# Patient Record
Sex: FEMALE | Race: WHITE | NOT HISPANIC OR LATINO | Employment: OTHER | URBAN - METROPOLITAN AREA
[De-identification: names, ages, dates, MRNs, and addresses within clinical notes are randomized per-mention and may not be internally consistent; named-entity substitution may affect disease eponyms.]

---

## 2017-03-01 ENCOUNTER — LAB CONVERSION - ENCOUNTER (OUTPATIENT)
Dept: OTHER | Facility: OTHER | Age: 77
End: 2017-03-01

## 2017-03-01 LAB
25(OH)D3 SERPL-MCNC: 39 NG/ML (ref 30–100)
BILIRUB UR QL STRIP: NEGATIVE
COLOR UR: YELLOW
COMMENT (HISTORICAL): CLEAR
DEPRECATED RDW RBC AUTO: 14.1 % (ref 11–15)
FECAL OCCULT BLOOD DIAGNOSTIC (HISTORICAL): NEGATIVE
GLUCOSE (HISTORICAL): NEGATIVE
HBA1C MFR BLD HPLC: 6.2 % OF TOTAL HGB
HCT VFR BLD AUTO: 42.5 % (ref 35–45)
HGB BLD-MCNC: 14 G/DL (ref 11.7–15.5)
KETONES UR STRIP-MCNC: NEGATIVE MG/DL
LEUKOCYTE ESTERASE UR QL STRIP: NEGATIVE
MCH RBC QN AUTO: 29.7 PG (ref 27–33)
MCHC RBC AUTO-ENTMCNC: 32.9 G/DL (ref 32–36)
MCV RBC AUTO: 90.1 FL (ref 80–100)
NITRITE UR QL STRIP: NEGATIVE
PH UR STRIP.AUTO: 5.5 [PH] (ref 5–8)
PLATELET # BLD AUTO: 211 THOUSAND/UL (ref 140–400)
PMV BLD AUTO: 9.6 FL (ref 7.5–12.5)
PROT UR STRIP-MCNC: NEGATIVE MG/DL
RBC # BLD AUTO: 4.71 MILLION/UL (ref 3.8–5.1)
SP GR UR STRIP.AUTO: 1.02 (ref 1–1.03)
TSH SERPL DL<=0.05 MIU/L-ACNC: 1.69 MIU/L (ref 0.4–4.5)
WBC # BLD AUTO: 7.6 THOUSAND/UL (ref 3.8–10.8)

## 2017-03-06 ENCOUNTER — ALLSCRIPTS OFFICE VISIT (OUTPATIENT)
Dept: OTHER | Facility: OTHER | Age: 77
End: 2017-03-06

## 2017-05-04 ENCOUNTER — ALLSCRIPTS OFFICE VISIT (OUTPATIENT)
Dept: OTHER | Facility: OTHER | Age: 77
End: 2017-05-04

## 2017-06-06 ENCOUNTER — GENERIC CONVERSION - ENCOUNTER (OUTPATIENT)
Dept: OTHER | Facility: OTHER | Age: 77
End: 2017-06-06

## 2017-06-28 ENCOUNTER — GENERIC CONVERSION - ENCOUNTER (OUTPATIENT)
Dept: OTHER | Facility: OTHER | Age: 77
End: 2017-06-28

## 2017-09-06 DIAGNOSIS — G40.209 LOCALZ-RLTD SYMPTOMATIC EPILEPSY W CMPLX PART SZ, NOTINTRAC, WO STATUS (HCC): ICD-10-CM

## 2017-09-06 DIAGNOSIS — I10 ESSENTIAL (PRIMARY) HYPERTENSION: ICD-10-CM

## 2017-09-06 DIAGNOSIS — C50.919 MALIGNANT NEOPLASM OF FEMALE BREAST (HCC): ICD-10-CM

## 2017-09-06 DIAGNOSIS — Z12.11 ENCOUNTER FOR SCREENING FOR MALIGNANT NEOPLASM OF COLON: ICD-10-CM

## 2017-09-06 DIAGNOSIS — M19.90 OSTEOARTHRITIS: ICD-10-CM

## 2017-09-06 DIAGNOSIS — R73.9 HYPERGLYCEMIA: ICD-10-CM

## 2017-09-06 DIAGNOSIS — D12.6 BENIGN NEOPLASM OF COLON: ICD-10-CM

## 2017-09-06 DIAGNOSIS — E78.5 HYPERLIPIDEMIA: ICD-10-CM

## 2017-09-06 DIAGNOSIS — M85.80 OTHER SPECIFIED DISORDERS OF BONE DENSITY AND STRUCTURE, UNSPECIFIED SITE: ICD-10-CM

## 2017-09-06 DIAGNOSIS — E66.9 OBESITY: ICD-10-CM

## 2017-09-06 DIAGNOSIS — R74.02 NONSPECIFIC ELEVATION OF LEVELS OF TRANSAMINASE AND LACTIC ACID DEHYDROGENASE (LDH): ICD-10-CM

## 2017-09-06 DIAGNOSIS — E55.9 VITAMIN D DEFICIENCY: ICD-10-CM

## 2017-09-06 DIAGNOSIS — R74.01 NONSPECIFIC ELEVATION OF LEVELS OF TRANSAMINASE AND LACTIC ACID DEHYDROGENASE (LDH): ICD-10-CM

## 2017-09-11 ENCOUNTER — LAB CONVERSION - ENCOUNTER (OUTPATIENT)
Dept: OTHER | Facility: OTHER | Age: 77
End: 2017-09-11

## 2017-09-11 LAB
25(OH)D3 SERPL-MCNC: 43 NG/ML (ref 30–100)
A/G RATIO (HISTORICAL): 1.8 (CALC) (ref 1–2.5)
ALBUMIN SERPL BCP-MCNC: 4.2 G/DL (ref 3.6–5.1)
ALP SERPL-CCNC: 85 U/L (ref 33–130)
ALT SERPL W P-5'-P-CCNC: 32 U/L (ref 6–29)
AST SERPL W P-5'-P-CCNC: 37 U/L (ref 10–35)
BILIRUB SERPL-MCNC: 0.8 MG/DL (ref 0.2–1.2)
BILIRUB UR QL STRIP: NEGATIVE
BUN SERPL-MCNC: 15 MG/DL (ref 7–25)
BUN/CREA RATIO (HISTORICAL): ABNORMAL (CALC) (ref 6–22)
CALCIUM SERPL-MCNC: 9.8 MG/DL (ref 8.6–10.4)
CHLORIDE SERPL-SCNC: 106 MMOL/L (ref 98–110)
CHOLEST SERPL-MCNC: 220 MG/DL
CHOLEST/HDLC SERPL: 4.2 (CALC)
CO2 SERPL-SCNC: 25 MMOL/L (ref 20–31)
COLOR UR: YELLOW
COMMENT (HISTORICAL): CLEAR
CREAT SERPL-MCNC: 0.74 MG/DL (ref 0.6–0.93)
DEPRECATED RDW RBC AUTO: 13.1 % (ref 11–15)
EGFR AFRICAN AMERICAN (HISTORICAL): 92 ML/MIN/1.73M2
EGFR-AMERICAN CALC (HISTORICAL): 80 ML/MIN/1.73M2
FECAL OCCULT BLOOD DIAGNOSTIC (HISTORICAL): NEGATIVE
GAMMA GLOBULIN (HISTORICAL): 2.4 G/DL (CALC) (ref 1.9–3.7)
GLUCOSE (HISTORICAL): 107 MG/DL (ref 65–99)
GLUCOSE (HISTORICAL): NEGATIVE
HBA1C MFR BLD HPLC: 6 % OF TOTAL HGB
HCT VFR BLD AUTO: 41.1 % (ref 35–45)
HDLC SERPL-MCNC: 53 MG/DL
HGB BLD-MCNC: 13.7 G/DL (ref 11.7–15.5)
INSULIN (HISTORICAL): 7.2 UIU/ML (ref 2–19.6)
KETONES UR STRIP-MCNC: NEGATIVE MG/DL
LDL CHOLESTEROL (HISTORICAL): 144 MG/DL (CALC)
LEUKOCYTE ESTERASE UR QL STRIP: NEGATIVE
MCH RBC QN AUTO: 29.4 PG (ref 27–33)
MCHC RBC AUTO-ENTMCNC: 33.3 G/DL (ref 32–36)
MCV RBC AUTO: 88.2 FL (ref 80–100)
NITRITE UR QL STRIP: NEGATIVE
NON-HDL-CHOL (CHOL-HDL) (HISTORICAL): 167 MG/DL (CALC)
PH UR STRIP.AUTO: 6 [PH] (ref 5–8)
PLATELET # BLD AUTO: 226 THOUSAND/UL (ref 140–400)
PMV BLD AUTO: 11.6 FL (ref 7.5–12.5)
POTASSIUM SERPL-SCNC: 4.5 MMOL/L (ref 3.5–5.3)
PROT UR STRIP-MCNC: NEGATIVE MG/DL
RBC # BLD AUTO: 4.66 MILLION/UL (ref 3.8–5.1)
SODIUM SERPL-SCNC: 141 MMOL/L (ref 135–146)
SP GR UR STRIP.AUTO: 1.02 (ref 1–1.03)
TOTAL PROTEIN (HISTORICAL): 6.6 G/DL (ref 6.1–8.1)
TRIGL SERPL-MCNC: 112 MG/DL
TSH SERPL DL<=0.05 MIU/L-ACNC: 1.48 MIU/L (ref 0.4–4.5)
WBC # BLD AUTO: 7.6 THOUSAND/UL (ref 3.8–10.8)

## 2017-09-12 ENCOUNTER — GENERIC CONVERSION - ENCOUNTER (OUTPATIENT)
Dept: OTHER | Facility: OTHER | Age: 77
End: 2017-09-12

## 2017-09-18 ENCOUNTER — GENERIC CONVERSION - ENCOUNTER (OUTPATIENT)
Dept: OTHER | Facility: OTHER | Age: 77
End: 2017-09-18

## 2017-10-02 ENCOUNTER — APPOINTMENT (OUTPATIENT)
Dept: LAB | Facility: HOSPITAL | Age: 77
End: 2017-10-02
Payer: MEDICARE

## 2017-10-02 ENCOUNTER — HOSPITAL ENCOUNTER (OUTPATIENT)
Dept: ULTRASOUND IMAGING | Facility: HOSPITAL | Age: 77
Discharge: HOME | End: 2017-10-02
Payer: MEDICARE

## 2017-10-02 ENCOUNTER — GENERIC CONVERSION - ENCOUNTER (OUTPATIENT)
Dept: OTHER | Facility: OTHER | Age: 77
End: 2017-10-02

## 2017-10-02 DIAGNOSIS — R20.0 ANESTHESIA OF SKIN: ICD-10-CM

## 2017-10-02 DIAGNOSIS — E78.5 HYPERLIPIDEMIA: ICD-10-CM

## 2017-10-02 DIAGNOSIS — R20.2 PARESTHESIA OF SKIN: ICD-10-CM

## 2017-10-02 DIAGNOSIS — E66.9 OBESITY: ICD-10-CM

## 2017-10-02 DIAGNOSIS — G45.9 TRANSIENT CEREBRAL ISCHEMIC ATTACK: ICD-10-CM

## 2017-10-02 DIAGNOSIS — R74.02 NONSPECIFIC ELEVATION OF LEVELS OF TRANSAMINASE AND LACTIC ACID DEHYDROGENASE (LDH): ICD-10-CM

## 2017-10-02 DIAGNOSIS — R74.01 NONSPECIFIC ELEVATION OF LEVELS OF TRANSAMINASE AND LACTIC ACID DEHYDROGENASE (LDH): ICD-10-CM

## 2017-10-02 DIAGNOSIS — I10 ESSENTIAL (PRIMARY) HYPERTENSION: ICD-10-CM

## 2017-10-02 DIAGNOSIS — E55.9 VITAMIN D DEFICIENCY: ICD-10-CM

## 2017-10-02 DIAGNOSIS — G40.209 LOCALZ-RLTD SYMPTOMATIC EPILEPSY W CMPLX PART SZ, NOTINTRAC, WO STATUS (HCC): ICD-10-CM

## 2017-10-02 DIAGNOSIS — M85.80 OTHER SPECIFIED DISORDERS OF BONE DENSITY AND STRUCTURE, UNSPECIFIED SITE: ICD-10-CM

## 2017-10-02 DIAGNOSIS — R73.9 HYPERGLYCEMIA: ICD-10-CM

## 2017-10-02 DIAGNOSIS — C50.919 MALIGNANT NEOPLASM OF FEMALE BREAST (HCC): ICD-10-CM

## 2017-10-02 LAB
ALBUMIN SERPL BCP-MCNC: 3.5 G/DL (ref 3.5–5)
ALP SERPL-CCNC: 82 U/L (ref 46–116)
ALT SERPL W P-5'-P-CCNC: 48 U/L (ref 12–78)
ANION GAP SERPL CALCULATED.3IONS-SCNC: 6 MMOL/L (ref 4–13)
AST SERPL W P-5'-P-CCNC: 44 U/L (ref 5–45)
BILIRUB SERPL-MCNC: 0.48 MG/DL (ref 0.2–1)
BILIRUB UR QL STRIP: NEGATIVE
BUN SERPL-MCNC: 19 MG/DL (ref 5–25)
CALCIUM SERPL-MCNC: 9.9 MG/DL (ref 8.3–10.1)
CHLORIDE SERPL-SCNC: 107 MMOL/L (ref 100–108)
CHOLEST SERPL-MCNC: 228 MG/DL (ref 50–200)
CLARITY UR: NORMAL
CO2 SERPL-SCNC: 30 MMOL/L (ref 21–32)
COLOR UR: YELLOW
CREAT SERPL-MCNC: 0.88 MG/DL (ref 0.6–1.3)
ERYTHROCYTE [DISTWIDTH] IN BLOOD BY AUTOMATED COUNT: 13.9 % (ref 11.6–15.1)
EST. AVERAGE GLUCOSE BLD GHB EST-MCNC: 137 MG/DL
GFR SERPL CREATININE-BSD FRML MDRD: 65 ML/MIN/1.73SQ M
GLUCOSE P FAST SERPL-MCNC: 114 MG/DL (ref 65–99)
GLUCOSE UR STRIP-MCNC: NEGATIVE MG/DL
HBA1C MFR BLD: 6.4 % (ref 4.2–6.3)
HCT VFR BLD AUTO: 42.7 % (ref 34.8–46.1)
HDLC SERPL-MCNC: 57 MG/DL (ref 40–60)
HGB BLD-MCNC: 13.9 G/DL (ref 11.5–15.4)
HGB UR QL STRIP.AUTO: NEGATIVE
KETONES UR STRIP-MCNC: NEGATIVE MG/DL
LDLC SERPL CALC-MCNC: 162 MG/DL (ref 0–100)
LEUKOCYTE ESTERASE UR QL STRIP: NEGATIVE
MCH RBC QN AUTO: 30.2 PG (ref 26.8–34.3)
MCHC RBC AUTO-ENTMCNC: 32.6 G/DL (ref 31.4–37.4)
MCV RBC AUTO: 93 FL (ref 82–98)
NITRITE UR QL STRIP: NEGATIVE
PH UR STRIP.AUTO: 5.5 [PH] (ref 4.5–8)
PLATELET # BLD AUTO: 231 THOUSANDS/UL (ref 149–390)
PMV BLD AUTO: 10.7 FL (ref 8.9–12.7)
POTASSIUM SERPL-SCNC: 4.6 MMOL/L (ref 3.5–5.3)
PROT SERPL-MCNC: 7.4 G/DL (ref 6.4–8.2)
PROT UR STRIP-MCNC: NEGATIVE MG/DL
RBC # BLD AUTO: 4.61 MILLION/UL (ref 3.81–5.12)
SODIUM SERPL-SCNC: 143 MMOL/L (ref 136–145)
SP GR UR STRIP.AUTO: >=1.03 (ref 1–1.03)
TRIGL SERPL-MCNC: 47 MG/DL
TSH SERPL DL<=0.05 MIU/L-ACNC: 1.6 UIU/ML (ref 0.36–3.74)
UROBILINOGEN UR QL STRIP.AUTO: 0.2 E.U./DL
WBC # BLD AUTO: 7.44 THOUSAND/UL (ref 4.31–10.16)

## 2017-10-02 PROCEDURE — 84443 ASSAY THYROID STIM HORMONE: CPT

## 2017-10-02 PROCEDURE — 36415 COLL VENOUS BLD VENIPUNCTURE: CPT

## 2017-10-02 PROCEDURE — 81003 URINALYSIS AUTO W/O SCOPE: CPT

## 2017-10-02 PROCEDURE — 76705 ECHO EXAM OF ABDOMEN: CPT

## 2017-10-02 PROCEDURE — 85027 COMPLETE CBC AUTOMATED: CPT

## 2017-10-02 PROCEDURE — 80053 COMPREHEN METABOLIC PANEL: CPT

## 2017-10-02 PROCEDURE — 80061 LIPID PANEL: CPT

## 2017-10-02 PROCEDURE — 83036 HEMOGLOBIN GLYCOSYLATED A1C: CPT

## 2017-10-05 ENCOUNTER — GENERIC CONVERSION - ENCOUNTER (OUTPATIENT)
Dept: OTHER | Facility: OTHER | Age: 77
End: 2017-10-05

## 2017-10-12 ENCOUNTER — HOSPITAL ENCOUNTER (OUTPATIENT)
Dept: BONE DENSITY | Facility: MEDICAL CENTER | Age: 77
Discharge: HOME | End: 2017-10-12
Payer: MEDICARE

## 2017-10-12 DIAGNOSIS — M85.80 OSTEOPENIA, UNSPECIFIED LOCATION: ICD-10-CM

## 2017-10-12 DIAGNOSIS — M85.80 OTHER SPECIFIED DISORDERS OF BONE DENSITY AND STRUCTURE: ICD-10-CM

## 2017-10-12 PROCEDURE — 77080 DXA BONE DENSITY AXIAL: CPT

## 2017-10-17 ENCOUNTER — GENERIC CONVERSION - ENCOUNTER (OUTPATIENT)
Dept: OTHER | Facility: OTHER | Age: 77
End: 2017-10-17

## 2018-01-09 NOTE — RESULT NOTES
Verified Results  * DXA BONE DENSITY SPINE HIP AND PELVIS [id number] 12:44PM Mindy Barnes Order Number: [id number]    - Patient Instructions: To schedule this appointment, please contact Central Scheduling at [phone]  Test Name Result Flag Reference   DXA BONE DENSITY SPINE HIP AND PELVIS (Report)     CENTRAL DXA SCAN     CLINICAL HISTORY:  78year old post-menopausal  female risk factors include estrogen deficiency  Personal history breast cancer  Bone losing medication, seizure type  TECHNIQUE: Bone densitometry was performed using a [employer] bone densitometer  Regions of interest appear properly placed  There are no obvious fractures or other confounding variables which could limit the study  Degenerative or    osteoarthritic changes are noted on the spine image at L2 and L3 which should be eliminated from evaluation but the computer did not eliminate those vertebrae  COMPARISON: Follow-up     RESULTS:    LUMBAR SPINE: L1-L4:   BMD 0 970 gm/cm2   T-score normal, -0 7 and unchanged from 2015 and 6% higher than in 2007  Z-score +1 7     LEFT TOTAL HIP:   BMD 0 827 gm/cm2   T-score normal, -0 9   Z-score 0 8     LEFT FEMORAL NECK:   BMD 0 611 gm/cm2   T-score below normal, -2 1 and 2% less than 2015 and 10% less than 2007, statistically significant decline  Z-score -0 1     The forearm BMD is 0 59 and the T score is below normal, -1 7 but 6% higher than in 2015 and 2% higher than in 2013, the 6% is statistically significant  Treatment is a consideration if appropriate to total clinical health evaluation       IMPRESSION:   1  Based on the [facility] classification, this study identifies a diagnosis of osteopenia, moderate at the femoral neck and forearm areas and the patient is considered at risk for fracture         2  A daily intake of calcium of at least 1200 mg and vitamin D, 800-1000 IU, as well as weight bearing and muscle strengthening exercise, fall prevention and avoidance of tobacco and excessive alcohol intake as basic preventive measures are recommended  3  Repeat DXA scan on the same equipment in 18-24 months as clinically indicated  The 10 year risk of hip fracture is 3 4%, with the 10 year risk of major osteoporotic fracture being 13%, as calculated by the [facility] fracture risk assessment tool (FRAX)  The current NOF guidelines recommend treating patients with FRAX 10 year risk score    of >3% for hip fracture and >20% for major osteoporotic fracture  Hip fracture risk exceeds treatment thresholds  WHO CLASSIFICATION:   Normal (a T-score of -1 0 or higher)   Low bone mineral density (a T-score of less than -1 0 but higher than -2 5)   Osteoporosis (a T-score of -2 5 or less)   Severe osteoporosis (a T-score of -2 5 or less with a fragility fracture)      Thank you for allowing us the opportunity to participate in your patient care  The expanded DEXA report will no longer be arriving in your mail  If you desire to view the full report please contact [facility] or access the PACS system         Workstation performed: [id number]     Signed by:   Peg Ryan MD   10/15/17

## 2018-01-12 ENCOUNTER — GENERIC CONVERSION - ENCOUNTER (OUTPATIENT)
Dept: FAMILY MEDICINE CLINIC | Facility: CLINIC | Age: 78
End: 2018-01-12

## 2018-01-13 VITALS
HEART RATE: 76 BPM | SYSTOLIC BLOOD PRESSURE: 134 MMHG | BODY MASS INDEX: 33.9 KG/M2 | DIASTOLIC BLOOD PRESSURE: 82 MMHG | WEIGHT: 198.6 LBS | HEIGHT: 64 IN

## 2018-01-14 VITALS
WEIGHT: 198 LBS | SYSTOLIC BLOOD PRESSURE: 134 MMHG | BODY MASS INDEX: 33.8 KG/M2 | DIASTOLIC BLOOD PRESSURE: 86 MMHG | HEIGHT: 64 IN | HEART RATE: 76 BPM | RESPIRATION RATE: 18 BRPM

## 2018-01-14 NOTE — RESULT NOTES
Verified Results  [address] [place] [address] [id number] 07:23AM Kym Bradley Order Number: [id number]    - Patient Instructions: To schedule this appointment, please contact Central Scheduling at [phone]  Test Name Result Flag Reference   US ABDOMEN LIMITED (Report)     RIGHT UPPER QUADRANT ULTRASOUND     INDICATION: R74 0: Nonspecific elevation of levels of transaminase and lactic acid dehydrogenase (ldh) (this clinical history is taken directly from the electronic ordering system)  COMPARISON: None  TECHNIQUE:  Real-time ultrasound of the right upper quadrant was performed with a curvilinear transducer with both volumetric sweeps and still imaging techniques  FINDINGS:     PANCREAS: Visualized portions of the pancreas are within normal limits  AORTA AND IVC: Visualized portions are normal for patient age  LIVER:   Size: Within normal range  The liver measures 14 6 cm in the midclavicular line  Contour: Surface contour is smooth  Parenchyma: There is marked diffuse increased echogenicity with smooth echotexture and significant beam attenuation with loss of periportal echogenicity  Most consistent with severe hepatic steatosis  No sonographically detectable suspicious solid mass  A cyst in the lateral left hepatic lobe measuring 8 mm appears to be decreased in size when compared to June 2009  Limited imaging of the main portal vein shows it to be patent and hepatopetal       BILIARY:   The gallbladder is normal in caliber  No wall thickening or pericholecystic fluid  There is an echogenic nodule along the anterior wall of the gallbladder body with extensive ringdown artifact  No intraluminal gallstones  Although there is no distinct sonographic Kwok's sign, the patient does report tenderness on scanning in the gallbladder region  No intrahepatic biliary dilatation  CBD measures 6 mm  No choledocholithiasis  KIDNEY:    Right kidney measures 10 5 cm  There is a small echogenic focus in the midportion of the right renal cortex, 4 mm probably representing small cyst with milk of calcium  Right kidney is otherwise unremarkable  No hydronephrosis  ASCITES:  None  IMPRESSION:     Profound hepatic steatosis  Ringdown artifact from the anterior wall of the gallbladder body, suggestive of cholesterolosis and possibly focal gallbladder adenomyomatosis  No evidence of cholelithiasis  The patient reports some tenderness with scanning in the gallbladder region         Workstation performed: [id number]     Signed by:   Jacquelyn Leon MD   10/5/17

## 2018-01-17 NOTE — RESULT NOTES
Verified Results  (1) CBC/ PLT (NO DIFF) 02Oct2017 08:09AM Tommie Martinez Order Number: [id number]     Test Name Result Flag Reference   HEMATOCRIT 42 7 %  34 8-46 1   HEMOGLOBIN 13 9 g/dL  11 5-15 4   MCHC 32 6 g/dL  31 4-37 4   MCH 30 2 pg  26 8-34 3   MCV 93 fL  82-98   PLATELET COUNT 472 Thousands/uL  149-390   RBC COUNT 4 61 Million/uL  3 81-5 12   RDW 13 9 %  11 6-15 1   WBC COUNT 7 44 Thousand/uL  4 31-10 16   MPV 10 7 fL  8 9-12 7     (1) COMPREHENSIVE METABOLIC PANEL 21LDH1479 92:52LA Juan Francisco Sanchez Order Number: [id number]     Test Name Result Flag Reference   SODIUM 143 mmol/L  136-145   POTASSIUM 4 6 mmol/L  3 5-5 3   CHLORIDE 107 mmol/L  100-108   CARBON DIOXIDE 30 mmol/L  21-32   ANION GAP (CALC) 6 mmol/L  4-13   BLOOD UREA NITROGEN 19 mg/dL  5-25   CREATININE 0 88 mg/dL  0 60-1 30   Standardized to IDMS reference method   CALCIUM 9 9 mg/dL  8 3-10 1   BILI, TOTAL 0 48 mg/dL  0 20-1 00   ALK PHOSPHATAS 82 U/L     ALT (SGPT) 48 U/L  12-78   Specimen collection should occur prior to Sulfasalazine administration due to the potential for falsely depressed results  AST(SGOT) 44 U/L  5-45   Specimen collection should occur prior to Sulfasalazine administration due to the potential for falsely depressed results  ALBUMIN 3 5 g/dL  3 5-5 0   TOTAL PROTEIN 7 4 g/dL  6 4-8 2   eGFR 65 ml/min/1 73sq m     National Kidney Disease Education Program recommendations are as follows:  GFR calculation is accurate only with a steady state creatinine  Chronic Kidney disease less than 60 ml/min/1 73 sq  meters  Kidney failure less than 15 ml/min/1 73 sq  meters  GLUCOSE FASTING 114 mg/dL H 65-99   Specimen collection should occur prior to Sulfasalazine administration due to the potential for falsely depressed results  Specimen collection should occur prior to Sulfapyridine administration due to the potential for falsely elevated results       (1) HEMOGLOBIN A1C 02Oct2017 08: 15ZF Ale Prieto Order Number: [id number]     Test Name Result Flag Reference   HEMOGLOBIN A1C 6 4 % H 4 2-6 3   EST  AVG  GLUCOSE 137 mg/dl       (1) LIPID PANEL, FASTING [id number] 08:09AM Reyes Sanchez Order Number: [id number]     Test Name Result Flag Reference   CHOLESTEROL 228 mg/dL H    HDL,DIRECT 57 mg/dL  40-60   Specimen collection should occur prior to Metamizole administration due to the potential for falsley depressed results  LDL CHOLESTEROL CALCULATED 162 mg/dL H 0-100   Triglyceride:        Normal <150 mg/dl   Borderline High 150-199 mg/dl   High 200-499 mg/dl   Very High >499 mg/dl      Cholesterol:       Desirable <200 mg/dl    Borderline High 200-239 mg/dl    High >239 mg/dl      HDL Cholesterol:       High>59 mg/dL    Low <41 mg/dL      This screening LDL is a calculated result  It does not have the accuracy of the Direct Measured LDL in the monitoring of patients with hyperlipidemia and/or statin therapy  Direct Measure LDL (CYZ324) must be ordered separately in these patients  TRIGLYCERIDES 47 mg/dL  <=150   Specimen collection should occur prior to N-Acetylcysteine or Metamizole administration due to the potential for falsely depressed results  (1) TSH 02Oct2017 08:09AM Reyes Sanchez Order Number: [id number]     Test Name Result Flag Reference   TSH 1 600 uIU/mL  0 358-3 740   Patients undergoing fluorescein dye angiography may retain small amounts of fluorescein in the body for 48-72 hours post procedure  Samples containing fluorescein can produce falsely depressed TSH values  If the patient had this procedure,a specimen should be resubmitted post fluorescein clearance            The recommended reference ranges for TSH during pregnancy are as follows:  First trimester 0 1 to 2 5 uIU/mL  Second trimester  0 2 to 3 0 uIU/mL  Third trimester 0 3 to 3 0 uIU/m     (1) URINALYSIS (will reflex a microscopy if leukocytes, occult blood, protein or nitrites are not within normal limits) 02Oct2017 08:09AM Schmeltzle, Sherra Boast Order Number: [id number]     Test Name Result Flag Reference   COLOR Yellow     CLARITY Slightly Cloudy     SPECIFIC GRAVITY UA >=1 030  1 003-1 030   PH UA 5 5  4 5-8 0   LEUKOCYTE ESTERASE UA Negative  Negative   NITRITE UA Negative  Negative   PROTEIN UA Negative mg/dl  Negative   GLUCOSE UA Negative mg/dl  Negative   KETONES UA Negative mg/dl  Negative   UROBILINOGEN UA 0 2 E U /dl  0 2, 1 0 E U /dl   BILIRUBIN UA Negative  Negative   BLOOD UA Negative  Negative, Trace-Intact

## 2018-01-18 NOTE — RESULT NOTES
Message   Recorded as Task   Date: 10/16/2017 07:02 AM, Created By: Mick Sanchez   Task Name: Call Patient with results   Assigned To: Mick Sanchez   Regarding Patient: Loy Mon, Status:  In Progress   Comment:    Mick Sanchez - 16 Oct 2017 7:02 AM     Patient Phone: [phone]    DEXA scan shows osteopenia patient to use calcium 1200 mg daily, multivitamin daily, vitamin-D a lose 2000 mg daily, repeat DEXA scan 2 years   [place] Lassiter - 17 Oct 2017 10:50 AM     TASK IN PROGRESS   [place] Lassiter - 17 Oct 2017 10:52 AM     TASK EDITED  [facility] for patient to call for DEXA results and instructions   Sofie Ward - 17 Oct 2017 11:21 AM     TASK EDITED  Pt notified of results of DEXA and recommendations        Signatures   Electronically signed by : Parth Dacosta, ; Oct 17 2017 11:21AM EST                       (Author)

## 2018-01-22 ENCOUNTER — GENERIC CONVERSION - ENCOUNTER (OUTPATIENT)
Dept: OTHER | Facility: OTHER | Age: 78
End: 2018-01-22

## 2018-01-22 VITALS
DIASTOLIC BLOOD PRESSURE: 90 MMHG | HEART RATE: 76 BPM | WEIGHT: 197 LBS | HEIGHT: 64 IN | BODY MASS INDEX: 33.63 KG/M2 | SYSTOLIC BLOOD PRESSURE: 146 MMHG

## 2018-01-23 NOTE — PROGRESS NOTES
Assessment    1  Encounter for preventive health examination (V70 0) (Z00 00)   2  Hearing loss (389 9) (H91 90)    Plan  Health Maintenance, Hearing loss    · Continue with our present treatment plan ; Status:Complete;   Done: 76BNI5410  10:34AM    Discussion/Summary    1  Health maintenance, patient declines evaluation for hearing loss  Chief Complaint  Medicare questionnaire discussed with patient      History of Present Illness  The patient is being seen for the subsequent annual wellness visit  Medicare Screening and Risk Factors   Hospitalizations: no previous hospitalizations  Once per lifetime medicare screening tests: ECG and AAA screening US has not yet been done  Medicare Screening Tests Risk Questions   Abdominal aortic aneurysm risk assessment: CT scan of abdomen 2012 no aneurysm seen, but none indicated  Osteoporosis risk assessment: In computer  HIV risk assessment: none indicated  Drug and Alcohol Use: The patient is a former cigarette smoker and Quit 2010  She has 30 pack year(s) of cigarette use  The patient reports rare alcohol use  Alcohol concern:   The patient has no concerns about alcohol abuse  She has never used illicit drugs  Diet and Physical Activity: Current diet includes well balanced meals  She exercises 3 times per week  Exercise: walking, stretching 2 hours per week  Mood Disorder and Cognitive Impairment Screening: PHQ-9 Depression Scale Anxiety screening No anxiety  Depression screening  negative for symptoms  She denies feeling down, depressed, or hopeless over the past two weeks  She denies feeling little interest or pleasure in doing things over the past two weeks  Cognitive impairment screening: denies difficulty learning/retaining new information, denies difficulty handling complex tasks, denies difficulty with reasoning, denies difficulty with spatial ability and orientation, denies difficulty with language and denies difficulty with behavior  Functional Ability/Level of Safety: Hearing is slightly decreased  She reports hearing difficulties  She does not use a hearing aid  Patient refuses allergy allergy evaluation for hearing aids  The patient is currently able to do activities of daily living without limitations, able to do instrumental activities of daily living without limitations, able to participate in social activities without limitations and able to drive without limitations  Activities of daily living details: does not need help using the phone, no transportation help needed, does not need help shopping, no meal preparation help needed, does not need help doing housework, does not need help doing laundry, does not need help managing medications and does not need help managing money  Fall risk factors: The patient fell 0 times in the past 12 months  Injury History: no polypharmacy, no alcohol use, no mobility impairment, no antidepressant use, no deconditioning, no postural hypotension, no sedative use, no visual impairment, no urinary incontinence, antihypertensive use, no cognitive impairment, up and go test was normal and no previous fall  Home safety risk factors:  no unfamiliar surroundings, no loose rugs, no poor household lighting, no uneven floors, no household clutter, grab bars in the bathroom and handrails on the stairs  Advance Directives: Advance directives: living will, durable power of  for health care directives and advance directives  end of life decisions were reviewed with the patient and I agree with the patient's decisions  Co-Managers and Medical Equipment/Suppliers: See Patient Care Team      Patient Care Team    Care Team Member Role Specialty Office Number   Lb Silva MD  Obstetrics/Gynecology [phone]     Active Problems    1  Anxiety (300 00) (F41 9)   2  Arthritis (716 90) (M19 90)   3  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)   4  Breast cancer (174 9) (C50 919)   5   Complex partial seizure (345 40) (G40 209)   6  Cystocele, midline (618 01) (N81 11)   7  Encounter for gynecological examination with abnormal finding (V72 31) (Z01 411)   8  Esophageal reflux (530 81) (K21 9)   9  Hearing loss (389 9) (H91 90)   10  Hyperglycemia (790 29) (R73 9)   11  Hyperlipidemia (272 4) (E78 5)   12  Hypertension (401 9) (I10)   13  Moderate osteopenia (733 90) (M85 80)   14  Numbness and tingling of foot (782 0) (R20 0,R20 2)   15  Obesity (278 00) (E66 9)   16  Osteoarthritis (715 90) (M19 90)   17  Screen for colon cancer (V76 51) (Z12 11)   18  Transient ischemic attack (435 9) (G45 9)   19  Vitamin D deficiency (268 9) (E55 9)    Past Medical History    1  History of Change in stool habits (787 99) (R19 4)   2  History of Elevated serum alkaline phosphatase level (790 5) (R74 8)   3  History of constipation (V12 79) (Z87 19)   4  History of hypercalcemia (V12 29) (Z86 39)   5  History of rheumatic fever (V12 09) (Z86 79)    Surgical History    1  History of Breast Surgery Lumpectomy   2  History of Tubal Ligation    Family History  Mother    1  Family history of Breast Cancer (V16 3)  Father    2  Family history of Coronary Artery Disease (V17 49)  Brother    3  Family history of Bone cancer   4  Family history of Lung Cancer (V16 1)    Social History    · Activities   · Being A Social Drinker   · Daily Coffee Consumption (1  Cups/Day)   · Daily Tea Consumption (___ Cups/Day)   · Former smoker (V15 82) (Z29 589)   · Hearing loss (389 9) (H91 90)   · Marital History -  (V61 03)   · Occupation: Retired    Current Meds   1  Aspirin 81 MG TABS; Therapy: (Recorded:22Oct2013) to Recorded   2  Better B Complex Oral Tablet; Take 2 tablets daily; Last Rx:86Uja3541 Ordered   3  CoQ10 100 MG Oral Capsule; Therapy: (Recorded:22Oct2013) to Recorded   4  Glucosamine-Chondroitin--419-502 MG Oral Tablet; Therapy: (Recorded:22Oct2013) to Recorded   5   Grape Seed Extract 100 MG Oral Capsule; Therapy: (([phone] 783-8816) to Recorded   6  LevETIRAcetam 500 MG Oral Tablet (Keppra); TAKE ONE TABLET BY MOUTH 2 TIMES A   DAY; Therapy: 18Erv8514 to (Kemi Suárez)  Requested for: 03LZQ6729; Last   Rx:04Nov2016 Ordered   7  Lisinopril 10 MG Oral Tablet; TAKE ONE TABLET BY MOUTH ONCE DAILY FOR BLOOD   PRESSURE; Therapy: 44EGP8532 to (Last Rx:16Mar2017)  Requested for: 93XEK7706 Ordered   8  Lumigan 0 03 % SOLN (Bimatoprost); Therapy: (Recorded:14Gsw4561) to Recorded   9  Magnesium Citrate TABS; Therapy: (Recorded:22Oct2013) to Recorded   10  Metoprolol Succinate  MG Oral Tablet Extended Release 24 Hour; take 1 tablet by    mouth once daily; Therapy: 19FPN0350 to (Last Rx:26Jun2017)  Requested for: 26Jun2017 Ordered   11  Milk Thistle 175 MG Oral Capsule; Therapy: (Kb Latus) to Recorded   12  Multiple Vitamin CAPS; Therapy: (Kb Latus) to Recorded   13  Omega 3 1000 MG Oral Capsule; Therapy: (Kb Latus) to Recorded   14  Turmeric 500 MG Oral Capsule; Therapy: (Kb Latus) to Recorded   15  Vitamin B-12 1000 MCG Oral Tablet; Therapy: (Recorded:22Oct2013) to Recorded   16  Vitamin D 2000 UNIT Oral Capsule; Take 2 capsules daily Recorded    Allergies    1  Crestor TABS   2  EPINEPHrine Base AERS   3  Iodides   4  Lipitor TABS   5  Sulfa Drugs   6  TEGretol TABS   7  Topamax TABS   8  Morphine Derivatives   9  Tetracyclines    10   Latex    Immunizations  Influenza --- Hari Mote: Permanently Deferred: Pt refuses, 76QZU0194   PCV --- Hari Mote: Permanently Deferred: Medical Deferral, patient refused, [id number]   PPSV --- Hari Mote: Permanently Deferred: Pt refuses, [id number]   Td/DT --- Hari Mote: Permanently Deferred: Medical Deferral, patient refused, [id number]   Zoster --- Hari Mote: Permanently Deferred: Medical Deferral, patient refused, [id number]     Future Appointments    Date/Time Provider Specialty Site   05/04/2018 10:00 AM Duane Cutler, Julee Haque DO Neurology [facility] [address]     Signatures   Electronically signed by : Jessica Downing DO; Sep 18 2017 10:34AM EST                       (Author)

## 2018-01-24 NOTE — MISCELLANEOUS
Message  Rec'd a call from Nishant Barnhart at [facility]  patient had an appendectomy on 1/15/18  Patient's incisions look good but her BP today is 168/100, has +1 pitting edema, is SOB when walking ( but patient says that's normal)  Her O2 sat  is 94-95%  They have not called the surgeon yet  Per Dr Swapna Santana, she needs to make an appt  or call the surgeon  (Dr Juan Floyd was already already gone for the day)  Nishant Barnhart aware and states that patient has to depend on her sister for a ride and will call us tomorrow and schedule an appt  ak      Active Problems    1  Anxiety (300 00) (F41 9)   2  Arthritis (716 90) (M19 90)   3  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)   4  Breast cancer (174 9) (C50 919)   5  Complex partial seizure (345 40) (G40 209)   6  Cystocele, midline (618 01) (N81 11)   7  Encounter for gynecological examination with abnormal finding (V72 31) (Z01 411)   8  Esophageal reflux (530 81) (K21 9)   9  Hearing loss (389 9) (H91 90)   10  Hyperglycemia (790 29) (R73 9)   11  Hyperlipidemia (272 4) (E78 5)   12  Hypertension (401 9) (I10)   13  Moderate osteopenia (733 90) (M85 80)   14  Numbness and tingling of foot (782 0) (R20 0,R20 2)   15  Obesity (278 00) (E66 9)   16  Osteoarthritis (715 90) (M19 90)   17  Screen for colon cancer (V76 51) (Z12 11)   18  Transaminitis (790 4) (R74 0)   19  Transient ischemic attack (435 9) (G45 9)   20  Vitamin D deficiency (268 9) (E55 9)    Current Meds   1  Aspirin 81 MG TABS; Therapy: (Recorded:22Oct2013) to Recorded   2  Better B Complex Oral Tablet; Take 2 tablets daily; Last Rx:23Cim0789 Ordered   3  CoQ10 100 MG Oral Capsule; Therapy: (Recorded:22Oct2013) to Recorded   4  Glucosamine-Chondroitin-MSM 957318-641 MG Oral Tablet; Therapy: (Recorded:95Jht6010) to Recorded   5  Grape Seed Extract 100 MG Oral Capsule; Therapy: (76 477 82 45) to Recorded   6   LevETIRAcetam 500 MG Oral Tablet (Keppra); TAKE ONE TABLET BY MOUTH 2 TIMES   A DAY;   Therapy: 42Mcj4835 to (Evaluate:30Apr2018)  Requested for: 78OND1745; Last   Rx:01Nov2017 Ordered   7  Lisinopril 10 MG Oral Tablet; TAKE ONE TABLET BY MOUTH ONCE DAILY FOR BLOOD   PRESSURE; Therapy: 01XSO1049 to (Last Rx:16Mar2017)  Requested for: 92DPA4335 Ordered   8  Lumigan 0 03 % SOLN (Bimatoprost); Therapy: (Recorded:93Pdy3928) to Recorded   9  Magnesium Citrate TABS; Therapy: (Recorded:22Oct2013) to Recorded   10  Metoprolol Succinate  MG Oral Tablet Extended Release 24 Hour; take 1 tablet    by mouth once daily; Therapy: 91MCA8017 to (Last Rx:26Jun2017)  Requested for: 26Jun2017 Ordered   11  Milk Thistle 175 MG Oral Capsule; Therapy: (Lanney Oven) to Recorded   12  Multiple Vitamin CAPS; Therapy: (Lanney Oven) to Recorded   13  Omega 3 1000 MG Oral Capsule; Therapy: (Lanney Oven) to Recorded   14  Turmeric 500 MG Oral Capsule; Therapy: (Lanney Oven) to Recorded   15  Vitamin B-12 1000 MCG Oral Tablet; Therapy: (Recorded:22Oct2013) to Recorded   16  Vitamin D 2000 UNIT Oral Capsule; Take 2 capsules daily Recorded    Allergies    1  Crestor TABS   2  EPINEPHrine Base AERS   3  Iodides   4  Lipitor TABS   5  Sulfa Drugs   6  TEGretol TABS   7  Topamax TABS   8  Morphine Derivatives   9  Tetracyclines    10  Latex    Signatures   Electronically signed by :  Luciano Oshea, ; Jan 22 2018  5:31PM EST                       (Author)

## 2018-01-26 ENCOUNTER — OFFICE VISIT (OUTPATIENT)
Dept: FAMILY MEDICINE CLINIC | Facility: CLINIC | Age: 78
End: 2018-01-26
Payer: MEDICARE

## 2018-01-26 VITALS
BODY MASS INDEX: 31.38 KG/M2 | SYSTOLIC BLOOD PRESSURE: 120 MMHG | WEIGHT: 183.8 LBS | DIASTOLIC BLOOD PRESSURE: 88 MMHG | HEIGHT: 64 IN

## 2018-01-26 DIAGNOSIS — Z90.49 HISTORY OF APPENDECTOMY: Primary | ICD-10-CM

## 2018-01-26 DIAGNOSIS — R73.9 HYPERGLYCEMIA: ICD-10-CM

## 2018-01-26 DIAGNOSIS — I10 ESSENTIAL HYPERTENSION: ICD-10-CM

## 2018-01-26 DIAGNOSIS — K21.9 GASTROESOPHAGEAL REFLUX DISEASE WITHOUT ESOPHAGITIS: ICD-10-CM

## 2018-01-26 PROBLEM — R74.01 TRANSAMINITIS: Status: ACTIVE | Noted: 2017-09-18

## 2018-01-26 PROBLEM — M85.80 MODERATE OSTEOPENIA: Status: ACTIVE | Noted: 2017-09-18

## 2018-01-26 PROCEDURE — 99495 TRANSJ CARE MGMT MOD F2F 14D: CPT | Performed by: PHYSICIAN ASSISTANT

## 2018-01-26 RX ORDER — MULTIVIT-MIN/IRON/FOLIC ACID/K 18-600-40
2 CAPSULE ORAL DAILY
COMMUNITY

## 2018-01-26 RX ORDER — OMEPRAZOLE 20 MG/1
20 CAPSULE, DELAYED RELEASE ORAL
COMMUNITY
End: 2018-01-26 | Stop reason: ALTCHOICE

## 2018-01-26 RX ORDER — LISINOPRIL 10 MG/1
TABLET ORAL
COMMUNITY
Start: 2017-12-22 | End: 2018-04-04 | Stop reason: SDUPTHER

## 2018-01-26 RX ORDER — ALPRAZOLAM 0.25 MG/1
0.25 TABLET, ORALLY DISINTEGRATING ORAL 2 TIMES DAILY
COMMUNITY
End: 2019-01-15

## 2018-01-26 RX ORDER — LEVETIRACETAM 500 MG/1
250 TABLET ORAL
COMMUNITY
End: 2018-11-07 | Stop reason: SDUPTHER

## 2018-01-26 RX ORDER — VITAMIN B COMPLEX
TABLET ORAL
COMMUNITY

## 2018-01-26 RX ORDER — AMOXICILLIN AND CLAVULANATE POTASSIUM 875; 125 MG/1; MG/1
1 TABLET, FILM COATED ORAL
COMMUNITY
Start: 2018-01-17 | End: 2018-01-31

## 2018-01-26 RX ORDER — METOPROLOL SUCCINATE 200 MG/1
TABLET, EXTENDED RELEASE ORAL
COMMUNITY
Start: 2017-12-22 | End: 2018-03-29 | Stop reason: SDUPTHER

## 2018-01-26 RX ORDER — PROPRANOLOL/HYDROCHLOROTHIAZID 40 MG-25MG
1 TABLET ORAL DAILY
COMMUNITY

## 2018-01-26 NOTE — PROGRESS NOTES
CC:  Patient was contacted within 2 days of her discharge by our office to follow up post hospitalization for transition of care visit  She was admitted to [facility] with appendicitis and had laparoscopic appendectomy  Assessment/Plan:    No problem-specific Assessment & Plan notes found for this encounter  Diagnoses and all orders for this visit:    History of appendectomy  Comments:  Patient appears to be doing remarkably well after laparoscopic appendectomy with follow-up on Monday with General surgery  Hyperglycemia  Comments:  Last hemoglobin A1c at 6 4  Continue regular follow-up with Dr Twila Mendoza in March  Essential hypertension  Comments:  Presently stable on metoprolol and lisinopril, no medication changes  Gastroesophageal reflux disease without esophagitis  Comments:  Stable with diet control, patient has discontinued omeprazole 20 mg daily  Other orders  -     ALPRAZolam (NIRAVAM) 0 25 MG dissolvable tablet; Take 0 25 mg by mouth 2 (two) times a day  -     amoxicillin-clavulanate (AUGMENTIN) 875-125 mg per tablet; Take 1 tablet by mouth  -     aspirin 81 MG tablet; Take 162 mg by mouth  -     B Complex Vitamins (B COMPLEX 1 PO); Take 2 tablets by mouth daily  -     Coenzyme Q10 (COQ10) 100 MG CAPS; Take by mouth  -     Glucosamine-Chondroitin--218-864 MG TABS; Take by mouth  -     levETIRAcetam (KEPPRA) 500 mg tablet; Take 250 mg by mouth  -     lisinopril (ZESTRIL) 10 mg tablet;   -     metoprolol succinate (TOPROL-XL) 200 MG 24 hr tablet;   -     Discontinue: omeprazole (PriLOSEC) 20 mg delayed release capsule; Take 20 mg by mouth  -     Turmeric 500 MG CAPS; Take by mouth  -     Cholecalciferol (VITAMIN D) 2000 units CAPS; Take 2 capsules by mouth daily          Subjective:      Patient ID: Nicole Madrid is a 78 y o  female  This is a 19-year-old female who presents to the office for follow-up of recent hospitalization    She was hospitalized about 15 days ago for happened situs  She had removal of the appendix laparoscopically  She was discharged from the hospital after 5 days  She was sent home with amoxicillin/clavulanic acid antibiotic for 2 weeks of therapy  She is having less pain and feeling better  She has follow-up with her general surgeon in 3 days  She also has a history of hyperglycemia which she has been following with Dr Cielo Winter  She has follow-up with him in March scheduled  She also has a history of esophageal reflux disease which has been stable currently diet controlled  She has been off of omeprazole medication  Her blood pressure has been stable with metoprolol and lisinopril  The following portions of the patient's history were reviewed and updated as appropriate: allergies, current medications, past family history, past medical history, past social history, past surgical history and problem list     Review of Systems   Constitutional: Negative for chills, fatigue and fever  HENT: Negative for congestion, ear pain and sinus pressure  Eyes: Negative for visual disturbance  Respiratory: Negative for cough, chest tightness and shortness of breath  Cardiovascular: Negative for chest pain and palpitations  Gastrointestinal: Negative for diarrhea, nausea and vomiting  Endocrine: Negative for polyuria  Genitourinary: Negative for dysuria and frequency  Musculoskeletal: Negative for arthralgias and myalgias  Skin: Negative for pallor and rash  Neurological: Negative for dizziness, weakness, light-headedness, numbness and headaches  Psychiatric/Behavioral: Negative for agitation, behavioral problems and sleep disturbance  All other systems reviewed and are negative  Objective:     Physical Exam   Constitutional: She is oriented to person, place, and time  She appears well-developed and well-nourished  No distress  HENT:   Head: Normocephalic and atraumatic     Right Ear: External ear normal  Left Ear: External ear normal    Nose: Nose normal    Mouth/Throat: Oropharynx is clear and moist  No oropharyngeal exudate  Eyes: Conjunctivae and EOM are normal  Pupils are equal, round, and reactive to light  Neck: Normal range of motion  Neck supple  No tracheal deviation present  No thyromegaly present  Cardiovascular: Normal rate, regular rhythm and normal heart sounds  Exam reveals no friction rub  No murmur heard  Pulmonary/Chest: Effort normal and breath sounds normal  No respiratory distress  She has no wheezes  She has no rales  Abdominal: Soft  Bowel sounds are normal  She exhibits no distension  There is no tenderness  There is no rebound and no guarding  There are 3 small incisions approximately 1-1 5 cm in length in the lower left abdominal and central abdominal region  These are clean, dry, intact without erythema or evidence of hematoma formation  There is no purulence or discharge  Musculoskeletal: Normal range of motion  She exhibits no edema or tenderness  Ambulating with a walker  Lymphadenopathy:     She has no cervical adenopathy  Neurological: She is alert and oriented to person, place, and time  No cranial nerve deficit  Coordination normal    Skin: Skin is warm and dry  No rash noted  No erythema  Psychiatric: She has a normal mood and affect  Her behavior is normal  Thought content normal    Nursing note and vitals reviewed

## 2018-02-21 PROBLEM — K35.30 ACUTE APPENDICITIS WITH LOCALIZED PERITONITIS: Status: ACTIVE | Noted: 2018-01-12

## 2018-02-21 PROBLEM — Z85.3 PERSONAL HISTORY OF BREAST CANCER: Status: ACTIVE | Noted: 2017-04-27

## 2018-02-21 PROBLEM — Z92.3 PERSONAL HISTORY OF RADIATION THERAPY: Status: ACTIVE | Noted: 2017-04-27

## 2018-02-21 PROBLEM — R97.8 ABNORMAL TUMOR MARKERS: Status: ACTIVE | Noted: 2017-04-27

## 2018-02-21 RX ORDER — BIMATOPROST 0.3 MG/ML
1 SOLUTION/ DROPS OPHTHALMIC
COMMUNITY

## 2018-03-18 DIAGNOSIS — R20.0 ANESTHESIA OF SKIN: ICD-10-CM

## 2018-03-18 DIAGNOSIS — E55.9 VITAMIN D DEFICIENCY: ICD-10-CM

## 2018-03-18 DIAGNOSIS — E78.5 HYPERLIPIDEMIA: ICD-10-CM

## 2018-03-18 DIAGNOSIS — R73.9 HYPERGLYCEMIA: ICD-10-CM

## 2018-03-18 DIAGNOSIS — E66.9 OBESITY: ICD-10-CM

## 2018-03-18 DIAGNOSIS — G45.9 TRANSIENT CEREBRAL ISCHEMIC ATTACK: ICD-10-CM

## 2018-03-18 DIAGNOSIS — R20.2 PARESTHESIA OF SKIN: ICD-10-CM

## 2018-03-18 DIAGNOSIS — I10 ESSENTIAL (PRIMARY) HYPERTENSION: ICD-10-CM

## 2018-03-18 DIAGNOSIS — M85.80 OTHER SPECIFIED DISORDERS OF BONE DENSITY AND STRUCTURE, UNSPECIFIED SITE: ICD-10-CM

## 2018-03-18 DIAGNOSIS — G40.209 LOCALZ-RLTD SYMPTOMATIC EPILEPSY W CMPLX PART SZ, NOTINTRAC, WO STATUS (HCC): ICD-10-CM

## 2018-03-18 DIAGNOSIS — C50.919 MALIGNANT NEOPLASM OF FEMALE BREAST (HCC): ICD-10-CM

## 2018-03-27 LAB
ALBUMIN SERPL-MCNC: 4.5 G/DL (ref 3.6–5.1)
ALBUMIN/GLOB SERPL: 1.7 (CALC) (ref 1–2.5)
ALP SERPL-CCNC: 93 U/L (ref 33–130)
ALT SERPL-CCNC: 25 U/L (ref 6–29)
AST SERPL-CCNC: 28 U/L (ref 10–35)
BILIRUB DIRECT SERPL-MCNC: 0.1 MG/DL
BILIRUB INDIRECT SERPL-MCNC: 0.7 MG/DL (CALC) (ref 0.2–1.2)
BILIRUB SERPL-MCNC: 0.8 MG/DL (ref 0.2–1.2)
GLOBULIN SER CALC-MCNC: 2.7 G/DL (CALC) (ref 1.9–3.7)
HAV IGM SERPL QL IA: NORMAL
HBV CORE IGM SERPL QL IA: NORMAL
HBV SURFACE AG SERPL QL IA: NORMAL
HCV AB S/CO SERPL IA: 0.01
HCV AB SERPL QL IA: NORMAL
PROT SERPL-MCNC: 7.2 G/DL (ref 6.1–8.1)

## 2018-03-29 ENCOUNTER — OFFICE VISIT (OUTPATIENT)
Dept: FAMILY MEDICINE CLINIC | Facility: CLINIC | Age: 78
End: 2018-03-29
Payer: MEDICARE

## 2018-03-29 VITALS
WEIGHT: 180 LBS | HEART RATE: 64 BPM | HEIGHT: 65 IN | BODY MASS INDEX: 29.99 KG/M2 | DIASTOLIC BLOOD PRESSURE: 82 MMHG | SYSTOLIC BLOOD PRESSURE: 138 MMHG

## 2018-03-29 DIAGNOSIS — E55.9 VITAMIN D DEFICIENCY: ICD-10-CM

## 2018-03-29 DIAGNOSIS — M79.601 PAIN OF RIGHT UPPER EXTREMITY: ICD-10-CM

## 2018-03-29 DIAGNOSIS — E78.5 HYPERLIPIDEMIA, UNSPECIFIED HYPERLIPIDEMIA TYPE: ICD-10-CM

## 2018-03-29 DIAGNOSIS — I10 ESSENTIAL HYPERTENSION: Primary | ICD-10-CM

## 2018-03-29 DIAGNOSIS — Z12.39 SCREENING BREAST EXAMINATION: ICD-10-CM

## 2018-03-29 DIAGNOSIS — G45.9 TRANSIENT CEREBRAL ISCHEMIA, UNSPECIFIED TYPE: ICD-10-CM

## 2018-03-29 DIAGNOSIS — C50.919 MALIGNANT NEOPLASM OF FEMALE BREAST, UNSPECIFIED ESTROGEN RECEPTOR STATUS, UNSPECIFIED LATERALITY, UNSPECIFIED SITE OF BREAST (HCC): ICD-10-CM

## 2018-03-29 DIAGNOSIS — R73.9 HYPERGLYCEMIA: ICD-10-CM

## 2018-03-29 DIAGNOSIS — E04.1 RIGHT THYROID NODULE: ICD-10-CM

## 2018-03-29 DIAGNOSIS — G40.209 PARTIAL SYMPTOMATIC EPILEPSY WITH COMPLEX PARTIAL SEIZURES, NOT INTRACTABLE, WITHOUT STATUS EPILEPTICUS (HCC): ICD-10-CM

## 2018-03-29 PROBLEM — K44.9 HIATAL HERNIA: Status: ACTIVE | Noted: 2018-01-29

## 2018-03-29 PROBLEM — K35.30 ACUTE APPENDICITIS WITH LOCALIZED PERITONITIS: Status: RESOLVED | Noted: 2018-01-12 | Resolved: 2018-03-29

## 2018-03-29 PROBLEM — Z90.49 S/P LAPAROSCOPIC APPENDECTOMY: Status: ACTIVE | Noted: 2018-02-12

## 2018-03-29 PROBLEM — R74.01 TRANSAMINITIS: Status: RESOLVED | Noted: 2017-09-18 | Resolved: 2018-03-29

## 2018-03-29 PROCEDURE — 99214 OFFICE O/P EST MOD 30 MIN: CPT | Performed by: FAMILY MEDICINE

## 2018-03-29 RX ORDER — METOPROLOL SUCCINATE 200 MG/1
TABLET, EXTENDED RELEASE ORAL
Qty: 90 TABLET | Refills: 0 | Status: SHIPPED | OUTPATIENT
Start: 2018-03-29 | End: 2018-04-06 | Stop reason: SDUPTHER

## 2018-03-29 RX ORDER — GABAPENTIN 300 MG/1
300 CAPSULE ORAL
Qty: 30 CAPSULE | Refills: 5 | Status: SHIPPED | OUTPATIENT
Start: 2018-03-29 | End: 2019-04-30 | Stop reason: SDUPTHER

## 2018-03-29 NOTE — PROGRESS NOTES
Assessment/Plan:  1  Hypertension, stable continue present therapy  2  Thyroid nodule per patient, there is a notation of right thyroid nodule on patient's diagnostic list from hospital   Check ultrasound and blood work  3  Right upper extremity pain favor neuropathy, rule out cervical or carpal tunnel, EMG is ordered Neurontin was ordered drowsiness discussed  4  Hyperglycemia/hyperlipidemia, blood work is ordered  5  Vitamin-D deficiency, blood work is ordered the 6  Breast cancer patient follows  6  TIA, no current symptomatology continue to monitor  7  Patient to return in 1 month      Right thyroid nodule  Check thyroid ultrasound of blood work    Transient ischemic attack  Stable asymptomatic at the present time    Hypertension  Stable continue present therapy    Complex partial seizure ([address] )  Stable continue present therapy follow-up with Neurology    Hyperlipidemia  Blood work ordered    Hyperglycemia  Blood work ordered    Breast cancer [facility])  Follow-up with Oncology as ordered    Pain of right upper extremity  I believe this seems neuropathic, EMG of right upper extremity was ordered to rule out cervical radiculopathy or carpal tunnel syndrome, Neurontin was ordered drowsiness discussed       Diagnoses and all orders for this visit:    Essential hypertension  -     CBC; Future  -     Comprehensive metabolic panel; Future  -     HEMOGLOBIN A1C W/ EAG ESTIMATION; Future  -     Lipid Panel with Direct LDL reflex; Future  -     T4; Future  -     TSH, 3rd generation with T4 reflex; Future  -     Urinalysis with microscopic; Future    Screening breast examination  -     Mammo screening bilateral w 3d & cad; Future    Right thyroid nodule  -     US thyroid; Future  -     CBC; Future  -     Comprehensive metabolic panel; Future  -     HEMOGLOBIN A1C W/ EAG ESTIMATION; Future  -     Lipid Panel with Direct LDL reflex; Future  -     T4; Future  -     TSH, 3rd generation with T4 reflex;  Future  - Urinalysis with microscopic; Future    Vitamin D deficiency  -     CBC; Future  -     Comprehensive metabolic panel; Future  -     HEMOGLOBIN A1C W/ EAG ESTIMATION; Future  -     Lipid Panel with Direct LDL reflex; Future  -     T4; Future  -     TSH, 3rd generation with T4 reflex; Future  -     Urinalysis with microscopic; Future  -     Vitamin D 25 hydroxy; Future    Hyperglycemia  -     CBC; Future  -     Comprehensive metabolic panel; Future  -     HEMOGLOBIN A1C W/ EAG ESTIMATION; Future  -     Lipid Panel with Direct LDL reflex; Future  -     T4; Future  -     TSH, 3rd generation with T4 reflex; Future  -     Urinalysis with microscopic; Future    Hyperlipidemia, unspecified hyperlipidemia type  -     CBC; Future  -     Comprehensive metabolic panel; Future  -     HEMOGLOBIN A1C W/ EAG ESTIMATION; Future  -     Lipid Panel with Direct LDL reflex; Future  -     T4; Future  -     TSH, 3rd generation with T4 reflex; Future  -     Urinalysis with microscopic; Future    Malignant neoplasm of female breast, unspecified estrogen receptor status, unspecified laterality, unspecified site of breast ([facility] )  -     CBC; Future  -     Comprehensive metabolic panel; Future  -     HEMOGLOBIN A1C W/ EAG ESTIMATION; Future  -     Lipid Panel with Direct LDL reflex; Future  -     T4; Future  -     TSH, 3rd generation with T4 reflex; Future  -     Urinalysis with microscopic; Future    Transient cerebral ischemia, unspecified type  -     CBC; Future  -     Comprehensive metabolic panel; Future  -     HEMOGLOBIN A1C W/ EAG ESTIMATION; Future  -     Lipid Panel with Direct LDL reflex; Future  -     T4; Future  -     TSH, 3rd generation with T4 reflex; Future  -     Urinalysis with microscopic; Future    Partial symptomatic epilepsy with complex partial seizures, not intractable, without status epilepticus ([facility] )  -     CBC; Future  -     Comprehensive metabolic panel; Future  -     HEMOGLOBIN A1C W/ EAG ESTIMATION;  Future  - Lipid Panel with Direct LDL reflex; Future  -     T4; Future  -     TSH, 3rd generation with T4 reflex; Future  -     Urinalysis with microscopic; Future    Pain of right upper extremity  -     EMG 1 Limb; Future  -     gabapentin (NEURONTIN) 300 mg capsule; Take 1 capsule (300 mg total) by mouth daily at bedtime  -     CBC; Future  -     Comprehensive metabolic panel; Future  -     HEMOGLOBIN A1C W/ EAG ESTIMATION; Future  -     Lipid Panel with Direct LDL reflex; Future  -     T4; Future  -     TSH, 3rd generation with T4 reflex; Future  -     Urinalysis with microscopic; Future          Subjective:      Patient ID: Kleber Montes is a 78 y o  female  Patient is status post laparoscopic appendectomy earlier this year  Abdominal is doing well  No fever chills abdominal pain nausea vomiting  Patient is having long-standing right neck to right hand shooting pain at times  No weakness or paresthesias  Patient was told by her surgeon at the hospital that she has a lump on her thyroid that she should tell her doctor about  Pt here for f/u to chronic conditions,lab results  The following portions of the patient's history were reviewed and updated as appropriate: allergies, current medications, past family history, past medical history, past social history, past surgical history and problem list     Review of Systems   Constitutional: Negative for chills and fever  HENT: Negative  Eyes: Negative  Respiratory: Negative  Cardiovascular: Negative  Gastrointestinal:        HPI   Endocrine:        HPI   Genitourinary: Negative  Musculoskeletal:        HPI   Skin: Negative  Allergic/Immunologic: Negative  Hematological: Negative  Psychiatric/Behavioral: Negative            Objective    Vitals:    03/29/18 0957 03/29/18 1009   BP: 128/98 138/82   Pulse: 64    Weight: 81 6 kg (180 lb)    Height: 5' 4 5" (1 638 m)          /82   Pulse 64   Ht 5' 4 5" (1 638 m)   Wt 81 6 kg (180 lb)   BMI 30 42 kg/m²          Physical Exam   Constitutional: She appears well-developed and well-nourished  HENT:   Head: Normocephalic and atraumatic  Mouth/Throat: Oropharynx is clear and moist    Eyes: Conjunctivae are normal  Pupils are equal, round, and reactive to light  Neck: Neck supple  No thyromegaly present  Cardiovascular: Normal rate, regular rhythm and normal heart sounds  Pulmonary/Chest: Effort normal and breath sounds normal    Abdominal: Soft  Bowel sounds are normal    Musculoskeletal:   Cervical spine   Neurological: She is alert  She displays normal reflexes  No cranial nerve deficit  Coordination normal    Skin: Skin is warm  Psychiatric: She has a normal mood and affect

## 2018-03-29 NOTE — ASSESSMENT & PLAN NOTE
I believe this seems neuropathic, EMG of right upper extremity was ordered to rule out cervical radiculopathy or carpal tunnel syndrome, Neurontin was ordered drowsiness discussed

## 2018-03-30 ENCOUNTER — HOSPITAL ENCOUNTER (OUTPATIENT)
Dept: NEUROLOGY | Facility: CLINIC | Age: 78
Discharge: HOME | End: 2018-03-30
Payer: MEDICARE

## 2018-03-30 DIAGNOSIS — G56.01 CARPAL TUNNEL SYNDROME OF RIGHT WRIST: Primary | ICD-10-CM

## 2018-03-30 DIAGNOSIS — M79.601 PAIN OF RIGHT UPPER EXTREMITY: ICD-10-CM

## 2018-03-30 PROCEDURE — 95910 NRV CNDJ TEST 7-8 STUDIES: CPT | Performed by: PSYCHIATRY & NEUROLOGY

## 2018-03-30 PROCEDURE — 95886 MUSC TEST DONE W/N TEST COMP: CPT | Performed by: PSYCHIATRY & NEUROLOGY

## 2018-04-04 DIAGNOSIS — I10 ESSENTIAL HYPERTENSION: Primary | ICD-10-CM

## 2018-04-04 RX ORDER — LISINOPRIL 10 MG/1
TABLET ORAL
Qty: 90 TABLET | Refills: 3 | Status: SHIPPED | OUTPATIENT
Start: 2018-04-04 | End: 2019-04-04 | Stop reason: SDUPTHER

## 2018-04-06 DIAGNOSIS — I10 ESSENTIAL HYPERTENSION: ICD-10-CM

## 2018-04-09 RX ORDER — METOPROLOL SUCCINATE 200 MG/1
200 TABLET, EXTENDED RELEASE ORAL DAILY
Qty: 90 TABLET | Refills: 3 | Status: SHIPPED | OUTPATIENT
Start: 2018-04-09 | End: 2018-12-24 | Stop reason: SDUPTHER

## 2018-04-10 ENCOUNTER — HOSPITAL ENCOUNTER (OUTPATIENT)
Dept: ULTRASOUND IMAGING | Facility: HOSPITAL | Age: 78
Discharge: HOME | End: 2018-04-10
Payer: MEDICARE

## 2018-04-10 DIAGNOSIS — E04.1 RIGHT THYROID NODULE: ICD-10-CM

## 2018-04-10 PROCEDURE — 76536 US EXAM OF HEAD AND NECK: CPT

## 2018-04-27 LAB
25(OH)D3 SERPL-MCNC: 46 NG/ML (ref 30–100)
ALBUMIN SERPL-MCNC: 4.3 G/DL (ref 3.6–5.1)
ALBUMIN/GLOB SERPL: 1.6 (CALC) (ref 1–2.5)
ALP SERPL-CCNC: 84 U/L (ref 33–130)
ALT SERPL-CCNC: 22 U/L (ref 6–29)
APPEARANCE UR: CLEAR
AST SERPL-CCNC: 26 U/L (ref 10–35)
BACTERIA UR QL AUTO: ABNORMAL /HPF
BASOPHILS # BLD AUTO: 50 CELLS/UL (ref 0–200)
BASOPHILS NFR BLD AUTO: 0.7 %
BILIRUB SERPL-MCNC: 0.7 MG/DL (ref 0.2–1.2)
BILIRUB UR QL STRIP: NEGATIVE
BUN SERPL-MCNC: 14 MG/DL (ref 7–25)
BUN/CREAT SERPL: ABNORMAL (CALC) (ref 6–22)
CALCIUM SERPL-MCNC: 9.9 MG/DL (ref 8.6–10.4)
CHLORIDE SERPL-SCNC: 108 MMOL/L (ref 98–110)
CHOLEST SERPL-MCNC: 228 MG/DL
CHOLEST/HDLC SERPL: 3.7 (CALC)
CO2 SERPL-SCNC: 27 MMOL/L (ref 20–31)
COLOR UR: YELLOW
CREAT SERPL-MCNC: 0.85 MG/DL (ref 0.6–0.93)
EOSINOPHIL # BLD AUTO: 79 CELLS/UL (ref 15–500)
EOSINOPHIL NFR BLD AUTO: 1.1 %
ERYTHROCYTE [DISTWIDTH] IN BLOOD BY AUTOMATED COUNT: 13 % (ref 11–15)
EST. AVERAGE GLUCOSE BLD GHB EST-MCNC: 120 (CALC)
EST. AVERAGE GLUCOSE BLD GHB EST-SCNC: 6.6 (CALC)
GLOBULIN SER CALC-MCNC: 2.7 G/DL (CALC) (ref 1.9–3.7)
GLUCOSE SERPL-MCNC: 107 MG/DL (ref 65–99)
GLUCOSE UR QL STRIP: NEGATIVE
HBA1C MFR BLD: 5.8 % OF TOTAL HGB
HCT VFR BLD AUTO: 41.5 % (ref 35–45)
HDLC SERPL-MCNC: 62 MG/DL
HGB BLD-MCNC: 13.9 G/DL (ref 11.7–15.5)
HGB UR QL STRIP: NEGATIVE
HYALINE CASTS #/AREA URNS LPF: ABNORMAL /LPF
KETONES UR QL STRIP: NEGATIVE
LDLC SERPL CALC-MCNC: 145 MG/DL (CALC)
LEUKOCYTE ESTERASE UR QL STRIP: ABNORMAL
LYMPHOCYTES # BLD AUTO: 1786 CELLS/UL (ref 850–3900)
LYMPHOCYTES NFR BLD AUTO: 24.8 %
MCH RBC QN AUTO: 30 PG (ref 27–33)
MCHC RBC AUTO-ENTMCNC: 33.5 G/DL (ref 32–36)
MCV RBC AUTO: 89.6 FL (ref 80–100)
MONOCYTES # BLD AUTO: 360 CELLS/UL (ref 200–950)
MONOCYTES NFR BLD AUTO: 5 %
NEUTROPHILS # BLD AUTO: 4925 CELLS/UL (ref 1500–7800)
NEUTROPHILS NFR BLD AUTO: 68.4 %
NITRITE UR QL STRIP: NEGATIVE
NONHDLC SERPL-MCNC: 166 MG/DL (CALC)
PH UR STRIP: 6.5 [PH] (ref 5–8)
PLATELET # BLD AUTO: 205 THOUSAND/UL (ref 140–400)
PMV BLD REES-ECKER: 11.5 FL (ref 7.5–12.5)
POTASSIUM SERPL-SCNC: 4.6 MMOL/L (ref 3.5–5.3)
PROT SERPL-MCNC: 7 G/DL (ref 6.1–8.1)
PROT UR QL STRIP: NEGATIVE
RBC # BLD AUTO: 4.63 MILLION/UL (ref 3.8–5.1)
RBC #/AREA URNS HPF: ABNORMAL /HPF
SERVICE CMNT-IMP: ABNORMAL
SL AMB EGFR AFRICAN AMERICAN: 78 ML/MIN/1.73M2
SL AMB EGFR NON AFRICAN AMERICAN: 67 ML/MIN/1.73M2
SODIUM SERPL-SCNC: 144 MMOL/L (ref 135–146)
SP GR UR STRIP: 1.02 (ref 1–1.03)
SQUAMOUS #/AREA URNS HPF: ABNORMAL /HPF
T4 SERPL-MCNC: 9.1 MCG/DL (ref 4.5–12)
TRIGL SERPL-MCNC: 100 MG/DL
TSH SERPL-ACNC: 1.47 MIU/L (ref 0.4–4.5)
WBC # BLD AUTO: 7.2 THOUSAND/UL (ref 3.8–10.8)
WBC #/AREA URNS HPF: ABNORMAL /HPF

## 2018-05-02 ENCOUNTER — OFFICE VISIT (OUTPATIENT)
Dept: FAMILY MEDICINE CLINIC | Facility: CLINIC | Age: 78
End: 2018-05-02
Payer: MEDICARE

## 2018-05-02 ENCOUNTER — HOSPITAL ENCOUNTER (OUTPATIENT)
Dept: RADIOLOGY | Facility: HOSPITAL | Age: 78
Discharge: HOME | End: 2018-05-02
Payer: MEDICARE

## 2018-05-02 VITALS
HEART RATE: 72 BPM | RESPIRATION RATE: 20 BRPM | DIASTOLIC BLOOD PRESSURE: 76 MMHG | WEIGHT: 178 LBS | BODY MASS INDEX: 29.66 KG/M2 | HEIGHT: 65 IN | SYSTOLIC BLOOD PRESSURE: 132 MMHG

## 2018-05-02 DIAGNOSIS — G45.9 TRANSIENT CEREBRAL ISCHEMIA, UNSPECIFIED TYPE: ICD-10-CM

## 2018-05-02 DIAGNOSIS — E04.1 RIGHT THYROID NODULE: Primary | ICD-10-CM

## 2018-05-02 DIAGNOSIS — R73.9 HYPERGLYCEMIA: ICD-10-CM

## 2018-05-02 DIAGNOSIS — C50.919 MALIGNANT NEOPLASM OF FEMALE BREAST, UNSPECIFIED ESTROGEN RECEPTOR STATUS, UNSPECIFIED LATERALITY, UNSPECIFIED SITE OF BREAST (HCC): ICD-10-CM

## 2018-05-02 DIAGNOSIS — G89.29 CHRONIC RIGHT SHOULDER PAIN: ICD-10-CM

## 2018-05-02 DIAGNOSIS — M15.9 OSTEOARTHRITIS OF MULTIPLE JOINTS, UNSPECIFIED OSTEOARTHRITIS TYPE: ICD-10-CM

## 2018-05-02 DIAGNOSIS — E55.9 VITAMIN D DEFICIENCY: ICD-10-CM

## 2018-05-02 DIAGNOSIS — E78.5 HYPERLIPIDEMIA, UNSPECIFIED HYPERLIPIDEMIA TYPE: ICD-10-CM

## 2018-05-02 DIAGNOSIS — M25.511 CHRONIC RIGHT SHOULDER PAIN: ICD-10-CM

## 2018-05-02 DIAGNOSIS — I10 ESSENTIAL HYPERTENSION: ICD-10-CM

## 2018-05-02 PROBLEM — M79.601 PAIN OF RIGHT UPPER EXTREMITY: Status: RESOLVED | Noted: 2018-03-29 | Resolved: 2018-05-02

## 2018-05-02 PROBLEM — M25.519 SHOULDER PAIN: Status: ACTIVE | Noted: 2018-05-02

## 2018-05-02 PROCEDURE — 99214 OFFICE O/P EST MOD 30 MIN: CPT | Performed by: FAMILY MEDICINE

## 2018-05-02 PROCEDURE — 73030 X-RAY EXAM OF SHOULDER: CPT

## 2018-05-02 NOTE — PATIENT INSTRUCTIONS
Complete x-ray and physical therapy evaluation for right shoulder  If shoulder pain is not resolved in 2-3 weeks return to office    Otherwise return in 6 months

## 2018-05-02 NOTE — PROGRESS NOTES
Assessment/Plan:  1  Hypertension, stable continue present therapy  2  TIA, stable no recurrence  3  Right thyroid nodule, thyroid function is normal thyroid ultrasound shows benign process which is stable no further thyroid ultrasounds of be needed  4  Seizure disorder, stable patient follows up with Neurology  5  Right shoulder pain/DJD, Voltaren gel was ordered, x-rays ordered referred to physical therapy if not a symptom-free in 2-3 weeks return to office  6  Right carpal tunnel syndrome, well controlled with gabapentin at bedtime and cock-up wrist splint  7  Vitamin-D deficiency, stable continue present therapy  8  Hyperlipidemia LDL still elevated patient declines medications  9  Hyperglycemia, diet-controlled  10  Breast cancer patient follows up with Oncology  11  Patient to return in 6 months for office visit blood work  If the shoulder still problematic in the next 2-3 weeks return at that point    Right thyroid nodule  Thyroid labs are normal, thyroid ultrasound is stable no further thyroid ultrasounds will be needed    Hypertension  Stable continue present therapy    Transient ischemic attack  Stable no recurrence    Complex partial seizure ([address] )  Stable patient follows up with Neurology    Carpal tunnel syndrome of right wrist  Continue gabapentin and cock-up wrist splint    Osteoarthritis  Voltaren gel, x-ray and physical therapy are ordered    Vitamin D deficiency  Stable continue present therapy    Shoulder pain  Check x-ray, use Voltaren gel, referred to physical therapy    Hyperlipidemia  LDL is mildly elevated 145  Patient declines medications    Breast cancer [facility])  Follow-up with oncologist       Diagnoses and all orders for this visit:    Right thyroid nodule  -     CBC; Future  -     Comprehensive metabolic panel; Future  -     Cancel: HEMOGLOBIN A1C W/ EAG ESTIMATION; Future  -     Lipid Panel with Direct LDL reflex; Future  -     TSH, 3rd generation with T4 reflex;  Future  - Urinalysis with microscopic; Future  -     Vitamin D 25 hydroxy; Future    Essential hypertension  -     CBC; Future  -     Comprehensive metabolic panel; Future  -     Cancel: HEMOGLOBIN A1C W/ EAG ESTIMATION; Future  -     Lipid Panel with Direct LDL reflex; Future  -     TSH, 3rd generation with T4 reflex; Future  -     Urinalysis with microscopic; Future  -     Vitamin D 25 hydroxy; Future    Transient cerebral ischemia, unspecified type  -     CBC; Future  -     Comprehensive metabolic panel; Future  -     Cancel: HEMOGLOBIN A1C W/ EAG ESTIMATION; Future  -     Lipid Panel with Direct LDL reflex; Future  -     TSH, 3rd generation with T4 reflex; Future  -     Urinalysis with microscopic; Future  -     Vitamin D 25 hydroxy; Future    Osteoarthritis of multiple joints, unspecified osteoarthritis type  -     CBC; Future  -     Comprehensive metabolic panel; Future  -     Cancel: HEMOGLOBIN A1C W/ EAG ESTIMATION; Future  -     Lipid Panel with Direct LDL reflex; Future  -     TSH, 3rd generation with T4 reflex; Future  -     Urinalysis with microscopic; Future  -     Vitamin D 25 hydroxy; Future  -     XR shoulder 2+ vw right; Future  -     Ambulatory referral to Physical Therapy; Future  -     diclofenac sodium (VOLTAREN) 1 %; Apply 2 g topically 4 (four) times a day    Vitamin D deficiency  -     CBC; Future  -     Comprehensive metabolic panel; Future  -     Cancel: HEMOGLOBIN A1C W/ EAG ESTIMATION; Future  -     Lipid Panel with Direct LDL reflex; Future  -     TSH, 3rd generation with T4 reflex; Future  -     Urinalysis with microscopic; Future  -     Vitamin D 25 hydroxy; Future    Chronic right shoulder pain  -     XR shoulder 2+ vw right; Future  -     Ambulatory referral to Physical Therapy; Future  -     diclofenac sodium (VOLTAREN) 1 %; Apply 2 g topically 4 (four) times a day    Hyperlipidemia, unspecified hyperlipidemia type  -     CBC; Future  -     Comprehensive metabolic panel;  Future  -     Cancel: HEMOGLOBIN A1C W/ EAG ESTIMATION; Future  -     Lipid Panel with Direct LDL reflex; Future  -     TSH, 3rd generation with T4 reflex; Future  -     Urinalysis with microscopic; Future  -     Vitamin D 25 hydroxy; Future    Malignant neoplasm of female breast, unspecified estrogen receptor status, unspecified laterality, unspecified site of breast ([address] )  -     CBC; Future  -     Comprehensive metabolic panel; Future  -     Cancel: HEMOGLOBIN A1C W/ EAG ESTIMATION; Future  -     Lipid Panel with Direct LDL reflex; Future  -     TSH, 3rd generation with T4 reflex; Future  -     Urinalysis with microscopic; Future  -     Vitamin D 25 hydroxy; Future    Hyperglycemia  -     HEMOGLOBIN A1C W/ EAG ESTIMATION; Future          Subjective:     Pt here for follow up and to review blood work results  CAIN Salgado     Patient ID: Vivian Love is a 78 y o  female  Blood work, EMG, and thyroid ultrasound were all discussed with the patient  Patient's right hand and wrist are asymptomatic using gabapentin at bedtime and wrist splint  However patient states her right shoulder is intermittently painful  Comes and goes mainly with use  She feels that-she hears grinding in her shoulder joint itself  The following portions of the patient's history were reviewed and updated as appropriate: allergies, current medications, past family history, past medical history, past social history, past surgical history and problem list     Review of Systems   Constitutional: Negative  HENT: Negative  Eyes: Negative  Respiratory: Negative  Cardiovascular: Negative  Gastrointestinal: Negative  Endocrine:        Per thyroid ultrasound no further thyroid ultrasound follow-up is needed   Genitourinary: Negative  Musculoskeletal:        HPI   Skin: Negative  Allergic/Immunologic: Negative  Neurological:        HPI   Hematological: Negative  Psychiatric/Behavioral: Negative            Objective: Physical Exam   Constitutional: She is oriented to person, place, and time  She appears well-developed and well-nourished  HENT:   Head: Normocephalic and atraumatic  Mouth/Throat: Oropharynx is clear and moist    Eyes: Conjunctivae and EOM are normal  Pupils are equal, round, and reactive to light  No scleral icterus  Neck: Neck supple  No thyromegaly present  Cardiovascular: Normal rate, regular rhythm and normal heart sounds  Pulmonary/Chest: Effort normal and breath sounds normal    Abdominal: Soft  Bowel sounds are normal  There is no tenderness  Musculoskeletal: She exhibits no edema  Right shoulder pain with greater than 30° 80 abduction  Negative point tenderness negative deformity right upper extremities neurovascularly intact   Neurological: She is alert and oriented to person, place, and time  Right wrist without Tinel's   Skin: Skin is warm  Psychiatric: She has a normal mood and affect

## 2018-05-07 ENCOUNTER — TELEPHONE (OUTPATIENT)
Dept: FAMILY MEDICINE CLINIC | Facility: CLINIC | Age: 78
End: 2018-05-07

## 2018-06-15 ENCOUNTER — OFFICE VISIT (OUTPATIENT)
Dept: NEUROLOGY | Facility: CLINIC | Age: 78
End: 2018-06-15
Payer: MEDICARE

## 2018-06-15 VITALS
RESPIRATION RATE: 14 BRPM | SYSTOLIC BLOOD PRESSURE: 140 MMHG | HEART RATE: 78 BPM | BODY MASS INDEX: 29.82 KG/M2 | HEIGHT: 65 IN | WEIGHT: 179 LBS | DIASTOLIC BLOOD PRESSURE: 80 MMHG

## 2018-06-15 DIAGNOSIS — G40.209 PARTIAL SYMPTOMATIC EPILEPSY WITH COMPLEX PARTIAL SEIZURES, NOT INTRACTABLE, WITHOUT STATUS EPILEPTICUS (HCC): Primary | ICD-10-CM

## 2018-06-15 PROCEDURE — 99213 OFFICE O/P EST LOW 20 MIN: CPT | Performed by: PSYCHIATRY & NEUROLOGY

## 2018-06-15 NOTE — ASSESSMENT & PLAN NOTE
Same dose Keppra 500mg bid , she sometimes takes 250 mg bid   Pt is stable , no reoccurrence     revived labs , normal     F/u in one year

## 2018-06-15 NOTE — PROGRESS NOTES
Patient ID: Maggie Magaña is a 78 y o  female  Assessment/Plan:    Complex partial seizure (HCC)  Same dose Keppra 500mg bid , she sometimes takes 250 mg bid  Pt is stable , no reoccurrence     revived labs , normal     F/u in one year         Diagnoses and all orders for this visit:    Partial symptomatic epilepsy with complex partial seizures, not intractable, without status epilepticus ([address] )         Subjective:     this a 79 y/o right handed female who presents as a f/u regarding partial complex seizures  she was last evaluated one year ago   In 2005 she was noted to have 2 episodes characterized by a brief alteration of consciousness  the workup did reveal left temporal sharp waves  she was placed on Tegretol  she developed rash and was placed on Topamax  it was discontinued due to side effects  she was started on [address] gpo bid  she admits to using 250 mg po bid on occasion    she has had not further seizures or side effects  higher dose did also result in behavioral changes  no recent seizures  today she compllains of right shoulder pain   Xray of the shoulder did show  Degenerative changes seen within the glenohumeral joint  Enthesopathic changes in the greater tuberosity emg study showed a mild cts but no evidence of a radiculopathy      Last EEG was in 2008  it showed b/L sharp activity ( right greater than left)  She has been treated for breast CA in 2007/2008 with lumpectomy and XRT  Jan 2018, appendectomy     She take melatonin 3 mg daily   She is requesting an additional script of xanax       4/26/18 , cbc nl  cmp glucose 107 , other wise normal     Hb 5 8 4/26/18            The following portions of the patient's history were reviewed and updated as appropriate:   She  has a past medical history of Elevated serum alkaline phosphatase level; Hypercalcemia; and Rheumatic fever  She  has a past surgical history that includes Breast lumpectomy (Left) and Tubal ligation    Her family history includes Bone cancer in her brother; Breast cancer in her mother; Coronary artery disease in her father; Lung cancer in her brother  She  reports that she has quit smoking  She has never used smokeless tobacco  She reports that she drinks alcohol  She reports that she does not use drugs  Current Outpatient Prescriptions   Medication Sig Dispense Refill    ALPRAZolam (NIRAVAM) 0 25 MG dissolvable tablet Take 0 25 mg by mouth 2 (two) times a day      aspirin 81 MG tablet Take 162 mg by mouth      B Complex Vitamins (B COMPLEX 1 PO) Take 2 tablets by mouth daily      bimatoprost (LUMIGAN) 0 03 % ophthalmic drops Apply to eye      Cholecalciferol (VITAMIN D) 2000 units CAPS Take 2 capsules by mouth daily      Coenzyme Q10 (COQ10) 100 MG CAPS Take by mouth      diclofenac sodium (VOLTAREN) 1 % Apply 2 g topically 4 (four) times a day 1 Tube 5    gabapentin (NEURONTIN) 300 mg capsule Take 1 capsule (300 mg total) by mouth daily at bedtime 30 capsule 5    Glucosamine-Chondroitin--400-375 MG TABS Take by mouth      levETIRAcetam (KEPPRA) 500 mg tablet Take 250 mg by mouth      lisinopril (ZESTRIL) 10 mg tablet TAKE ONE TABLET BY MOUTH ONCE DAILY FOR BLOOD PRESSURE 90 tablet 3    metoprolol succinate (TOPROL-XL) 200 MG 24 hr tablet Take 1 tablet (200 mg total) by mouth daily 90 tablet 3    Turmeric 500 MG CAPS Take by mouth       No current facility-administered medications for this visit  She is allergic to carbamazepine; epinephrine; iodides; morphine and related; sulfamethoxazole-trimethoprim; latex; and topiramate            Objective:    Blood pressure 140/80, pulse 78, resp  rate 14, height 5' 4 5" (1 638 m), weight 81 2 kg (179 lb)  Physical Exam   Constitutional: She appears well-developed  HENT:   Head: Normocephalic  Eyes: EOM are normal  Pupils are equal, round, and reactive to light  Neck: Normal range of motion     Psychiatric: Her speech is normal  Neurological Exam    Mental Status  The patient is alert and oriented to person, place, time, and situation  She has no visuospatial neglect  Her speech is normal  She has normal attention span and concentration  She has a normal fund of knowledge  Cranial Nerves    CN II: The patient's visual acuity and visual fields are normal   CN III, IV, VI: The patient's pupils are equally round and reactive to light and ocular movements are normal   CN V: The patient has normal facial sensation  CN VII:  The patient has symmetric facial movement  CN VIII:  The patient's hearing is normal   CN IX, X: The patient has symmetric palate movement and normal gag reflex  CN XI: The patient's shoulder shrug strength is normal   CN XII: The patient's tongue is midline without atrophy or fasciculations  Motor    Decrease rom in rigth shoulder  But better      Sensory  The patient's sensation is to light touch  Reflexes  1     Gait and Coordination   She has a wide stance  She has normal right finger to nose and normal left finger to nose coordination  ROS:    Review of Systems   Constitutional: Negative  Negative for appetite change and fever  HENT: Positive for hearing loss and tinnitus  Negative for trouble swallowing and voice change  Sinus problems,   Eyes: Negative  Negative for photophobia and pain  Respiratory: Positive for cough  Negative for shortness of breath  Cardiovascular: Positive for palpitations  Gastrointestinal: Negative  Negative for nausea and vomiting  Endocrine: Negative  Negative for cold intolerance and heat intolerance  Genitourinary: Positive for frequency  Negative for dysuria and urgency  Musculoskeletal: Positive for arthralgias and neck pain  Negative for myalgias  Skin: Positive for rash  Neurological: Negative    Negative for dizziness, tremors, seizures, syncope, facial asymmetry, speech difficulty, weakness, light-headedness, numbness and headaches  Hematological: Negative  Does not bruise/bleed easily  Psychiatric/Behavioral: Positive for sleep disturbance  Negative for confusion and hallucinations

## 2018-07-12 ENCOUNTER — OFFICE VISIT (OUTPATIENT)
Dept: OBGYN CLINIC | Facility: CLINIC | Age: 78
End: 2018-07-12
Payer: MEDICARE

## 2018-07-12 VITALS — DIASTOLIC BLOOD PRESSURE: 85 MMHG | WEIGHT: 172.6 LBS | BODY MASS INDEX: 29.17 KG/M2 | SYSTOLIC BLOOD PRESSURE: 130 MMHG

## 2018-07-12 DIAGNOSIS — Z01.818 PREOP TESTING: ICD-10-CM

## 2018-07-12 DIAGNOSIS — N81.10 CYSTOCELE WITHOUT UTERINE PROLAPSE: Primary | ICD-10-CM

## 2018-07-12 DIAGNOSIS — N36.41 URETHRAL HYPERMOBILITY: ICD-10-CM

## 2018-07-12 PROCEDURE — 99214 OFFICE O/P EST MOD 30 MIN: CPT | Performed by: OBSTETRICS & GYNECOLOGY

## 2018-07-12 NOTE — PROGRESS NOTES
CC:  Bulging    HPI: Nyla Patel presents for concerned that she has a vaginal bulge that seems to be worsening in addition to pelvic pressure  She denies any abnormal discharge, bleeding, defecation issues, or urinary problems  Review of her chart shows that when she was last seen 2 years ago, she did have a midline cystocele  Past Medical History:  Past Medical History:   Diagnosis Date    Elevated serum alkaline phosphatase level     Hypercalcemia     Hypertension     Rheumatic fever        Past Surgical History:  Past Surgical History:   Procedure Laterality Date    APPENDECTOMY      BREAST LUMPECTOMY Left     TUBAL LIGATION         Past OB/Gyn History:  Patient is menopausal       ALLERGIES:   Allergies   Allergen Reactions    Carbamazepine Myalgia     flu-like symptoms    Epinephrine Other (See Comments)     "weakness"    Iodides Itching    Morphine And Related     Sulfamethoxazole-Trimethoprim     Latex Rash    Topiramate Rash       MEDS:   Current Outpatient Prescriptions:     ALPRAZolam (NIRAVAM) 0 25 MG dissolvable tablet    aspirin 81 MG tablet    B Complex Vitamins (B COMPLEX 1 PO)    bimatoprost (LUMIGAN) 0 03 % ophthalmic drops    Cholecalciferol (VITAMIN D) 2000 units CAPS    Coenzyme Q10 (COQ10) 100 MG CAPS    diclofenac sodium (VOLTAREN) 1 %    gabapentin (NEURONTIN) 300 mg capsule    Glucosamine-Chondroitin--400-375 MG TABS    levETIRAcetam (KEPPRA) 500 mg tablet    lisinopril (ZESTRIL) 10 mg tablet    metoprolol succinate (TOPROL-XL) 200 MG 24 hr tablet    Turmeric 500 MG CAPS    Review of Systems:  Skin: No rashes or discolorations of any concern  RESP: Denies SOB, no cough  CV: Denies chest pain or palpitations  Breasts: Denies masses, pain, skin changes and nipple discharge  GI: Denies abdominal pain, heartburn, nausea, vomiting, changes in bowel habits  : Denies dysuria, frequency, CVA tenderness, incontinence and hematuria     Genitalia: Denies abnormal vaginal discharge, external lesions, rashes, pelvic pain,or  abnormal bleeding  Positive for pelvic pressure, and protrusion  Rectal:  Denies pain, bleeding, hemorrhoids,    Physical Exam:  /85 (BP Location: Right arm, Patient Position: Sitting, Cuff Size: Standard)   Wt 78 3 kg (172 lb 9 6 oz)   LMP  (LMP Unknown)   BMI 29 17 kg/m²    Gen: The patient was alert and oriented x3, pleasant well-appearing female in no acute distress  Abd:  Soft, nontender, nondistended, no masses or organomegaly  Pelvic  Normal appearing external female genitalia, no visible lesions, no rashes  Within the vagina clearly is a midline cystocele, that with straining, does bulge and protrude slightly beyond the hymenal ring  There is marked urethral hypermobility as well  The cervix and uterus descend only a 3rd way down into the vaginal canal and there is no posterior wall defect  Skin:  No concerning lesions  Extremeties: No edema      Assessment & Plan:   1  Cystocele and urethral hypermobility  I reviewed with Magalis Lopez the fact that she should undergo a cystocele repair in conjunction with the TVT O procedure  A cystoscopy would also be performed afterwards to ensure that no injury to the bladder and/or ureters has not occurred  The patient and I reviewed the risks and benefits, pros and cons of the procedure, including alternatives  A pamphlet, going over the procedure was also given to the patient  Mia Story would like to proceed with the repairs and she was personally consented by myself

## 2018-07-13 ENCOUNTER — TELEPHONE (OUTPATIENT)
Dept: UROLOGY | Facility: AMBULATORY SURGERY CENTER | Age: 78
End: 2018-07-13

## 2018-07-13 NOTE — TELEPHONE ENCOUNTER
Reason for appointment/Complaint/Diagnosis : cystocele wants 2nd opinion from OBGYN documentation in [facility]  Patient not in any pain     Insurance: Medicare & [place] [facility] say breast cancer   If yes, what kind? Previous urologist?     no                  Records requested/where? In [place]     Outside testing/where? In [facility]     Location Preference for office visit?  [address] [place]

## 2018-08-16 ENCOUNTER — OFFICE VISIT (OUTPATIENT)
Dept: URGENT CARE | Age: 78
End: 2018-08-16
Payer: MEDICARE

## 2018-08-16 VITALS
WEIGHT: 169 LBS | DIASTOLIC BLOOD PRESSURE: 70 MMHG | HEIGHT: 64 IN | OXYGEN SATURATION: 95 % | SYSTOLIC BLOOD PRESSURE: 147 MMHG | RESPIRATION RATE: 16 BRPM | HEART RATE: 74 BPM | BODY MASS INDEX: 28.85 KG/M2 | TEMPERATURE: 98.2 F

## 2018-08-16 DIAGNOSIS — S60.219A ECCHYMOSIS OF WRIST: Primary | ICD-10-CM

## 2018-08-16 PROCEDURE — G0463 HOSPITAL OUTPT CLINIC VISIT: HCPCS | Performed by: NURSE PRACTITIONER

## 2018-08-16 PROCEDURE — 99213 OFFICE O/P EST LOW 20 MIN: CPT | Performed by: NURSE PRACTITIONER

## 2018-08-16 NOTE — PATIENT INSTRUCTIONS
Keep clean and dry  Apply ice as needed  Continue to monitor  Ecchymosis   AMBULATORY CARE:   Ecchymosis  is a collection of blood under the skin  Blood leaks from blood vessels and collects in nearby tissues  This can happen anywhere just below the skin, or in a mucus membrane, such as your mouth  Ecchymosis may appear as a large red, blue, or purple area of skin  You may also have pain or swelling in the area  Signs and symptoms may move to nearby body areas  Contact your healthcare provider if:   · You have new symptoms  · Your bruise suddenly gets bigger and feels hard  · The affected area does not improve within 2 weeks  · You have ecchymosis around your eye and you are having trouble seeing  · You have questions or concerns about your condition or care  Treatment  is usually not needed  Your healthcare provider may want you to have more tests to find the cause if you get ecchymosis often or it is painful  The medical condition causing ecchymosis may need to be treated  Your healthcare provider may stop or change a medicine that is causing your ecchymosis  The following may help relieve your symptoms:  · Rest  the area to help the tissues heal     · Apply ice  to the area to relieve pain and swelling  Ice can also help prevent tissue damage  Use an ice pack, or put crushed ice in a bag  Cover the ice pack or bag with a small towel before you apply it to your skin  Apply ice for 20 minutes every hour, or as directed  · Elevate  the affected area to reduce swelling, and to improve circulation  Prop the area on pillows to keep it elevated above the level of your heart  Do this as often as possible  · NSAID medicines  such as ibuprofen can help reduce pain and swelling  NSAIDs are available without a prescription  Ask your healthcare provider which medicine is right for you  Ask how much to take and how often to take it  Follow directions   NSAIDs can cause stomach bleeding and kidney damage if not taken correctly  If you take blood thinner medicine, always ask if NSAIDs are safe for you  Follow up with your healthcare provider as directed:  Write down your questions so you remember to ask them during your visits  © 2017 [address] Information is for End User's use only and may not be sold, redistributed or otherwise used for commercial purposes  All illustrations and images included in CareNotes® are the copyrighted property of A D A M , Inc  or [employer]  The above information is an  only  It is not intended as medical advice for individual conditions or treatments  Talk to your doctor, nurse or pharmacist before following any medical regimen to see if it is safe and effective for you

## 2018-08-16 NOTE — PROGRESS NOTES
[facility]        NAME: Asaf Barahona is a 78 y o  female  : 1940    MRN: [id number]  DATE: 2018  TIME: 11:36 AM    Assessment and Plan   Ecchymosis of wrist [S60 219A]  1  Ecchymosis of wrist           Patient Instructions     Keep clean and dry  Apply ice as needed  Continue to monitor  Follow up with PCP in 3-5 days  Proceed to  ER if symptoms worsen  Chief Complaint     Chief Complaint   Patient presents with    Insect Bite     Pt c/o presumed insect bite to inside right wrist  Pt states she noticed it this morning upon awakening  History of Present Illness       44-year-old female presenting today with c/o right wrist bruise and probable bug bite  She noticed it this morning  No n/t  No pain  No itch  No drainage  Insect Bite         Review of Systems   Review of Systems   Constitutional: Negative  Respiratory: Negative  Cardiovascular: Negative  Skin: Positive for color change (ecchymosis noted on right wrist surrounding probable bug bite)           Current Medications       Current Outpatient Prescriptions:     ALPRAZolam (NIRAVAM) 0 25 MG dissolvable tablet, Take 0 25 mg by mouth 2 (two) times a day, Disp: , Rfl:     aspirin 81 MG tablet, Take 162 mg by mouth, Disp: , Rfl:     B Complex Vitamins (B COMPLEX 1 PO), Take 2 tablets by mouth daily, Disp: , Rfl:     bimatoprost (LUMIGAN) 0 03 % ophthalmic drops, Apply to eye, Disp: , Rfl:     Cholecalciferol (VITAMIN D) 2000 units CAPS, Take 2 capsules by mouth daily, Disp: , Rfl:     Coenzyme Q10 (COQ10) 100 MG CAPS, Take by mouth, Disp: , Rfl:     diclofenac sodium (VOLTAREN) 1 %, Apply 2 g topically 4 (four) times a day, Disp: 1 Tube, Rfl: 5    gabapentin (NEURONTIN) 300 mg capsule, Take 1 capsule (300 mg total) by mouth daily at bedtime, Disp: 30 capsule, Rfl: 5    levETIRAcetam (KEPPRA) 500 mg tablet, Take 250 mg by mouth, Disp: , Rfl:     lisinopril (ZESTRIL) 10 mg tablet, TAKE ONE TABLET BY MOUTH ONCE DAILY FOR BLOOD PRESSURE, Disp: 90 tablet, Rfl: 3    metoprolol succinate (TOPROL-XL) 200 MG 24 hr tablet, Take 1 tablet (200 mg total) by mouth daily, Disp: 90 tablet, Rfl: 3    Omega-3 Fatty Acids (FISH OIL PO), Take by mouth, Disp: , Rfl:     Turmeric 500 MG CAPS, Take by mouth, Disp: , Rfl:     Glucosamine-Chondroitin--266-603 MG TABS, Take by mouth, Disp: , Rfl:     Current Allergies     Allergies as of 08/16/2018 - Reviewed 08/16/2018   Allergen Reaction Noted    Carbamazepine Myalgia 03/07/2007    Epinephrine Other (See Comments) 03/07/2007    Iodides Itching 12/10/2011    Morphine and related  05/04/2012    Sulfamethoxazole-trimethoprim  05/04/2012    Latex Rash 05/04/2012    Topiramate Rash 03/07/2007            The following portions of the patient's history were reviewed and updated as appropriate: allergies, current medications, past family history, past medical history, past social history, past surgical history and problem list      Past Medical History:   Diagnosis Date    Elevated serum alkaline phosphatase level     Hypercalcemia     Hypertension     Rheumatic fever        Past Surgical History:   Procedure Laterality Date    APPENDECTOMY      BREAST LUMPECTOMY Left     TUBAL LIGATION         Family History   Problem Relation Age of Onset    Breast cancer Mother     Coronary artery disease Father     Bone cancer Brother     Lung cancer Brother          Medications have been verified  Objective   /70   Pulse 74   Temp 98 2 °F (36 8 °C) (Tympanic)   Resp 16   Ht 5' 4" (1 626 m)   Wt 76 7 kg (169 lb)   LMP  (LMP Unknown)   SpO2 95%   BMI 29 01 kg/m²        Physical Exam     Physical Exam   Constitutional: She is oriented to person, place, and time  She appears well-developed and well-nourished  Cardiovascular: Normal rate, regular rhythm and normal heart sounds      Pulmonary/Chest: Effort normal and breath sounds normal  Musculoskeletal: Normal range of motion  She exhibits no edema or tenderness  Neurological: She is alert and oriented to person, place, and time  Skin: Skin is warm and dry  Ecchymosis area noted along distal radial surrounding likely bug bite  No erythema migrans  No s/s infection  DNVI  Psychiatric: She has a normal mood and affect  Her behavior is normal  Judgment and thought content normal    Nursing note and vitals reviewed

## 2018-08-17 ENCOUNTER — TELEPHONE (OUTPATIENT)
Dept: FAMILY MEDICINE CLINIC | Facility: CLINIC | Age: 78
End: 2018-08-17

## 2018-08-17 NOTE — TELEPHONE ENCOUNTER
Pt called stating she has a sinus headache and would like to know what she can do  She has been doing saline nasal rinse with little relief  Per TS pt is to use Tylenol as directed and use OTC Afrin NS for 3 days only  If not better next week make a OV  Pt aware

## 2018-10-15 ENCOUNTER — OFFICE VISIT (OUTPATIENT)
Dept: UROLOGY | Facility: CLINIC | Age: 78
End: 2018-10-15

## 2018-10-15 VITALS
BODY MASS INDEX: 29.37 KG/M2 | DIASTOLIC BLOOD PRESSURE: 90 MMHG | SYSTOLIC BLOOD PRESSURE: 140 MMHG | WEIGHT: 172 LBS | HEIGHT: 64 IN

## 2018-10-15 DIAGNOSIS — N81.10 FEMALE CYSTOCELE: Primary | ICD-10-CM

## 2018-10-15 RX ORDER — TURMERIC 100 %
1 POWDER (GRAM) MISCELLANEOUS
COMMUNITY
End: 2018-10-15 | Stop reason: SDUPTHER

## 2018-10-16 ENCOUNTER — TELEPHONE (OUTPATIENT)
Dept: UROLOGY | Facility: CLINIC | Age: 78
End: 2018-10-16

## 2018-10-16 NOTE — TELEPHONE ENCOUNTER
Patient was originally scheduled to see Dr Keren Blackwell yesterday, 10/15/18  Dr Keren Blackwell had requested appointment be cancelled and patient was referred to Dr Chito Maldonado  Patient's information was passed onto Dr Opal Kimball office, who is to call patient regarding appointment  Patient calling today, reports our office did not call with an appointment yet  Instructed patient Dr Opal Kimball office will be calling to schedule appointment  Advised patient, it may be a few days before she is called with an appointment  Patient verbalized understanding

## 2018-11-07 DIAGNOSIS — G40.909 SEIZURE DISORDER (HCC): Primary | ICD-10-CM

## 2018-11-07 RX ORDER — LEVETIRACETAM 500 MG/1
TABLET ORAL
Qty: 60 TABLET | Refills: 5 | Status: SHIPPED | OUTPATIENT
Start: 2018-11-07 | End: 2019-09-20 | Stop reason: SDUPTHER

## 2018-12-22 LAB
25(OH)D3 SERPL-MCNC: 48 NG/ML (ref 30–100)
ALBUMIN SERPL-MCNC: 4.3 G/DL (ref 3.6–5.1)
ALBUMIN/GLOB SERPL: 1.7 (CALC) (ref 1–2.5)
ALP SERPL-CCNC: 87 U/L (ref 33–130)
ALT SERPL-CCNC: 13 U/L (ref 6–29)
APPEARANCE UR: CLEAR
AST SERPL-CCNC: 21 U/L (ref 10–35)
BACTERIA UR QL AUTO: ABNORMAL /HPF
BASOPHILS # BLD AUTO: 66 CELLS/UL (ref 0–200)
BASOPHILS NFR BLD AUTO: 0.8 %
BILIRUB SERPL-MCNC: 0.9 MG/DL (ref 0.2–1.2)
BILIRUB UR QL STRIP: NEGATIVE
BUN SERPL-MCNC: 18 MG/DL (ref 7–25)
BUN/CREAT SERPL: ABNORMAL (CALC) (ref 6–22)
CALCIUM SERPL-MCNC: 9.9 MG/DL (ref 8.6–10.4)
CHLORIDE SERPL-SCNC: 105 MMOL/L (ref 98–110)
CHOLEST SERPL-MCNC: 243 MG/DL
CHOLEST/HDLC SERPL: 3.5 (CALC)
CO2 SERPL-SCNC: 28 MMOL/L (ref 20–32)
COLOR UR: YELLOW
CREAT SERPL-MCNC: 0.85 MG/DL (ref 0.6–0.93)
EOSINOPHIL # BLD AUTO: 249 CELLS/UL (ref 15–500)
EOSINOPHIL NFR BLD AUTO: 3 %
ERYTHROCYTE [DISTWIDTH] IN BLOOD BY AUTOMATED COUNT: 12.8 % (ref 11–15)
EST. AVERAGE GLUCOSE BLD GHB EST-MCNC: 123 (CALC)
EST. AVERAGE GLUCOSE BLD GHB EST-SCNC: 6.8 (CALC)
GLOBULIN SER CALC-MCNC: 2.6 G/DL (CALC) (ref 1.9–3.7)
GLUCOSE SERPL-MCNC: 103 MG/DL (ref 65–99)
GLUCOSE UR QL STRIP: NEGATIVE
HBA1C MFR BLD: 5.9 % OF TOTAL HGB
HCT VFR BLD AUTO: 40.8 % (ref 35–45)
HDLC SERPL-MCNC: 69 MG/DL
HGB BLD-MCNC: 14.1 G/DL (ref 11.7–15.5)
HGB UR QL STRIP: NEGATIVE
HYALINE CASTS #/AREA URNS LPF: ABNORMAL /LPF
KETONES UR QL STRIP: NEGATIVE
LDLC SERPL CALC-MCNC: 154 MG/DL (CALC)
LEUKOCYTE ESTERASE UR QL STRIP: ABNORMAL
LYMPHOCYTES # BLD AUTO: 2299 CELLS/UL (ref 850–3900)
LYMPHOCYTES NFR BLD AUTO: 27.7 %
MCH RBC QN AUTO: 30.8 PG (ref 27–33)
MCHC RBC AUTO-ENTMCNC: 34.6 G/DL (ref 32–36)
MCV RBC AUTO: 89.1 FL (ref 80–100)
MONOCYTES # BLD AUTO: 515 CELLS/UL (ref 200–950)
MONOCYTES NFR BLD AUTO: 6.2 %
NEUTROPHILS # BLD AUTO: 5171 CELLS/UL (ref 1500–7800)
NEUTROPHILS NFR BLD AUTO: 62.3 %
NITRITE UR QL STRIP: NEGATIVE
NONHDLC SERPL-MCNC: 174 MG/DL (CALC)
PH UR STRIP: ABNORMAL [PH] (ref 5–8)
PLATELET # BLD AUTO: 204 THOUSAND/UL (ref 140–400)
PMV BLD REES-ECKER: 11 FL (ref 7.5–12.5)
POTASSIUM SERPL-SCNC: 4.6 MMOL/L (ref 3.5–5.3)
PROT SERPL-MCNC: 6.9 G/DL (ref 6.1–8.1)
PROT UR QL STRIP: NEGATIVE
RBC # BLD AUTO: 4.58 MILLION/UL (ref 3.8–5.1)
RBC #/AREA URNS HPF: ABNORMAL /HPF
SL AMB EGFR AFRICAN AMERICAN: 77 ML/MIN/1.73M2
SL AMB EGFR NON AFRICAN AMERICAN: 67 ML/MIN/1.73M2
SODIUM SERPL-SCNC: 140 MMOL/L (ref 135–146)
SP GR UR STRIP: 1.02 (ref 1–1.03)
SQUAMOUS #/AREA URNS HPF: ABNORMAL /HPF
TRIGL SERPL-MCNC: 90 MG/DL
TSH SERPL-ACNC: 2.12 MIU/L (ref 0.4–4.5)
WBC # BLD AUTO: 8.3 THOUSAND/UL (ref 3.8–10.8)
WBC #/AREA URNS HPF: ABNORMAL /HPF

## 2018-12-24 DIAGNOSIS — I10 ESSENTIAL HYPERTENSION: ICD-10-CM

## 2018-12-24 RX ORDER — METOPROLOL SUCCINATE 200 MG/1
TABLET, EXTENDED RELEASE ORAL
Qty: 90 TABLET | Refills: 0 | Status: SHIPPED | OUTPATIENT
Start: 2018-12-24 | End: 2019-03-27 | Stop reason: SDUPTHER

## 2019-01-02 ENCOUNTER — OFFICE VISIT (OUTPATIENT)
Dept: FAMILY MEDICINE CLINIC | Facility: CLINIC | Age: 79
End: 2019-01-02
Payer: MEDICARE

## 2019-01-02 VITALS
SYSTOLIC BLOOD PRESSURE: 134 MMHG | DIASTOLIC BLOOD PRESSURE: 82 MMHG | BODY MASS INDEX: 31.21 KG/M2 | HEIGHT: 64 IN | HEART RATE: 72 BPM | WEIGHT: 182.8 LBS

## 2019-01-02 DIAGNOSIS — G40.209 PARTIAL SYMPTOMATIC EPILEPSY WITH COMPLEX PARTIAL SEIZURES, NOT INTRACTABLE, WITHOUT STATUS EPILEPTICUS (HCC): ICD-10-CM

## 2019-01-02 DIAGNOSIS — C50.919 MALIGNANT NEOPLASM OF FEMALE BREAST, UNSPECIFIED ESTROGEN RECEPTOR STATUS, UNSPECIFIED LATERALITY, UNSPECIFIED SITE OF BREAST (HCC): ICD-10-CM

## 2019-01-02 DIAGNOSIS — G45.9 TRANSIENT ISCHEMIC ATTACK: ICD-10-CM

## 2019-01-02 DIAGNOSIS — R73.9 HYPERGLYCEMIA: ICD-10-CM

## 2019-01-02 DIAGNOSIS — E55.9 VITAMIN D DEFICIENCY: ICD-10-CM

## 2019-01-02 DIAGNOSIS — M85.80 MODERATE OSTEOPENIA: ICD-10-CM

## 2019-01-02 DIAGNOSIS — E66.9 OBESITY (BMI 30.0-34.9): ICD-10-CM

## 2019-01-02 DIAGNOSIS — Z01.818 PREOPERATIVE CLEARANCE: ICD-10-CM

## 2019-01-02 DIAGNOSIS — N81.11 MIDLINE CYSTOCELE: ICD-10-CM

## 2019-01-02 DIAGNOSIS — E78.5 HYPERLIPIDEMIA, UNSPECIFIED HYPERLIPIDEMIA TYPE: ICD-10-CM

## 2019-01-02 DIAGNOSIS — I10 ESSENTIAL HYPERTENSION: Primary | ICD-10-CM

## 2019-01-02 PROBLEM — E04.1 RIGHT THYROID NODULE: Status: RESOLVED | Noted: 2018-01-29 | Resolved: 2019-01-02

## 2019-01-02 PROCEDURE — 99214 OFFICE O/P EST MOD 30 MIN: CPT | Performed by: FAMILY MEDICINE

## 2019-01-02 PROCEDURE — G0439 PPPS, SUBSEQ VISIT: HCPCS | Performed by: FAMILY MEDICINE

## 2019-01-02 NOTE — ASSESSMENT & PLAN NOTE
For surgical correction 02/04/2019 with urogynecology, Dr Dhiraj Eric     Will schedule preop consultation in 2 weeks preop blood work is ordered

## 2019-01-02 NOTE — PROGRESS NOTES
Assessment/Plan:  1  Hypertension, stable continue present therapy  2  TIA, stable continue present therapy  3  Seizure  disorder, stable continue present therapy follow-up Neurology  4  Breast cancer, patient follows up with Oncology  5  Hyperlipidemia medication refused  6  Hyperglycemia diet controlled  7  Cystocele patient to have corrective surgery 02/04/2019 with Dr Camilo William  Will schedule preop consultation with EKG and preop blood work is ordered  8  Vitamin-D deficiency, stable continue present therapy  9  Osteopenia up-to-date with DEXA  10  Patient return in 2 weeks for preop consultation with EKG    Transient ischemic attack  Stable continue present therapy    Hypertension  Stable continue present therapy    Complex partial seizure ([facility] [address] )  Stable follow-up with Neurology    Moderate osteopenia  DEXA scan 10/17    Midline cystocele  For surgical correction 02/04/2019 with urogynecology, Dr Camilo William  Will schedule preop consultation in 2 weeks preop blood work is ordered    Vitamin D deficiency  Stable continue present therapy    Hyperlipidemia  Medication refused    Hyperglycemia  Diet controlled    Breast cancer ([facility] [address] )  Follow with Hematology/Oncology    Preoperative clearance  Blood work ordered       Diagnoses and all orders for this visit:    Essential hypertension  -     Basic metabolic panel; Future  -     CBC; Future  -     Urinalysis with reflex to microscopic; Future  -     Urine culture; Future  -     APTT; Future  -     Protime-INR; Future    Transient ischemic attack  -     Basic metabolic panel; Future  -     CBC; Future  -     Urinalysis with reflex to microscopic; Future  -     Urine culture; Future  -     APTT; Future  -     Protime-INR; Future    Partial symptomatic epilepsy with complex partial seizures, not intractable, without status epilepticus ([facility] [address] )  -     Basic metabolic panel; Future  -     CBC; Future  -     Urinalysis with reflex to microscopic;  Future  -     Urine culture; Future  -     APTT; Future  -     Protime-INR; Future    Moderate osteopenia  -     Basic metabolic panel; Future  -     CBC; Future  -     Urinalysis with reflex to microscopic; Future  -     Urine culture; Future  -     APTT; Future  -     Protime-INR; Future    Midline cystocele  -     Basic metabolic panel; Future  -     CBC; Future  -     Urinalysis with reflex to microscopic; Future  -     Urine culture; Future  -     APTT; Future  -     Protime-INR; Future    Vitamin D deficiency  -     Basic metabolic panel; Future  -     CBC; Future  -     Urinalysis with reflex to microscopic; Future  -     Urine culture; Future  -     APTT; Future  -     Protime-INR; Future    Hyperlipidemia, unspecified hyperlipidemia type  -     Basic metabolic panel; Future  -     CBC; Future  -     Urinalysis with reflex to microscopic; Future  -     Urine culture; Future  -     APTT; Future  -     Protime-INR; Future    Hyperglycemia  -     Basic metabolic panel; Future  -     CBC; Future  -     Urinalysis with reflex to microscopic; Future  -     Urine culture; Future  -     APTT; Future  -     Protime-INR; Future    Malignant neoplasm of female breast, unspecified estrogen receptor status, unspecified laterality, unspecified site of breast ([facility] [address] )  -     Basic metabolic panel; Future  -     CBC; Future  -     Urinalysis with reflex to microscopic; Future  -     Urine culture; Future  -     APTT; Future  -     Protime-INR; Future    Preoperative clearance  -     Basic metabolic panel; Future  -     CBC; Future  -     Urinalysis with reflex to microscopic; Future  -     Urine culture; Future  -     APTT; Future  -     Protime-INR; Future    Obesity (BMI 30 0-34  9)          Subjective: pt yan ere for follow up of hypertension~cd     Patient ID: Kathleen Black is a 70 y o  female  Patient is here for follow-up she also wished to have preop clearance however patient's surgeries not till 02/04/2019 so this cannot be done    I will reschedule her for 2 weeks order preop blood work and do EKG at that appointment  Otherwise patient is doing well without any current medical complaints or concerns  Save for her urinary incontinence she has a cystocele and will require surgery with urogynecology, Dr Papito Granados        The following portions of the patient's history were reviewed and updated as appropriate: allergies, current medications, past family history, past medical history, past social history, past surgical history and problem list     Review of Systems   Constitutional: Negative  HENT: Negative  Eyes: Negative  Respiratory: Negative  Cardiovascular: Negative  Gastrointestinal: Negative  Endocrine: Negative  Genitourinary:        HPI   Musculoskeletal: Negative  Skin: Negative  Allergic/Immunologic: Negative  Neurological: Negative  Hematological: Negative  Objective:  Vitals:    01/02/19 0857 01/02/19 0908   BP: 140/90 134/82   BP Location: Right arm    Patient Position: Sitting    Cuff Size: Large    Pulse: 72    Weight: 82 9 kg (182 lb 12 8 oz)    Height: 5' 4" (1 626 m)                 Physical Exam   Constitutional: She is oriented to person, place, and time  She appears well-developed and well-nourished  HENT:   Head: Normocephalic and atraumatic  Mouth/Throat: Oropharynx is clear and moist    Eyes: Pupils are equal, round, and reactive to light  Conjunctivae are normal  No scleral icterus  Neck: Neck supple  No thyromegaly present  Cardiovascular: Normal rate, regular rhythm and normal heart sounds  Pulmonary/Chest: Effort normal and breath sounds normal    Abdominal: Soft  Bowel sounds are normal  There is no tenderness  Musculoskeletal: She exhibits no edema  Lymphadenopathy:     She has no cervical adenopathy  Neurological: She is alert and oriented to person, place, and time  No cranial nerve deficit  Skin: Skin is warm and dry     Psychiatric: She has a normal mood and affect  BMI Counseling: Body mass index is 31 38 kg/m²  Discussed the patient's BMI with her  The BMI is above average  BMI counseling and education was provided to the patient  Nutrition recommendations include reducing intake of cholesterol

## 2019-01-02 NOTE — PROGRESS NOTES
Assessment and Plan:     Problem List Items Addressed This Visit     None        Health Maintenance Due   Topic Date Due    Depression Screening PHQ  1940    Medicare Annual Wellness Visit (AWV)  1940    CRC Screening: Colonoscopy  1940    Fall Risk  10/11/2007    Urinary Incontinence Screening  10/11/2007    DTaP,Tdap,and Td Vaccines (1 - Tdap) 03/30/2018    Pneumococcal PPSV23/PCV13 65+ Years / High and Highest Risk (2 of 2 - PPSV23) 05/24/2018    INFLUENZA VACCINE  07/01/2018         HPI:  Manfred Junior is a 70 y o  female here for her Subsequent Wellness Visit      Patient Active Problem List   Diagnosis    Vitamin D deficiency    Transient ischemic attack    Osteoarthritis    Moderate osteopenia    Hypertension    Hyperlipidemia    Hyperglycemia    GERD (gastroesophageal reflux disease)    Breast cancer (HCC)    Complex partial seizure ([address] )    Midline cystocele    Benign neoplasm of large intestine    Anxiety    Abnormal tumor markers    Personal history of breast cancer    Personal history of radiation therapy    Hiatal hernia    Right thyroid nodule    S/P laparoscopic appendectomy    Carpal tunnel syndrome of right wrist    Shoulder pain     Past Medical History:   Diagnosis Date    Elevated serum alkaline phosphatase level     Hypercalcemia     Hypertension     Rheumatic fever      Past Surgical History:   Procedure Laterality Date    APPENDECTOMY      BREAST LUMPECTOMY Left     TUBAL LIGATION       Family History   Problem Relation Age of Onset    Breast cancer Mother     Coronary artery disease Father     Bone cancer Brother     Lung cancer Brother      History   Smoking Status    Former Smoker   Smokeless Tobacco    Never Used     History   Alcohol Use    Yes     Comment: social      History   Drug Use No       Current Outpatient Prescriptions   Medication Sig Dispense Refill    ALPRAZolam (NIRAVAM) 0 25 MG dissolvable tablet Take 0 25 mg by mouth 2 (two) times a day      aspirin 81 MG tablet Take 162 mg by mouth      B Complex Vitamins (B COMPLEX 1 PO) Take 2 tablets by mouth daily      bimatoprost (LUMIGAN) 0 03 % ophthalmic drops Apply to eye      CALCIUM-MAGNESIUM-ZINC PO Take 1 tablet by mouth      Cholecalciferol (VITAMIN D) 2000 units CAPS Take 2 capsules by mouth daily      Coenzyme Q10 (COQ10) 100 MG CAPS Take by mouth      diclofenac sodium (VOLTAREN) 1 % Apply 2 g topically 4 (four) times a day 1 Tube 5    gabapentin (NEURONTIN) 300 mg capsule Take 1 capsule (300 mg total) by mouth daily at bedtime 30 capsule 5    Glucosamine-Chondroitin--400-375 MG TABS Take by mouth      levETIRAcetam (KEPPRA) 500 mg tablet Take 1 tablet by mouth twice a day 60 tablet 5    lisinopril (ZESTRIL) 10 mg tablet TAKE ONE TABLET BY MOUTH ONCE DAILY FOR BLOOD PRESSURE 90 tablet 3    metoprolol succinate (TOPROL-XL) 200 MG 24 hr tablet TAKE 1 TABLET BY MOUTH ONCE DAILY 90 tablet 0    Multiple Vitamins-Minerals (MULTIVITAMIN ADULT PO) Take 1 capsule by mouth      Omega-3 Fatty Acids (FISH OIL PO) Take by mouth      Turmeric 500 MG CAPS Take by mouth       No current facility-administered medications for this visit  Allergies   Allergen Reactions    Carbamazepine Myalgia     flu-like symptoms    Epinephrine Other (See Comments)     "weakness"    Iodides Itching    Morphine And Related     Sulfamethoxazole-Trimethoprim     Latex Rash    Topiramate Rash     Immunization History   Administered Date(s) Administered    Pneumococcal Conjugate 13-Valent 03/29/2018    Pneumococcal Polysaccharide PPV23 03/29/2018    Td (adult), adsorbed 03/29/2018    Zoster 03/29/2018       Patient Care Team:  Abida Clay DO as PCP - General  Marlena Rasmussen MD    Medicare Screening Tests and Risk Assessments:  Javier Lo is here for her Subsequent Wellness visit  Health Risk Assessment:  Patient rates overall health as good   Patient feels that their physical health rating is Much worse  Eyesight was rated as Same  Hearing was rated as Slightly worse  Pain experienced by patient in the last 7 days has been Some  Patient's pain rating has been 6/10  Emotional/Mental Health:  Patient has been feeling nervous/anxious  PHQ-9 Depression Screening:    Frequency of the following problems over the past two weeks:      1  Little interest or pleasure in doing things: 0 - not at all      2  Feeling down, depressed, or hopeless: 0 - not at all  PHQ-2 Score: 0          Broken Bones/Falls: Fall Risk Assessment:    In the past year, patient has experienced: No history of falling in past year          Bladder/Bowel:  Patient has not leaked urine accidently in the last six months  Patient reports no loss of bowel control  Immunizations:  Patient has not had a flu vaccination within the last year  Patient has not received a pneumonia shot  Patient has not received a shingles shot  Patient has not received tetanus/diphtheria shot  Home Safety:  Patient does not have trouble with stairs inside or outside of their home  Patient currently reports that there are no safety hazards present in home, working smoke alarms,     Preventative Screenings:   Breast cancer screening performed, no colon cancer screen completed, glaucoma eye exam completed,     Nutrition:  Current diet: Regular with servings of the following:    Medications:  Patient is currently taking over-the-counter supplements  Patient is able to manage medications  Lifestyle Choices:  Patient reports no tobacco use  Patient has not smoked or used tobacco in the past   Patient reports alcohol use  Patient drives a vehicle  Patient wears seat belt          Activities of Daily Living:  Can get out of bed by his or her self, able to dress self, able to make own meals, able to do own shopping, able to bathe self, can do own laundry/housekeeping, can manage own money, pay bills and track expenses    Previous Hospitalizations:  No hospitalization or ED visit in past 12 months        Preventative Screening/Counseling:      Cardiovascular:      General: Screening Current          Diabetes:      General: Screening Current          Colorectal Cancer:      General: Screening Current          Breast Cancer:      General: Screening Current          Cervical Cancer:      General: Screening Current          Osteoporosis:      General: Screening Current          AAA:      General: Screening Not Indicated          Glaucoma:      General: Screening Current          HIV:      General: Patient Declines          Hepatitis C:      General: Patient Declines        Advanced Directives:   Patient has living will for healthcare, has durable POA for healthcare, patient has an advanced directive  Information on ACP and/or AD provided  No 5 wishes given  End of life assessment reviewed with patient  Provider agrees with end of life decisions

## 2019-01-08 ENCOUNTER — APPOINTMENT (OUTPATIENT)
Dept: LAB | Age: 79
End: 2019-01-08
Payer: MEDICARE

## 2019-01-08 DIAGNOSIS — Z01.818 PREOPERATIVE CLEARANCE: ICD-10-CM

## 2019-01-08 DIAGNOSIS — E78.5 HYPERLIPIDEMIA, UNSPECIFIED HYPERLIPIDEMIA TYPE: ICD-10-CM

## 2019-01-08 DIAGNOSIS — E55.9 VITAMIN D DEFICIENCY: ICD-10-CM

## 2019-01-08 DIAGNOSIS — N81.11 MIDLINE CYSTOCELE: ICD-10-CM

## 2019-01-08 DIAGNOSIS — C50.919 MALIGNANT NEOPLASM OF FEMALE BREAST, UNSPECIFIED ESTROGEN RECEPTOR STATUS, UNSPECIFIED LATERALITY, UNSPECIFIED SITE OF BREAST (HCC): ICD-10-CM

## 2019-01-08 DIAGNOSIS — I10 ESSENTIAL HYPERTENSION: ICD-10-CM

## 2019-01-08 DIAGNOSIS — G40.209 PARTIAL SYMPTOMATIC EPILEPSY WITH COMPLEX PARTIAL SEIZURES, NOT INTRACTABLE, WITHOUT STATUS EPILEPTICUS (HCC): ICD-10-CM

## 2019-01-08 DIAGNOSIS — G45.9 TRANSIENT ISCHEMIC ATTACK: ICD-10-CM

## 2019-01-08 DIAGNOSIS — R73.9 HYPERGLYCEMIA: ICD-10-CM

## 2019-01-08 DIAGNOSIS — M85.80 MODERATE OSTEOPENIA: ICD-10-CM

## 2019-01-08 LAB
ANION GAP SERPL CALCULATED.3IONS-SCNC: 8 MMOL/L (ref 4–13)
APTT PPP: 32 SECONDS (ref 26–38)
BACTERIA UR QL AUTO: ABNORMAL /HPF
BILIRUB UR QL STRIP: NEGATIVE
BUN SERPL-MCNC: 16 MG/DL (ref 5–25)
CALCIUM SERPL-MCNC: 9.7 MG/DL (ref 8.3–10.1)
CHLORIDE SERPL-SCNC: 107 MMOL/L (ref 100–108)
CLARITY UR: ABNORMAL
CO2 SERPL-SCNC: 26 MMOL/L (ref 21–32)
COLOR UR: YELLOW
CREAT SERPL-MCNC: 0.9 MG/DL (ref 0.6–1.3)
ERYTHROCYTE [DISTWIDTH] IN BLOOD BY AUTOMATED COUNT: 13.4 % (ref 11.6–15.1)
GFR SERPL CREATININE-BSD FRML MDRD: 62 ML/MIN/1.73SQ M
GLUCOSE SERPL-MCNC: 130 MG/DL (ref 65–140)
GLUCOSE UR STRIP-MCNC: NEGATIVE MG/DL
HCT VFR BLD AUTO: 43.4 % (ref 34.8–46.1)
HGB BLD-MCNC: 13.7 G/DL (ref 11.5–15.4)
HGB UR QL STRIP.AUTO: NEGATIVE
HYALINE CASTS #/AREA URNS LPF: ABNORMAL /LPF
INR PPP: 1.01 (ref 0.86–1.17)
KETONES UR STRIP-MCNC: NEGATIVE MG/DL
LEUKOCYTE ESTERASE UR QL STRIP: ABNORMAL
MCH RBC QN AUTO: 29.7 PG (ref 26.8–34.3)
MCHC RBC AUTO-ENTMCNC: 31.6 G/DL (ref 31.4–37.4)
MCV RBC AUTO: 94 FL (ref 82–98)
NITRITE UR QL STRIP: NEGATIVE
NON-SQ EPI CELLS URNS QL MICRO: ABNORMAL /HPF
PH UR STRIP.AUTO: 5.5 [PH] (ref 4.5–8)
PLATELET # BLD AUTO: 236 THOUSANDS/UL (ref 149–390)
PMV BLD AUTO: 11 FL (ref 8.9–12.7)
POTASSIUM SERPL-SCNC: 4.4 MMOL/L (ref 3.5–5.3)
PROT UR STRIP-MCNC: NEGATIVE MG/DL
PROTHROMBIN TIME: 13.4 SECONDS (ref 11.8–14.2)
RBC # BLD AUTO: 4.61 MILLION/UL (ref 3.81–5.12)
RBC #/AREA URNS AUTO: ABNORMAL /HPF
SODIUM SERPL-SCNC: 141 MMOL/L (ref 136–145)
SP GR UR STRIP.AUTO: 1.02 (ref 1–1.03)
UROBILINOGEN UR QL STRIP.AUTO: 0.2 E.U./DL
WBC # BLD AUTO: 6.74 THOUSAND/UL (ref 4.31–10.16)
WBC #/AREA URNS AUTO: ABNORMAL /HPF

## 2019-01-08 PROCEDURE — 36415 COLL VENOUS BLD VENIPUNCTURE: CPT

## 2019-01-08 PROCEDURE — 87077 CULTURE AEROBIC IDENTIFY: CPT

## 2019-01-08 PROCEDURE — 87186 SC STD MICRODIL/AGAR DIL: CPT

## 2019-01-08 PROCEDURE — 85730 THROMBOPLASTIN TIME PARTIAL: CPT

## 2019-01-08 PROCEDURE — 80048 BASIC METABOLIC PNL TOTAL CA: CPT

## 2019-01-08 PROCEDURE — 81001 URINALYSIS AUTO W/SCOPE: CPT

## 2019-01-08 PROCEDURE — 85027 COMPLETE CBC AUTOMATED: CPT

## 2019-01-08 PROCEDURE — 87086 URINE CULTURE/COLONY COUNT: CPT

## 2019-01-08 PROCEDURE — 85610 PROTHROMBIN TIME: CPT

## 2019-01-08 PROCEDURE — 87147 CULTURE TYPE IMMUNOLOGIC: CPT

## 2019-01-09 DIAGNOSIS — N30.00 ACUTE CYSTITIS WITHOUT HEMATURIA: Primary | ICD-10-CM

## 2019-01-09 RX ORDER — NITROFURANTOIN 25; 75 MG/1; MG/1
CAPSULE ORAL
Qty: 14 CAPSULE | Refills: 0 | Status: SHIPPED | OUTPATIENT
Start: 2019-01-09 | End: 2019-01-15

## 2019-01-10 LAB — BACTERIA UR CULT: ABNORMAL

## 2019-01-16 ENCOUNTER — OFFICE VISIT (OUTPATIENT)
Dept: FAMILY MEDICINE CLINIC | Facility: CLINIC | Age: 79
End: 2019-01-16
Payer: MEDICARE

## 2019-01-16 VITALS
HEIGHT: 64 IN | WEIGHT: 181 LBS | SYSTOLIC BLOOD PRESSURE: 132 MMHG | BODY MASS INDEX: 30.9 KG/M2 | TEMPERATURE: 100.1 F | HEART RATE: 72 BPM | DIASTOLIC BLOOD PRESSURE: 80 MMHG | RESPIRATION RATE: 16 BRPM

## 2019-01-16 DIAGNOSIS — N81.11 MIDLINE CYSTOCELE: ICD-10-CM

## 2019-01-16 DIAGNOSIS — Z01.818 PREOPERATIVE CLEARANCE: Primary | ICD-10-CM

## 2019-01-16 DIAGNOSIS — J30.9 ALLERGIC RHINITIS, UNSPECIFIED SEASONALITY, UNSPECIFIED TRIGGER: ICD-10-CM

## 2019-01-16 DIAGNOSIS — H66.009 ACUTE SUPPURATIVE OTITIS MEDIA WITHOUT SPONTANEOUS RUPTURE OF EAR DRUM, RECURRENCE NOT SPECIFIED, UNSPECIFIED LATERALITY: ICD-10-CM

## 2019-01-16 DIAGNOSIS — N30.00 ACUTE CYSTITIS WITHOUT HEMATURIA: ICD-10-CM

## 2019-01-16 DIAGNOSIS — J20.9 ACUTE BRONCHITIS, UNSPECIFIED ORGANISM: ICD-10-CM

## 2019-01-16 DIAGNOSIS — I10 ESSENTIAL HYPERTENSION: ICD-10-CM

## 2019-01-16 DIAGNOSIS — J01.40 ACUTE PANSINUSITIS, RECURRENCE NOT SPECIFIED: ICD-10-CM

## 2019-01-16 PROCEDURE — 99214 OFFICE O/P EST MOD 30 MIN: CPT | Performed by: FAMILY MEDICINE

## 2019-01-16 PROCEDURE — 93000 ELECTROCARDIOGRAM COMPLETE: CPT | Performed by: FAMILY MEDICINE

## 2019-01-16 RX ORDER — AZELASTINE 1 MG/ML
2 SPRAY, METERED NASAL 2 TIMES DAILY
Qty: 30 ML | Refills: 0 | Status: SHIPPED | OUTPATIENT
Start: 2019-01-16 | End: 2021-10-13

## 2019-01-16 RX ORDER — BENZONATATE 200 MG/1
200 CAPSULE ORAL 3 TIMES DAILY PRN
Qty: 30 CAPSULE | Refills: 0 | Status: SHIPPED | OUTPATIENT
Start: 2019-01-16 | End: 2019-01-26

## 2019-01-16 RX ORDER — AZITHROMYCIN 250 MG/1
TABLET, FILM COATED ORAL
Qty: 6 TABLET | Refills: 0 | Status: SHIPPED | OUTPATIENT
Start: 2019-01-16 | End: 2019-01-21

## 2019-01-16 NOTE — PROGRESS NOTES
Assessment/Plan:  1  Preoperative clearance, patient's preoperative blood work as well as EKG was normal   However patient's active infection surgery to be canceled 01/21/2019  I recommend be rescheduled in approximately 2 weeks  2  Cystocele, will need surgical repair by urogynecology  3  Acute sinusitis/bronchitis/otitis media, Zithromax is ordered  Chest x-ray is ordered  4   UTI status post Macrobid therapy repeat UA with CNS  5  Hypertension, stable continue present therapy  6  Recheck 1 week]        Midline cystocele  For surgery with urogynecology over patient is active infection surgery needs to be    Acute cystitis without hematuria  Repeat urinalysis with culture status post Macrobid therapy    Hypertension  Stable continue present therapy    Allergic rhinitis  Astelin is ordered       Diagnoses and all orders for this visit:    Preoperative clearance  -     POCT ECG  -     XR chest pa & lateral; Future    Midline cystocele    Acute cystitis without hematuria  -     Urinalysis with reflex to microscopic; Future  -     Urine culture; Future    Acute pansinusitis, recurrence not specified  -     azithromycin (ZITHROMAX) 250 mg tablet; Take 2 tablets today then 1 tablet daily till finished    Acute suppurative otitis media without spontaneous rupture of ear drum, recurrence not specified, unspecified laterality  -     azithromycin (ZITHROMAX) 250 mg tablet; Take 2 tablets today then 1 tablet daily till finished    Acute bronchitis, unspecified organism  -     XR chest pa & lateral; Future  -     benzonatate (TESSALON) 200 MG capsule; Take 1 capsule (200 mg total) by mouth 3 (three) times a day as needed for cough for up to 10 days  -     azithromycin (ZITHROMAX) 250 mg tablet;  Take 2 tablets today then 1 tablet daily till finished    Essential hypertension    Allergic rhinitis, unspecified seasonality, unspecified trigger  -     azelastine (ASTELIN) 0 1 % nasal spray; 2 sprays into each nostril 2 (two) times a day Use in each nostril as directed          Subjective: Pt here with complains of chest congestions, post nasal drip, cough, sinus pressure and green mucous x 3 days  Pt also here today for a pre-op clearance on 1/21/2019 with Dr Mortimer Pluck R Cruz     Patient ID: Festus Cartagena is a 70 y o  female  Patient is here for preoperative clearance for pelvic surgery for incontinence  She was initially scheduled 02/04/2019 however this is been changed to 01/21/2019 P  Preop blood work was discussed with the patient  Patient did have UTI this was treated with Macrobid  Will repeat a urinalysis for clearance  Unfortunately patient developed sinus pain and pressure otalgia mild to moderate productive cough with yellow sputum over the past few days  Low-grade fever no chills no chest pain or shortness of breath        The following portions of the patient's history were reviewed and updated as appropriate: allergies, current medications, past family history, past medical history, past social history, past surgical history and problem list     Review of Systems   Constitutional:        HPI   HENT:        HPI   Eyes: Negative  Respiratory:        HPI   Cardiovascular: Negative for chest pain  Gastrointestinal: Negative  Endocrine: Negative  Genitourinary:        HPI   Musculoskeletal: Negative  Skin: Negative  Allergic/Immunologic: Positive for environmental allergies  Neurological: Negative  Hematological: Negative  Psychiatric/Behavioral: Negative  Objective:      /80 (BP Location: Left arm, Patient Position: Sitting, Cuff Size: Large)   Pulse 72   Temp 100 1 °F (37 8 °C) (Tympanic)   Resp 16   Ht 5' 4" (1 626 m)   Wt 82 1 kg (181 lb)   LMP  (LMP Unknown)   BMI 31 07 kg/m²          Physical Exam   Constitutional: She is oriented to person, place, and time  She appears well-developed and well-nourished  HENT:   Head: Normocephalic and atraumatic  Mouth/Throat: No oropharyngeal exudate  Both tympanic membranes are dull right TM is injected positive allergic turbinates positive pansinus tenderness to percussion positive purulent postnasal drip mild injection negative exudate   Eyes: Pupils are equal, round, and reactive to light  Conjunctivae and EOM are normal  No scleral icterus  Neck: Neck supple  No thyromegaly present  Cardiovascular: Normal rate, regular rhythm and normal heart sounds  Pulmonary/Chest: Effort normal  No respiratory distress  She has no wheezes  She has no rales  She exhibits no tenderness  Coarse breath sounds   Abdominal: Soft  Bowel sounds are normal  There is no tenderness  Musculoskeletal: She exhibits no edema  Lymphadenopathy:     She has cervical adenopathy  Neurological: She is alert and oriented to person, place, and time  No cranial nerve deficit  Skin: Skin is warm and dry  Psychiatric: She has a normal mood and affect

## 2019-01-19 ENCOUNTER — TELEPHONE (OUTPATIENT)
Dept: FAMILY MEDICINE CLINIC | Facility: CLINIC | Age: 79
End: 2019-01-19

## 2019-01-19 ENCOUNTER — APPOINTMENT (OUTPATIENT)
Dept: RADIOLOGY | Age: 79
End: 2019-01-19
Payer: MEDICARE

## 2019-01-19 ENCOUNTER — APPOINTMENT (OUTPATIENT)
Dept: LAB | Age: 79
End: 2019-01-19
Payer: MEDICARE

## 2019-01-19 DIAGNOSIS — Z01.818 PREOPERATIVE CLEARANCE: ICD-10-CM

## 2019-01-19 DIAGNOSIS — N30.00 ACUTE CYSTITIS WITHOUT HEMATURIA: ICD-10-CM

## 2019-01-19 DIAGNOSIS — J20.9 ACUTE BRONCHITIS, UNSPECIFIED ORGANISM: ICD-10-CM

## 2019-01-19 LAB
BACTERIA UR QL AUTO: ABNORMAL /HPF
BILIRUB UR QL STRIP: NEGATIVE
CLARITY UR: ABNORMAL
COLOR UR: YELLOW
GLUCOSE UR STRIP-MCNC: NEGATIVE MG/DL
HGB UR QL STRIP.AUTO: NEGATIVE
KETONES UR STRIP-MCNC: NEGATIVE MG/DL
LEUKOCYTE ESTERASE UR QL STRIP: NEGATIVE
NITRITE UR QL STRIP: NEGATIVE
NON-SQ EPI CELLS URNS QL MICRO: ABNORMAL /HPF
PH UR STRIP.AUTO: 6.5 [PH] (ref 4.5–8)
PROT UR STRIP-MCNC: ABNORMAL MG/DL
RBC #/AREA URNS AUTO: ABNORMAL /HPF
SP GR UR STRIP.AUTO: 1.03 (ref 1–1.03)
UROBILINOGEN UR QL STRIP.AUTO: 0.2 E.U./DL
WBC #/AREA URNS AUTO: ABNORMAL /HPF

## 2019-01-19 PROCEDURE — 71046 X-RAY EXAM CHEST 2 VIEWS: CPT

## 2019-01-19 PROCEDURE — 87086 URINE CULTURE/COLONY COUNT: CPT

## 2019-01-19 PROCEDURE — 81001 URINALYSIS AUTO W/SCOPE: CPT

## 2019-01-19 NOTE — TELEPHONE ENCOUNTER
Pt called stating she is not feeling any better and is feeling worse  Pt is congested and coughing but now feels "weird"  Pt states she is shaking and has not yet gone for chest xray  Explained to pt that she should go to er since not feeling well and hx for high bp pressure and feeling shaky  Also explained to pt that she should not take a chance staying home feeling that way when feeling this way living alone and with bad weather coming  Pt agrees and will go to er asap  Will forward message to Dr Antonino Izquierdo who is here today   CAIN Salgado

## 2019-01-20 LAB — BACTERIA UR CULT: NORMAL

## 2019-01-25 ENCOUNTER — TELEPHONE (OUTPATIENT)
Dept: FAMILY MEDICINE CLINIC | Facility: CLINIC | Age: 79
End: 2019-01-25

## 2019-01-25 ENCOUNTER — OFFICE VISIT (OUTPATIENT)
Dept: FAMILY MEDICINE CLINIC | Facility: CLINIC | Age: 79
End: 2019-01-25
Payer: MEDICARE

## 2019-01-25 VITALS
TEMPERATURE: 98.1 F | BODY MASS INDEX: 31 KG/M2 | SYSTOLIC BLOOD PRESSURE: 138 MMHG | WEIGHT: 181.6 LBS | HEART RATE: 68 BPM | DIASTOLIC BLOOD PRESSURE: 84 MMHG | HEIGHT: 64 IN

## 2019-01-25 DIAGNOSIS — E66.9 OBESITY (BMI 30.0-34.9): ICD-10-CM

## 2019-01-25 DIAGNOSIS — N30.00 ACUTE CYSTITIS WITHOUT HEMATURIA: Primary | ICD-10-CM

## 2019-01-25 DIAGNOSIS — J30.9 ALLERGIC RHINITIS, UNSPECIFIED SEASONALITY, UNSPECIFIED TRIGGER: ICD-10-CM

## 2019-01-25 DIAGNOSIS — J20.9 ACUTE BRONCHITIS, UNSPECIFIED ORGANISM: ICD-10-CM

## 2019-01-25 DIAGNOSIS — I10 ESSENTIAL HYPERTENSION: ICD-10-CM

## 2019-01-25 DIAGNOSIS — H66.009 ACUTE SUPPURATIVE OTITIS MEDIA WITHOUT SPONTANEOUS RUPTURE OF EAR DRUM, RECURRENCE NOT SPECIFIED, UNSPECIFIED LATERALITY: ICD-10-CM

## 2019-01-25 DIAGNOSIS — N81.11 MIDLINE CYSTOCELE: ICD-10-CM

## 2019-01-25 DIAGNOSIS — J01.40 ACUTE PANSINUSITIS, RECURRENCE NOT SPECIFIED: ICD-10-CM

## 2019-01-25 PROCEDURE — 99214 OFFICE O/P EST MOD 30 MIN: CPT | Performed by: FAMILY MEDICINE

## 2019-01-25 RX ORDER — METHYLPREDNISOLONE 4 MG/1
TABLET ORAL
Qty: 1 EACH | Refills: 0 | Status: SHIPPED | OUTPATIENT
Start: 2019-01-25 | End: 2019-01-31

## 2019-01-25 NOTE — TELEPHONE ENCOUNTER
BT, Pt was here today to see TS and was given a Medrol Pk but she is not finished with the [employer] that she was given prior to todays ov, Pt would likt to know if she can take both of these?

## 2019-01-25 NOTE — TELEPHONE ENCOUNTER
Patient has questions about the medication that she was prescribed today and taking her current meds  Please call when available   Thank you

## 2019-01-25 NOTE — PROGRESS NOTES
Assessment/Plan:  1  Allergic rhinitis continue present therapy Medrol was ordered  2  Acute sinusitis, resolved  3  Acute bronchitis chest x-ray was clear will add Medrol to assist with clearing  Patient facial Zithromax today  4  UTI, resolved UA and culture were normal  5  Hypertension, stable continue present therapy  6  Midline cystocele surgery is canceled  Patient is having 2nd thoughts about surgery  I told to discuss with her gynecologist about a pessary or other nonsurgical options  7  Recheck 2 weeks        Allergic rhinitis  Continue present therapy will add Medrol Dosepak    Hypertension  Stable continue present therapy    Acute cystitis without hematuria  Resolved, UA and culture were normal    Midline cystocele  Surgery was canceled       Diagnoses and all orders for this visit:    Acute cystitis without hematuria    Allergic rhinitis, unspecified seasonality, unspecified trigger  -     methylPREDNISolone 4 MG tablet therapy pack; Use as directed on package    Essential hypertension    Acute pansinusitis, recurrence not specified    Acute suppurative otitis media without spontaneous rupture of ear drum, recurrence not specified, unspecified laterality    Acute bronchitis, unspecified organism  -     methylPREDNISolone 4 MG tablet therapy pack; Use as directed on package    Midline cystocele    Obesity (BMI 30 0-34  9)          Subjective: Pt here for f/u to sinus infection, pt had a cxr, urine,sample  Patient ID: Mariano Trejo is a 70 y o  female  Patient has sinus pain has gone no UTI symptoms  Chest x-ray was clear UA with culture was normal   Patient still with mild head congestion mild dry cough  No fever chills  Patient is taking her last antibiotic today    Using her nose spray as ordered        The following portions of the patient's history were reviewed and updated as appropriate: allergies, current medications, past family history, past medical history, past social history, past surgical history and problem list     Review of Systems   Constitutional: Negative for chills and fever  HENT:        HPI   Eyes: Negative  Respiratory:        HPI   Cardiovascular: Negative  Gastrointestinal: Negative  Genitourinary:        HPI   Musculoskeletal: Negative  Skin: Negative  Allergic/Immunologic: Positive for environmental allergies  Neurological: Negative  Hematological: Negative  Psychiatric/Behavioral: Negative  Objective:      /84   Pulse 68   Temp 98 1 °F (36 7 °C)   Ht 5' 4" (1 626 m)   Wt 82 4 kg (181 lb 9 6 oz)   LMP  (LMP Unknown)   BMI 31 17 kg/m²          Physical Exam   Constitutional: She is oriented to person, place, and time  She appears well-developed and well-nourished  HENT:   Head: Normocephalic and atraumatic  Mouth/Throat: No oropharyngeal exudate  Tympanic membranes are dull but no injection no fluid positive allergic turbinates negative sinus tenderness scant clear postnasal drip negative pharyngeal injection or exudate   Eyes: Pupils are equal, round, and reactive to light  Conjunctivae and EOM are normal    Neck: Neck supple  No thyromegaly present  Cardiovascular: Normal rate, regular rhythm and normal heart sounds  Pulmonary/Chest: Effort normal  No respiratory distress  She has no wheezes  She has no rales  She exhibits no tenderness  Very faint coarse breath sounds negative wheezing   Abdominal: Soft  Bowel sounds are normal  There is no tenderness  Musculoskeletal: She exhibits no edema  Lymphadenopathy:     She has no cervical adenopathy  Neurological: She is oriented to person, place, and time  No cranial nerve deficit  Skin: Skin is warm and dry  Psychiatric: She has a normal mood and affect  BMI Counseling: Body mass index is 31 17 kg/m²  Discussed the patient's BMI with her  The BMI is above average  BMI counseling and education was provided to the patient   Nutrition recommendations include decreasing overall calorie intake

## 2019-01-27 ENCOUNTER — OFFICE VISIT (OUTPATIENT)
Dept: URGENT CARE | Age: 79
End: 2019-01-27
Payer: MEDICARE

## 2019-01-27 VITALS
SYSTOLIC BLOOD PRESSURE: 170 MMHG | HEART RATE: 71 BPM | OXYGEN SATURATION: 94 % | WEIGHT: 182 LBS | RESPIRATION RATE: 16 BRPM | HEIGHT: 64 IN | DIASTOLIC BLOOD PRESSURE: 74 MMHG | BODY MASS INDEX: 31.07 KG/M2 | TEMPERATURE: 98.7 F

## 2019-01-27 DIAGNOSIS — M54.50 ACUTE BILATERAL LOW BACK PAIN WITHOUT SCIATICA: Primary | ICD-10-CM

## 2019-01-27 LAB
SL AMB  POCT GLUCOSE, UA: NORMAL
SL AMB LEUKOCYTE ESTERASE,UA: NORMAL
SL AMB POCT BILIRUBIN,UA: NORMAL
SL AMB POCT BLOOD,UA: NORMAL
SL AMB POCT CLARITY,UA: CLEAR
SL AMB POCT COLOR,UA: YELLOW
SL AMB POCT KETONES,UA: NORMAL
SL AMB POCT NITRITE,UA: NORMAL
SL AMB POCT PH,UA: 5
SL AMB POCT SPECIFIC GRAVITY,UA: 1.02
SL AMB POCT URINE PROTEIN: NORMAL
SL AMB POCT UROBILINOGEN: 0.2

## 2019-01-27 PROCEDURE — 87086 URINE CULTURE/COLONY COUNT: CPT | Performed by: PHYSICIAN ASSISTANT

## 2019-01-27 PROCEDURE — 81002 URINALYSIS NONAUTO W/O SCOPE: CPT | Performed by: PHYSICIAN ASSISTANT

## 2019-01-27 PROCEDURE — G0463 HOSPITAL OUTPT CLINIC VISIT: HCPCS | Performed by: PHYSICIAN ASSISTANT

## 2019-01-27 PROCEDURE — 99213 OFFICE O/P EST LOW 20 MIN: CPT | Performed by: PHYSICIAN ASSISTANT

## 2019-01-27 NOTE — PROGRESS NOTES
[address]        NAME: Dylan Quintanilla is a 70 y o  female  : 1940    MRN: [id number]  DATE: 2019  TIME: 11:42 AM    Assessment and Plan   Acute bilateral low back pain without sciatica [M54 5]  1  Acute bilateral low back pain without sciatica  POCT urine dip    Urine culture     Patient appears clinically well  Vital signs stable  No pulsatile abdominal mass  Patient tender to palpation to bilateral paraspinal muscles  Urinalysis normal   Likely patient has muscle strain due to inactivity secondary to URI  Patient told to follow up with family doctor this week  Patient states she understands and agrees  Patient Instructions       Take medications as directed  Drink plenty of fluids  Follow up with family doctor this week  Go to ER immediately if new or worsening symptoms occur  Chief Complaint     Chief Complaint   Patient presents with    Back Pain     b/l back pain that she can feel with heart beat; radiates to both hips; used heat on back         History of Present Illness       Patient has new onset bilateral lower back pain starting last night  Patient states that she feels occasional throbbing  Patient states that she used a balm ointment which significantly helped her pain  Her pain currently is 2/10  Patient has no loss of bowel or bladder function  No sacral numbness  No nausea vomiting  Patient has no recent trauma or fall  Patient has had a URI recently and she states that she has been mostly sedentary this whole time  Patient also has a history of vaginal prolapse with a obstructed urethra  Patient states that she is here because she is concerned that her vaginal prolapse is causing the symptoms  No fevers or chills  No dizziness  No other symptoms  Review of Systems   Review of Systems   Constitutional: Negative  Negative for chills, fatigue and fever  HENT: Negative  Eyes: Negative  Respiratory: Negative  Cardiovascular: Negative  Gastrointestinal: Negative for abdominal pain, constipation, diarrhea, nausea and vomiting  Endocrine: Negative  Genitourinary: Negative for dysuria, flank pain, frequency, pelvic pain, urgency, vaginal bleeding, vaginal discharge and vaginal pain  Musculoskeletal: Positive for back pain  Negative for gait problem, neck pain and neck stiffness  Skin: Negative  Negative for pallor and rash  Allergic/Immunologic: Negative  Neurological: Negative  Negative for dizziness, weakness, light-headedness and numbness  Hematological: Negative  Psychiatric/Behavioral: Negative            Current Medications       Current Outpatient Prescriptions:     aspirin 81 MG tablet, Take 162 mg by mouth, Disp: , Rfl:     azelastine (ASTELIN) 0 1 % nasal spray, 2 sprays into each nostril 2 (two) times a day Use in each nostril as directed, Disp: 30 mL, Rfl: 0    B Complex Vitamins (B COMPLEX 1 PO), Take 2 tablets by mouth daily, Disp: , Rfl:     bimatoprost (LUMIGAN) 0 03 % ophthalmic drops, Administer 1 drop to both eyes daily at bedtime  , Disp: , Rfl:     Calcium Carbonate (CALCIUM 600 PO), Take by mouth, Disp: , Rfl:     Cholecalciferol (VITAMIN D) 2000 units CAPS, Take 2 capsules by mouth daily, Disp: , Rfl:     Coenzyme Q10 (COQ10) 100 MG CAPS, Take by mouth, Disp: , Rfl:     diclofenac sodium (VOLTAREN) 1 %, Apply 2 g topically 4 (four) times a day, Disp: 1 Tube, Rfl: 5    gabapentin (NEURONTIN) 300 mg capsule, Take 1 capsule (300 mg total) by mouth daily at bedtime (Patient taking differently: Take 300 mg by mouth daily at bedtime as needed  ), Disp: 30 capsule, Rfl: 5    GLUCOSAMINE-MSM-HYALURONIC ACD PO, Take 1 capsule by mouth 3 (three) times a day, Disp: , Rfl:     levETIRAcetam (KEPPRA) 500 mg tablet, Take 1 tablet by mouth twice a day (Patient taking differently: Take 250 mg by mouth every 12 (twelve) hours Take 1 tablet by mouth twice a day ), Disp: 60 tablet, Rfl: 5    lisinopril (ZESTRIL) 10 mg tablet, TAKE ONE TABLET BY MOUTH ONCE DAILY FOR BLOOD PRESSURE (Patient taking differently: TAKE ONE TABLET BY MOUTH ONCE DAILY FOR BLOOD PRESSURE in PM), Disp: 90 tablet, Rfl: 3    Magnesium 400 MG TABS, Take by mouth, Disp: , Rfl:     methylPREDNISolone 4 MG tablet therapy pack, Use as directed on package, Disp: 1 each, Rfl: 0    metoprolol succinate (TOPROL-XL) 200 MG 24 hr tablet, TAKE 1 TABLET BY MOUTH ONCE DAILY (Patient taking differently: TAKE 1 TABLET BY MOUTH ONCE DAILY in PM), Disp: 90 tablet, Rfl: 0    Multiple Vitamins-Minerals (MULTIVITAMIN ADULT PO), Take 1 capsule by mouth daily  , Disp: , Rfl:     Omega-3 Fatty Acids (FISH OIL PO), Take 1,000 mg by mouth daily  , Disp: , Rfl:     Turmeric 500 MG CAPS, Take 1 capsule by mouth daily  , Disp: , Rfl:     Current Allergies     Allergies as of 01/27/2019 - Reviewed 01/27/2019   Allergen Reaction Noted    Carbamazepine Myalgia 03/07/2007    Epinephrine Other (See Comments) 03/07/2007    Iodides Itching 12/10/2011    Morphine and related  05/04/2012    Sulfamethoxazole-trimethoprim  05/04/2012    Latex Rash 05/04/2012    Topiramate Rash 03/07/2007            The following portions of the patient's history were reviewed and updated as appropriate: allergies, current medications, past family history, past medical history, past social history, past surgical history and problem list      Past Medical History:   Diagnosis Date    Cancer ([facility] [address] )     left breast    Elevated serum alkaline phosphatase level     GERD (gastroesophageal reflux disease)     Glaucoma     Hiatal hernia     Hypercalcemia     Hyperglycemia     diet controlled    Hypertension     Rheumatic fever     Seizures (HCC)     TIA (transient ischemic attack)     Vitamin D deficiency        Past Surgical History:   Procedure Laterality Date    APPENDECTOMY      BREAST LUMPECTOMY Left     COLONOSCOPY      FOOT SURGERY Right     ORIF ANKLE FRACTURE Left     TONSILLECTOMY      TUBAL LIGATION         Family History   Problem Relation Age of Onset    Breast cancer Mother     Coronary artery disease Father     Bone cancer Brother     Lung cancer Brother          Medications have been verified  Objective   /74   Pulse 71   Temp 98 7 °F (37 1 °C)   Resp 16   Ht 5' 4" (1 626 m)   Wt 82 6 kg (182 lb)   LMP  (LMP Unknown)   SpO2 94%   BMI 31 24 kg/m²        Physical Exam     Physical Exam   Constitutional: She appears well-developed and well-nourished  No distress  HENT:   Head: Normocephalic and atraumatic  Cardiovascular: Normal rate, regular rhythm, normal heart sounds and intact distal pulses  Pulmonary/Chest: Effort normal and breath sounds normal  No respiratory distress  She has no wheezes  She has no rales  Abdominal: Soft  Normal appearance and bowel sounds are normal  She exhibits no distension, no pulsatile midline mass and no mass  There is no tenderness  There is no rigidity, no rebound, no guarding, no CVA tenderness, no tenderness at McBurney's point and negative Kwok's sign  Musculoskeletal:        Lumbar back: She exhibits tenderness  She exhibits normal range of motion, no bony tenderness (No midline point tenderness) and no deformity  Back:    Strength Sensation Circulation intact and symmetrical to lower extremities bilaterally  Normal gait   Skin: Skin is warm  No rash noted  She is not diaphoretic  No pallor  Nursing note and vitals reviewed

## 2019-01-27 NOTE — PATIENT INSTRUCTIONS
Take medications as directed  Drink plenty of fluids  Follow up with family doctor this week  Go to ER immediately if new or worsening symptoms occur  Low Back Strain   WHAT YOU NEED TO KNOW:   Low back strain is an injury to your lower back muscles or tendons  Tendons are strong tissues that connect muscles to bones  The lower back supports most of your body weight and helps you move, twist, and bend  DISCHARGE INSTRUCTIONS:   Return to the emergency department if:   · You hear or feel a pop in your lower back  · You have increased swelling or pain in your lower back  · You have trouble moving your legs  · Your legs are numb  Contact your healthcare provider if:   · You have a fever  · Your pain does not go away, even after treatment  · You have questions or concerns about your condition or care  Medicines: The following medicines may be ordered by your healthcare provider:  · Acetaminophen decreases pain and fever  It is available without a doctor's order  Ask how much to take and how often to take it  Follow directions  Acetaminophen can cause liver damage if not taken correctly  · NSAIDs , such as ibuprofen, help decrease swelling, pain, and fever  This medicine is available with or without a doctor's order  NSAIDs can cause stomach bleeding or kidney problems in certain people  If you take blood thinner medicine, always ask your healthcare provider if NSAIDs are safe for you  Always read the medicine label and follow directions  · Muscle relaxers  help decrease pain and muscle spasms  · Prescription pain medicine  may be given  Ask how to take this medicine safely  · Take your medicine as directed  Contact your healthcare provider if you think your medicine is not helping or if you have side effects  Tell him or her if you are allergic to any medicine  Keep a list of the medicines, vitamins, and herbs you take  Include the amounts, and when and why you take them  Bring the list or the pill bottles to follow-up visits  Carry your medicine list with you in case of an emergency  Self-care:   · Rest  as directed  You may need to rest in bed for a period of time after your injury  Do not lift heavy objects  · Apply ice  on your back for 15 to 20 minutes every hour or as directed  Use an ice pack, or put crushed ice in a plastic bag  Cover it with a towel  Ice helps prevent tissue damage and decreases swelling and pain  · Apply heat  on your lower back for 20 to 30 minutes every 2 hours for as many days as directed  Heat helps decrease pain and muscle spasms  · Slowly start to increase your activity  as the pain decreases, or as directed  Prevent another low back strain:   · Use correct body movements  ¨ Bend at the hips and knees when you  objects  Do not bend from the waist  Use your leg muscles as you lift the load  Do not use your back  Keep the object close to your chest as you lift it  Try not to twist or lift anything above your waist     ¨ Change your position often when you stand for long periods of time  Rest one foot on a small box or footrest, and then switch to the other foot often  ¨ Try not to sit for long periods of time  When you do, sit in a straight-backed chair with your feet flat on the floor  ¨ Never reach, pull, or push while you are sitting  · Warm up before you exercise  Do exercises that strengthen your back muscles  Ask your healthcare provider about the best exercise plan for you  · Maintain a healthy weight  Ask your healthcare provider how much you should weigh  Ask him to help you create a weight loss plan if you are overweight  © 2017 [facility] Information is for End User's use only and may not be sold, redistributed or otherwise used for commercial purposes  All illustrations and images included in CareNotes® are the copyrighted property of [employer]  or [employer]    The above information is an  only  It is not intended as medical advice for individual conditions or treatments  Talk to your doctor, nurse or pharmacist before following any medical regimen to see if it is safe and effective for you

## 2019-01-29 LAB — BACTERIA UR CULT: NORMAL

## 2019-02-07 ENCOUNTER — OFFICE VISIT (OUTPATIENT)
Dept: FAMILY MEDICINE CLINIC | Facility: CLINIC | Age: 79
End: 2019-02-07
Payer: MEDICARE

## 2019-02-07 VITALS
WEIGHT: 181 LBS | SYSTOLIC BLOOD PRESSURE: 148 MMHG | HEIGHT: 65 IN | DIASTOLIC BLOOD PRESSURE: 84 MMHG | BODY MASS INDEX: 30.16 KG/M2 | HEART RATE: 68 BPM

## 2019-02-07 DIAGNOSIS — E55.9 VITAMIN D DEFICIENCY: ICD-10-CM

## 2019-02-07 DIAGNOSIS — G45.9 TRANSIENT ISCHEMIC ATTACK: ICD-10-CM

## 2019-02-07 DIAGNOSIS — J30.9 ALLERGIC RHINITIS, UNSPECIFIED SEASONALITY, UNSPECIFIED TRIGGER: Primary | ICD-10-CM

## 2019-02-07 DIAGNOSIS — R73.9 HYPERGLYCEMIA: ICD-10-CM

## 2019-02-07 DIAGNOSIS — F41.9 ANXIETY: ICD-10-CM

## 2019-02-07 DIAGNOSIS — N81.11 MIDLINE CYSTOCELE: ICD-10-CM

## 2019-02-07 DIAGNOSIS — I10 ESSENTIAL HYPERTENSION: ICD-10-CM

## 2019-02-07 DIAGNOSIS — E78.5 HYPERLIPIDEMIA, UNSPECIFIED HYPERLIPIDEMIA TYPE: ICD-10-CM

## 2019-02-07 DIAGNOSIS — C50.919 MALIGNANT NEOPLASM OF FEMALE BREAST, UNSPECIFIED ESTROGEN RECEPTOR STATUS, UNSPECIFIED LATERALITY, UNSPECIFIED SITE OF BREAST (HCC): ICD-10-CM

## 2019-02-07 DIAGNOSIS — J01.40 ACUTE PANSINUSITIS, RECURRENCE NOT SPECIFIED: ICD-10-CM

## 2019-02-07 DIAGNOSIS — J20.9 ACUTE BRONCHITIS, UNSPECIFIED ORGANISM: ICD-10-CM

## 2019-02-07 DIAGNOSIS — E66.9 OBESITY (BMI 30.0-34.9): ICD-10-CM

## 2019-02-07 PROBLEM — N30.00 ACUTE CYSTITIS WITHOUT HEMATURIA: Status: RESOLVED | Noted: 2019-01-09 | Resolved: 2019-02-07

## 2019-02-07 PROBLEM — Z01.818 PREOPERATIVE CLEARANCE: Status: RESOLVED | Noted: 2019-01-02 | Resolved: 2019-02-07

## 2019-02-07 PROCEDURE — 99214 OFFICE O/P EST MOD 30 MIN: CPT | Performed by: FAMILY MEDICINE

## 2019-02-07 RX ORDER — ALPRAZOLAM 0.25 MG/1
TABLET ORAL
Qty: 30 TABLET | Refills: 1 | Status: SHIPPED | OUTPATIENT
Start: 2019-02-07 | End: 2019-07-05 | Stop reason: SDUPTHER

## 2019-02-07 NOTE — PATIENT INSTRUCTIONS
Follow low-fat low-cholesterol low carbohydrate diet to decrease sugar and cholesterol and decrease BMI  Return in 4 months for office visit blood work

## 2019-02-07 NOTE — PROGRESS NOTES
Assessment/Plan:  1  Allergic rhinitis, stable continue present therapy  2  Acute bronchitis/sinusitis, resolved  3  Hypertension, stable continue present therapy  4  Cystocele patient declining surgery refer to OBGYN  5  Hyperlipidemia blood work ordered  6  Hyperglycemia blood work ordered  7  Vitamin-D deficiency, blood work ordered  8  Anxiety, Xanax reordered  9  TIA, stable continue present therapy  10  Patient return in 4 months for office visit blood work sooner if needed  Allergic rhinitis  Stable continue present therapy    Transient ischemic attack  Stable continue present therapy    Hypertension  Stable continue present therapy    Midline cystocele  Referred to OBGYN for routine evaluation    Vitamin D deficiency  Blood work ordered    Hyperlipidemia  Blood work ordered    Hyperglycemia  Blood work ordered    Breast cancer [facility])  Follows with Hematology/Oncology    Anxiety  Renew Xanax uses sparingly       Diagnoses and all orders for this visit:    Allergic rhinitis, unspecified seasonality, unspecified trigger  -     CBC; Future  -     Comprehensive metabolic panel; Future  -     Hemoglobin A1C; Future  -     Lipid Panel with Direct LDL reflex; Future  -     TSH, 3rd generation with Free T4 reflex; Future  -     Urinalysis with reflex to microscopic; Future  -     Vitamin D 25 hydroxy; Future    Midline cystocele  -     Ambulatory referral to Obstetrics / Gynecology; Future  -     CBC; Future  -     Comprehensive metabolic panel; Future  -     Hemoglobin A1C; Future  -     Lipid Panel with Direct LDL reflex; Future  -     TSH, 3rd generation with Free T4 reflex; Future  -     Urinalysis with reflex to microscopic; Future  -     Vitamin D 25 hydroxy; Future    Vitamin D deficiency  -     CBC; Future  -     Comprehensive metabolic panel; Future  -     Hemoglobin A1C; Future  -     Lipid Panel with Direct LDL reflex; Future  -     TSH, 3rd generation with Free T4 reflex;  Future  -     Urinalysis with reflex to microscopic; Future  -     Vitamin D 25 hydroxy; Future    Hyperlipidemia, unspecified hyperlipidemia type  -     CBC; Future  -     Comprehensive metabolic panel; Future  -     Hemoglobin A1C; Future  -     Lipid Panel with Direct LDL reflex; Future  -     TSH, 3rd generation with Free T4 reflex; Future  -     Urinalysis with reflex to microscopic; Future  -     Vitamin D 25 hydroxy; Future    Hyperglycemia  -     CBC; Future  -     Comprehensive metabolic panel; Future  -     Hemoglobin A1C; Future  -     Lipid Panel with Direct LDL reflex; Future  -     TSH, 3rd generation with Free T4 reflex; Future  -     Urinalysis with reflex to microscopic; Future  -     Vitamin D 25 hydroxy; Future    Malignant neoplasm of female breast, unspecified estrogen receptor status, unspecified laterality, unspecified [facility]  -     CBC; Future  -     Comprehensive metabolic panel; Future  -     Hemoglobin A1C; Future  -     Lipid Panel with Direct LDL reflex; Future  -     TSH, 3rd generation with Free T4 reflex; Future  -     Urinalysis with reflex to microscopic; Future  -     Vitamin D 25 hydroxy; Future    Acute bronchitis, unspecified organism  -     CBC; Future  -     Comprehensive metabolic panel; Future  -     Hemoglobin A1C; Future  -     Lipid Panel with Direct LDL reflex; Future  -     TSH, 3rd generation with Free T4 reflex; Future  -     Urinalysis with reflex to microscopic; Future  -     Vitamin D 25 hydroxy; Future    Acute pansinusitis, recurrence not specified  -     CBC; Future  -     Comprehensive metabolic panel; Future  -     Hemoglobin A1C; Future  -     Lipid Panel with Direct LDL reflex; Future  -     TSH, 3rd generation with Free T4 reflex; Future  -     Urinalysis with reflex to microscopic; Future  -     Vitamin D 25 hydroxy; Future    Transient ischemic attack    Essential hypertension    Anxiety  -     ALPRAZolam (XANAX) 0 25 mg tablet;  Take 1 tablet daily as needed for anxiety    Obesity (BMI 30 0-34  9)          Subjective: patient is here for a 2 week f/u re: UTI, bronchitis, sinusitis, AR  Patient feels better  Patient states her surgery by Dr Víctor Rocha was cancelled  ak     Patient ID: Dian Tariq is a 70 y o  female  Patient doing well no ENT cough wheeze fever chills or UTI symptoms  Patient has chosen not to have surgery for her cystocele, but she would like to see OBGYN  I will refer her to [facility] for routine evaluation  The following portions of the patient's history were reviewed and updated as appropriate: allergies, current medications, past family history, past medical history, past social history, past surgical history and problem list     Review of Systems   Constitutional: Negative  HENT:        HPI   Eyes: Negative  Respiratory:        HPI   Cardiovascular: Negative  Gastrointestinal: Negative  Genitourinary:        HPI   Musculoskeletal: Negative  Skin: Negative  Allergic/Immunologic: Positive for environmental allergies  Neurological: Negative  Hematological: Negative  Psychiatric/Behavioral: Negative  Objective:      /84   Pulse 68   Ht 5' 4 5" (1 638 m)   Wt 82 1 kg (181 lb)   LMP  (LMP Unknown)   BMI 30 59 kg/m²          Physical Exam   Constitutional: She is oriented to person, place, and time  She appears well-developed and well-nourished  HENT:   Head: Normocephalic and atraumatic  Right Ear: External ear normal    Left Ear: External ear normal    Mouth/Throat: Oropharynx is clear and moist  No oropharyngeal exudate  Mildly allergic turbinates   Eyes: Pupils are equal, round, and reactive to light  Conjunctivae and EOM are normal  No scleral icterus  Neck: Neck supple  No thyromegaly present  Cardiovascular: Normal rate, regular rhythm and normal heart sounds  Pulmonary/Chest: Effort normal and breath sounds normal    Abdominal: Soft   Bowel sounds are normal  There is no tenderness  Musculoskeletal: She exhibits no edema  Lymphadenopathy:     She has no cervical adenopathy  Neurological: She is alert and oriented to person, place, and time  No cranial nerve deficit  Skin: Skin is warm and dry  Psychiatric: She has a normal mood and affect  BMI Counseling: Body mass index is 30 59 kg/m²  Discussed the patient's BMI with her  The BMI is above average  BMI counseling and education was provided to the patient  Nutrition recommendations include moderation in carbohydrate intake and reducing intake of cholesterol

## 2019-03-27 DIAGNOSIS — I10 ESSENTIAL HYPERTENSION: ICD-10-CM

## 2019-03-27 RX ORDER — METOPROLOL SUCCINATE 200 MG/1
TABLET, EXTENDED RELEASE ORAL
Qty: 90 TABLET | Refills: 0 | Status: SHIPPED | OUTPATIENT
Start: 2019-03-27 | End: 2019-06-21 | Stop reason: SDUPTHER

## 2019-04-02 ENCOUNTER — OFFICE VISIT (OUTPATIENT)
Dept: OBGYN CLINIC | Facility: MEDICAL CENTER | Age: 79
End: 2019-04-02
Payer: MEDICARE

## 2019-04-02 VITALS
BODY MASS INDEX: 30.84 KG/M2 | WEIGHT: 185.1 LBS | HEIGHT: 65 IN | SYSTOLIC BLOOD PRESSURE: 126 MMHG | DIASTOLIC BLOOD PRESSURE: 70 MMHG

## 2019-04-02 DIAGNOSIS — N81.10 BADEN-WALKER GRADE 2 CYSTOCELE: Primary | ICD-10-CM

## 2019-04-02 PROCEDURE — 99204 OFFICE O/P NEW MOD 45 MIN: CPT | Performed by: OBSTETRICS & GYNECOLOGY

## 2019-04-04 DIAGNOSIS — I10 ESSENTIAL HYPERTENSION: ICD-10-CM

## 2019-04-05 RX ORDER — LISINOPRIL 10 MG/1
TABLET ORAL
Qty: 90 TABLET | Refills: 1 | Status: SHIPPED | OUTPATIENT
Start: 2019-04-05 | End: 2020-01-23

## 2019-04-30 DIAGNOSIS — M79.601 PAIN OF RIGHT UPPER EXTREMITY: ICD-10-CM

## 2019-04-30 RX ORDER — GABAPENTIN 300 MG/1
300 CAPSULE ORAL
Qty: 30 CAPSULE | Refills: 0 | Status: SHIPPED | OUTPATIENT
Start: 2019-04-30 | End: 2020-01-07 | Stop reason: SDUPTHER

## 2019-06-04 ENCOUNTER — TELEPHONE (OUTPATIENT)
Dept: NEUROLOGY | Facility: CLINIC | Age: 79
End: 2019-06-04

## 2019-06-17 ENCOUNTER — APPOINTMENT (OUTPATIENT)
Dept: LAB | Age: 79
End: 2019-06-17
Payer: MEDICARE

## 2019-06-17 DIAGNOSIS — J20.9 ACUTE BRONCHITIS, UNSPECIFIED ORGANISM: ICD-10-CM

## 2019-06-17 DIAGNOSIS — J01.40 ACUTE PANSINUSITIS, RECURRENCE NOT SPECIFIED: ICD-10-CM

## 2019-06-17 DIAGNOSIS — E78.5 HYPERLIPIDEMIA, UNSPECIFIED HYPERLIPIDEMIA TYPE: ICD-10-CM

## 2019-06-17 DIAGNOSIS — R73.9 HYPERGLYCEMIA: ICD-10-CM

## 2019-06-17 DIAGNOSIS — J30.9 ALLERGIC RHINITIS, UNSPECIFIED SEASONALITY, UNSPECIFIED TRIGGER: ICD-10-CM

## 2019-06-17 DIAGNOSIS — E55.9 VITAMIN D DEFICIENCY: ICD-10-CM

## 2019-06-17 DIAGNOSIS — N81.11 MIDLINE CYSTOCELE: ICD-10-CM

## 2019-06-17 DIAGNOSIS — C50.919 MALIGNANT NEOPLASM OF FEMALE BREAST, UNSPECIFIED ESTROGEN RECEPTOR STATUS, UNSPECIFIED LATERALITY, UNSPECIFIED SITE OF BREAST (HCC): ICD-10-CM

## 2019-06-17 LAB
25(OH)D3 SERPL-MCNC: 37.3 NG/ML (ref 30–100)
ALBUMIN SERPL BCP-MCNC: 3.8 G/DL (ref 3.5–5)
ALP SERPL-CCNC: 83 U/L (ref 46–116)
ALT SERPL W P-5'-P-CCNC: 25 U/L (ref 12–78)
ANION GAP SERPL CALCULATED.3IONS-SCNC: 3 MMOL/L (ref 4–13)
AST SERPL W P-5'-P-CCNC: 20 U/L (ref 5–45)
BILIRUB SERPL-MCNC: 0.67 MG/DL (ref 0.2–1)
BILIRUB UR QL STRIP: NEGATIVE
BUN SERPL-MCNC: 16 MG/DL (ref 5–25)
CALCIUM SERPL-MCNC: 9.6 MG/DL (ref 8.3–10.1)
CHLORIDE SERPL-SCNC: 108 MMOL/L (ref 100–108)
CHOLEST SERPL-MCNC: 224 MG/DL (ref 50–200)
CLARITY UR: CLEAR
CO2 SERPL-SCNC: 27 MMOL/L (ref 21–32)
COLOR UR: YELLOW
CREAT SERPL-MCNC: 0.78 MG/DL (ref 0.6–1.3)
ERYTHROCYTE [DISTWIDTH] IN BLOOD BY AUTOMATED COUNT: 12.8 % (ref 11.6–15.1)
EST. AVERAGE GLUCOSE BLD GHB EST-MCNC: 128 MG/DL
GFR SERPL CREATININE-BSD FRML MDRD: 74 ML/MIN/1.73SQ M
GLUCOSE P FAST SERPL-MCNC: 97 MG/DL (ref 65–99)
GLUCOSE UR STRIP-MCNC: NEGATIVE MG/DL
HBA1C MFR BLD: 6.1 % (ref 4.2–6.3)
HCT VFR BLD AUTO: 42.7 % (ref 34.8–46.1)
HDLC SERPL-MCNC: 65 MG/DL (ref 40–60)
HGB BLD-MCNC: 13.5 G/DL (ref 11.5–15.4)
HGB UR QL STRIP.AUTO: NEGATIVE
KETONES UR STRIP-MCNC: NEGATIVE MG/DL
LDLC SERPL CALC-MCNC: 142 MG/DL (ref 0–100)
LEUKOCYTE ESTERASE UR QL STRIP: NEGATIVE
MCH RBC QN AUTO: 29.9 PG (ref 26.8–34.3)
MCHC RBC AUTO-ENTMCNC: 31.6 G/DL (ref 31.4–37.4)
MCV RBC AUTO: 95 FL (ref 82–98)
NITRITE UR QL STRIP: NEGATIVE
PH UR STRIP.AUTO: 6 [PH]
PLATELET # BLD AUTO: 194 THOUSANDS/UL (ref 149–390)
PMV BLD AUTO: 11.5 FL (ref 8.9–12.7)
POTASSIUM SERPL-SCNC: 4.8 MMOL/L (ref 3.5–5.3)
PROT SERPL-MCNC: 7.2 G/DL (ref 6.4–8.2)
PROT UR STRIP-MCNC: NEGATIVE MG/DL
RBC # BLD AUTO: 4.51 MILLION/UL (ref 3.81–5.12)
SODIUM SERPL-SCNC: 138 MMOL/L (ref 136–145)
SP GR UR STRIP.AUTO: 1.02 (ref 1–1.03)
TRIGL SERPL-MCNC: 87 MG/DL
TSH SERPL DL<=0.05 MIU/L-ACNC: 2.43 UIU/ML (ref 0.36–3.74)
UROBILINOGEN UR QL STRIP.AUTO: 0.2 E.U./DL
WBC # BLD AUTO: 7.65 THOUSAND/UL (ref 4.31–10.16)

## 2019-06-17 PROCEDURE — 83036 HEMOGLOBIN GLYCOSYLATED A1C: CPT

## 2019-06-17 PROCEDURE — 84443 ASSAY THYROID STIM HORMONE: CPT

## 2019-06-17 PROCEDURE — 80053 COMPREHEN METABOLIC PANEL: CPT

## 2019-06-17 PROCEDURE — 36415 COLL VENOUS BLD VENIPUNCTURE: CPT

## 2019-06-17 PROCEDURE — 81003 URINALYSIS AUTO W/O SCOPE: CPT

## 2019-06-17 PROCEDURE — 82306 VITAMIN D 25 HYDROXY: CPT

## 2019-06-17 PROCEDURE — 80061 LIPID PANEL: CPT

## 2019-06-17 PROCEDURE — 85027 COMPLETE CBC AUTOMATED: CPT

## 2019-06-21 DIAGNOSIS — I10 ESSENTIAL HYPERTENSION: ICD-10-CM

## 2019-06-21 RX ORDER — METOPROLOL SUCCINATE 200 MG/1
TABLET, EXTENDED RELEASE ORAL
Qty: 90 TABLET | Refills: 0 | Status: SHIPPED | OUTPATIENT
Start: 2019-06-21 | End: 2019-09-18 | Stop reason: SDUPTHER

## 2019-07-02 ENCOUNTER — OFFICE VISIT (OUTPATIENT)
Dept: FAMILY MEDICINE CLINIC | Facility: CLINIC | Age: 79
End: 2019-07-02
Payer: MEDICARE

## 2019-07-02 VITALS
BODY MASS INDEX: 30.49 KG/M2 | HEART RATE: 64 BPM | DIASTOLIC BLOOD PRESSURE: 84 MMHG | SYSTOLIC BLOOD PRESSURE: 140 MMHG | WEIGHT: 183 LBS | HEIGHT: 65 IN

## 2019-07-02 DIAGNOSIS — H91.93 BILATERAL HEARING LOSS, UNSPECIFIED HEARING LOSS TYPE: ICD-10-CM

## 2019-07-02 DIAGNOSIS — C50.919 MALIGNANT NEOPLASM OF FEMALE BREAST, UNSPECIFIED ESTROGEN RECEPTOR STATUS, UNSPECIFIED LATERALITY, UNSPECIFIED SITE OF BREAST (HCC): ICD-10-CM

## 2019-07-02 DIAGNOSIS — Z74.09 DECREASED AMBULATION STATUS: Primary | ICD-10-CM

## 2019-07-02 DIAGNOSIS — Z12.39 SCREENING BREAST EXAMINATION: ICD-10-CM

## 2019-07-02 DIAGNOSIS — I10 ESSENTIAL HYPERTENSION: ICD-10-CM

## 2019-07-02 DIAGNOSIS — G45.9 TRANSIENT ISCHEMIC ATTACK: ICD-10-CM

## 2019-07-02 DIAGNOSIS — E55.9 VITAMIN D DEFICIENCY: ICD-10-CM

## 2019-07-02 DIAGNOSIS — H60.503 ACUTE OTITIS EXTERNA OF BOTH EARS, UNSPECIFIED TYPE: ICD-10-CM

## 2019-07-02 DIAGNOSIS — R73.9 HYPERGLYCEMIA: ICD-10-CM

## 2019-07-02 DIAGNOSIS — M85.80 MODERATE OSTEOPENIA: ICD-10-CM

## 2019-07-02 DIAGNOSIS — E78.5 HYPERLIPIDEMIA, UNSPECIFIED HYPERLIPIDEMIA TYPE: ICD-10-CM

## 2019-07-02 PROCEDURE — 99214 OFFICE O/P EST MOD 30 MIN: CPT | Performed by: FAMILY MEDICINE

## 2019-07-02 RX ORDER — CIPROFLOXACIN AND DEXAMETHASONE 3; 1 MG/ML; MG/ML
4 SUSPENSION/ DROPS AURICULAR (OTIC) 2 TIMES DAILY
Qty: 7.5 ML | Refills: 0 | Status: SHIPPED | OUTPATIENT
Start: 2019-07-02 | End: 2020-08-06

## 2019-07-02 NOTE — PATIENT INSTRUCTIONS
Patient follow up with physical therapy for ambulation dysfunction  Follow-up with ENT for hearing loss  Patient return in 6 months for office visit blood work  Follow-up with all specialist per their instructions    Follow low-fat low-carbohydrate low sugar diet

## 2019-07-02 NOTE — PROGRESS NOTES
Assessment and Plan:  1  Decreased ambulation status, patient using cane  Refer to physical therapy for evaluation  2  Hearing loss, refer to ENT  3  Acute otitis externa, Ciprodex is ordered  4  Hypertension, stable continue present therapy  5  Seizure disorder/ TIA, stable patient follows with Neurology  6  Vitamin-D deficiency, stable continue present therapy  7  Hyperlipidemia medication has been refused low-fat diet recommended  8  Hyperglycemia, low sugar low carb right diet recommended  9  Patient to return in 6 months for office visit blood work sooner if needed    BMI Counseling: Body mass index is 30 93 kg/m²  Discussed the patient's BMI with her  The BMI is above average  BMI counseling and education was provided to the patient  Nutrition recommendations include moderation in carbohydrate intake and reducing intake of cholesterol        Problem List Items Addressed This Visit        Cardiovascular and Mediastinum    Transient ischemic attack      Stable continue present therapy         Relevant Orders    CBC    Comprehensive metabolic panel    Hemoglobin A1C    Lipid Panel with Direct LDL reflex    TSH, 3rd generation with Free T4 reflex    Urinalysis with reflex to microscopic    Vitamin D 25 hydroxy    Hypertension      Stable continue present therapy         Relevant Orders    CBC    Comprehensive metabolic panel    Hemoglobin A1C    Lipid Panel with Direct LDL reflex    TSH, 3rd generation with Free T4 reflex    Urinalysis with reflex to microscopic    Vitamin D 25 hydroxy       Musculoskeletal and Integument    Moderate osteopenia      DEXA 10/17, DEXA ordered for November, 2019         Relevant Orders    DXA bone density spine hip and pelvis    CBC    Comprehensive metabolic panel    Hemoglobin A1C    Lipid Panel with Direct LDL reflex    TSH, 3rd generation with Free T4 reflex    Urinalysis with reflex to microscopic    Vitamin D 25 hydroxy       Other    Vitamin D deficiency      Stable continue present therapy         Relevant Orders    CBC    Comprehensive metabolic panel    Hemoglobin A1C    Lipid Panel with Direct LDL reflex    TSH, 3rd generation with Free T4 reflex    Urinalysis with reflex to microscopic    Vitamin D 25 hydroxy    Hyperlipidemia      Medication has been refused low-fat low-cholesterol diet recommended         Relevant Orders    CBC    Comprehensive metabolic panel    Hemoglobin A1C    Lipid Panel with Direct LDL reflex    TSH, 3rd generation with Free T4 reflex    Urinalysis with reflex to microscopic    Vitamin D 25 hydroxy    Hyperglycemia      Low sugar diet recommended         Relevant Orders    CBC    Comprehensive metabolic panel    Hemoglobin A1C    Lipid Panel with Direct LDL reflex    TSH, 3rd generation with Free T4 reflex    Urinalysis with reflex to microscopic    Vitamin D 25 hydroxy    Breast cancer (HCC)      Follows with [facility]         Relevant Orders    CBC    Comprehensive metabolic panel    Hemoglobin A1C    Lipid Panel with Direct LDL reflex    TSH, 3rd generation with Free T4 reflex    Urinalysis with reflex to microscopic    Vitamin D 25 hydroxy    Decreased ambulation status - Primary      Consult physical therapy         Relevant Orders    Ambulatory referral to Physical Therapy    CBC    Comprehensive metabolic panel    Hemoglobin A1C    Lipid Panel with Direct LDL reflex    TSH, 3rd generation with Free T4 reflex    Urinalysis with reflex to microscopic    Vitamin D 25 hydroxy      Other Visit Diagnoses     Bilateral hearing loss, unspecified hearing loss type        Relevant Orders    Ambulatory Referral to Otolaryngology    CBC    Comprehensive metabolic panel    Hemoglobin A1C    Lipid Panel with Direct LDL reflex    TSH, 3rd generation with Free T4 reflex    Urinalysis with reflex to microscopic    Vitamin D 25 hydroxy    Acute otitis externa of both ears, unspecified type        Relevant Medications    ciprofloxacin-dexamethasone (CIPRODEX) otic suspension    Other Relevant Orders    CBC    Comprehensive metabolic panel    Hemoglobin A1C    Lipid Panel with Direct LDL reflex    TSH, 3rd generation with Free T4 reflex    Urinalysis with reflex to microscopic    Vitamin D 25 hydroxy                 Diagnoses and all orders for this visit:    Decreased ambulation status  -     Ambulatory referral to Physical Therapy; Future  -     CBC; Future  -     Comprehensive metabolic panel; Future  -     Hemoglobin A1C; Future  -     Lipid Panel with Direct LDL reflex; Future  -     TSH, 3rd generation with Free T4 reflex; Future  -     Urinalysis with reflex to microscopic; Future  -     Vitamin D 25 hydroxy; Future    Bilateral hearing loss, unspecified hearing loss type  -     Ambulatory Referral to Otolaryngology; Future  -     CBC; Future  -     Comprehensive metabolic panel; Future  -     Hemoglobin A1C; Future  -     Lipid Panel with Direct LDL reflex; Future  -     TSH, 3rd generation with Free T4 reflex; Future  -     Urinalysis with reflex to microscopic; Future  -     Vitamin D 25 hydroxy; Future    Acute otitis externa of both ears, unspecified type  -     ciprofloxacin-dexamethasone (CIPRODEX) otic suspension; Administer 4 drops into both ears 2 (two) times a day  -     CBC; Future  -     Comprehensive metabolic panel; Future  -     Hemoglobin A1C; Future  -     Lipid Panel with Direct LDL reflex; Future  -     TSH, 3rd generation with Free T4 reflex; Future  -     Urinalysis with reflex to microscopic; Future  -     Vitamin D 25 hydroxy; Future    Essential hypertension  -     CBC; Future  -     Comprehensive metabolic panel; Future  -     Hemoglobin A1C; Future  -     Lipid Panel with Direct LDL reflex; Future  -     TSH, 3rd generation with Free T4 reflex; Future  -     Urinalysis with reflex to microscopic; Future  -     Vitamin D 25 hydroxy; Future    Transient ischemic attack  -     CBC; Future  -     Comprehensive metabolic panel;  Future  - Hemoglobin A1C; Future  -     Lipid Panel with Direct LDL reflex; Future  -     TSH, 3rd generation with Free T4 reflex; Future  -     Urinalysis with reflex to microscopic; Future  -     Vitamin D 25 hydroxy; Future    Vitamin D deficiency  -     CBC; Future  -     Comprehensive metabolic panel; Future  -     Hemoglobin A1C; Future  -     Lipid Panel with Direct LDL reflex; Future  -     TSH, 3rd generation with Free T4 reflex; Future  -     Urinalysis with reflex to microscopic; Future  -     Vitamin D 25 hydroxy; Future    Malignant neoplasm of female breast, unspecified estrogen receptor status, unspecified laterality, unspecified site [facility]  -     CBC; Future  -     Comprehensive metabolic panel; Future  -     Hemoglobin A1C; Future  -     Lipid Panel with Direct LDL reflex; Future  -     TSH, 3rd generation with Free T4 reflex; Future  -     Urinalysis with reflex to microscopic; Future  -     Vitamin D 25 hydroxy; Future    Moderate osteopenia  -     DXA bone density spine hip and pelvis; Future  -     CBC; Future  -     Comprehensive metabolic panel; Future  -     Hemoglobin A1C; Future  -     Lipid Panel with Direct LDL reflex; Future  -     TSH, 3rd generation with Free T4 reflex; Future  -     Urinalysis with reflex to microscopic; Future  -     Vitamin D 25 hydroxy; Future    Hyperlipidemia, unspecified hyperlipidemia type  -     CBC; Future  -     Comprehensive metabolic panel; Future  -     Hemoglobin A1C; Future  -     Lipid Panel with Direct LDL reflex; Future  -     TSH, 3rd generation with Free T4 reflex; Future  -     Urinalysis with reflex to microscopic; Future  -     Vitamin D 25 hydroxy; Future    Hyperglycemia  -     CBC; Future  -     Comprehensive metabolic panel; Future  -     Hemoglobin A1C; Future  -     Lipid Panel with Direct LDL reflex; Future  -     TSH, 3rd generation with Free T4 reflex; Future  -     Urinalysis with reflex to microscopic;  Future  -     Vitamin D 25 hydroxy; Future    Other orders  -     GRAPE SEED EXTRACT PO; Take by mouth  -     milk thistle 175 MG tablet; Take 175 mg by mouth daily  -     Alpha-Lipoic Acid 100 MG CAPS; Take by mouth              Subjective:      Patient ID: Antoinette Diez is a 70 y o  female  CC:    Chief Complaint   Patient presents with    Hyperglycemia    Hyperlipidemia    Hypertension    Vitamin D Deficiency     review BW results  Pt states she tried to use an ear syringe last night and now her hearing is worse than before  She also notes that she has recently started using a cane due to episodes of being unsteady on her feet  -  [facility]       HPI:      Patient was having difficulty hearing so she "washed her ears out"  last night in hearing is no worsen with mild pain  Negative otorrhea  No fever chills  In addition patient is having difficulty ambulation and is now ambulating with cane  Blood work was reviewed with the patient      The following portions of the patient's history were reviewed and updated as appropriate: allergies, current medications, past family history, past medical history, past social history, past surgical history and problem list       Review of Systems   Constitutional: Negative  HENT:         HPI   Eyes: Negative  Respiratory: Negative  Cardiovascular: Negative  Gastrointestinal: Negative  Endocrine: Negative  Genitourinary: Negative  Musculoskeletal:         HPI   Skin: Negative  Allergic/Immunologic: Negative  Neurological:         HPI   Hematological: Negative  Psychiatric/Behavioral: Negative  Data to review:       Objective:    Vitals:    07/02/19 1105 07/02/19 1113   BP: 156/86 140/84   BP Location: Right arm    Patient Position: Sitting    Cuff Size: Large    Pulse: 64    Weight: 83 kg (183 lb)    Height: 5' 4 5" (1 638 m)         Physical Exam   Constitutional: She is oriented to person, place, and time  She appears well-developed and well-nourished  HENT:   Head: Normocephalic and atraumatic  Nose: Nose normal    Mouth/Throat: Oropharynx is clear and moist  No oropharyngeal exudate  Tympanic membranes intact bilaterally her EACs with edema and erythema   Eyes: Pupils are equal, round, and reactive to light  Conjunctivae and EOM are normal  No scleral icterus  Neck: Neck supple  No JVD present  No tracheal deviation present  No thyromegaly present  Cardiovascular: Normal rate, regular rhythm and normal heart sounds  Pulmonary/Chest: Effort normal and breath sounds normal    Abdominal: Soft  Bowel sounds are normal  There is no tenderness  Musculoskeletal: She exhibits no edema  Ambulating with cane   Lymphadenopathy:     She has no cervical adenopathy  Neurological: She is alert and oriented to person, place, and time  A cranial nerve deficit is present  Subjective bilateral hearing loss otherwise normal   Skin: Skin is warm and dry  Psychiatric: She has a normal mood and affect

## 2019-07-05 DIAGNOSIS — F41.9 ANXIETY: ICD-10-CM

## 2019-07-05 RX ORDER — ALPRAZOLAM 0.25 MG/1
TABLET ORAL
Qty: 30 TABLET | Refills: 1 | Status: SHIPPED | OUTPATIENT
Start: 2019-07-05

## 2019-07-31 ENCOUNTER — OFFICE VISIT (OUTPATIENT)
Dept: OBGYN CLINIC | Facility: MEDICAL CENTER | Age: 79
End: 2019-07-31
Payer: MEDICARE

## 2019-07-31 VITALS
SYSTOLIC BLOOD PRESSURE: 126 MMHG | BODY MASS INDEX: 31.92 KG/M2 | DIASTOLIC BLOOD PRESSURE: 64 MMHG | HEIGHT: 64 IN | WEIGHT: 187 LBS

## 2019-07-31 DIAGNOSIS — Z12.31 ENCOUNTER FOR SCREENING MAMMOGRAM FOR MALIGNANT NEOPLASM OF BREAST: ICD-10-CM

## 2019-07-31 DIAGNOSIS — N89.8 VAGINAL IRRITATION: ICD-10-CM

## 2019-07-31 DIAGNOSIS — N81.10 CYSTOCELE WITHOUT UTERINE PROLAPSE: Primary | ICD-10-CM

## 2019-07-31 PROCEDURE — 99214 OFFICE O/P EST MOD 30 MIN: CPT | Performed by: OBSTETRICS & GYNECOLOGY

## 2019-07-31 NOTE — PROGRESS NOTES
Partient here due to vaginal irritation from previously diagnosed cystocele; On physical exam, maintains cystocele grade II-III, asymptomatic except for dryness and irritation of vaginal tissue of the cystocele  She admits to using Summer's Kayli and baby wipes to cleanse the area; I advised to use a solution of corn starch/baking soda dissolved in  Warm water as the best cleansing area; Also, she does not wish to use a pessary or have surgery  I gave her printed instructions for Kegel exercises and a pamphlet about organ prolapses published by [employer]  She asked if I cristhianpuld explain to her sister, Марина Schultz and I dis; If no improvement after two months of Kegel's, I would advised pelvic floor physical therapy that is provided by [facility]  I asked her to return in two mo ths for reevaluation  She agreed  Time spent was at least 25 minutes with > 50 % counseling

## 2019-09-05 ENCOUNTER — OFFICE VISIT (OUTPATIENT)
Dept: URGENT CARE | Age: 79
End: 2019-09-05
Payer: MEDICARE

## 2019-09-05 VITALS — WEIGHT: 183 LBS | TEMPERATURE: 97.6 F | BODY MASS INDEX: 30.49 KG/M2 | RESPIRATION RATE: 20 BRPM | HEIGHT: 65 IN

## 2019-09-05 DIAGNOSIS — S99.919A ANKLE INJURY, INITIAL ENCOUNTER: Primary | ICD-10-CM

## 2019-09-05 PROCEDURE — 99213 OFFICE O/P EST LOW 20 MIN: CPT | Performed by: NURSE PRACTITIONER

## 2019-09-05 PROCEDURE — G0463 HOSPITAL OUTPT CLINIC VISIT: HCPCS | Performed by: NURSE PRACTITIONER

## 2019-09-05 NOTE — PATIENT INSTRUCTIONS
Follow-up with your family doctor  Wear Ace wrap to the left ankle for comfort but do not wear to bed  Elevate and use ice to the left ankle  Use Tylenol Motrin for any pain and discomfort  Symptoms persist go to the ER  X-ray done tonight no obvious fractures or abnormalities, call tomorrow for final x-ray results  Ankle Sprain   WHAT YOU NEED TO KNOW:   An ankle sprain happens when 1 or more ligaments in your ankle joint stretch or tear  Ligaments are tough tissues that connect bones  Ligaments support your joints and keep your bones in place  DISCHARGE INSTRUCTIONS:   Return to the emergency department if:   · You have severe pain in your ankle  · Your foot or toes are cold or numb  · Your ankle becomes more weak or unstable (wobbly)  · You are unable to put any weight on your ankle or foot  · Your swelling has increased or returned  Contact your healthcare provider if:   · Your pain does not go away, even after treatment  · You have questions or concerns about your condition or care  Medicines: You may need any of the following:  · NSAIDs , such as ibuprofen, help decrease swelling, pain, and fever  This medicine is available with or without a doctor's order  NSAIDs can cause stomach bleeding or kidney problems in certain people  If you take blood thinner medicine, always ask your healthcare provider if NSAIDs are safe for you  Always read the medicine label and follow directions  · Acetaminophen  decreases pain  It is available without a doctor's order  Ask how much to take and how often to take it  Follow directions  Acetaminophen can cause liver damage if not taken correctly  · Prescription pain medicine  may be given  Ask how to take this medicine safely  · Take your medicine as directed  Contact your healthcare provider if you think your medicine is not helping or if you have side effects  Tell him or her if you are allergic to any medicine   Keep a list of the medicines, vitamins, and herbs you take  Include the amounts, and when and why you take them  Bring the list or the pill bottles to follow-up visits  Carry your medicine list with you in case of an emergency  Self care:   · Use support devices,  such as a brace, cast, or splint, may be needed to limit your movement and protect your joint  You may need to use crutches to decrease your pain as you move around  · Go to physical therapy as directed  A physical therapist teaches you exercises to help improve movement and strength, and to decrease pain  · Rest  your ankle so that it can heal  Return to normal activities as directed  · Apply ice on your ankle for 15 to 20 minutes every hour or as directed  Use an ice pack, or put crushed ice in a plastic bag  Cover it with a towel  Ice helps prevent tissue damage and decreases swelling and pain  · Compress  your ankle  Ask if you should wrap an elastic bandage around your injured ligament  An elastic bandage provides support and helps decrease swelling and movement so your joint can heal  Wear as long as directed  · Elevate  your ankle above the level of your heart as often as you can  This will help decrease swelling and pain  Prop your ankle on pillows or blankets to keep it elevated comfortably  Prevent another ankle sprain:   · Let your ankle heal   Find out how long your ligament needs to heal  Do not do any physical activity until your healthcare provider says it is okay  If you start activity too soon, you may develop a more serious injury  · Always warm up and stretch  before you exercise or play sports  · Use the right equipment  Always wear shoes that fit well and are made for the activity that you are doing  You may also need ankle supports, elbow and knee pads, or braces  Follow up with your healthcare provider as directed:  Write down your questions so you remember to ask them during your visits     © 2017 [employer] Information is for End User's use only and may not be sold, redistributed or otherwise used for commercial purposes  All illustrations and images included in CareNotes® are the copyrighted property of [employer]  or [employer]  The above information is an  only  It is not intended as medical advice for individual conditions or treatments  Talk to your doctor, nurse or pharmacist before following any medical regimen to see if it is safe and effective for you

## 2019-09-05 NOTE — PROGRESS NOTES
NAME: Aureliano Eckert is a 70 y o  female  : 1940    MRN: [id number]      Assessment and Plan   Ankle injury, initial encounter [[id number] [id number]]  1  Ankle injury, initial encounter  XR ankle 3+ vw left       Mariann Luque was seen today for ankle injury  Diagnoses and all orders for this visit:    Ankle injury, initial encounter  -     XR ankle 3+ vw left; Future    ace wrap to the left ankle for support      Patient Instructions   Patient Instructions     Follow-up with your family doctor  Wear Ace wrap to the left ankle for comfort but do not wear to bed  Elevate and use ice to the left ankle  Use Tylenol Motrin for any pain and discomfort  Symptoms persist go to the ER  X-ray done tonight no obvious fractures or abnormalities, call tomorrow for final x-ray results  Ankle Sprain   WHAT YOU NEED TO KNOW:   An ankle sprain happens when 1 or more ligaments in your ankle joint stretch or tear  Ligaments are tough tissues that connect bones  Ligaments support your joints and keep your bones in place  DISCHARGE INSTRUCTIONS:   Return to the emergency department if:   · You have severe pain in your ankle  · Your foot or toes are cold or numb  · Your ankle becomes more weak or unstable (wobbly)  · You are unable to put any weight on your ankle or foot  · Your swelling has increased or returned  Contact your healthcare provider if:   · Your pain does not go away, even after treatment  · You have questions or concerns about your condition or care  Medicines: You may need any of the following:  · NSAIDs , such as ibuprofen, help decrease swelling, pain, and fever  This medicine is available with or without a doctor's order  NSAIDs can cause stomach bleeding or kidney problems in certain people  If you take blood thinner medicine, always ask your healthcare provider if NSAIDs are safe for you  Always read the medicine label and follow directions  · Acetaminophen  decreases pain   It is available without a doctor's order  Ask how much to take and how often to take it  Follow directions  Acetaminophen can cause liver damage if not taken correctly  · Prescription pain medicine  may be given  Ask how to take this medicine safely  · Take your medicine as directed  Contact your healthcare provider if you think your medicine is not helping or if you have side effects  Tell him or her if you are allergic to any medicine  Keep a list of the medicines, vitamins, and herbs you take  Include the amounts, and when and why you take them  Bring the list or the pill bottles to follow-up visits  Carry your medicine list with you in case of an emergency  Self care:   · Use support devices,  such as a brace, cast, or splint, may be needed to limit your movement and protect your joint  You may need to use crutches to decrease your pain as you move around  · Go to physical therapy as directed  A physical therapist teaches you exercises to help improve movement and strength, and to decrease pain  · Rest  your ankle so that it can heal  Return to normal activities as directed  · Apply ice on your ankle for 15 to 20 minutes every hour or as directed  Use an ice pack, or put crushed ice in a plastic bag  Cover it with a towel  Ice helps prevent tissue damage and decreases swelling and pain  · Compress  your ankle  Ask if you should wrap an elastic bandage around your injured ligament  An elastic bandage provides support and helps decrease swelling and movement so your joint can heal  Wear as long as directed  · Elevate  your ankle above the level of your heart as often as you can  This will help decrease swelling and pain  Prop your ankle on pillows or blankets to keep it elevated comfortably  Prevent another ankle sprain:   · Let your ankle heal   Find out how long your ligament needs to heal  Do not do any physical activity until your healthcare provider says it is okay   If you start activity too soon, you may develop a more serious injury  · Always warm up and stretch  before you exercise or play sports  · Use the right equipment  Always wear shoes that fit well and are made for the activity that you are doing  You may also need ankle supports, elbow and knee pads, or braces  Follow up with your healthcare provider as directed:  Write down your questions so you remember to ask them during your visits  © 2017 [employer] Information is for End User's use only and may not be sold, redistributed or otherwise used for commercial purposes  All illustrations and images included in CareNotes® are the copyrighted property of A [employer] M [employer] Inc  or [employer]  The above information is an  only  It is not intended as medical advice for individual conditions or treatments  Talk to your doctor, nurse or pharmacist before following any medical regimen to see if it is safe and effective for you  Proceed to ER if symptoms worsen  Chief Complaint     Chief Complaint   Patient presents with    Ankle Injury     Pt reports she tripped over her recycling bin approx two hrs ago and her toe got caught between the grass and sidewalk  Denies LOC,  dizziness, lightheadedness or change in vision  Had previous sx to same ankle with screws in place  No OTC meds taken or ice applied  Has positive swelling and bruising  History of Present Illness     Patient is a 72-year-old female who is here today after tripping over recycling bin at about 2 hours ago  She caught her toe between the grass in the sidewalk and has caused her some pain  She denies having dizziness lightheadedness or any change in vision and denies striking her head  She had previous symptoms at the same ankle and had surgery whether screws in place  She has taken no over-the-counter medications since this occurred or has applied ice  She does have bruising and swelling to the area    She ambulate into the exam room on arrival and is accompanied by family member at bedside  Ankle Injury    The incident occurred 1 to 3 hours ago  The incident occurred at home  Injury mechanism: tripped  The pain is at a severity of 4/10  The pain is mild  The pain has been constant since onset  She reports no foreign bodies present  Nothing aggravates the symptoms  She has tried nothing for the symptoms  Review of Systems   Review of Systems   Musculoskeletal: Positive for gait problem (slight altered gait) and joint swelling (lateral left ankle pain)     Skin:        Bruising and swelling are present to the left ankle           Current Medications       Current Outpatient Medications:     Alpha-Lipoic Acid 100 MG CAPS, Take by mouth, Disp: , Rfl:     ALPRAZolam (XANAX) 0 25 mg tablet, TAKE ONE TABLET BY MOUTH EVERY DAY AS NEEDED FOR ANXIETY, Disp: 30 tablet, Rfl: 1    aspirin 81 MG tablet, Take 162 mg by mouth, Disp: , Rfl:     B Complex Vitamins (B COMPLEX 1 PO), Take 2 tablets by mouth daily, Disp: , Rfl:     bimatoprost (LUMIGAN) 0 03 % ophthalmic drops, Administer 1 drop to both eyes daily at bedtime  , Disp: , Rfl:     Calcium Carbonate (CALCIUM 600 PO), Take by mouth, Disp: , Rfl:     Cholecalciferol (VITAMIN D) 2000 units CAPS, Take 2 capsules by mouth daily, Disp: , Rfl:     Coenzyme Q10 (COQ10) 100 MG CAPS, Take by mouth, Disp: , Rfl:     gabapentin (NEURONTIN) 300 mg capsule, Take 1 capsule (300 mg total) by mouth daily at bedtime as needed (pain), Disp: 30 capsule, Rfl: 0    GLUCOSAMINE-MSM-HYALURONIC ACD PO, Take 1 capsule by mouth 3 (three) times a day, Disp: , Rfl:     GRAPE SEED EXTRACT PO, Take by mouth, Disp: , Rfl:     lisinopril (ZESTRIL) 10 mg tablet, TAKE ONE TABLET BY MOUTH ONCE DAILY FOR BLOOD PRESSURE, Disp: 90 tablet, Rfl: 1    Magnesium 400 MG TABS, Take by mouth, Disp: , Rfl:     metoprolol succinate (TOPROL-XL) 200 MG 24 hr tablet, TAKE 1 TABLET BY MOUTH ONCE DAILY, Disp: 90 tablet, Rfl: 0    milk thistle 175 MG tablet, Take 175 mg by mouth daily, Disp: , Rfl:     mometasone (ELOCON) 0 1 % cream, Apply to bilateral external ear once weekly at bedtime, Disp: 45 g, Rfl: 3    Multiple Vitamins-Minerals (MULTIVITAMIN ADULT PO), Take 1 capsule by mouth daily  , Disp: , Rfl:     Omega-3 Fatty Acids (FISH OIL PO), Take 1,000 mg by mouth daily  , Disp: , Rfl:     Turmeric 500 MG CAPS, Take 1 capsule by mouth daily  , Disp: , Rfl:     azelastine (ASTELIN) 0 1 % nasal spray, 2 sprays into each nostril 2 (two) times a day Use in each nostril as directed (Patient not taking: Reported on 7/16/2019), Disp: 30 mL, Rfl: 0    ciprofloxacin-dexamethasone (CIPRODEX) otic suspension, Administer 4 drops into both ears 2 (two) times a day (Patient not taking: Reported on 7/16/2019), Disp: 7 5 mL, Rfl: 0    diclofenac sodium (VOLTAREN) 1 %, Apply 2 g topically 4 (four) times a day (Patient not taking: Reported on 7/16/2019), Disp: 1 Tube, Rfl: 5    levETIRAcetam (KEPPRA) 500 mg tablet, Take 1 tablet by mouth twice a day (Patient not taking: Reported on 9/5/2019), Disp: 60 tablet, Rfl: 5    Current Allergies     Allergies as of 09/05/2019 - Reviewed 09/05/2019   Allergen Reaction Noted    Carbamazepine Myalgia 03/07/2007    Epinephrine Other (See Comments) 03/07/2007    Iodides Itching 12/10/2011    Morphine and related  05/04/2012    Sulfamethoxazole-trimethoprim  05/04/2012    Latex Rash 05/04/2012    Topiramate Rash 03/07/2007              Past Medical History:   Diagnosis Date    Cancer ([facility] [address] )     left breast    Elevated serum alkaline phosphatase level     GERD (gastroesophageal reflux disease)     Glaucoma     Hiatal hernia     Hypercalcemia     Hyperglycemia     diet controlled    Hypertension     Rheumatic fever     Seizures (HCC)     TIA (transient ischemic attack)     Vitamin D deficiency        Past Surgical History:   Procedure Laterality Date    APPENDECTOMY  BREAST LUMPECTOMY Left     COLONOSCOPY      FOOT SURGERY Right     ORIF ANKLE FRACTURE Left     TONSILLECTOMY      TUBAL LIGATION         Family History   Problem Relation Age of Onset    Breast cancer Mother     Coronary artery disease Father     Bone cancer Brother     Lung cancer Brother          Medications have been verified  The following portions of the patient's history were reviewed and updated as appropriate: allergies, current medications, past family history, past medical history, past social history, past surgical history and problem list     Objective   Temp 97 6 °F (36 4 °C)   Resp 20   Ht 5' 4 5" (1 638 m)   Wt 83 kg (183 lb)   LMP  (LMP Unknown)   BMI 30 93 kg/m²      Physical Exam     Physical Exam   Constitutional: She appears well-developed and well-nourished  Musculoskeletal:        Left ankle: She exhibits swelling and ecchymosis  She exhibits normal range of motion  Tenderness  Skin: Skin is warm  Capillary refill takes less than 2 seconds  Bruising and ecchymosis noted              MARIE Looney

## 2019-09-17 DIAGNOSIS — I10 ESSENTIAL HYPERTENSION: Primary | ICD-10-CM

## 2019-09-17 RX ORDER — LISINOPRIL 10 MG/1
TABLET ORAL
Qty: 90 TABLET | Refills: 3 | Status: SHIPPED | OUTPATIENT
Start: 2019-09-17 | End: 2020-01-23 | Stop reason: SDUPTHER

## 2019-09-18 DIAGNOSIS — I10 ESSENTIAL HYPERTENSION: ICD-10-CM

## 2019-09-18 RX ORDER — METOPROLOL SUCCINATE 200 MG/1
TABLET, EXTENDED RELEASE ORAL
Qty: 90 TABLET | Refills: 0 | Status: SHIPPED | OUTPATIENT
Start: 2019-09-18 | End: 2019-12-18 | Stop reason: SDUPTHER

## 2019-09-20 ENCOUNTER — OFFICE VISIT (OUTPATIENT)
Dept: NEUROLOGY | Facility: CLINIC | Age: 79
End: 2019-09-20
Payer: MEDICARE

## 2019-09-20 VITALS
RESPIRATION RATE: 16 BRPM | DIASTOLIC BLOOD PRESSURE: 80 MMHG | HEART RATE: 78 BPM | WEIGHT: 185 LBS | SYSTOLIC BLOOD PRESSURE: 118 MMHG | HEIGHT: 65 IN | BODY MASS INDEX: 30.82 KG/M2

## 2019-09-20 DIAGNOSIS — G40.909 SEIZURE DISORDER (HCC): ICD-10-CM

## 2019-09-20 DIAGNOSIS — G40.209 PARTIAL SYMPTOMATIC EPILEPSY WITH COMPLEX PARTIAL SEIZURES, NOT INTRACTABLE, WITHOUT STATUS EPILEPTICUS (HCC): Primary | ICD-10-CM

## 2019-09-20 PROCEDURE — 99213 OFFICE O/P EST LOW 20 MIN: CPT | Performed by: PSYCHIATRY & NEUROLOGY

## 2019-09-20 RX ORDER — LEVETIRACETAM 500 MG/1
TABLET ORAL
Qty: 60 TABLET | Refills: 5 | Status: SHIPPED | OUTPATIENT
Start: 2019-09-20 | End: 2020-09-18 | Stop reason: SDUPTHER

## 2019-09-20 NOTE — PROGRESS NOTES
Patient ID: Bessie Woods is a 70 y o  female  Assessment/Plan:    Complex partial seizure [facility])  This is a 80-year-old patient partial complex seizures  She is currently doing well and has had no recurrence  She will be continued on the current dose of Keppra and I refilled her prescription  We did review her recent CBC and CMP that was normal     We did discuss that abrupt discontinuation of high doses of benzodiazepines can cause seizures  She is to return to our offices in one year but she has any other questions or problems in the interim she is to call       Diagnoses and all orders for this visit:    Partial symptomatic epilepsy with complex partial seizures, not intractable, without status epilepticus ([facility] [address] )    Seizure disorder (HCC)  -     levETIRAcetam (KEPPRA) 500 mg tablet; Take 1 tablet by mouth twice a day         Subjective:    this a 77 y/o right handed female who presents as a f/u regarding partial complex seizures  she was last evaluated 18 months ago she denies any seizures any auras    In 2005 she was noted to have 2 episodes characterized by a brief alteration of consciousness  the workup did reveal left temporal sharp waves  she was placed on Tegretol  she developed rash and was placed on Topamax  it was discontinued due to side effects  she was started on [address] gpo bid  she admits to using 250 mg po bid on occasion     higher dose did also result in behavioral changes      today she compl of right shoulder pain she also reports bladder prolapse and will be proceeding with exercises  She is reluctant to pursue any type of surgical intervention  She is treated with Xanax on occasion for panic disorder            Last EEG was in 2008  it showed b/L sharp activity ( right greater than left)  She has been treated for breast CA in 2007/2008 with lumpectomy and XRT          Jan 2018, appendectomy      She does take melatonin as needed for insomnia        cmp    Cbc in June 2019 normal                 The following portions of the patient's history were reviewed and updated as appropriate: She  has a past medical history of Cancer ([facility] [address] ), Elevated serum alkaline phosphatase level, GERD (gastroesophageal reflux disease), Glaucoma, Hiatal hernia, Hypercalcemia, Hyperglycemia, Hypertension, Rheumatic fever, Seizures ([facility] [address] ), TIA (transient ischemic attack), and Vitamin D deficiency  She  has a past surgical history that includes Breast lumpectomy (Left); Tubal ligation; Appendectomy; Colonoscopy; ORIF ankle fracture (Left); Tonsillectomy; and Foot surgery (Right)  Her family history includes Bone cancer in her brother; Breast cancer in her mother; Coronary artery disease in her father; Lung cancer in her brother  She  reports that she quit smoking about 9 years ago  She has never used smokeless tobacco  She reports that she drinks alcohol  She reports that she does not use drugs    Current Outpatient Medications   Medication Sig Dispense Refill    Alpha-Lipoic Acid 100 MG CAPS Take by mouth      ALPRAZolam (XANAX) 0 25 mg tablet TAKE ONE TABLET BY MOUTH EVERY DAY AS NEEDED FOR ANXIETY 30 tablet 1    aspirin 81 MG tablet Take 162 mg by mouth      B Complex Vitamins (B COMPLEX 1 PO) Take 2 tablets by mouth daily      bimatoprost (LUMIGAN) 0 03 % ophthalmic drops Administer 1 drop to both eyes daily at bedtime        Calcium Carbonate (CALCIUM 600 PO) Take by mouth      Cholecalciferol (VITAMIN D) 2000 units CAPS Take 2 capsules by mouth daily      Coenzyme Q10 (COQ10) 100 MG CAPS Take by mouth      gabapentin (NEURONTIN) 300 mg capsule Take 1 capsule (300 mg total) by mouth daily at bedtime as needed (pain) 30 capsule 0    GLUCOSAMINE-MSM-HYALURONIC ACD PO Take 1 capsule by mouth 3 (three) times a day      GRAPE SEED EXTRACT PO Take by mouth      levETIRAcetam (KEPPRA) 500 mg tablet Take 1 tablet by mouth twice a day 60 tablet 5    lisinopril (ZESTRIL) 10 mg tablet TAKE ONE TABLET BY MOUTH ONCE DAILY FOR BLOOD PRESSURE 90 tablet 1    lisinopril (ZESTRIL) 10 mg tablet TAKE ONE TABLET BY MOUTH ONCE DAILY FOR BLOOD PRESSURE 90 tablet 3    Magnesium 400 MG TABS Take by mouth      metoprolol succinate (TOPROL-XL) 200 MG 24 hr tablet TAKE 1 TABLET BY MOUTH ONCE DAILY 90 tablet 0    milk thistle 175 MG tablet Take 175 mg by mouth daily      mometasone (ELOCON) 0 1 % cream Apply to bilateral external ear once weekly at bedtime 45 g 3    Multiple Vitamins-Minerals (MULTIVITAMIN ADULT PO) Take 1 capsule by mouth daily        Omega-3 Fatty Acids (FISH OIL PO) Take 1,000 mg by mouth daily        Turmeric 500 MG CAPS Take 1 capsule by mouth daily        azelastine (ASTELIN) 0 1 % nasal spray 2 sprays into each nostril 2 (two) times a day Use in each nostril as directed (Patient not taking: Reported on 7/16/2019) 30 mL 0    ciprofloxacin-dexamethasone (CIPRODEX) otic suspension Administer 4 drops into both ears 2 (two) times a day (Patient not taking: Reported on 7/16/2019) 7 5 mL 0    diclofenac sodium (VOLTAREN) 1 % Apply 2 g topically 4 (four) times a day (Patient not taking: Reported on 7/16/2019) 1 Tube 5     No current facility-administered medications for this visit  She is allergic to carbamazepine; epinephrine; iodides; morphine and related; sulfamethoxazole-trimethoprim; latex; and topiramate            Objective:    Blood pressure 118/80, pulse 78, resp  rate 16, height 5' 4 5" (1 638 m), weight 83 9 kg (185 lb), not currently breastfeeding  Physical Exam   Constitutional: She appears well-developed  HENT:   Head: Normocephalic  Eyes: Pupils are equal, round, and reactive to light  Lids are normal    Neurological: She has normal strength  Reflex Scores:       Tricep reflexes are 1+ on the right side and 1+ on the left side  Bicep reflexes are 1+ on the right side and 1+ on the left side  Patellar reflexes are 1+ on the right side and 1+ on the left side  Achilles reflexes are Tr on the right side and Tr on the left side  Psychiatric: Her speech is normal    Vitals reviewed  Neurological Exam  Mental Status   Oriented to person, place, time and situation  Speech is normal  Language is fluent with no aphasia  Cranial Nerves  CN II: Visual acuity is normal  Visual fields full to confrontation  CN III, IV, VI: Extraocular movements intact bilaterally  Normal lids and orbits bilaterally  Pupils equal round and reactive to light bilaterally  CN V: Facial sensation is normal   CN VII: Full and symmetric facial movement  CN VIII: Hearing is normal   CN IX, X: Palate elevates symmetrically  Normal gag reflex  CN XI: Shoulder shrug strength is normal   CN XII: Tongue midline without atrophy or fasciculations  Motor   Strength is 5/5 throughout all four extremities  Sensory  Light touch is normal in upper and lower extremities  Temperature is normal in upper and lower extremities  Reflexes                                           Right                      Left  Biceps                                 1+                         1+  Triceps                                1+                         1+  Patellar                                1+                         1+  Achilles                                Tr                         Tr  Plantar                           Downgoing                Downgoing    Coordination  Right: Finger-to-nose normal   Left: Finger-to-nose normal     Gait Unable to rise from chair without using arms  Otherwise she had a normal gait  Review of systems obtained by the medical assistant as below was reviewed with the patient at today's visit  ROS:    Review of Systems   Constitutional: Positive for fatigue  Negative for appetite change and fever  HENT: Positive for hearing loss and tinnitus  Negative for trouble swallowing and voice change  Eyes: Negative  Negative for photophobia and pain     Respiratory: Positive for cough  Negative for shortness of breath  Cardiovascular: Positive for palpitations  Gastrointestinal: Negative  Negative for nausea and vomiting  Endocrine: Negative  Negative for cold intolerance and heat intolerance  Hair loss    Genitourinary: Negative  Negative for dysuria, frequency and urgency  Musculoskeletal: Positive for arthralgias, gait problem and myalgias  Negative for neck pain  Pain while walking   Skin: Positive for rash  Neurological: Negative for dizziness, tremors, seizures, syncope, facial asymmetry, speech difficulty, weakness, light-headedness, numbness and headaches  Hematological: Negative  Does not bruise/bleed easily  Psychiatric/Behavioral: Positive for sleep disturbance  Negative for confusion and hallucinations  The patient is nervous/anxious

## 2019-09-20 NOTE — ASSESSMENT & PLAN NOTE
This is a 42-year-old patient partial complex seizures  She is currently doing well and has had no recurrence  She will be continued on the current dose of Keppra and I refilled her prescription  We did review her recent CBC and CMP that was normal     We did discuss that abrupt discontinuation of high doses of benzodiazepines can cause seizures      She is to return to our offices in one year but she has any other questions or problems in the interim she is to call

## 2019-09-30 ENCOUNTER — TELEPHONE (OUTPATIENT)
Dept: FAMILY MEDICINE CLINIC | Facility: CLINIC | Age: 79
End: 2019-09-30

## 2019-09-30 DIAGNOSIS — G89.29 CHRONIC RIGHT SHOULDER PAIN: ICD-10-CM

## 2019-09-30 DIAGNOSIS — M25.511 CHRONIC RIGHT SHOULDER PAIN: ICD-10-CM

## 2019-09-30 DIAGNOSIS — M15.9 OSTEOARTHRITIS OF MULTIPLE JOINTS, UNSPECIFIED OSTEOARTHRITIS TYPE: ICD-10-CM

## 2019-09-30 NOTE — TELEPHONE ENCOUNTER
PT STATING THAT TS ASK HER TO GIVE PHYS THERAPY A CALL AT [address] [facility] IN [place] AND SHE CALLED THEM THEY TOLD HER THEY DO NOT DO PHYS THERAPY  PT IS ASKING IF SOMEONE CAN GIVE HER A CALL AND EXPLAIN WHAT TYPE OF THERAPY SHE NEEDS    PT CAN BE REACHED [phone]

## 2019-10-01 NOTE — TELEPHONE ENCOUNTER
I spoke with the PT office pt called and clarified the order  They will call pt back to schedule  Pt aware

## 2019-10-08 ENCOUNTER — EVALUATION (OUTPATIENT)
Dept: PHYSICAL THERAPY | Facility: CLINIC | Age: 79
End: 2019-10-08
Payer: MEDICARE

## 2019-10-08 DIAGNOSIS — Z74.09 DECREASED STRENGTH, ENDURANCE, AND MOBILITY: Primary | ICD-10-CM

## 2019-10-08 DIAGNOSIS — R68.89 DECREASED STRENGTH, ENDURANCE, AND MOBILITY: Primary | ICD-10-CM

## 2019-10-08 DIAGNOSIS — R53.1 DECREASED STRENGTH, ENDURANCE, AND MOBILITY: Primary | ICD-10-CM

## 2019-10-08 DIAGNOSIS — R26.9 GAIT ABNORMALITY: ICD-10-CM

## 2019-10-08 PROCEDURE — 97162 PT EVAL MOD COMPLEX 30 MIN: CPT | Performed by: PHYSICAL MEDICINE & REHABILITATION

## 2019-10-08 NOTE — PROGRESS NOTES
PT Evaluation     Today's date: 10/8/2019  Patient name: Sumi Garcia  : 1940  MRN: [id number]  Referring provider: AnaC ristina Velazquez DO  Dx:   Encounter Diagnosis     ICD-10-CM    1  Decreased strength, endurance, and mobility R53 1     Z74 09    2  Gait abnormality R26 9        Start Time: 1000  Stop Time: 1050  Total time in clinic (min): 50 minutes    Assessment  Assessment details: Pt is a 70 y o  female presenting to outpatient physical therapy with Decreased strength, endurance, and mobility, Gait abnormality   Pt presents with pain, decreased range of motion, decreased strength, and decreased tolerance to activity  Pt reports pain in L ankle with ambulation and has history of surgery (approximately 2 years ago)  Pt would benefit from skilled physical therapy to address limitations and to achieve goals  Thank you for this referral    Impairments: abnormal gait, abnormal or restricted ROM, impaired balance, impaired physical strength and lacks appropriate home exercise program    Symptom irritability: moderateBarriers to therapy: Chronicity, comorbidities  Understanding of Dx/Px/POC: fair   Prognosis: fair    Goals  Short-term goals:  1  Pt will decrease pain by 1-2 points within 4 weeks  2  Pt will improve ROM by 5-10 degrees within 4 weeks  3  Pt will improve strength by 1/2 grade within 4 weeks  4  Pt will improve physical FS score by points by discharge  Long-term goals  1  Pt will ambulate with improved gait pattern by discharge  2  Pt will demonstrate good dynamic knee alignment with squatting activities without cuing by discharge  3  Pt will restore maximal level of function for stairclimbing, demonstrating safety, by discharge      Plan  Patient would benefit from: PT eval and skilled physical therapy  Planned modality interventions: biofeedback, cryotherapy, TENS, thermotherapy: hydrocollator packs and traction  Planned therapy interventions: joint mobilization, manual therapy, neuromuscular re-education, patient education, postural training, strengthening, stretching, therapeutic activities, therapeutic exercise, home exercise program, balance/weight bearing training, balance and gait training  Frequency: 2x week  Duration in weeks: 4  Treatment plan discussed with: patient        Subjective Evaluation    History of Present Illness  Mechanism of injury: Pt reports surgery on L ankle approximately 2 years ago, she states that she has "plates and screws" in her ankle  She reports a fall 2 weeks ago resulting in her hitting her left ankle on the curb  She feels that her walking ability has continued to decline recently  The pt cuts her own grass with push mower and she likes to work in her garden  She feels that her problem is that she has arthritic pain  Pt has 3 steps c railings to enter home and 2 steps to get into her bedroom  She recently began using a SPC due to safety concerns  Inconsistent ankle and foot pain, typically worse in the morning  Pain  Current pain ratin  At best pain ratin  At worst pain ratin  Quality: tight and dull ache    Patient Goals  Patient goals for therapy: decreased pain  Patient goal: Improve abiltyt to ambulate safely  Objective     Strength/Myotome Testing     Left Hip   Planes of Motion   Flexion: 4-  Extension: 4  Abduction: 4  Adduction: 4+    Right Hip   Planes of Motion   Flexion: 4  Extension: 4  Abduction: 4  Adduction: 4+    Left Ankle/Foot   Dorsiflexion: 4    Right Ankle/Foot   Dorsiflexion: 4+    Ambulation   Weight-Bearing Status   Assistive device used: single point cane    Observational Gait   Decreased walking speed and stride length  Functional Assessment        Comments  TUG c SPC: 20 9 sec    TUG no AD: 16 75    10 m walk test: np    2 min walk test: np    Sit to stand: B UE assist, R trunk lean       30 sec sit to stand: 6 reps, B UE assist     Tandem stance: R/L: 3 sec , L/R: 10 sec      Flowsheet Rows      Most Recent Value   PT/OT G-Codes   Current Score  51   Projected Score  65             Precautions: FALL RISK, Hx of ankle surgery, GERD, HTN, TIA, Hx of seizure, Moderate osteopenia,       Manual              L ankle PROM                                                    DGI, 6 min walk test                 Exercise Diary              Bike             Gastroc stretch B             Gait training c SPC             HR             TR             Standing marches             Standing hip 3 way             NBOS on foam             Tandem on level surface             Sit to stand elevated surface             Side steps             Step ups/step ups             Biodex (maze/LOS)             L BAPS                                                                                               Modalities

## 2019-10-10 ENCOUNTER — OFFICE VISIT (OUTPATIENT)
Dept: PHYSICAL THERAPY | Facility: CLINIC | Age: 79
End: 2019-10-10
Payer: MEDICARE

## 2019-10-10 DIAGNOSIS — R26.9 GAIT ABNORMALITY: ICD-10-CM

## 2019-10-10 DIAGNOSIS — R53.1 DECREASED STRENGTH, ENDURANCE, AND MOBILITY: Primary | ICD-10-CM

## 2019-10-10 DIAGNOSIS — R68.89 DECREASED STRENGTH, ENDURANCE, AND MOBILITY: Primary | ICD-10-CM

## 2019-10-10 DIAGNOSIS — Z74.09 DECREASED STRENGTH, ENDURANCE, AND MOBILITY: Primary | ICD-10-CM

## 2019-10-10 PROCEDURE — 97112 NEUROMUSCULAR REEDUCATION: CPT

## 2019-10-10 PROCEDURE — 97110 THERAPEUTIC EXERCISES: CPT

## 2019-10-10 NOTE — PROGRESS NOTES
Daily Note     Today's date: 10/10/2019  Patient name: Jenni Reese  : 1940  MRN: [id number]  Referring provider: Montserrat Schmidt DO  Dx:   Encounter Diagnosis     ICD-10-CM    1  Decreased strength, endurance, and mobility R53 1     Z74 09    2  Gait abnormality R26 9        Start Time: 1010  Stop Time: 1110  Total time in clinic (min): 60 minutes    Subjective: Pt reports she is feeling good at the moment  Pt states she notices her legs are weaker at the moment  Objective: See treatment diary below      Assessment: Tolerated treatment well  Patient demonstrated fatigue post treatment and would benefit from continued PT  Pt performed exercises as noted with no adverse symptoms  Pt shows moderate challenge with standing balance activities  Continue to progress as able  Plan: Continue per plan of care        Precautions: FALL RISK, Hx of ankle surgery, GERD, HTN, TIA, Hx of seizure, Moderate osteopenia,       Manual  10/10            L ankle PROM np                                                   DGI, 6 min walk test np                Exercise Diary  10/10            Bike 5'            Gastroc stretch B missed            Gait training c SPC nv            HR 20x            TR 20x            Standing marches 20x            Standing hip 3 way 20x            NBOS on foam 3x30"            Tandem on level surface 3x10"            Sit to stand elevated surface 10x            Side steps 3 laps            Step ups/step ups 0R 10x ea            Biodex (maze/LOS) 2x with HR            L BAPS nv                                                                                              Modalities

## 2019-10-11 ENCOUNTER — APPOINTMENT (OUTPATIENT)
Dept: PHYSICAL THERAPY | Facility: CLINIC | Age: 79
End: 2019-10-11
Payer: MEDICARE

## 2019-10-15 ENCOUNTER — OFFICE VISIT (OUTPATIENT)
Dept: PHYSICAL THERAPY | Facility: CLINIC | Age: 79
End: 2019-10-15
Payer: MEDICARE

## 2019-10-15 DIAGNOSIS — R26.9 GAIT ABNORMALITY: ICD-10-CM

## 2019-10-15 DIAGNOSIS — R53.1 DECREASED STRENGTH, ENDURANCE, AND MOBILITY: Primary | ICD-10-CM

## 2019-10-15 DIAGNOSIS — R68.89 DECREASED STRENGTH, ENDURANCE, AND MOBILITY: Primary | ICD-10-CM

## 2019-10-15 DIAGNOSIS — Z74.09 DECREASED STRENGTH, ENDURANCE, AND MOBILITY: Primary | ICD-10-CM

## 2019-10-15 PROCEDURE — 97112 NEUROMUSCULAR REEDUCATION: CPT | Performed by: PHYSICAL MEDICINE & REHABILITATION

## 2019-10-15 PROCEDURE — 97110 THERAPEUTIC EXERCISES: CPT | Performed by: PHYSICAL MEDICINE & REHABILITATION

## 2019-10-15 NOTE — PROGRESS NOTES
Daily Note     Today's date: 10/15/2019  Patient name: Dez Slaughter  : 1940  MRN: [id number]  Referring provider: Taya Hall DO  Dx:   Encounter Diagnosis     ICD-10-CM    1  Decreased strength, endurance, and mobility R53 1     Z74 09    2  Gait abnormality R26 9        Start Time: 915  Stop Time: 1015  Total time in clinic (min): 60 minutes    Subjective: Pt states that she is feeling a little stiff today, but overall she is ok  Objective: See treatment diary below      Assessment: Tolerated treatment well  Patient demonstrated fatigue post treatment, exhibited good technique with therapeutic exercises, would benefit from continued PT and challenge with NM control and dynamic/static balance deficits that continue to require contact guard      Plan: Progress treatment as tolerated         Precautions: FALL RISK, Hx of ankle surgery, GERD, HTN, TIA, Hx of seizure, Moderate osteopenia,       Manual  10/10            L ankle PROM np nv                                                  DGI, 6 min walk test np                Exercise Diary  10/10 10/15           Bike 5' 6'           Gastroc stretch B missed 3 x 30" ea           Gait training c SPC nv            HR 20x 30           TR 20x 30           Standing marches 20x 30x           Dynamic gait  Head turns/side step and over cone/head nods  4 laps ea/  backward 2 lap           Standing hip 3 way 20x --           NBOS on foam 3x30" 2 x 1' ea  EO/EC           Tandem on level surface 3x10" 2 x 30" ea           Sit to stand elevated surface 10x 2 x 10  Low mat           Side steps 3 laps 5 laps           Step ups/step ups 0R 10x ea --           Biodex (maze/LOS) 2x with HR --           L BAPS nv --                                                                                             Modalities

## 2019-10-17 ENCOUNTER — OFFICE VISIT (OUTPATIENT)
Dept: PHYSICAL THERAPY | Facility: CLINIC | Age: 79
End: 2019-10-17
Payer: MEDICARE

## 2019-10-17 DIAGNOSIS — R68.89 DECREASED STRENGTH, ENDURANCE, AND MOBILITY: Primary | ICD-10-CM

## 2019-10-17 DIAGNOSIS — R53.1 DECREASED STRENGTH, ENDURANCE, AND MOBILITY: Primary | ICD-10-CM

## 2019-10-17 DIAGNOSIS — Z74.09 DECREASED STRENGTH, ENDURANCE, AND MOBILITY: Primary | ICD-10-CM

## 2019-10-17 DIAGNOSIS — R26.9 GAIT ABNORMALITY: ICD-10-CM

## 2019-10-17 PROCEDURE — 97110 THERAPEUTIC EXERCISES: CPT | Performed by: PHYSICAL MEDICINE & REHABILITATION

## 2019-10-17 PROCEDURE — 97112 NEUROMUSCULAR REEDUCATION: CPT | Performed by: PHYSICAL MEDICINE & REHABILITATION

## 2019-10-17 PROCEDURE — 97116 GAIT TRAINING THERAPY: CPT | Performed by: PHYSICAL MEDICINE & REHABILITATION

## 2019-10-17 NOTE — PROGRESS NOTES
Daily Note     Today's date: 10/17/2019  Patient name: Gisel Barroso  : 1940  MRN: [id number]  Referring provider: Kacey Brice DO  Dx:   Encounter Diagnosis     ICD-10-CM    1  Decreased strength, endurance, and mobility R53 1     Z74 09    2  Gait abnormality R26 9        Start Time: 935  Stop Time: 1038  Total time in clinic (min): 63 minutes    Subjective: Pt states that she is feeling good today  Objective: See treatment diary below      Assessment: Tolerated treatment well  Patient demonstrated fatigue post treatment, exhibited good technique with therapeutic exercises and would benefit from continued PT  Improved NM control with static and dynamic activities  Plan: Progress treatment as tolerated         Precautions: FALL RISK, Hx of ankle surgery, GERD, HTN, TIA, Hx of seizure, Moderate osteopenia,       Manual  10/10            L ankle PROM np nv                                                  DGI, 6 min walk test np                Exercise Diary  10/10 10/15 10/17          Bike 5' 6' 6'          Gastroc stretch B missed 3 x 30" ea 3 x 30"          Gait training c SPC nv            HR 20x 30 30          TR 20x 30 30`          Standing marches 20x 30x 30          Dynamic gait  Head turns/side step and over cone/head nods  4 laps ea/  backward 2 lap 4 laps          Standing hip 3 way 20x -- 30 ea          NBOS on foam 3x30" 2 x 1' ea  EO/EC 3 x 30"  EO/EC          Tandem on level surface 3x10" 2 x 30" ea           Sit to stand elevated surface 10x 2 x 10  Low mat nv          Side steps 3 laps 5 laps           Step ups/step ups 0R 10x ea --           Biodex (maze/LOS) 2x with HR --           L BAPS nv -- Lv 2  30 ea                                                                                            Modalities

## 2019-10-22 ENCOUNTER — OFFICE VISIT (OUTPATIENT)
Dept: PHYSICAL THERAPY | Facility: CLINIC | Age: 79
End: 2019-10-22
Payer: MEDICARE

## 2019-10-22 DIAGNOSIS — R68.89 DECREASED STRENGTH, ENDURANCE, AND MOBILITY: Primary | ICD-10-CM

## 2019-10-22 DIAGNOSIS — R26.9 GAIT ABNORMALITY: ICD-10-CM

## 2019-10-22 DIAGNOSIS — R53.1 DECREASED STRENGTH, ENDURANCE, AND MOBILITY: Primary | ICD-10-CM

## 2019-10-22 DIAGNOSIS — Z74.09 DECREASED STRENGTH, ENDURANCE, AND MOBILITY: Primary | ICD-10-CM

## 2019-10-22 PROCEDURE — 97112 NEUROMUSCULAR REEDUCATION: CPT

## 2019-10-22 NOTE — PROGRESS NOTES
Daily Note     Today's date: 10/22/2019  Patient name: Gricelda Sanchez  : 1940  MRN: [id number]  Referring provider: Geri Stone DO  Dx:   Encounter Diagnosis     ICD-10-CM    1  Decreased strength, endurance, and mobility R53 1     Z74 09    2  Gait abnormality R26 9        Start Time: 925  Stop Time: 100  Total time in clinic (min): 40 minutes    Subjective: Pt states that she is feeling good today  Objective: See treatment diary below      Assessment: Tolerated treatment well  Patient demonstrated fatigue post treatment, exhibited good technique with therapeutic exercises and would benefit from continued PT  Pt continues to work towards goals of increased balance and improved safety with gait  Pt will benefit from further skilled PT to increase strength, flexibility and function  Continue to progress as able  Plan: Progress treatment as tolerated         Precautions: FALL RISK, Hx of ankle surgery, GERD, HTN, TIA, Hx of seizure, Moderate osteopenia,       Manual  10/10            L ankle PROM np nv                                                  DGI, 6 min walk test np                Exercise Diary  10/10 10/15 10/17 10/22         Bike 5' 6' 6' 6'         Gastroc stretch B missed 3 x 30" ea 3 x 30" 3x30"         Gait training c SPC nv            HR 20x 30 30 30x         TR 20x 30 30` 30x         Standing marches 20x 30x 30 30x         Dynamic gait  Head turns/side step and over cone/head nods  4 laps ea/  backward 2 lap 4 laps 4 laps         Standing hip 3 way 20x -- 30 ea 30x         NBOS on foam 3x30" 2 x 1' ea  EO/EC 3 x 30"  EO/EC 3 x 30"  EO/EC         Tandem on level surface 3x10" 2 x 30" ea           Sit to stand elevated surface 10x 2 x 10  Low mat nv 2x10 low mat         Side steps 3 laps 5 laps           Step ups/step ups 0R 10x ea --           Biodex (maze/LOS) 2x with HR --           L BAPS nv -- Lv 2  30 ea Lv 2  30 ea Modalities

## 2019-10-24 ENCOUNTER — OFFICE VISIT (OUTPATIENT)
Dept: PHYSICAL THERAPY | Facility: CLINIC | Age: 79
End: 2019-10-24
Payer: MEDICARE

## 2019-10-24 DIAGNOSIS — R26.9 GAIT ABNORMALITY: ICD-10-CM

## 2019-10-24 DIAGNOSIS — Z74.09 DECREASED STRENGTH, ENDURANCE, AND MOBILITY: Primary | ICD-10-CM

## 2019-10-24 DIAGNOSIS — R68.89 DECREASED STRENGTH, ENDURANCE, AND MOBILITY: Primary | ICD-10-CM

## 2019-10-24 DIAGNOSIS — R53.1 DECREASED STRENGTH, ENDURANCE, AND MOBILITY: Primary | ICD-10-CM

## 2019-10-24 PROCEDURE — 97530 THERAPEUTIC ACTIVITIES: CPT

## 2019-10-24 PROCEDURE — 97112 NEUROMUSCULAR REEDUCATION: CPT

## 2019-10-24 PROCEDURE — 97110 THERAPEUTIC EXERCISES: CPT

## 2019-10-24 NOTE — PROGRESS NOTES
Daily Note     Today's date: 10/24/2019  Patient name: Yadira Gandara  : 1940  MRN: [id number]  Referring provider: Haseeb Moura DO  Dx:   Encounter Diagnosis     ICD-10-CM    1  Decreased strength, endurance, and mobility R53 1     Z74 09    2  Gait abnormality R26 9      Start Time: 945  Stop Time: 1050  Total time in clinic (min): 65 minutes  Subjective: Pt reports she was feeling "really good" after last PT Tx  Pt states, "I think PT is really working  I feel better and safer walking and doing things already "    Objective: See treatment diary below    Assessment: Pt required verbal cuing to slow pace during BAPS board activity  Pt demonstrated slight sway during NBOS EC/EO on foam with CGA for awareness  Pt required single HHA during first few sit-stands on Airex to due hesitation/fear of falling; however, after a few reptitions pt was able to perform (I)  Pt was able to perform cone steps with step-over-step sequencing this visit  Pt demonstrated fatigue post treatment and continues to work towards goals of increased balance and improved safety with gait  Pt will benefit from further skilled PT to increase strength, flexibility and function  Continue to progress as able  Plan: Progress treatment as tolerated         Precautions: FALL RISK, Hx of ankle surgery, GERD, HTN, TIA, Hx of seizure, Moderate osteopenia,   Manual  10/10            L ankle PROM np nv                                                  DGI, 6 min walk test np                Exercise Diary  10/10 10/15 10/17 10/22 10/24        Bike 5' 6' 6' 6' 6'        Gastroc stretch B missed 3 x 30" ea 3 x 30" 3x30"         Gait training c SPC nv            HR 20x 30 30 30x Airex 30x        TR 20x 30 30` 30x Aierx 30x        Standing marches 20x 30x 30 30x Airex 30x        Dynamic gait  Head turns/side step and over cone/head nods  4 laps ea/  backward 2 lap 4 laps 4 laps CS for all, 4 laps ea        Standing hip 3 way 20x -- 30 ea 30x 30xea        NBOS on foam 3x30" 2 x 1' ea  EO/EC 3 x 30"  EO/EC 3 x 30"  EO/EC 2x30"ea  EO/EC        Tandem on level surface 3x10" 2 x 30" ea           Sit to stand elevated surface 10x 2 x 10  Low mat nv 2x10 low mat low mat 10x,   10x airex under feet        Side steps 3 laps 5 laps           Step ups/step ups 0R 10x ea --           Biodex (maze/LOS) 2x with HR --           L BAPS nv -- Lv 2  30 ea Lv 2  30 ea L3  30xea  M/L, A/P, CW, CCW                                                                                          Modalities

## 2019-10-29 ENCOUNTER — OFFICE VISIT (OUTPATIENT)
Dept: PHYSICAL THERAPY | Facility: CLINIC | Age: 79
End: 2019-10-29
Payer: MEDICARE

## 2019-10-29 DIAGNOSIS — R68.89 DECREASED STRENGTH, ENDURANCE, AND MOBILITY: Primary | ICD-10-CM

## 2019-10-29 DIAGNOSIS — R53.1 DECREASED STRENGTH, ENDURANCE, AND MOBILITY: Primary | ICD-10-CM

## 2019-10-29 DIAGNOSIS — R26.9 GAIT ABNORMALITY: ICD-10-CM

## 2019-10-29 DIAGNOSIS — Z74.09 DECREASED STRENGTH, ENDURANCE, AND MOBILITY: Primary | ICD-10-CM

## 2019-10-29 PROCEDURE — 97112 NEUROMUSCULAR REEDUCATION: CPT

## 2019-10-29 PROCEDURE — 97530 THERAPEUTIC ACTIVITIES: CPT

## 2019-10-29 PROCEDURE — 97110 THERAPEUTIC EXERCISES: CPT

## 2019-10-29 NOTE — PROGRESS NOTES
Daily Note     Today's date: 10/29/2019  Patient name: Maya Gibson  : 1940  MRN: [id number]  Referring provider: Kris Morris DO  Dx:   Encounter Diagnosis     ICD-10-CM    1  Decreased strength, endurance, and mobility R53 1     Z74 09    2  Gait abnormality R26 9      Start Time: 945  Stop Time: 1030  Total time in clinic (min): 45 minutes  Subjective: Pt reports continued confidence with ambulation and activity as OPPT progresses  Pt states she has an appointment with her OBGYN tomorrow and will discuss concerns re: prolapsed uterus and tolerance to activity - pt requested to modify Tx today due to these concerns  Pt arrives to today's tx /s [facility] umbrella in case of balance concerns while ambulating  Objective: See treatment diary below    Assessment: Pt reported prolasped uterus Sx during standing hip extension exercise - exercise ceased this visit at a result  Pt showed improved balance during sit-stands on foam and while ambulating around clinic  Pt tolerated addition of weight to hip 3-ways well - consider increasing weight NV  Pt demonstrated fatigue post treatment and continues to work towards goals of increased balance and improved safety with gait  Pt will benefit from further skilled PT to increase strength, flexibility and function  Continue to progress as able  Plan: Progress treatment as tolerated         Precautions: FALL RISK, Hx of ankle surgery, GERD, HTN, TIA, Hx of seizure, Moderate osteopenia,   Manual  10/10            L ankle PROM np nv                                                  DGI, 6 min walk test np                Exercise Diary  10/10 10/15 10/17 10/22 10/24 10/29       Bike 5' 6' 6' 6' 6' 8'        Gastroc stretch B missed 3 x 30" ea 3 x 30" 3x30"  Standing  30"x3ea       Gait training c SPC nv            HR 20x 30 30 30x [id number] Aierx 30x       TR 20x 30 30` 30x Aierx 30x Aierx 30x       Standing marches 20x 30x 30 30x Airex 30x Dynamic gait  Head turns/side step and over cone/head nods  4 laps ea/  backward 2 lap 4 laps 4 laps CS for all, 4 laps ea        Standing hip 3 way 20x -- 30 ea 30x 30xea 1# 25ea       NBOS on foam 3x30" 2 x 1' ea  EO/EC 3 x 30"  EO/EC 3 x 30"  EO/EC 2x30"ea  EO/EC        Tandem on level surface 3x10" 2 x 30" ea           Sit to stand elevated surface 10x 2 x 10  Low mat nv 2x10 low mat low mat 10x,   10x airex under feet Low mat  3x10  airex under feet       Side steps 3 laps 5 laps           Step ups/step ups 0R 10x ea --           Biodex (maze/LOS) 2x with HR --           L BAPS nv -- Lv 2  30 ea Lv 2  30 ea L3  30xea  M/L, A/P, CW, CCW L3 30xea M/L, A/P, CW, CCW                                                                                         Modalities

## 2019-10-30 ENCOUNTER — OFFICE VISIT (OUTPATIENT)
Dept: OBGYN CLINIC | Facility: MEDICAL CENTER | Age: 79
End: 2019-10-30
Payer: MEDICARE

## 2019-10-30 VITALS
SYSTOLIC BLOOD PRESSURE: 122 MMHG | BODY MASS INDEX: 32.32 KG/M2 | WEIGHT: 189.3 LBS | DIASTOLIC BLOOD PRESSURE: 78 MMHG | HEIGHT: 64 IN

## 2019-10-30 DIAGNOSIS — N81.10 CYSTOCELE WITHOUT UTERINE PROLAPSE: Primary | ICD-10-CM

## 2019-10-30 PROCEDURE — 99214 OFFICE O/P EST MOD 30 MIN: CPT | Performed by: OBSTETRICS & GYNECOLOGY

## 2019-10-30 RX ORDER — BIMATOPROST 0.01 %
DROPS OPHTHALMIC (EYE)
Refills: 3 | COMMUNITY
Start: 2019-10-04 | End: 2019-10-30

## 2019-10-30 NOTE — PROGRESS NOTES
Tayla So was seen today for follow-up  Diagnoses and all orders for this visit:    Cystocele without uterine prolapse  -     Ambulatory referral to Physical Therapy; Future    Discussion/Summary: Patient here for recheck of cystocele which appears improved from July, 2019 visit  Seen with p gaye  student Abhay Paul  If desired, gave her referral for [facility]'s pelvic floor physical therapy  She appears comfortable with present management  Plan: continue with [facility], possible P T  Pelvic floor; discussed options of pessary and surgery; time spe nt was at least 25 minutes with greater than 50 % counseling  Subjective   Stefanie Fritz is a 70 y o  female here for a problem visit  Patient is complaining of cystocele  Sx's started 4 years ago  Patient reports that sx's are not related to her menses  Patient Active Problem List   Diagnosis    Vitamin D deficiency    Transient ischemic attack    Osteoarthritis    Moderate osteopenia    Hypertension    Hyperlipidemia    Hyperglycemia    GERD (gastroesophageal reflux disease)    Breast cancer (HCC)    Complex partial seizure ([address] )    Midline cystocele    Benign neoplasm of large intestine    Anxiety    Abnormal tumor markers    Personal history of breast cancer    Personal history of radiation therapy    Hiatal hernia    S/P laparoscopic appendectomy    Carpal tunnel syndrome of right wrist    Shoulder pain    Allergic rhinitis    Decreased ambulation status       Gynecologic History  No LMP recorded (lmp unknown)  Patient is postmenopausal   The current method of family planning is tubal ligation      Past Medical History:   Diagnosis Date    Cancer [facility])     left breast    Elevated serum alkaline phosphatase level     GERD (gastroesophageal reflux disease)     Glaucoma     Hiatal hernia     Hypercalcemia     Hyperglycemia     diet controlled    Hypertension     Rheumatic fever     Seizures ([facility] )     TIA (transient ischemic attack)     Vitamin D deficiency      Past Surgical History:   Procedure Laterality Date    APPENDECTOMY      BREAST LUMPECTOMY Left     COLONOSCOPY      FOOT SURGERY Right     ORIF ANKLE FRACTURE Left     TONSILLECTOMY      TUBAL LIGATION       Family History   Problem Relation Age of Onset    Breast cancer Mother     Coronary artery disease Father     Bone cancer Brother     Lung cancer Brother      Social History     Socioeconomic History    Marital status:      Spouse name: Not on file    Number of children: Not on file    Years of education: Not on file    Highest education level: Not on file   Occupational History    Occupation: retired   Social Needs    Financial resource strain: Not on file    Food insecurity:     Worry: Not on file     Inability: Not on file   [employer] needs:     Medical: Not on file     Non-medical: Not on file   Tobacco Use    Smoking status: Former Smoker     Last attempt to quit:      Years since quittin 8    Smokeless tobacco: Never Used   Substance and Sexual Activity    Alcohol use: Yes     Comment: social    Drug use: No    Sexual activity: Not Currently   Lifestyle    Physical activity:     Days per week: Not on file     Minutes per session: Not on file    Stress: Not on file   Relationships    Social connections:     Talks on phone: Not on file     Gets together: Not on file     Attends Temple service: Not on file     Active member of club or organization: Not on file     Attends meetings of clubs or organizations: Not on file     Relationship status: Not on file    Intimate partner violence:     Fear of current or ex partner: Not on file     Emotionally abused: Not on file     Physically abused: Not on file     Forced sexual activity: Not on file   Other Topics Concern    Not on file   Social History Narrative    Activities - gardening    Daily coffee consumption- 1 cup per day    Daily tea consumption    Hearing loss     Allergies   Allergen Reactions    Carbamazepine Myalgia     flu-like symptoms    Epinephrine Other (See Comments)     "weakness"    Iodides Itching    Morphine And Related     Sulfamethoxazole-Trimethoprim     Latex Rash    Topiramate Rash       Current Outpatient Medications:     Alpha-Lipoic Acid 100 MG CAPS, Take by mouth, Disp: , Rfl:     ALPRAZolam (XANAX) 0 25 mg tablet, TAKE ONE TABLET BY MOUTH EVERY DAY AS NEEDED FOR ANXIETY, Disp: 30 tablet, Rfl: 1    aspirin 81 MG tablet, Take 162 mg by mouth, Disp: , Rfl:     azelastine (ASTELIN) 0 1 % nasal spray, 2 sprays into each nostril 2 (two) times a day Use in each nostril as directed (Patient not taking: Reported on 7/16/2019), Disp: 30 mL, Rfl: 0    B Complex Vitamins (B COMPLEX 1 PO), Take 2 tablets by mouth daily, Disp: , Rfl:     bimatoprost (LUMIGAN) 0 03 % ophthalmic drops, Administer 1 drop to both eyes daily at bedtime  , Disp: , Rfl:     Calcium Carbonate (CALCIUM 600 PO), Take by mouth, Disp: , Rfl:     Cholecalciferol (VITAMIN D) 2000 units CAPS, Take 2 capsules by mouth daily, Disp: , Rfl:     ciprofloxacin-dexamethasone (CIPRODEX) otic suspension, Administer 4 drops into both ears 2 (two) times a day (Patient not taking: Reported on 7/16/2019), Disp: 7 5 mL, Rfl: 0    Coenzyme Q10 (COQ10) 100 MG CAPS, Take by mouth, Disp: , Rfl:     gabapentin (NEURONTIN) 300 mg capsule, Take 1 capsule (300 mg total) by mouth daily at bedtime as needed (pain), Disp: 30 capsule, Rfl: 0    GLUCOSAMINE-MSM-HYALURONIC ACD PO, Take 1 capsule by mouth 3 (three) times a day, Disp: , Rfl:     GRAPE SEED EXTRACT PO, Take by mouth, Disp: , Rfl:     levETIRAcetam (KEPPRA) 500 mg tablet, Take 1 tablet by mouth twice a day, Disp: 60 tablet, Rfl: 5    lisinopril (ZESTRIL) 10 mg tablet, TAKE ONE TABLET BY MOUTH ONCE DAILY FOR BLOOD PRESSURE, Disp: 90 tablet, Rfl: 1    lisinopril (ZESTRIL) 10 mg tablet, TAKE ONE TABLET BY MOUTH ONCE DAILY FOR BLOOD PRESSURE, Disp: 90 tablet, Rfl: 3    Magnesium 400 MG TABS, Take by mouth, Disp: , Rfl:     metoprolol succinate (TOPROL-XL) 200 MG 24 hr tablet, TAKE 1 TABLET BY MOUTH ONCE DAILY, Disp: 90 tablet, Rfl: 0    milk thistle 175 MG tablet, Take 175 mg by mouth daily, Disp: , Rfl:     mometasone (ELOCON) 0 1 % cream, Apply to bilateral external ear once weekly at bedtime, Disp: 45 g, Rfl: 3    Multiple Vitamins-Minerals (MULTIVITAMIN ADULT PO), Take 1 capsule by mouth daily  , Disp: , Rfl:     Omega-3 Fatty Acids (FISH OIL PO), Take 1,000 mg by mouth daily  , Disp: , Rfl:     Turmeric 500 MG CAPS, Take 1 capsule by mouth daily  , Disp: , Rfl:     VOLTAREN 1 %, APPLY 2G TOPICALLY TO AFFECTED AREA FOUR TIMES A DAY AS DIRECTED, Disp: 100 g, Rfl: 5    Review of Systems  Constitutional :no fever, feels well, no tiredness, no recent weight gain or loss  ENT: no ear ache, no loss of hearing, no nosebleeds or nasal discharge, no sore throat or hoarseness  Cardiovascular: no complaints of slow or fast heart beat, no chest pain, no palpitations, no leg claudication or lower extremity edema  Respiratory: no complaints of shortness of shortness of breath, no MAYEN  Breasts:no complaints of breast pain, breast lump, or nipple discharge  Gastrointestinal: no complaints of abdominal pain, constipation, nausea, vomiting, or diarrhea or bloody stools  Genitourinary : no complaints of dysuria, incontinence, pelvic pain, no dysmenorrhea, vaginal discharge or abnormal vaginal bleeding and as noted in HPI  Musculoskeletal: no complaints of arthralgia, no myalgia, no joint swelling or stiffness, no limb pain or swelling    Integumentary: no complaints of skin rash or lesion, itching or dry skin  Neurological: no complaints of headache, no confusion, no numbness or tingling, no dizziness or fainting     Objective     /78   Ht 5' 4" (1 626 m)   Wt 85 9 kg (189 lb 4 8 oz)   LMP  (LMP Unknown) Breastfeeding? No   BMI 32 49 kg/m²     General Appears stated age, cooperative, alert normal mood and affect   Psychiatric oriented to person, place and time  Mood and affect normal   Neck: normal, supple,trachea midline, no masses  Thyroid: normal, no thyromegaly   Heart: regular rate and rhythm, S1, S2 normal, no murmur, click, rub or gallop   Lungs: clear to auscultation bilaterally, no increased work of breathing or signs of respiratory distress   Breasts: normal, no dimpling or skin changes noted   Abdomen: soft, non-tender, without masses or organomegaly   Vulva: normal , no lesions   Vagina: normal , no lesions or dryness   Urethra: normal   Urethal meatus normal   Bladder Grade II-III cystocele, improved from July, 2019 visit   Cervix: normal, no palpable masses    Uterus: normal , non-tender, not enlarged, no palpable masses   Adnexa: normal, non-tender without fullness or masses   Lymphatic Palpation of lymph nodes in neck, axilla, groin and/or other locations: no lymphadenopathy or masses noted   Skin Normal skin turgor and no rashes    Palpation of skin and subcutaneous tissue normal

## 2019-10-31 ENCOUNTER — OFFICE VISIT (OUTPATIENT)
Dept: PHYSICAL THERAPY | Facility: CLINIC | Age: 79
End: 2019-10-31
Payer: MEDICARE

## 2019-10-31 DIAGNOSIS — Z74.09 DECREASED STRENGTH, ENDURANCE, AND MOBILITY: Primary | ICD-10-CM

## 2019-10-31 DIAGNOSIS — R53.1 DECREASED STRENGTH, ENDURANCE, AND MOBILITY: Primary | ICD-10-CM

## 2019-10-31 DIAGNOSIS — R68.89 DECREASED STRENGTH, ENDURANCE, AND MOBILITY: Primary | ICD-10-CM

## 2019-10-31 DIAGNOSIS — R26.9 GAIT ABNORMALITY: ICD-10-CM

## 2019-10-31 PROCEDURE — 97530 THERAPEUTIC ACTIVITIES: CPT

## 2019-10-31 PROCEDURE — 97110 THERAPEUTIC EXERCISES: CPT

## 2019-10-31 PROCEDURE — 97112 NEUROMUSCULAR REEDUCATION: CPT

## 2019-10-31 NOTE — PROGRESS NOTES
Daily Note     Today's date: 10/31/2019  Patient name: Kateryna Hernandez  : 1940  MRN: [id number]  Referring provider: Sarah Schmidt DO  Dx:   Encounter Diagnosis     ICD-10-CM    1  Decreased strength, endurance, and mobility R53 1     Z74 09    2  Gait abnormality R26 9                   Subjective: Pt reports improved balance and mobility since beginning therapy      Objective: See treatment diary below     Precautions: FALL RISK, Hx of ankle surgery, GERD, HTN, TIA, Hx of seizure, Moderate osteopenia    Manual  10/10            L ankle PROM np nv                                                  DGI, 6 min walk test np                Exercise Diary  10/10 10/15 10/17 10/22 10/24 10/29 10/31      Bike 5' 6' 6' 6' 6' 8'  8'      Gastroc stretch B missed 3 x 30" ea 3 x 30" 3x30"  Standing  30"x3ea Standing 30"x3 ea      Gait training c SPC nv      -      HR 20x 30 30 30x Airex 30x Airex 30x Airex SLS  20x ea      TR 20x 30 30` 30x Airex 30x Airex 30x Airex 30x      Standing marches 20x 30x 30 30x Airex 30x  Airex 30x ea      Dynamic gait  Head turns/side step and over cone/head nods  4 laps ea/  backward 2 lap 4 laps 4 laps CS for all, 4 laps ea        Standing hip 3 way 20x -- 30 ea 30x 30xea 1# 25ea 1#  20x ea      NBOS on foam 3x30" 2 x 1' ea  EO/EC 3 x 30"  EO/EC 3 x 30"  EO/EC 2x30"ea  EO/EC  -      Tandem on level surface 3x10" 2 x 30" ea     -      Sit to stand elevated surface 10x 2 x 10  Low mat nv 2x10 low mat low mat 10x,   10x airex under feet Low mat  3x10  airex under feet Low mat  2x10  airex under feet      Side steps 3 laps 5 laps     -      Step ups/step ups 0R 10x ea --     -      Biodex (maze/LOS) 2x with HR --     -      L BAPS nv -- Lv 2  30 ea Lv 2  30 ea L3  30xea  M/L, A/P, CW, CCW L3 30xea M/L, A/P, CW, CCW L3 30xea M/L, A/P, CW, CCW                   Rockerboard AP, ML + balance       20x ea, 30" ea      Tandem and sidestep on foam       2 laps ea,   min A Assessment: Tolerated treatment well  Patient demonstrated fatigue post treatment and exhibited good technique with therapeutic exercises and balance activities  Pt req min A for added balance activities on foam beam     Plan: Continue per plan of care  Progress treatment as tolerated       Patricia Aguilar, PTA

## 2019-11-05 ENCOUNTER — EVALUATION (OUTPATIENT)
Dept: PHYSICAL THERAPY | Facility: CLINIC | Age: 79
End: 2019-11-05
Payer: MEDICARE

## 2019-11-05 DIAGNOSIS — Z74.09 DECREASED STRENGTH, ENDURANCE, AND MOBILITY: Primary | ICD-10-CM

## 2019-11-05 DIAGNOSIS — R53.1 DECREASED STRENGTH, ENDURANCE, AND MOBILITY: Primary | ICD-10-CM

## 2019-11-05 DIAGNOSIS — R68.89 DECREASED STRENGTH, ENDURANCE, AND MOBILITY: Primary | ICD-10-CM

## 2019-11-05 DIAGNOSIS — R26.9 GAIT ABNORMALITY: ICD-10-CM

## 2019-11-05 PROCEDURE — 97110 THERAPEUTIC EXERCISES: CPT | Performed by: PHYSICAL MEDICINE & REHABILITATION

## 2019-11-05 PROCEDURE — 97140 MANUAL THERAPY 1/> REGIONS: CPT | Performed by: PHYSICAL MEDICINE & REHABILITATION

## 2019-11-05 NOTE — PROGRESS NOTES
PT Re-Evaluation     Today's date: 2019  Patient name: Geri Howe  : 1940  MRN: [id number]  Referring provider: Jordan Teran DO  Dx:   Encounter Diagnosis     ICD-10-CM    1  Decreased strength, endurance, and mobility R53 1     Z74 09    2  Gait abnormality R26 9        Start Time: 930  Stop Time: 1015  Total time in clinic (min): 45 minutes    Assessment  Assessment details: Robby Giron has been attending outpatient physical therapy with Decreased strength, endurance, and mobility , Gait abnormality   Since the last assessment, patient demonstrates decreased pain levels, improved range of motion, improved strength, and increased tolerance to activity  Pt continues to present with pain, demonstrate decreased range of motion, decreased strength, and decreased tolerance to activity  Pt would benefit from continued skilled physical therapy to address limitations and to achieve goals  Thank you for this referral    Impairments: abnormal gait, abnormal or restricted ROM, impaired balance, impaired physical strength and lacks appropriate home exercise program    Symptom irritability: lowBarriers to therapy: Chronicity, comorbidities  Understanding of Dx/Px/POC: fair   Prognosis: fair    Goals  Short-term goals:  1  Pt will decrease pain by 1-2 points within 4 weeks  MET  2  Pt will improve ROM by 5-10 degrees within 4 weeks  MET  3  Pt will improve strength by 1/2 grade within 4 weeks  MET  4  Pt will improve physical FS score by points by discharge  MET  Long-term goals  1  Pt will ambulate with improved gait pattern by discharge  Improved  2  Pt will demonstrate good dynamic knee alignment with squatting activities without cuing by discharge  Improved  3  Pt will restore maximal level of function for stairclimbing, demonstrating safety, by discharge   improved    Plan  Patient would benefit from: PT eval and skilled physical therapy  Planned modality interventions: biofeedback, cryotherapy, TENS, thermotherapy: hydrocollator packs and traction  Planned therapy interventions: joint mobilization, manual therapy, neuromuscular re-education, patient education, postural training, strengthening, stretching, therapeutic activities, therapeutic exercise, home exercise program, balance/weight bearing training, balance and gait training  Frequency: 2x week  Duration in weeks: 2  Treatment plan discussed with: patient        Subjective Evaluation    History of Present Illness  Mechanism of injury: Pt reports surgery on L ankle approximately 2 years ago, she states that she has "plates and screws" in her ankle  She reports a fall 2 weeks ago resulting in her hitting her left ankle on the curb  She feels that her walking ability has continued to decline recently  The pt cuts her own grass with push mower and she likes to work in her garden  She feels that her problem is that she has arthritic pain  Pt has 3 steps c railings to enter home and 2 steps to get into her bedroom  She recently began using a SPC due to safety concerns  Inconsistent ankle and foot pain, typically worse in the morning  (19) Pt reports with no AD  She feels much better since beginning OPPT  Chief concern is pelvic prolapse that occurred while ambulating  Pain  Current pain ratin  At best pain ratin  At worst pain ratin  Quality: tight and dull ache    Patient Goals  Patient goals for therapy: decreased pain  Patient goal: Improve abilty to ambulate safely    Improved        Objective     Strength/Myotome Testing     Left Hip   Planes of Motion   Flexion: 4-  Extension: 4  Abduction: 4  Adduction: 4+    Right Hip   Planes of Motion   Flexion: 4  Extension: 4  Abduction: 4  Adduction: 4+    Left Ankle/Foot   Dorsiflexion: 4    Right Ankle/Foot   Dorsiflexion: 4+    Ambulation   Weight-Bearing Status     Additional Weight-Bearing Status Details  SPC only used outside of the home and with long distance ambulation    Observational Gait   Decreased walking speed and stride length  Functional Assessment        Comments  TUG c SPC: 20 9 sec (Not tested)    TUG no AD: 16 75 (11/5/19) 13 82 sec    10 m walk test: np    2 min walk test: np    Sit to stand: B UE assist, R trunk lean    (RESOLVED)    30 sec sit to stand: 6 reps, B UE assist  (11/5/19) 8 reps no UE assist    Tandem stance: R/L: 3 sec , L/R: 10 sec (11/5/19) R/L 6 sec , L/R 7 sec      Flowsheet Rows      Most Recent Value   PT/OT G-Codes   Current Score  64   Projected Score  65                 Precautions: FALL RISK, Hx of ankle surgery, GERD, HTN, TIA, Hx of seizure, Moderate osteopenia     Manual  11/5                    L ankle PROM nv                                                                                                                      Exercise Diary  10/10 10/15 10/17 10/22 10/24 10/29 10/31  11/5       Bike 5' 6' 6' 6' 6' 8'  8'  8'       TM post tx nv           -         HR 20x 30 30 30x Airex 30x Airex 30x Airex SLS  20x ea         TR 20x 30 30` 30x Airex 30x Airex 30x Airex 30x         TB ankle inv/ever             NBOS on foam 3x30" 2 x 1' ea  EO/EC 3 x 30"  EO/EC 3 x 30"  EO/EC 2x30"ea  EO/EC   -         Tandem on level surface 3x10" 2 x 30" ea         -         Sit to stand  10x 2 x 10  Low mat nv 2x10 low mat low mat 10x,   10x airex under feet Low mat  3x10  airex under feet Low mat  2x10  airex under feet  no foam       Side steps 3 laps 5 laps         -         Step ups/step ups 0R 10x ea --         -                                 Rockerboard AP, ML + balance             20x ea, 30" ea         Tandem and sidestep on foam             2 laps ea,   min A

## 2019-11-07 ENCOUNTER — OFFICE VISIT (OUTPATIENT)
Dept: PHYSICAL THERAPY | Facility: CLINIC | Age: 79
End: 2019-11-07
Payer: MEDICARE

## 2019-11-07 DIAGNOSIS — R53.1 DECREASED STRENGTH, ENDURANCE, AND MOBILITY: Primary | ICD-10-CM

## 2019-11-07 DIAGNOSIS — R68.89 DECREASED STRENGTH, ENDURANCE, AND MOBILITY: Primary | ICD-10-CM

## 2019-11-07 DIAGNOSIS — R26.9 GAIT ABNORMALITY: ICD-10-CM

## 2019-11-07 DIAGNOSIS — Z74.09 DECREASED STRENGTH, ENDURANCE, AND MOBILITY: Primary | ICD-10-CM

## 2019-11-07 PROCEDURE — 97110 THERAPEUTIC EXERCISES: CPT

## 2019-11-07 PROCEDURE — 97112 NEUROMUSCULAR REEDUCATION: CPT

## 2019-11-07 NOTE — PROGRESS NOTES
Daily Note     Today's date: 2019  Patient name: Gricelda Sanchez  : 1940  MRN: [id number]  Referring provider: Geri Stone DO  Dx:   Encounter Diagnosis     ICD-10-CM    1  Decreased strength, endurance, and mobility R53 1     Z74 09    2  Gait abnormality R26 9                   Subjective: Pt reports improved balance and ankle stability since beginning therapy but continues to note intermittent LOB and L ankle tightness  Objective: See treatment diary below     Precautions: FALL RISK, Hx of ankle surgery, GERD, HTN, TIA, Hx of seizure, Moderate osteopenia     Manual                     L ankle PROM nv [address]                                            Exercise Diary  10/10 10/15 10/17 10/22 10/24 10/29 10/31  11/5 11/7      Bike 5' 6' 6' 6' 6' 8'  8'  8'  9'     TM post tx nv           -   -      HR 20x 30 30 30x Airex 30x Airex 30x Airex SLS  20x ea  Airex SLS 20x ea     TR 20x 30 30` 30x Airex 30x Airex 30x Airex 30x  Airex 30x     TB ankle inv/ever        - GTB 4-ways 20x ea    NBOS on foam 3x30" 2 x 1' ea  EO/EC 3 x 30"  EO/EC 3 x 30"  EO/EC 2x30"ea  EO/EC   - -  -     Tandem on level surface 3x10" 2 x 30" ea         - -  -     Sit to stand  10x 2 x 10  Low mat nv 2x10 low mat low mat 10x,   10x airex under feet Low mat  3x10  airex under feet Low mat  2x10  airex under feet  no foam Low mat  2x10  airex under feet      Side steps 3 laps 5 laps         -    -     Step ups/step ups 0R 10x ea --         -    -     Rockerboard AP, ML + balance             20x ea, 30" ea   20x ea, 30" ea      Tandem and sidestep on foam             2 laps ea,   min A   3 laps ea fingertips                                                                                 Assessment: Tolerated treatment well   Patient demonstrated fatigue post treatment, exhibited good technique with therapeutic exercises and would benefit from continued PT to address remaining ankle stability and strength deficits  Plan: Continue per plan of care  Progress treatment as tolerated      Benedict Doing, PTA

## 2019-11-11 ENCOUNTER — OFFICE VISIT (OUTPATIENT)
Dept: PHYSICAL THERAPY | Facility: CLINIC | Age: 79
End: 2019-11-11
Payer: MEDICARE

## 2019-11-11 DIAGNOSIS — Z74.09 DECREASED STRENGTH, ENDURANCE, AND MOBILITY: Primary | ICD-10-CM

## 2019-11-11 DIAGNOSIS — R26.9 GAIT ABNORMALITY: ICD-10-CM

## 2019-11-11 DIAGNOSIS — R68.89 DECREASED STRENGTH, ENDURANCE, AND MOBILITY: Primary | ICD-10-CM

## 2019-11-11 DIAGNOSIS — R53.1 DECREASED STRENGTH, ENDURANCE, AND MOBILITY: Primary | ICD-10-CM

## 2019-11-11 PROCEDURE — 97112 NEUROMUSCULAR REEDUCATION: CPT

## 2019-11-11 PROCEDURE — 97110 THERAPEUTIC EXERCISES: CPT

## 2019-11-11 NOTE — PROGRESS NOTES
Daily Note     Today's date: 2019  Patient name: Anabel Rahman  : 1940  MRN: [id number]  Referring provider: Skip Moody DO  Dx:   Encounter Diagnosis     ICD-10-CM    1  Decreased strength, endurance, and mobility R53 1     Z74 09    2  Gait abnormality R26 9        Start Time: 1130  Stop Time: 1145  Total time in clinic (min): 15 minutes  Subjective: Pt reports improved balance and ankle stability since beginning therapy  "There are times when it 'weeble-wobbles' but I feel confident and safe now not using a cane  I know that sometimes I will need a cane, but I feel safer over all "      Objective: See treatment diary below  Time borrowed from PT, [address] 1110 - 1125       Precautions: FALL RISK, Hx of ankle surgery, GERD, HTN, TIA, Hx of seizure, Moderate osteopenia  Manual      L ankle PROM nv [address]                                            Exercise Diary  10/10 10/15 10/17 10/22 10/24 10/29 10/31  11/5 11/7  11/11    Bike 5' 6' 6' 6' 6' 8'  8'  8'  9' 8'     TM post tx nv           -   -  --    HR 20x 30 30 30x Airex 30x Airex 30x Airex SLS  20x ea  Airex SLS 20x ea Airex SLS  25ea    TR 20x 30 30` 30x Airex 30x Airex 30x Airex 30x  Airex 30x  Airex 30x   TB ankle inv/ever        - GTB 4-ways 20x ea    NBOS on foam 3x30" 2 x 1' ea  EO/EC 3 x 30"  EO/EC 3 x 30"  EO/EC 2x30"ea  EO/EC   - -  - --    Tandem on level surface 3x10" 2 x 30" ea         - -  -  --   Sit to stand  10x 2 x 10  Low mat nv 2x10 low mat low mat 10x,   10x airex under feet Low mat  3x10  airex under feet Low mat  2x10  airex under feet  no foam Low mat  2x10  airex under feet   low mat Airex under feet  25x   Side steps 3 laps 5 laps         -    - --    Step ups/step ups 0R 10x ea --         -    - --    Rockerboard AP, ML + balance             20x ea, 30" ea   20x ea, 30" ea  30ea  30"ea    Tandem and sidestep on foam             2 laps ea,   min A   3 laps ea fingertips 5 laps ea  occasional UE support                                                                                Assessment:  Pt tolerated treatment well  Pt continues to demonstrate lack in confidence with Airex activities- however, demonstrates fair-good balance during beam walking and good balance during sit-to-stands  Patient demonstrated fatigue post treatment, exhibited good technique with therapeutic exercises and would benefit from continued PT to address remaining ankle stability and strength deficits  Plan: Continue per plan of care  Progress treatment as tolerated      Morareji Blake, PHYLLIS

## 2019-11-12 ENCOUNTER — HOSPITAL ENCOUNTER (OUTPATIENT)
Dept: BONE DENSITY | Facility: MEDICAL CENTER | Age: 79
Discharge: HOME | End: 2019-11-12
Payer: MEDICARE

## 2019-11-12 DIAGNOSIS — M85.80 MODERATE OSTEOPENIA: ICD-10-CM

## 2019-11-12 PROCEDURE — 77080 DXA BONE DENSITY AXIAL: CPT

## 2019-11-14 ENCOUNTER — OFFICE VISIT (OUTPATIENT)
Dept: PHYSICAL THERAPY | Facility: CLINIC | Age: 79
End: 2019-11-14
Payer: MEDICARE

## 2019-11-14 DIAGNOSIS — R26.9 GAIT ABNORMALITY: ICD-10-CM

## 2019-11-14 DIAGNOSIS — R68.89 DECREASED STRENGTH, ENDURANCE, AND MOBILITY: Primary | ICD-10-CM

## 2019-11-14 DIAGNOSIS — R53.1 DECREASED STRENGTH, ENDURANCE, AND MOBILITY: Primary | ICD-10-CM

## 2019-11-14 DIAGNOSIS — Z74.09 DECREASED STRENGTH, ENDURANCE, AND MOBILITY: Primary | ICD-10-CM

## 2019-11-14 PROCEDURE — 97112 NEUROMUSCULAR REEDUCATION: CPT

## 2019-11-14 PROCEDURE — 97140 MANUAL THERAPY 1/> REGIONS: CPT

## 2019-11-14 PROCEDURE — 97110 THERAPEUTIC EXERCISES: CPT

## 2019-11-14 NOTE — PROGRESS NOTES
Daily Note     Today's date: 2019  Patient name: Jackie Gil  : 1940  MRN: [id number]  Referring provider: Lashell Hopper DO  Dx:   Encounter Diagnosis     ICD-10-CM    1  Decreased strength, endurance, and mobility R53 1     Z74 09    2  Gait abnormality R26 9                   Subjective: Pt reports improved ambulation confidence but notes that her L ankle and foot pain cause her to "feel wobbly" when walking       Objective: See treatment diary below     Precautions: FALL RISK, Hx of ankle surgery, GERD, HTN, TIA, Hx of seizure, Moderate osteopenia    Manual      L ankle PROM [facility]                              Exercise Diary  11/14  10/17 10/22 10/24 10/29 10/31  11/5 11/7  11/11    Bike 9' post  6' 6' 6' 8'  8'  8'  9' 8'     TM post tx attempted, dang 4 laps-          -   -  --    HR SLS  Airex R  Level L  25ea   30 30x Airex 30x Airex 30x Airex SLS  20x ea  Airex SLS 20x ea Airex SLS  25ea    TR Airex 30x  30` 30x Airex 30x Airex 30x Airex 30x  Airex 30x  Airex 30x   TB ankle inv/ever -       - GTB 4-ways 20x ea    NBOS on foam -  3 x 30"  EO/EC 3 x 30"  EO/EC 2x30"ea  EO/EC   - -  - --    Tandem on level surface -          - -  -  --   Sit to stand  -  nv 2x10 low mat low mat 10x,   10x airex under feet Low mat  3x10  airex under feet Low mat  2x10  airex under feet  no foam Low mat  2x10  airex under feet   low mat Airex under feet  25x   Side steps -          -    - --    Step ups/step ups -          -    - --    Rockerboard AP, ML + balance 30x ea, 30" ea          20x ea, 30" ea   20x ea, 30" ea  30ea  30"ea    Tandem and sidestep on foam 3 laps ea, min A          2 laps ea,   min A   3 laps ea fingertips 5 laps ea  occasional UE support                          squats w/chair No HHA 20x                     storks GTB   20x ea, B                         Assessment: Pt had extreme difficulty walking with normal stride length and speed on TM, and had knees flexed the entire time  Discontinued TM attempts and walked hallways  To dialysis lab twice to improve ambulation endurance, without LOB or difficulty  Tolerated treatment well  Patient demonstrated fatigue post treatment, exhibited good technique with therapeutic exercises and would benefit from continued PT to further improve balance, ankle stability and strength, and ambulation endurance  Plan: Continue per plan of care  Progress treatment as tolerated      Christin Stearns, PTA

## 2019-11-19 ENCOUNTER — APPOINTMENT (OUTPATIENT)
Dept: PHYSICAL THERAPY | Facility: CLINIC | Age: 79
End: 2019-11-19
Payer: MEDICARE

## 2019-11-21 ENCOUNTER — APPOINTMENT (OUTPATIENT)
Dept: PHYSICAL THERAPY | Facility: CLINIC | Age: 79
End: 2019-11-21
Payer: MEDICARE

## 2019-11-25 ENCOUNTER — OFFICE VISIT (OUTPATIENT)
Dept: PHYSICAL THERAPY | Facility: CLINIC | Age: 79
End: 2019-11-25
Payer: MEDICARE

## 2019-11-25 DIAGNOSIS — R53.1 DECREASED STRENGTH, ENDURANCE, AND MOBILITY: Primary | ICD-10-CM

## 2019-11-25 DIAGNOSIS — R68.89 DECREASED STRENGTH, ENDURANCE, AND MOBILITY: Primary | ICD-10-CM

## 2019-11-25 DIAGNOSIS — R26.9 GAIT ABNORMALITY: ICD-10-CM

## 2019-11-25 DIAGNOSIS — Z74.09 DECREASED STRENGTH, ENDURANCE, AND MOBILITY: Primary | ICD-10-CM

## 2019-11-25 PROCEDURE — 97112 NEUROMUSCULAR REEDUCATION: CPT

## 2019-11-25 PROCEDURE — 97110 THERAPEUTIC EXERCISES: CPT

## 2019-11-25 PROCEDURE — 97530 THERAPEUTIC ACTIVITIES: CPT

## 2019-11-25 NOTE — PROGRESS NOTES
Daily Note     Today's date: 2019  Patient name: Daren Marrero  : 1940  MRN: [id number]  Referring provider: Alejandro Pimentel DO  Dx:   Encounter Diagnosis     ICD-10-CM    1  Decreased strength, endurance, and mobility R53 1     Z74 09    2   Gait abnormality R26 9        Start Time: 1100  Stop Time: 1200  Total time in clinic (min): 60 minutes  Subjective: Pt arrives to today's tx stating she feels "good but a little achy" - "I think I may have broken my pinky toe "    Objective: See treatment diary below     Precautions: FALL RISK, Hx of ankle surgery, GERD, HTN, TIA, Hx of seizure, Moderate osteopenia    Manual      L ankle PROM [facility]                              Exercise Diary  11/14 11/25 10/17 10/22 10/24 10/29 10/31  11/5 11/7  11/11    Bike 9' post 10'  6' 6' 6' 8'  8'  8'  9' 8'     TM post tx attempted, dang 4 laps-          -   -  --    HR SLS  Airex R  Level L  25ea  SLS Airex  30ea 30 30x Airex 30x Airex 30x Airex SLS  20x ea  Airex SLS 20x ea Airex SLS  25ea    TR Airex 30x Airex 30x 30` 30x Airex 30x Airex 30x Airex 30x  Airex 30x  Airex 30x   TB ankle inv/ever -       - GTB 4-ways 20x ea    NBOS on foam -  3 x 30"  EO/EC 3 x 30"  EO/EC 2x30"ea  EO/EC   - -  - --    Tandem on level surface -          - -  -  --   Sit to stand  -  nv 2x10 low mat low mat 10x,   10x airex under feet Low mat  3x10  airex under feet Low mat  2x10  airex under feet  no foam Low mat  2x10  airex under feet   low mat Airex under feet  25x   Side steps -          -    - --    Step ups/step ups -          -    - --    Rockerboard AP, ML + balance 30x ea, 30" ea 30x ea, 30" ea         20x ea, 30" ea   20x ea, 30" ea  30ea  30"ea    Tandem and sidestep on foam 3 laps ea, min A 5 laps ea, occasional HHA         2 laps ea,   min A   3 laps ea fingertips 5 laps ea  occasional UE support                          squats w/chair No HHA 20x                   [employer] GTB   20x ea, B GTB 20ea (B/L) HHA                        Assessment: Pt had occasional LOB during foam beam walking; however, was able to self-correct  HHA offered during foam beam walking for confidence - pt used min WB'ing through UE's  Pt had mod WB'ing through UE during [employer]k kicks - /c this assistance pt demonstrated improved technique for an effective exercise  Tolerated treatment well  Patient demonstrated fatigue post treatment, exhibited good technique with therapeutic exercises and would benefit from continued PT to further improve balance, ankle stability and strength, and ambulation endurance  Plan: Continue per plan of care  Progress treatment as tolerated      Clance Douglass, PTA

## 2019-11-27 ENCOUNTER — EVALUATION (OUTPATIENT)
Dept: PHYSICAL THERAPY | Facility: REHABILITATION | Age: 79
End: 2019-11-27
Payer: MEDICARE

## 2019-11-27 DIAGNOSIS — N81.10 CYSTOCELE WITHOUT UTERINE PROLAPSE: Primary | ICD-10-CM

## 2019-11-27 PROCEDURE — 97162 PT EVAL MOD COMPLEX 30 MIN: CPT | Performed by: PHYSICAL THERAPIST

## 2019-11-27 NOTE — PROGRESS NOTES
PT Evaluation     Today's date: 2019  Patient name: Leo Coon  : 1940  MRN: [id number]  Referring provider: Olivia Arroyo DO  Dx:   Encounter Diagnosis     ICD-10-CM    1  Cystocele without uterine prolapse N81 10 Ambulatory referral to Physical Therapy                  Assessment  Assessment details: Leo Coon is a 70 y o  female who presents to physical therapy with Cystocele without uterine prolapse  Pt was offered option of second person in the room prior to physical examination  Pt was explained purpose and process of assessment and provided consent prior to initiation of physical exam  Pt has redness around labia and visible protrusion of bladder with slight bulge  Pt feels that bladder is falling out with standing and walking  Pt notes that urine stream is erratic due to "kinked urethra " Pt has moderate weakening of anterior vaginal wall  Discussed with pt some changes to make with body mechanics to avoid worsening of prolapse  Leo Coon would benefit from formal physical therapy to address impairments as detailed, decrease pain, and restore maximal level of function for all home, toileting, and mobility tasks  Thank you for this referral     Impairments: abnormal coordination, abnormal gait, impaired physical strength, lacks appropriate home exercise program and poor body mechanics  Understanding of Dx/Px/POC: good   Prognosis: fair    Goals  Short term goals:  1  Pt will improve pelvic floor strength to 4/5 to prevent worsening of bladder prolapse in 6 weeks  2  Pt will demonstrate improved body mechanics to avoid increased pressure on pelvic floor in 6 weeks  Long term goals:  1  Pt will verbalize understanding of vulvar care by discharge  2  Pt will be able to walk and stand for greater than 45 minutes prior to feeling bladder by discharge      Plan  Patient would benefit from: women's health eval and skilled physical therapy  Planned modality interventions: biofeedback  Planned therapy interventions: abdominal trunk stabilization, joint mobilization, manual therapy, massage, behavior modification, body mechanics training, breathing training, neuromuscular re-education, patient education, postural training, strengthening, stretching, therapeutic activities, therapeutic exercise, flexibility, functional ROM exercises and home exercise program  Frequency: 1x week  Duration in weeks: 12  Treatment plan discussed with: patient        PT Pelvic Floor Subjective:   History of Present Illness:   Pt started to feel protrusion from vagina and was worried that was getting worse  Pt had surgical consult and was concerned about side effects  Pt states that she had lifted heavy loads in the past  Pt believes that prolapse started about 2-3 years ago when lifting a heavy planter full of dirt  Pt denies blood and discharge  Pt has some soreness around vulva  Pt has been using non-perfumed soap  Pt states that she is concerned about walking too long or standing too long that prolapse is worse  Pt is currently not using any lubrication  Pt states that she is not having any leakage  Pt wears a pad throughout the day just in case  Pt states that she has no bleeding and has not "poked around down there " Pt was not on any hormone replacement after chemo pill    Social Support:     Lives with:  Alone (3 cats)    Relationship status:     Work status: retired    History of Depression: no  Diet and Exercise:    Diet:balanced nutrition    Gardening, going after cats  OB/ gyn History    Gestational History:     Prior Pregnancy: Yes      Number of prior pregnancies: 3    Number of term pregnancies: 2    Delivery Type: vaginal delivery      Number of vaginal deliveries: 2    Delivery Complications:  "they had to sew me with my second one"- 3 days of labor with daughter    Menstrual History:      Menopausal: no menopause  no hormone replacement therapy  Menopause around age 56 years  Bladder Function:     Voiding Difficulties positive for: hesitancy      Voiding Difficulties negative for: urgency, frequent urination, straining and incomplete emptying      Voiding Difficulties comments:     Urinary leakage: no urine leakage    Nocturia (episodes per night): 1    Painful urination: No      Intake (ounces): Intake (ounces) comment: Urine stream splays due to "kinked urethra"  Voids about 6x/day  Incontinence Management:     Pads/Diaper Use:  Day and night    Pads/Diapers Additional Comments: pad during day, pantiliner at night  Bowel Function:     Bowel Function comments:  Uses fish oil to avoid constipation    Bowel frequency: daily    Barn[place] Stool Scale: type 4 and type 5  Sexual Function:     Sexually Active:  Not sexually active  Pain:     At best pain ratin    At worst pain ratin (tender)    Location:  Labia and bladder    Onset:  More than 2 years ago    Aggravating factors:  Walking and prolonged positions    Relieving factors:  Change in position  Diagnostic Tests:     None    Treatments:     Previous treatment:  Physical therapy (balance)  Patient Goals: Other patient goals:  Have bladder partially go back in      Objective   Pelvic Floor Exam   Position: supine exam    Diastatis   Palpation of linea alba: Will assess NV    Skin inspection:   scars present  Number of scars: 1  Scar 1 location: perineal body  Sensitivity level low Restriction level medium     General Perineum Exam:   Positive for perianal erythema       General perineum exam comments: Redness over bilateral labia    Visual Inspection of Perineum:   Excursion of perineal body in cephalad direction with contraction of pelvic floor muscles (PFM): good  Excursion of perineal body in caudal direction with relaxation of pelvic floor muscles (PFM): good   Involuntary contraction with coughing: yes  Involuntary relaxation with bearing down: yes  PFM Contraction Comments: Anal wink reflex stronger on L than R  Cotton swab test: non-tender  Cough reflex: cough reflex  Sphincter Tone Resting: weak  Sphincter Tone Squeeze: normal  Sensation: intact    Pelvic Organ Prolapse   Position: hook-lying  At rest: mild and mild  With bearing down: moderate (to the level of hymenal remnants)  Location: anterior  Comments: Plan to assess in standing at NV    Pelvic Floor Muscle Exam     Muscle Contraction: well isolated  Breathing pattern with contraction: apical  Pelvic floor muscle relaxation is delayed  50% pelvic floor relaxation  12 seconds required for complete relaxation       PERFECT Score   Power right: 3/5  Power left: 3+/5  Endurance (seconds to max): 4    SMEG Biofeedback   to be assessed next treatment               Precautions: GERD, TIA, HTN, hx of seizures, OA, hx of breast cancer      Manual  11/27            Lower quarter screen nv            Assess standing prolapse nv                                                       Exercise Diary  11/27            sEMG baseline nv            sEMG endurance 5W/10R nv            sEMG power 2W/4R nv            TA + PF nv            TA + PF + hip add nv            TA + PF + hip add + bridge nv            TA + wall squat nv            TA + heel slides nv                                                                                                                                                                            Modalities  11/27

## 2019-12-11 ENCOUNTER — OFFICE VISIT (OUTPATIENT)
Dept: PHYSICAL THERAPY | Facility: REHABILITATION | Age: 79
End: 2019-12-11
Payer: MEDICARE

## 2019-12-11 DIAGNOSIS — N81.10 CYSTOCELE WITHOUT UTERINE PROLAPSE: Primary | ICD-10-CM

## 2019-12-11 DIAGNOSIS — R26.9 GAIT ABNORMALITY: ICD-10-CM

## 2019-12-11 DIAGNOSIS — R68.89 DECREASED STRENGTH, ENDURANCE, AND MOBILITY: ICD-10-CM

## 2019-12-11 DIAGNOSIS — R53.1 DECREASED STRENGTH, ENDURANCE, AND MOBILITY: ICD-10-CM

## 2019-12-11 DIAGNOSIS — Z74.09 DECREASED STRENGTH, ENDURANCE, AND MOBILITY: ICD-10-CM

## 2019-12-11 PROCEDURE — 97140 MANUAL THERAPY 1/> REGIONS: CPT | Performed by: PHYSICAL THERAPIST

## 2019-12-11 PROCEDURE — 97110 THERAPEUTIC EXERCISES: CPT | Performed by: PHYSICAL THERAPIST

## 2019-12-11 PROCEDURE — 97112 NEUROMUSCULAR REEDUCATION: CPT | Performed by: PHYSICAL THERAPIST

## 2019-12-11 NOTE — PROGRESS NOTES
Daily Note     Today's date: 2019  Patient name: Priscila Alvarado  : 1940  MRN: [id number]  Referring provider: Kerri Lei DO  Dx:   Encounter Diagnosis     ICD-10-CM    1  Cystocele without uterine prolapse N81 10    2  Decreased strength, endurance, and mobility R53 1     Z74 09    3  Gait abnormality R26 9                   Subjective: Pt notes that sometimes she has lower abdominal pain similar to cramps from a period  Pt indicates [place] type 4-5 daily  Pt states that she has not had any leakage  Pt wears 1 pad per day and 1 pantiliner at night  Objective: See treatment diary below  Lumbar AROM- feels better with forward flexion (reduction of prolapse), WNL except limited in R rotation  Hip flexion: R 4+/5, L 4/5  Hip IR: R 5/5, L 5/5  Hip ER: R 5/5, L 5/5  R: - ELSY, - FADIR, + hamstring tightness, - SLR  L: - ELSY, - FADIR, + hamstring tightness, -SLR  No TTP in abdomen, suprapubic tenderness with inferior glide when assessing bladder fascia  NEPTALI: 1/3/1  Standing prolapse assessment: bladder protrusion to 1 cm past vaginal opening        Assessment: Tolerated treatment well  Patient demonstrated fatigue post treatment, exhibited good technique with therapeutic exercises and would benefit from continued PT to decrease extent of prolapse  Pt provided consent prior to visualization of prolapse in standing  Pt is able to reduce prolapse to <1 cm past vaginal opening with Kegel in standing  Pt demonstrates good form with seated Kegel contractions  Reviewed vulvar and genital hygiene with patient and provided written copy; pt expressed verbal understanding  Pt mentions that she has fatty liver and raised stomach  Plan to assess breathing mechanics and make corrections at NV  Plan: Continue per plan of care  Progress treatment as tolerated         Precautions: GERD, TIA, HTN, hx of seizures, OA, hx of breast cancer      Manual             Lower quarter screen nv ED Assess standing prolapse nv ED           Diaphragm assess  nv                                         Exercise Diary  11/27 12/11           sEMG baseline nv nv           sEMG endurance 5W/10R nv nv           sEMG power 2W/4R nv nv           TA + PF nv nv           TA + PF + hip add nv nv           TA + PF + hip add + bridge nv nv           TA + wall squat nv nv           TA + heel slides nv nv           Standing PF  5"x5           Seated PF  5"x5           Vulvar care edu  10 min           Genital hygiene edu  10 min                                                                                                                       Modalities  11/27 12/11

## 2019-12-18 ENCOUNTER — APPOINTMENT (OUTPATIENT)
Dept: PHYSICAL THERAPY | Facility: REHABILITATION | Age: 79
End: 2019-12-18
Payer: MEDICARE

## 2019-12-18 DIAGNOSIS — I10 ESSENTIAL HYPERTENSION: ICD-10-CM

## 2019-12-18 RX ORDER — METOPROLOL SUCCINATE 200 MG/1
200 TABLET, EXTENDED RELEASE ORAL DAILY
Qty: 90 TABLET | Refills: 0 | Status: SHIPPED | OUTPATIENT
Start: 2019-12-18 | End: 2020-03-13

## 2019-12-30 ENCOUNTER — OFFICE VISIT (OUTPATIENT)
Dept: PHYSICAL THERAPY | Facility: REHABILITATION | Age: 79
End: 2019-12-30
Payer: MEDICARE

## 2019-12-30 DIAGNOSIS — R53.1 DECREASED STRENGTH, ENDURANCE, AND MOBILITY: Primary | ICD-10-CM

## 2019-12-30 DIAGNOSIS — Z74.09 DECREASED STRENGTH, ENDURANCE, AND MOBILITY: Primary | ICD-10-CM

## 2019-12-30 DIAGNOSIS — N81.10 CYSTOCELE WITHOUT UTERINE PROLAPSE: ICD-10-CM

## 2019-12-30 DIAGNOSIS — R68.89 DECREASED STRENGTH, ENDURANCE, AND MOBILITY: Primary | ICD-10-CM

## 2019-12-30 PROCEDURE — 97112 NEUROMUSCULAR REEDUCATION: CPT | Performed by: PHYSICAL THERAPIST

## 2019-12-30 PROCEDURE — 97110 THERAPEUTIC EXERCISES: CPT | Performed by: PHYSICAL THERAPIST

## 2019-12-30 PROCEDURE — 97530 THERAPEUTIC ACTIVITIES: CPT | Performed by: PHYSICAL THERAPIST

## 2019-12-30 NOTE — PROGRESS NOTES
Daily Note     Today's date: 2019  Patient name: Anabel Rahman  : 1940  MRN: [id number]  Referring provider: Sarthak Dela Cruz DO  Dx:   Encounter Diagnosis     ICD-10-CM    1  Decreased strength, endurance, and mobility R53 1     Z74 09    2  Cystocele without uterine prolapse N81 10                   Subjective: Pt reports not having any leakage  Pt has been working on her Kegel exercises and feels "they are helping somewhat " Pt has been able to sleep without a pad without any leakage  Objective: See treatment diary below      Assessment: Tolerated treatment well  Patient demonstrated fatigue post treatment and would benefit from continued PT to decrease severity of bladder prolapse and improve endurance of pelvic floor  Pt had good understanding of pelvic floor and TA contraction in hooklying but required bolster to avoid substitution from glutes and hip flexors  Pt needed cuing to appropriately grade contraction to submaximal level  Pt was positioned in standing flexion with surface electrodes placed perianally on either side of external anal sphincter with reference electrode placed over R posterior hip  Performed sEMG with pt in seated, demonstrating good resting tone, recruitment, and relaxation, with poor endurance hold  Plan: Continue per plan of care  Progress treatment as tolerated         Precautions: GERD, TIA, HTN, hx of seizures, OA, hx of breast cancer      Manual            Lower quarter screen nv ED np          Assess standing prolapse nv ED np          Diaphragm assess  nv nv                                        Exercise Diary            sEMG baseline nv nv 8 min          sEMG endurance 5W/10R nv nv 2x5, seated          sEMG power 2W/4R nv nv nv          TA + PF nv nv 3"x10          TA + PF + hip add nv nv 3"x10          TA + PF + hip add + bridge nv nv 10x          TA + wall squat nv nv +PF 10x          TA + heel slides nv nv 10x ea +PF          Standing PF  5"x5 nv          Seated PF  5"x5 nv          Vulvar care edu  10 min np          Genital hygiene edu  10 min np                                                                                                                      Modalities  11/27 12/11 12/30

## 2020-01-07 DIAGNOSIS — M79.601 PAIN OF RIGHT UPPER EXTREMITY: ICD-10-CM

## 2020-01-07 RX ORDER — GABAPENTIN 300 MG/1
300 CAPSULE ORAL
Qty: 30 CAPSULE | Refills: 0 | Status: SHIPPED | OUTPATIENT
Start: 2020-01-07 | End: 2020-05-06

## 2020-01-13 ENCOUNTER — OFFICE VISIT (OUTPATIENT)
Dept: PHYSICAL THERAPY | Facility: REHABILITATION | Age: 80
End: 2020-01-13
Payer: MEDICARE

## 2020-01-13 DIAGNOSIS — R68.89 DECREASED STRENGTH, ENDURANCE, AND MOBILITY: Primary | ICD-10-CM

## 2020-01-13 DIAGNOSIS — Z74.09 DECREASED STRENGTH, ENDURANCE, AND MOBILITY: Primary | ICD-10-CM

## 2020-01-13 DIAGNOSIS — N81.10 CYSTOCELE WITHOUT UTERINE PROLAPSE: ICD-10-CM

## 2020-01-13 DIAGNOSIS — R53.1 DECREASED STRENGTH, ENDURANCE, AND MOBILITY: Primary | ICD-10-CM

## 2020-01-13 PROCEDURE — 97110 THERAPEUTIC EXERCISES: CPT | Performed by: PHYSICAL THERAPIST

## 2020-01-13 PROCEDURE — 97112 NEUROMUSCULAR REEDUCATION: CPT | Performed by: PHYSICAL THERAPIST

## 2020-01-13 NOTE — PROGRESS NOTES
Daily Note     Today's date: 2020  Patient name: Ira Barclay  : 1940  MRN: [id number]  Referring provider: Moises Murdock DO  Dx:   Encounter Diagnosis     ICD-10-CM    1  Decreased strength, endurance, and mobility R53 1     Z74 09    2  Cystocele without uterine prolapse N81 10                   Subjective: Pt has been having pain in L side of low back, similar to when she had her period and would have cramps  Pt has been doing Kegels 3-4x/day  Pt has stopped wearing pad around the house and stopped wearing it to go to bed  Pt feels the bulge a little less  Objective: See treatment diary below      Assessment: Tolerated treatment well  Patient demonstrated fatigue post treatment and would benefit from continued PT to increase strength and endurance of pelvic floor  Pt was positioned in standing flexion with surface electrodes placed perianally on either side of external anal sphincter with reference electrode placed over R posterior hip  Performed biofeedback in standing with pt demonstrating higher resting tone to hold in bladder  Pt displays improved endurance to 3 seconds with less fatigue over repetitions  Took 5 minute rest break between slow holds and quick flicks in standing  Pt was challenged with addition of pball exercises to address core and balance with perineal support  Plan: Continue per plan of care  Progress treatment as tolerated         Precautions: GERD, TIA, HTN, hx of seizures, OA, hx of breast cancer      Manual           Lower quarter screen nv ED np np         Assess standing prolapse nv ED np np         Diaphragm assess  nv nv np                                       Exercise Diary           sEMG baseline nv nv 8 min 10 min         sEMG endurance 5W/10R nv nv 2x5, seated 2x5, standing         sEMG power 2W/4R nv nv nv 2x10 standing         TA + PF nv nv 3"x10 nv         TA + PF + hip add nv nv 3"x10 nv         TA + PF + hip add + bridge nv nv 10x nv         TA + wall squat nv nv +PF 10x nv         TA + heel slides nv nv 10x ea +PF nv         Standing PF  5"x5 nv nv         Seated PF  5"x5 nv nv         Vulvar care edu  10 min np np         Genital hygiene edu  10 min np np         pball TA + shoulder ext    Grn, 3"x10         pball TA + low rows    Grn, 3"x10                                                                                           Modalities  11/27 12/11 12/30 1/13

## 2020-01-17 ENCOUNTER — APPOINTMENT (OUTPATIENT)
Dept: LAB | Age: 80
End: 2020-01-17
Payer: MEDICARE

## 2020-01-17 DIAGNOSIS — H60.503 ACUTE OTITIS EXTERNA OF BOTH EARS, UNSPECIFIED TYPE: ICD-10-CM

## 2020-01-17 DIAGNOSIS — C50.919 MALIGNANT NEOPLASM OF FEMALE BREAST, UNSPECIFIED ESTROGEN RECEPTOR STATUS, UNSPECIFIED LATERALITY, UNSPECIFIED SITE OF BREAST (HCC): ICD-10-CM

## 2020-01-17 DIAGNOSIS — R73.9 HYPERGLYCEMIA: ICD-10-CM

## 2020-01-17 DIAGNOSIS — M85.80 MODERATE OSTEOPENIA: ICD-10-CM

## 2020-01-17 DIAGNOSIS — E55.9 VITAMIN D DEFICIENCY: ICD-10-CM

## 2020-01-17 DIAGNOSIS — E78.5 HYPERLIPIDEMIA, UNSPECIFIED HYPERLIPIDEMIA TYPE: ICD-10-CM

## 2020-01-17 DIAGNOSIS — H91.93 BILATERAL HEARING LOSS, UNSPECIFIED HEARING LOSS TYPE: ICD-10-CM

## 2020-01-17 DIAGNOSIS — G45.9 TRANSIENT ISCHEMIC ATTACK: ICD-10-CM

## 2020-01-17 DIAGNOSIS — Z74.09 DECREASED AMBULATION STATUS: ICD-10-CM

## 2020-01-17 DIAGNOSIS — I10 ESSENTIAL HYPERTENSION: ICD-10-CM

## 2020-01-17 LAB
25(OH)D3 SERPL-MCNC: 50.6 NG/ML (ref 30–100)
ALBUMIN SERPL BCP-MCNC: 3.8 G/DL (ref 3.5–5)
ALP SERPL-CCNC: 89 U/L (ref 46–116)
ALT SERPL W P-5'-P-CCNC: 25 U/L (ref 12–78)
ANION GAP SERPL CALCULATED.3IONS-SCNC: 6 MMOL/L (ref 4–13)
AST SERPL W P-5'-P-CCNC: 16 U/L (ref 5–45)
BACTERIA UR QL AUTO: ABNORMAL /HPF
BILIRUB SERPL-MCNC: 0.81 MG/DL (ref 0.2–1)
BILIRUB UR QL STRIP: NEGATIVE
BUN SERPL-MCNC: 18 MG/DL (ref 5–25)
CALCIUM SERPL-MCNC: 10 MG/DL (ref 8.3–10.1)
CHLORIDE SERPL-SCNC: 108 MMOL/L (ref 100–108)
CHOLEST SERPL-MCNC: 267 MG/DL (ref 50–200)
CLARITY UR: ABNORMAL
CO2 SERPL-SCNC: 28 MMOL/L (ref 21–32)
COLOR UR: YELLOW
CREAT SERPL-MCNC: 0.88 MG/DL (ref 0.6–1.3)
ERYTHROCYTE [DISTWIDTH] IN BLOOD BY AUTOMATED COUNT: 12.7 % (ref 11.6–15.1)
EST. AVERAGE GLUCOSE BLD GHB EST-MCNC: 131 MG/DL
GFR SERPL CREATININE-BSD FRML MDRD: 64 ML/MIN/1.73SQ M
GLUCOSE P FAST SERPL-MCNC: 108 MG/DL (ref 65–99)
GLUCOSE UR STRIP-MCNC: NEGATIVE MG/DL
HBA1C MFR BLD: 6.2 % (ref 4.2–6.3)
HCT VFR BLD AUTO: 45 % (ref 34.8–46.1)
HDLC SERPL-MCNC: 67 MG/DL
HGB BLD-MCNC: 14.3 G/DL (ref 11.5–15.4)
HGB UR QL STRIP.AUTO: NEGATIVE
HYALINE CASTS #/AREA URNS LPF: ABNORMAL /LPF
KETONES UR STRIP-MCNC: NEGATIVE MG/DL
LDLC SERPL CALC-MCNC: 180 MG/DL (ref 0–100)
LEUKOCYTE ESTERASE UR QL STRIP: ABNORMAL
MCH RBC QN AUTO: 30.5 PG (ref 26.8–34.3)
MCHC RBC AUTO-ENTMCNC: 31.8 G/DL (ref 31.4–37.4)
MCV RBC AUTO: 96 FL (ref 82–98)
NITRITE UR QL STRIP: NEGATIVE
NON-SQ EPI CELLS URNS QL MICRO: ABNORMAL /HPF
PH UR STRIP.AUTO: 5.5 [PH]
PLATELET # BLD AUTO: 221 THOUSANDS/UL (ref 149–390)
PMV BLD AUTO: 11.6 FL (ref 8.9–12.7)
POTASSIUM SERPL-SCNC: 4.9 MMOL/L (ref 3.5–5.3)
PROT SERPL-MCNC: 7.6 G/DL (ref 6.4–8.2)
PROT UR STRIP-MCNC: NEGATIVE MG/DL
RBC # BLD AUTO: 4.69 MILLION/UL (ref 3.81–5.12)
RBC #/AREA URNS AUTO: ABNORMAL /HPF
SODIUM SERPL-SCNC: 142 MMOL/L (ref 136–145)
SP GR UR STRIP.AUTO: 1.02 (ref 1–1.03)
TRIGL SERPL-MCNC: 101 MG/DL
TSH SERPL DL<=0.05 MIU/L-ACNC: 2.37 UIU/ML (ref 0.36–3.74)
UROBILINOGEN UR QL STRIP.AUTO: 0.2 E.U./DL
WBC # BLD AUTO: 8.78 THOUSAND/UL (ref 4.31–10.16)
WBC #/AREA URNS AUTO: ABNORMAL /HPF

## 2020-01-17 PROCEDURE — 80061 LIPID PANEL: CPT

## 2020-01-17 PROCEDURE — 82306 VITAMIN D 25 HYDROXY: CPT

## 2020-01-17 PROCEDURE — 83036 HEMOGLOBIN GLYCOSYLATED A1C: CPT

## 2020-01-17 PROCEDURE — 84443 ASSAY THYROID STIM HORMONE: CPT

## 2020-01-17 PROCEDURE — 81001 URINALYSIS AUTO W/SCOPE: CPT

## 2020-01-17 PROCEDURE — 36415 COLL VENOUS BLD VENIPUNCTURE: CPT

## 2020-01-17 PROCEDURE — 80053 COMPREHEN METABOLIC PANEL: CPT

## 2020-01-17 PROCEDURE — 85027 COMPLETE CBC AUTOMATED: CPT

## 2020-01-20 ENCOUNTER — APPOINTMENT (OUTPATIENT)
Dept: PHYSICAL THERAPY | Facility: REHABILITATION | Age: 80
End: 2020-01-20
Payer: MEDICARE

## 2020-01-23 ENCOUNTER — OFFICE VISIT (OUTPATIENT)
Dept: FAMILY MEDICINE CLINIC | Facility: CLINIC | Age: 80
End: 2020-01-23
Payer: MEDICARE

## 2020-01-23 VITALS
BODY MASS INDEX: 32.44 KG/M2 | SYSTOLIC BLOOD PRESSURE: 140 MMHG | WEIGHT: 190 LBS | DIASTOLIC BLOOD PRESSURE: 74 MMHG | HEIGHT: 64 IN | HEART RATE: 68 BPM

## 2020-01-23 DIAGNOSIS — R73.9 HYPERGLYCEMIA: ICD-10-CM

## 2020-01-23 DIAGNOSIS — Z00.00 HEALTH CARE MAINTENANCE: ICD-10-CM

## 2020-01-23 DIAGNOSIS — E55.9 VITAMIN D DEFICIENCY: ICD-10-CM

## 2020-01-23 DIAGNOSIS — M15.9 OSTEOARTHRITIS OF MULTIPLE JOINTS, UNSPECIFIED OSTEOARTHRITIS TYPE: ICD-10-CM

## 2020-01-23 DIAGNOSIS — G45.9 TRANSIENT ISCHEMIC ATTACK: ICD-10-CM

## 2020-01-23 DIAGNOSIS — I10 ESSENTIAL HYPERTENSION: Primary | ICD-10-CM

## 2020-01-23 DIAGNOSIS — G40.209 PARTIAL SYMPTOMATIC EPILEPSY WITH COMPLEX PARTIAL SEIZURES, NOT INTRACTABLE, WITHOUT STATUS EPILEPTICUS (HCC): ICD-10-CM

## 2020-01-23 DIAGNOSIS — C50.919 MALIGNANT NEOPLASM OF FEMALE BREAST, UNSPECIFIED ESTROGEN RECEPTOR STATUS, UNSPECIFIED LATERALITY, UNSPECIFIED SITE OF BREAST (HCC): ICD-10-CM

## 2020-01-23 DIAGNOSIS — E78.5 HYPERLIPIDEMIA, UNSPECIFIED HYPERLIPIDEMIA TYPE: ICD-10-CM

## 2020-01-23 PROCEDURE — 1123F ACP DISCUSS/DSCN MKR DOCD: CPT | Performed by: FAMILY MEDICINE

## 2020-01-23 PROCEDURE — G0438 PPPS, INITIAL VISIT: HCPCS | Performed by: FAMILY MEDICINE

## 2020-01-23 PROCEDURE — 99214 OFFICE O/P EST MOD 30 MIN: CPT | Performed by: FAMILY MEDICINE

## 2020-01-23 RX ORDER — LOSARTAN POTASSIUM 25 MG/1
25 TABLET ORAL DAILY
Qty: 30 TABLET | Refills: 5 | Status: SHIPPED | OUTPATIENT
Start: 2020-01-23 | End: 2020-07-06

## 2020-01-23 NOTE — PATIENT INSTRUCTIONS
Discontinue lisinopril  Changed to losartan 25 mg daily  Have nursing Department check blood pressure in 4 weeks  Follow specialist per their instructions  Follow low-fat low carb right low sugar diet    Return in 6 months for office visit blood work sooner if needed

## 2020-01-23 NOTE — PROGRESS NOTES
Assessment and Plan:     Problem List Items Addressed This Visit     None           Preventive health issues were discussed with patient, and age appropriate screening tests were ordered as noted in patient's After Visit Summary  Personalized health advice and appropriate referrals for health education or preventive services given if needed, as noted in patient's After Visit Summary       History of Present Illness:     Patient presents for Medicare Annual Wellness visit    Patient Care Team:  Odell Knight DO as PCP - Yen Kwok MD     Problem List:     Patient Active Problem List   Diagnosis    Vitamin D deficiency    Transient ischemic attack    Osteoarthritis    Moderate osteopenia    Hypertension    Hyperlipidemia    Hyperglycemia    GERD (gastroesophageal reflux disease)    [facility]    Complex partial seizure ([facility]    Midline cystocele    Benign neoplasm of large intestine    Anxiety    Abnormal tumor markers    Personal history of breast cancer    Personal history of radiation therapy    Hiatal hernia    S/P laparoscopic appendectomy    Carpal tunnel syndrome of right wrist    Shoulder pain    Allergic rhinitis    Decreased ambulation status      Past Medical and Surgical History:     Past Medical History:   Diagnosis Date    Cancer [facility]     left breast    Elevated serum alkaline phosphatase level     GERD (gastroesophageal reflux disease)     Glaucoma     Hiatal hernia     Hypercalcemia     Hyperglycemia     diet controlled    Hypertension     Rheumatic fever     Seizures (HCC)     TIA (transient ischemic attack)     Vitamin D deficiency      Past Surgical History:   Procedure Laterality Date    APPENDECTOMY      BREAST LUMPECTOMY Left     COLONOSCOPY      FOOT SURGERY Left     ORIF ANKLE FRACTURE Left     TONSILLECTOMY      TUBAL LIGATION        Family History:     Family History   Problem Relation Age of Onset   [facility] Breast cancer Mother  Coronary artery disease Father     Bone cancer Brother     Lung cancer Brother       Social History:     Social History     Socioeconomic History    Marital status:      Spouse name: None    Number of children: None    Years of education: None    Highest education level: None   Occupational History    Occupation: retired   Social Needs    Financial resource strain: None    Food insecurity:     Worry: None     Inability: None    Transportation needs:     Medical: None     Non-medical: None   Tobacco Use    Smoking status: Former Smoker     Last attempt to quit: 2010     Years since quitting: 10 0    Smokeless tobacco: Never Used   Substance and Sexual Activity    Alcohol use: Yes     Comment: social    Drug use: No    Sexual activity: Not Currently   Lifestyle    Physical activity:     Days per week: None     Minutes per session: None    Stress: None   Relationships    Social connections:     Talks on phone: None     Gets together: None     Attends Restoration service: None     Active member of club or organization: None     Attends meetings of clubs or organizations: None     Relationship status: None    Intimate partner violence:     Fear of current or ex partner: None     Emotionally abused: None     Physically abused: None     Forced sexual activity: None   Other Topics Concern    None   Social History Narrative    Activities - gardening    Daily coffee consumption- 1 cup per day    Daily tea consumption    Hearing loss       Medications and Allergies:     Current Outpatient Medications   Medication Sig Dispense Refill    Alpha-Lipoic Acid 100 MG CAPS Take by mouth      ALPRAZolam (XANAX) 0 25 mg tablet TAKE ONE TABLET BY MOUTH EVERY DAY AS NEEDED FOR ANXIETY 30 tablet 1    aspirin 81 MG tablet Take 162 mg by mouth      azelastine (ASTELIN) 0 1 % nasal spray 2 sprays into each nostril 2 (two) times a day Use in each nostril as directed 30 mL 0    B Complex Vitamins (B COMPLEX 1 PO) Take 2 tablets by mouth daily      bimatoprost (LUMIGAN) 0 03 % ophthalmic drops Administer 1 drop to both eyes daily at bedtime        Calcium Carbonate (CALCIUM 600 PO) Take by mouth      Cholecalciferol (VITAMIN D) 2000 units CAPS Take 2 capsules by mouth daily      ciprofloxacin-dexamethasone (CIPRODEX) otic suspension Administer 4 drops into both ears 2 (two) times a day 7 5 mL 0    Coenzyme Q10 (COQ10) 100 MG CAPS Take by mouth      gabapentin (NEURONTIN) 300 mg capsule Take 1 capsule (300 mg total) by mouth daily at bedtime as needed (pain) 30 capsule 0    GLUCOSAMINE-MSM-HYALURONIC ACD PO Take 1 capsule by mouth 3 (three) times a day      GRAPE SEED EXTRACT PO Take by mouth      levETIRAcetam (KEPPRA) 500 mg tablet Take 1 tablet by mouth twice a day 60 tablet 5    lisinopril (ZESTRIL) 10 mg tablet TAKE ONE TABLET BY MOUTH ONCE DAILY FOR BLOOD PRESSURE 90 tablet 1    Magnesium 400 MG TABS Take by mouth      metoprolol succinate (TOPROL-XL) 200 MG 24 hr tablet Take 1 tablet (200 mg total) by mouth daily 90 tablet 0    milk thistle 175 MG tablet Take 175 mg by mouth daily      mometasone (ELOCON) 0 1 % cream Apply to bilateral external ear once weekly at bedtime 45 g 3    Multiple Vitamins-Minerals (MULTIVITAMIN ADULT PO) Take 1 capsule by mouth daily        Omega-3 Fatty Acids (FISH OIL PO) Take 1,000 mg by mouth daily        Turmeric 500 MG CAPS Take 1 capsule by mouth daily        VOLTAREN 1 % APPLY 2G TOPICALLY TO AFFECTED AREA FOUR TIMES A DAY AS DIRECTED 100 g 5     No current facility-administered medications for this visit        Allergies   Allergen Reactions    Carbamazepine Myalgia     flu-like symptoms    Epinephrine Other (See Comments)     "weakness"    Iodides Itching    Morphine And Related     Sulfamethoxazole-Trimethoprim     Latex Rash    Topiramate Rash      Immunizations:     Immunization History   Administered Date(s) Administered    Pneumococcal Conjugate 13-Valent 03/29/2018    Pneumococcal Polysaccharide PPV23 03/29/2018    Td (adult), adsorbed 03/29/2018    Zoster 03/29/2018      Health Maintenance:         Topic Date Due    CRC Screening: Colonoscopy  07/02/2020 (Originally 1/18/2010)    Hepatitis C Screening  Completed         Topic Date Due    Influenza Vaccine  07/01/2019      Medicare Health Risk Assessment:     /74   Pulse 68   Ht 5' 4" (1 626 m)   Wt 86 2 kg (190 lb)   LMP  (LMP Unknown)   BMI 32 61 kg/m²      Cayla Pina is here for her Subsequent Wellness visit  Health Risk Assessment:   Patient rates overall health as fair  Patient feels that their physical health rating is slightly worse  Eyesight was rated as same  Hearing was rated as slightly worse  Patient feels that their emotional and mental health rating is slightly worse  Pain experienced in the last 7 days has been some  Patient's pain rating has been 5/10  Patient states that she has experienced no weight loss or gain in last 6 months  Use tylenol for arthritis    Depression Screening:   PHQ-2 Score: 1      Fall Risk Screening: In the past year, patient has experienced: no history of falling in past year      Urinary Incontinence Screening:   Patient has not leaked urine accidently in the last six months  Prolapsed bladder  Seeing P T  Home Safety:  Patient does not have trouble with stairs inside or outside of their home  Patient has working smoke alarms and has no working carbon monoxide detector  Home safety hazards include: none  Nutrition:   Current diet is Regular  Medications:   Patient is currently taking over-the-counter supplements  OTC medications include: see medication list  Patient is able to manage medications  Activities of Daily Living (ADLs)/Instrumental Activities of Daily Living (IADLs):   Walk and transfer into and out of bed and chair?: Yes  Dress and groom yourself?: Yes    Bathe or shower yourself?: Yes    Feed yourself?  Yes  Do your laundry/housekeeping?: Yes  Manage your money, pay your bills and track your expenses?: Yes  Make your own meals?: Yes    Do your own shopping?: Yes    Previous Hospitalizations:   Any hospitalizations or ED visits within the last 12 months?: No      Advance Care Planning:   Living will: Yes    Durable POA for healthcare:  Yes    Advanced directive: Yes    Five wishes given: Yes      Cognitive Screening:   Provider or family/friend/caregiver concerned regarding cognition?: No    PREVENTIVE SCREENINGS      Cardiovascular Screening:    General: Screening Not Indicated and History Lipid Disorder      Diabetes Screening:     General: Screening Current      Colorectal Cancer Screening:     General: Screening Current      Breast Cancer Screening:     General: History Breast Cancer      Cervical Cancer Screening:    General: Screening Not Indicated      Osteoporosis Screening:    General: Screening Current      Abdominal Aortic Aneurysm (AAA) Screening:        General: Screening Not Indicated      Lung Cancer Screening:     General: Screening Not Indicated      Hepatitis C Screening:    General: Screening Current      Odell Knight DO

## 2020-01-23 NOTE — ASSESSMENT & PLAN NOTE
Hemoglobin A1c is 6 2    Explained at 6 5 patient diabetic low sugar low carb right diet recommended

## 2020-01-23 NOTE — PROGRESS NOTES
Assessment and Plan:  1  Hypertension, patient has an Ace cough lisinopril discontinued changed to losartan 25 mg daily  Will have nursing check blood pressure in 4 weeks  2  TIA, stable continue present therapy  3  Seizure disorder, stable follows with Neurology  4  DJD, patient may use Tylenol as needed  5  Breast cancer, patient follows with Oncology at [facility]  6  Hyperlipidemia patient adamantly refuses medication  Low-fat diet recommended  6  Hyperglycemia, hemoglobin A1c is 6 2  Explained at 6 5 this to be diabetic low sugar low carb right diet recommended  7  Uterine/bladder prolapse patient follows with OBGYN undergoing physical therapy at the present time  8  BMI 32 61, diet exercise weight loss recommended  9  Vitamin-D deficiency, stable continue present therapy  10  Health care maintenance see 2nd note of the day for a [address]  11  Patient to return in 6 months for office visit blood work sooner if needed    Problem List Items Addressed This Visit        Cardiovascular and Mediastinum    Transient ischemic attack     Stable continue present therapy         Relevant Orders    CBC    Comprehensive metabolic panel    Hemoglobin A1C    Lipid Panel with Direct LDL reflex    TSH, 3rd generation with Free T4 reflex    UA (URINE) with reflex to Scope    Vitamin D 25 hydroxy    Hypertension - Primary     Because of Ace cough lisinopril discontinued changed to losartan  Will have nursing check blood pressure in 4 weeks    If still with cough in 2-3 weeks return to office         Relevant Medications    losartan (COZAAR) 25 mg tablet    Other Relevant Orders    CBC    Comprehensive metabolic panel    Hemoglobin A1C    Lipid Panel with Direct LDL reflex    TSH, 3rd generation with Free T4 reflex    UA (URINE) with reflex to Scope    Vitamin D 25 hydroxy       Nervous and Auditory    Complex partial seizure (HCC)     Stable follow-up neurology         Relevant Orders    CBC    Comprehensive metabolic panel    Hemoglobin A1C    Lipid Panel with Direct LDL reflex    TSH, 3rd generation with Free T4 reflex    UA (URINE) with reflex to Scope    Vitamin D 25 hydroxy       Musculoskeletal and Integument    Osteoarthritis     Patient may use Tylenol as needed         Relevant Orders    CBC    Comprehensive metabolic panel    Hemoglobin A1C    Lipid Panel with Direct LDL reflex    TSH, 3rd generation with Free T4 reflex    UA (URINE) with reflex to Scope    Vitamin D 25 hydroxy       Other    Vitamin D deficiency     Stable         Relevant Orders    CBC    Comprehensive metabolic panel    Hemoglobin A1C    Lipid Panel with Direct LDL reflex    TSH, 3rd generation with Free T4 reflex    UA (URINE) with reflex to Scope    Vitamin D 25 hydroxy    Hyperlipidemia     Patient refuses medication low-fat diet recommended         Relevant Orders    CBC    Comprehensive metabolic panel    Hemoglobin A1C    Lipid Panel with Direct LDL reflex    TSH, 3rd generation with Free T4 reflex    UA (URINE) with reflex to Scope    Vitamin D 25 hydroxy    Hyperglycemia     Hemoglobin A1c is 6 2  Explained at 6 5 patient diabetic low sugar low carb right diet recommended         Relevant Orders    CBC    Comprehensive metabolic panel    Hemoglobin A1C    Lipid Panel with Direct LDL reflex    TSH, 3rd generation with Free T4 reflex    UA (URINE) with reflex to Scope    Vitamin D 25 hydroxy    Breast cancer ([facility] )     Patient follows with [facility]         Relevant Orders    CBC    Comprehensive metabolic panel    Hemoglobin A1C    Lipid Panel with Direct LDL reflex    TSH, 3rd generation with Free T4 reflex    UA (URINE) with reflex to Scope    Vitamin D 25 hydroxy    Health care maintenance     A [place] completed                      Diagnoses and all orders for this visit:    Essential hypertension  -     losartan (COZAAR) 25 mg tablet; Take 1 tablet (25 mg total) by mouth daily  -     CBC;  Future  -     Comprehensive metabolic panel; Future  -     Hemoglobin A1C; Future  -     Lipid Panel with Direct LDL reflex; Future  -     TSH, 3rd generation with Free T4 reflex; Future  -     UA (URINE) with reflex to Scope; Future  -     Vitamin D 25 hydroxy; Future    Transient ischemic attack  -     CBC; Future  -     Comprehensive metabolic panel; Future  -     Hemoglobin A1C; Future  -     Lipid Panel with Direct LDL reflex; Future  -     TSH, 3rd generation with Free T4 reflex; Future  -     UA (URINE) with reflex to Scope; Future  -     Vitamin D 25 hydroxy; Future    Partial symptomatic epilepsy with complex partial seizures, not intractable, without status epilepticus ([facility] )  -     CBC; Future  -     Comprehensive metabolic panel; Future  -     Hemoglobin A1C; Future  -     Lipid Panel with Direct LDL reflex; Future  -     TSH, 3rd generation with Free T4 reflex; Future  -     UA (URINE) with reflex to Scope; Future  -     Vitamin D 25 hydroxy; Future    Osteoarthritis of multiple joints, unspecified osteoarthritis type  -     CBC; Future  -     Comprehensive metabolic panel; Future  -     Hemoglobin A1C; Future  -     Lipid Panel with Direct LDL reflex; Future  -     TSH, 3rd generation with Free T4 reflex; Future  -     UA (URINE) with reflex to Scope; Future  -     Vitamin D 25 hydroxy; Future    Malignant neoplasm of female breast, unspecified estrogen receptor status, unspecified laterality, unspecified site of breast ([facility] )  -     CBC; Future  -     Comprehensive metabolic panel; Future  -     Hemoglobin A1C; Future  -     Lipid Panel with Direct LDL reflex; Future  -     TSH, 3rd generation with Free T4 reflex; Future  -     UA (URINE) with reflex to Scope; Future  -     Vitamin D 25 hydroxy; Future    Hyperlipidemia, unspecified hyperlipidemia type  -     CBC; Future  -     Comprehensive metabolic panel; Future  -     Hemoglobin A1C; Future  -     Lipid Panel with Direct LDL reflex;  Future  -     TSH, 3rd generation with Free T4 reflex; Future  -     UA (URINE) with reflex to Scope; Future  -     Vitamin D 25 hydroxy; Future    Hyperglycemia  -     CBC; Future  -     Comprehensive metabolic panel; Future  -     Hemoglobin A1C; Future  -     Lipid Panel with Direct LDL reflex; Future  -     TSH, 3rd generation with Free T4 reflex; Future  -     UA (URINE) with reflex to Scope; Future  -     Vitamin D 25 hydroxy; Future    Vitamin D deficiency  -     CBC; Future  -     Comprehensive metabolic panel; Future  -     Hemoglobin A1C; Future  -     Lipid Panel with Direct LDL reflex; Future  -     TSH, 3rd generation with Free T4 reflex; Future  -     UA (URINE) with reflex to Scope; Future  -     Vitamin D 25 hydroxy; Future    Health care maintenance              Subjective:      Patient ID: Stefanie Fritz is a 70 y o  female  CC:    Chief Complaint   Patient presents with    Follow-up     Follow up to chronic conditions and review lab results   Medicare Wellness Visit     Annual Subsequent    Cough     c/o a cough since taking Lisinopril  HPI:    Patient has uterine prolapse followed by gyn  Is doing physical therapy at the present time  Wishes to hold off on surgery  Patient feels she has a cough from lisinopril changed to losartan and patient is to return in 2-3 weeks if still with cough  Blood work was discussed with the patient      The following portions of the patient's history were reviewed and updated as appropriate: allergies, current medications, past family history, past medical history, past social history, past surgical history and problem list       Review of Systems   Constitutional: Negative  HENT: Negative  Eyes: Negative  Respiratory:        HPI   Cardiovascular: Negative  Gastrointestinal: Negative  Endocrine: Negative  Genitourinary:        HPI   Musculoskeletal: Negative  Skin: Negative  Allergic/Immunologic: Negative  Neurological: Negative  Hematological: Negative  Psychiatric/Behavioral: Negative  Data to review:       Objective:    Vitals:    01/23/20 1003   BP: 140/74   Pulse: 68   Weight: 86 2 kg (190 lb)   Height: 5' 4" (1 626 m)        Physical Exam   Constitutional: She is oriented to person, place, and time  She appears well-developed and well-nourished  HENT:   Head: Normocephalic and atraumatic  Mouth/Throat: Oropharynx is clear and moist    Eyes: No scleral icterus  Neck: Neck supple  No JVD present  Cardiovascular: Normal rate, regular rhythm and normal heart sounds  Pulmonary/Chest: Effort normal and breath sounds normal    Abdominal: Soft  Bowel sounds are normal  There is no tenderness  Musculoskeletal: She exhibits no edema  Neurological: She is alert and oriented to person, place, and time  No cranial nerve deficit  Skin: Skin is warm  Psychiatric: She has a normal mood and affect  BMI Counseling: Body mass index is 32 61 kg/m²  The BMI is above normal  Nutrition recommendations include moderation in carbohydrate intake and reducing intake of cholesterol  Exercise recommendations include exercising 3-5 times per week

## 2020-01-23 NOTE — ASSESSMENT & PLAN NOTE
Because of Ace cough lisinopril discontinued changed to losartan  Will have nursing check blood pressure in 4 weeks    If still with cough in 2-3 weeks return to office

## 2020-01-27 ENCOUNTER — OFFICE VISIT (OUTPATIENT)
Dept: PHYSICAL THERAPY | Facility: REHABILITATION | Age: 80
End: 2020-01-27
Payer: MEDICARE

## 2020-01-27 DIAGNOSIS — Z74.09 DECREASED STRENGTH, ENDURANCE, AND MOBILITY: Primary | ICD-10-CM

## 2020-01-27 DIAGNOSIS — R53.1 DECREASED STRENGTH, ENDURANCE, AND MOBILITY: Primary | ICD-10-CM

## 2020-01-27 DIAGNOSIS — R68.89 DECREASED STRENGTH, ENDURANCE, AND MOBILITY: Primary | ICD-10-CM

## 2020-01-27 DIAGNOSIS — N81.10 CYSTOCELE WITHOUT UTERINE PROLAPSE: ICD-10-CM

## 2020-01-27 PROCEDURE — 90912 BFB TRAINING 1ST 15 MIN: CPT | Performed by: PHYSICAL THERAPIST

## 2020-01-27 PROCEDURE — 97110 THERAPEUTIC EXERCISES: CPT | Performed by: PHYSICAL THERAPIST

## 2020-01-27 PROCEDURE — 97112 NEUROMUSCULAR REEDUCATION: CPT | Performed by: PHYSICAL THERAPIST

## 2020-01-27 NOTE — PROGRESS NOTES
Daily Note     Today's date: 2020  Patient name: Sydnee Negro  : 1940  MRN: [id number]  Referring provider: Tyra Brice DO  Dx:   Encounter Diagnosis     ICD-10-CM    1  Decreased strength, endurance, and mobility R53 1     Z74 09    2  Cystocele without uterine prolapse N81 10                   Subjective: Pt states that she feels achy today and took Aleve with breakfast  Pt feels slight improvement with bladder prolapse and has been compliant with HEP  Pt reports some pain in legs and notes poor circulation  Objective: See treatment diary below      Assessment: Tolerated treatment well  Patient demonstrated fatigue post treatment, exhibited good technique with therapeutic exercises and would benefit from continued PT to improve pelvic floor strength and endurance to decrease degree of prolapse  Pt had good understanding of mat exercises and notes good activation of muscles without pain or fatigue  Pt was positioned in R sidelying with surface electrodes placed perianally on either side of external anal sphincter with reference electrode placed over L posterior hip  Performed sEMG in standing with pt demonstrating good contraction but quicker fatigue  Pt has good recruitment but fair holding ability  Pt was given green TB for HEP and verbalized understanding  Plan: Continue per plan of care  Progress treatment as tolerated         Precautions: GERD, TIA, HTN, hx of seizures, OA, hx of breast cancer      Manual          Lower quarter screen nv ED np np np        Assess standing prolapse nv ED np np np        Diaphragm assess  nv nv np np                                      Exercise Diary          sEMG baseline nv nv 8 min 10 min 8 min        sEMG endurance 5W/10R nv nv 2x5, seated 2x5, standing 2x5 standing        sEMG power 2W/4R nv nv nv 2x10 standing 2x10 standing        TA + PF nv nv 3"x10 nv 3"x10        TA + PF + hip add nv nv 3"x10 nv 3"x10        TA + PF + hip add + bridge nv nv 10x nv 3"x10        TA + wall squat nv nv +PF 10x nv 10x        TA + heel slides nv nv 10x ea +PF nv 10x ea, alt        Standing PF  5"x5 nv nv nv        Seated PF  5"x5 nv nv nv        Vulvar care edu  10 min np np np        Genital hygiene edu  10 min np np np        pball TA + shoulder ext    Grn, 3"x10 nv        pball TA + low rows    Grn, 3"x10 nv        TA iso     3"x10        TA + PF + bridge + TB abd     Grn 3"x10                                                                Modalities  11/27 12/11 12/30 1/13 1/27

## 2020-02-03 ENCOUNTER — APPOINTMENT (OUTPATIENT)
Dept: PHYSICAL THERAPY | Facility: REHABILITATION | Age: 80
End: 2020-02-03
Payer: MEDICARE

## 2020-02-10 ENCOUNTER — APPOINTMENT (OUTPATIENT)
Dept: PHYSICAL THERAPY | Facility: REHABILITATION | Age: 80
End: 2020-02-10
Payer: MEDICARE

## 2020-02-17 ENCOUNTER — OFFICE VISIT (OUTPATIENT)
Dept: PHYSICAL THERAPY | Facility: REHABILITATION | Age: 80
End: 2020-02-17
Payer: MEDICARE

## 2020-02-17 DIAGNOSIS — R68.89 DECREASED STRENGTH, ENDURANCE, AND MOBILITY: Primary | ICD-10-CM

## 2020-02-17 DIAGNOSIS — R53.1 DECREASED STRENGTH, ENDURANCE, AND MOBILITY: Primary | ICD-10-CM

## 2020-02-17 DIAGNOSIS — N81.10 CYSTOCELE WITHOUT UTERINE PROLAPSE: ICD-10-CM

## 2020-02-17 DIAGNOSIS — Z74.09 DECREASED STRENGTH, ENDURANCE, AND MOBILITY: Primary | ICD-10-CM

## 2020-02-17 PROCEDURE — 90912 BFB TRAINING 1ST 15 MIN: CPT | Performed by: PHYSICAL THERAPIST

## 2020-02-17 PROCEDURE — 97140 MANUAL THERAPY 1/> REGIONS: CPT | Performed by: PHYSICAL THERAPIST

## 2020-02-17 NOTE — PROGRESS NOTES
PT Re-Evaluation     Today's date: 2020  Patient name: Anabel Rahman  : 1940  MRN: [id number]  Referring provider: Sarthak Dela Cruz DO  Dx:   Encounter Diagnosis     ICD-10-CM    1  Decreased strength, endurance, and mobility R53 1     Z74 09    2  Cystocele without uterine prolapse N81 10                   Assessment  Assessment details: Anabel Rahman is a 70 y o  female who presents to physical therapy with Cystocele without uterine prolapse  Pt has improved vulvar hygiene and no longer has redness or irritation  Pt has visible protrusion of bladder in standing, more pronounced with bearing down, moderately reduced with active pelvic floor contraction  Pt has improved both recruitment and derecruitment of pelvic floor muscles but continues to fatigue with endurance holds  Pt notes improved awareness of timing of pelvic floor contraction  Pt has decreased pad usage to zero at home and only wears pads when going out  Pt continues to have bladder prolapse, worse in standing and walking  Anabel Rahman would benefit from continued formal physical therapy to address impairments as detailed, decrease pain, and restore maximal level of function for all home, toileting, and mobility tasks  Thank you for this referral     Impairments: abnormal coordination, abnormal gait, impaired physical strength and poor body mechanics  Understanding of Dx/Px/POC: good   Prognosis: fair    Goals  Short term goals:  1  Pt will improve pelvic floor strength to 4/5 to prevent worsening of bladder prolapse in 6 weeks  - partially met  2  Pt will demonstrate improved body mechanics to avoid increased pressure on pelvic floor in 6 weeks  - partially met    Long term goals:  1  Pt will verbalize understanding of vulvar care by discharge  - met  2  Pt will be able to walk and stand for greater than 45 minutes prior to feeling bladder by discharge  - not met    Plan  Plan details: 1x every other week  Patient would benefit from: skilled physical therapy  Planned modality interventions: biofeedback  Planned therapy interventions: abdominal trunk stabilization, joint mobilization, manual therapy, massage, behavior modification, body mechanics training, breathing training, neuromuscular re-education, patient education, postural training, strengthening, stretching, therapeutic activities, therapeutic exercise, flexibility, functional ROM exercises and home exercise program  Frequency: 1x week  Duration in weeks: 12  Treatment plan discussed with: patient        PT Pelvic Floor Subjective:   History of Present Illness:   Pt states "sometimes I'm lazy" but has been compliant with Kegels when seated  Pt states that soreness around vulva has resolved  Pt notes better reduction of prolapse while seated  Pt has been conscious of performing kegel with walking  Pt denies blood and discharge  Pt notes different smell to her urine but feels that may be related to her medication  Pt denies leakage of urine  Pt has been going to bed without using a pad  Pt changes underwear at least 2 times per day, noting that they are dry but she is concerned about cleanliness against bladder    Social Support:     Lives with:  Alone (3 cats)    Relationship status:     Work status: retired    History of Depression: no  Diet and Exercise:    Diet:balanced nutrition    Gardening, going after cats  OB/ gyn History    Gestational History:     Prior Pregnancy: Yes      Number of prior pregnancies: 3    Number of term pregnancies: 2    Delivery Type: vaginal delivery      Number of vaginal deliveries: 2    Delivery Complications:  "they had to sew me with my second one"- 3 days of labor with daughter    Menstrual History:      Menopausal: no menopause  no hormone replacement therapy  Menopause around age 56 years  Bladder Function:     Voiding Difficulties positive for: hesitancy      Voiding Difficulties negative for: urgency, frequent urination, straining and incomplete emptying      Voiding Difficulties comments:     Urinary leakage: no urine leakage    Nocturia (episodes per night): 0    Painful urination: No      Intake (ounces): Intake (ounces) comment: Urine stream splays due to "kinked urethra"  Voids about 6x/day; at RE pt "guess 3-4x/day" to void  Incontinence Management:     Pads/Diaper Use:  Day    Pads/Diapers Additional Comments: pad during day, pantiliner at night; at RE pt wears only small pad when going out  Bowel Function:     Bowel Function comments:  Uses fish oil to avoid constipation    Bowel frequency: daily    [place] Stool Scale: type 4, type 5 and type 6    Stool softener use: no stool softeners    Enema use: no enema  Sexual Function:     Sexually Active:  Not sexually active  Pain:     At best pain ratin    At worst pain ratin (tender)    Location:  Labia and bladder    Onset:  More than 2 years ago    Aggravating factors:  Walking and prolonged positions    Relieving factors:  Change in position  Diagnostic Tests:     None    Treatments:     Previous treatment:  Physical therapy (balance)  Patient Goals: Other patient goals:  Have bladder partially go back in- partially met      Objective     Lumbar Screen  Lumbar range of motion within normal limits with the following exceptions:Limited in all planes, symmetrical pattern    Strength/Myotome Testing     Left Hip   Planes of Motion   Flexion: 5  External rotation: 5  Internal rotation: 5    Right Hip   Planes of Motion   Flexion: 4+  External rotation: 5  Internal rotation: 5    Tests     Left Hip   Negative ELSY and FADIR  SLR: Negative  Right Hip   Negative ELSY and FADIR  SLR: Negative     Pelvic Floor Exam   Position: supine exam  Abdominal assessment: Slight tenderness over bladder    Diastatis   Diastasis recti present: yes  3" above umbilicus (# fingers): 3  Umbilicus (# fingers): 0  3" below umbilicus (# fingers): 0  Palpation of linea alba:Pt notes hiatal hernia that was not fixed    Skin inspection:   scars present  Number of scars: 1  Additional skin inspection details: Did not reassess  Scar 1 location: perineal body  Sensitivity level low Restriction level medium     General Perineum Exam:   Negative for perianal erythema    General perineum exam comments: Redness over bilateral labia; resolved at RE    Visual Inspection of Perineum:   Excursion of perineal body in cephalad direction with contraction of pelvic floor muscles (PFM): good  Excursion of perineal body in caudal direction with relaxation of pelvic floor muscles (PFM): good   Involuntary contraction with coughing: yes  Involuntary relaxation with bearing down: yes  PFM Contraction Comments: Anal wink reflex stronger on L than R; did not reassess  C[place]ton swab test: non-tender  Cough reflex: cough reflex  Sphincter Tone Resting: weak  Sphincter Tone Squeeze: normal  Sensation: intact    Pelvic Organ Prolapse   Position: standing  At rest: moderate  With bearing down: moderate (to the level of hymenal remnants) and severe (1 cm beyond the level of hymenal remnants)  Location: anterior  Comments: Able to actively reduce bladder to level of hymenal remnants in standing but difficulty with maintaining that contraction    Pelvic Floor Muscle Exam     Muscle Contraction: well isolated  Breathing pattern with contraction: within normal limits  Pelvic floor muscle relaxation is complete       PERFECT Score   Power right: 3/5  Power left: 3+/5  Endurance (seconds to max): 4    SMEG Biofeedback   Sensor used: external sensors placed perianally  Positioning: sitting    endurance protocol   Secs work/Secs rest/Reps: 5/10/5  Max achieved (microvolts): 20 1  Resting average (microvolts): 3 3  Working average (microvolts): 9  Rise (microvolts): 5 73  Recruitment: brisk  Holding ability: fair  Derecruitment: goodelevated  Biofeedback comments: Performed strength testing with biofeedback instead of internal testing               Precautions: GERD, TIA, HTN, hx of seizures, OA, hx of breast cancer      Manual  11/27 12/11 12/30 1/13 1/27 2/17       Lower quarter screen nv ED np np np ED       Assess standing prolapse nv ED np np np ED       Diaphragm assess  nv nv np np nv       measurements      ED                        Exercise Diary  11/27 12/11 12/30 1/13 1/27 2/17       sEMG baseline nv nv 8 min 10 min 8 min 2 min       sEMG endurance 5W/10R nv nv 2x5, seated 2x5, standing 2x5 standing 2x5 seated       sEMG power 2W/4R nv nv nv 2x10 standing 2x10 standing 10x seated       TA + PF nv nv 3"x10 nv 3"x10 nv       TA + PF + hip add nv nv 3"x10 nv 3"x10 nv       TA + PF + hip add + bridge nv nv 10x nv 3"x10 nv       TA + wall squat nv nv +PF 10x nv 10x nv       TA + heel slides nv nv 10x ea +PF nv 10x ea, alt nv       Standing PF  5"x5 nv nv nv 5"x5       Seated PF  5"x5 nv nv nv sEMG       Vulvar care edu  10 min np np np np       Genital hygiene edu  10 min np np np np       pball TA + shoulder ext    Grn, 3"x10 nv nv       pball TA + low rows    Grn, 3"x10 nv nv       TA iso     3"x10 nv       TA + PF + bridge + TB abd     Grn 3"x10 nv                                                               Modalities  11/27 12/11 12/30 1/13 1/27 2/17

## 2020-02-24 ENCOUNTER — OFFICE VISIT (OUTPATIENT)
Dept: PHYSICAL THERAPY | Facility: REHABILITATION | Age: 80
End: 2020-02-24
Payer: MEDICARE

## 2020-02-24 DIAGNOSIS — R68.89 DECREASED STRENGTH, ENDURANCE, AND MOBILITY: Primary | ICD-10-CM

## 2020-02-24 DIAGNOSIS — N81.10 CYSTOCELE WITHOUT UTERINE PROLAPSE: ICD-10-CM

## 2020-02-24 DIAGNOSIS — Z74.09 DECREASED STRENGTH, ENDURANCE, AND MOBILITY: Primary | ICD-10-CM

## 2020-02-24 DIAGNOSIS — R53.1 DECREASED STRENGTH, ENDURANCE, AND MOBILITY: Primary | ICD-10-CM

## 2020-02-24 PROCEDURE — 97112 NEUROMUSCULAR REEDUCATION: CPT | Performed by: PHYSICAL THERAPIST

## 2020-02-24 PROCEDURE — 97530 THERAPEUTIC ACTIVITIES: CPT | Performed by: PHYSICAL THERAPIST

## 2020-02-24 PROCEDURE — 97110 THERAPEUTIC EXERCISES: CPT | Performed by: PHYSICAL THERAPIST

## 2020-02-24 NOTE — PROGRESS NOTES
Daily Note     Today's date: 2020  Patient name: Jenni Reese  : 1940  MRN: [id number]  Referring provider: Maggi Dumas DO  Dx:   Encounter Diagnosis     ICD-10-CM    1  Decreased strength, endurance, and mobility R53 1     Z74 09    2  Cystocele without uterine prolapse N81 10                   Subjective: Pt worked outside yesterday  Pt states "I'm paying for it today " Pt reports that she had some difficulty with walking 3 blocks to her car  Pt wants to get to the point that she doesn't have to wear underwear at night  Pt reports sharp low back pain with lifting and takes Aleve as needed  Pt didn't notice any significant bladder descent while cleaning over the weekend  Pt denies leakage with housework yesterday  Objective: See treatment diary below      Assessment: Tolerated treatment well  Patient demonstrated fatigue post treatment, exhibited good technique with therapeutic exercises and would benefit from continued PT to decrease extent of prolapse and improve pelvic floor strength  Pt demonstrates improved form with lifting following review of lifting mechanics  Pt was challenged with posture and performing TA contraction with added UE and LE motions  Pt reports feeling "worked out" at end of session  Plan: Continue per plan of care  Progress treatment as tolerated         Precautions: GERD, TIA, HTN, hx of seizures, OA, hx of breast cancer      Manual        Lower quarter screen nv ED np np np ED np      Assess standing prolapse nv ED np np np ED np      Diaphragm assess  nv nv np np nv np      measurements      ED                        Exercise Diary        sEMG baseline nv nv 8 min 10 min 8 min 2 min np      sEMG endurance 5W/10R nv nv 2x5, seated 2x5, standing 2x5 standing 2x5 seated np      sEMG power 2W/4R nv nv nv 2x10 standing 2x10 standing 10x seated np      TA + PF nv nv 3"x10 nv 3"x10 nv np TA + PF + hip add nv nv 3"x10 nv 3"x10 nv nv      TA + PF + hip add + bridge nv nv 10x nv 3"x10 nv nv      TA + wall squat nv nv +PF 10x nv 10x nv nv      TA + heel slides nv nv 10x ea +PF nv 10x ea, alt nv nv      Standing PF  5"x5 nv nv nv 5"x5 nv      Seated PF  5"x5 nv nv nv sEMG nv      Vulvar care edu  10 min np np np np np      Genital hygiene edu  10 min np np np np np      pball TA + shoulder ext    Grn, 3"x10 nv nv Stand, grn, 3"x15      pball TA + low rows    Grn, 3"x10 nv nv Stand, grn 3"x15      TA iso     3"x10 nv nv      TA + PF + bridge + TB abd     Grn 3"x10 nv nv      Lifting mechanics       10 min      TA + sh flexion       3# 15x      TA + sh abd       0# 15x      Seated TA + march       10x ea      Seated TA + LAQ       10x ea          Modalities  11/27 12/11 12/30 1/13 1/27 2/17 2/24

## 2020-02-26 ENCOUNTER — CLINICAL SUPPORT (OUTPATIENT)
Dept: FAMILY MEDICINE CLINIC | Facility: CLINIC | Age: 80
End: 2020-02-26

## 2020-02-26 VITALS
WEIGHT: 190.6 LBS | SYSTOLIC BLOOD PRESSURE: 136 MMHG | HEIGHT: 65 IN | HEART RATE: 72 BPM | DIASTOLIC BLOOD PRESSURE: 86 MMHG | BODY MASS INDEX: 31.75 KG/M2

## 2020-02-26 DIAGNOSIS — I10 ESSENTIAL HYPERTENSION: Primary | ICD-10-CM

## 2020-02-26 NOTE — PROGRESS NOTES
Pt presents for BP check per TS  He medication was changed from lisinopril to losartan at that time  BP is 136/86 today  She feels well on the losartan and has complaints about side effects at present  --bb

## 2020-03-13 DIAGNOSIS — I10 ESSENTIAL HYPERTENSION: ICD-10-CM

## 2020-03-13 RX ORDER — METOPROLOL SUCCINATE 200 MG/1
TABLET, EXTENDED RELEASE ORAL
Qty: 90 TABLET | Refills: 0 | Status: SHIPPED | OUTPATIENT
Start: 2020-03-13 | End: 2020-06-15

## 2020-05-06 DIAGNOSIS — M79.601 PAIN OF RIGHT UPPER EXTREMITY: ICD-10-CM

## 2020-05-06 RX ORDER — GABAPENTIN 300 MG/1
CAPSULE ORAL
Qty: 30 CAPSULE | Refills: 0 | Status: SHIPPED | OUTPATIENT
Start: 2020-05-06 | End: 2020-10-26

## 2020-06-15 DIAGNOSIS — I10 ESSENTIAL HYPERTENSION: ICD-10-CM

## 2020-06-15 RX ORDER — METOPROLOL SUCCINATE 200 MG/1
TABLET, EXTENDED RELEASE ORAL
Qty: 90 TABLET | Refills: 0 | Status: SHIPPED | OUTPATIENT
Start: 2020-06-15 | End: 2020-09-15

## 2020-06-18 ENCOUNTER — TELEPHONE (OUTPATIENT)
Dept: NEUROLOGY | Facility: CLINIC | Age: 80
End: 2020-06-18

## 2020-07-05 DIAGNOSIS — I10 ESSENTIAL HYPERTENSION: ICD-10-CM

## 2020-07-06 RX ORDER — LOSARTAN POTASSIUM 25 MG/1
TABLET ORAL
Qty: 30 TABLET | Refills: 5 | Status: SHIPPED | OUTPATIENT
Start: 2020-07-06 | End: 2021-05-03

## 2020-07-23 ENCOUNTER — HOSPITAL ENCOUNTER (EMERGENCY)
Facility: HOSPITAL | Age: 80
Discharge: HOME | End: 2020-07-23
Attending: EMERGENCY MEDICINE | Admitting: EMERGENCY MEDICINE
Payer: MEDICARE

## 2020-07-23 ENCOUNTER — OFFICE VISIT (OUTPATIENT)
Dept: URGENT CARE | Age: 80
End: 2020-07-23
Payer: MEDICARE

## 2020-07-23 VITALS
WEIGHT: 190 LBS | DIASTOLIC BLOOD PRESSURE: 78 MMHG | BODY MASS INDEX: 32.44 KG/M2 | TEMPERATURE: 96.9 F | SYSTOLIC BLOOD PRESSURE: 171 MMHG | RESPIRATION RATE: 18 BRPM | HEIGHT: 64 IN | OXYGEN SATURATION: 96 % | HEART RATE: 70 BPM

## 2020-07-23 VITALS
OXYGEN SATURATION: 96 % | SYSTOLIC BLOOD PRESSURE: 165 MMHG | TEMPERATURE: 98.6 F | HEART RATE: 104 BPM | DIASTOLIC BLOOD PRESSURE: 102 MMHG | RESPIRATION RATE: 18 BRPM

## 2020-07-23 DIAGNOSIS — S61.451D CAT BITE OF RIGHT HAND, SUBSEQUENT ENCOUNTER: Primary | ICD-10-CM
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
## 2020-07-23 DIAGNOSIS — S61.452A CAT BITE OF LEFT HAND, INITIAL ENCOUNTER: Primary | ICD-10-CM

## 2020-07-23 PROCEDURE — 99213 OFFICE O/P EST LOW 20 MIN: CPT | Performed by: PHYSICIAN ASSISTANT

## 2020-07-23 PROCEDURE — 99283 EMERGENCY DEPT VISIT LOW MDM: CPT | Performed by: EMERGENCY MEDICINE

## 2020-07-23 PROCEDURE — G0463 HOSPITAL OUTPT CLINIC VISIT: HCPCS | Performed by: PHYSICIAN ASSISTANT

## 2020-07-23 PROCEDURE — 99283 EMERGENCY DEPT VISIT LOW MDM: CPT

## 2020-07-23 RX ORDER — AMOXICILLIN AND CLAVULANATE POTASSIUM 875; 125 MG/1; MG/1
1 TABLET, FILM COATED ORAL EVERY 12 HOURS SCHEDULED
Qty: 14 TABLET | Refills: 0 | Status: SHIPPED | OUTPATIENT
Start: 2020-07-23 | End: 2020-07-30

## 2020-07-23 NOTE — PROGRESS NOTES
[facility]        NAME: Dian Tariq is a 70 y o  female  : 1940    MRN: [id number]  DATE: 2020  TIME: 10:18 AM    Assessment and Plan   Cat bite of left hand, initial encounter Fernanda Ashley  Cat bite of left hand, initial encounter  TDAP Vaccine greater than or equal to 8yo    amoxicillin-clavulanate (AUGMENTIN) 875-125 mg per tablet     Vital signs stable  Patient appears well  Due to extension in the wrist, there is concern infection worsening  Discussed options with patient  We decided to start on empiric antibiotics orally  Close follow-up with ER if any worsening and swelling or any continued spread of erythema further up the arm  I educated patient on signs and symptoms of worsening condition  I educated patient on indications to go to ER  She states she understands and agrees  Patient given Boostrix  Patient Instructions       Continue to monitor symptoms  If new or worsening symptoms occur such as worsening redness, discharge, redness spreading up your extremity, fevers chills, nausea vomiting, or any concerning symptoms occur go immediately to the ER  Follow up with family doctor this week  Chief Complaint     Chief Complaint   Patient presents with    Animal Bite     pt states her cat bit her left hand while playing yesterday afternoon  +redness  +edema noted          History of Present Illness       Yesterday, patient was playing with her cat  This is her own cat, fully vaccinated  The cat is not aggressive  She was playing with the cat and the cat got aggravated and bit her on the left hand  She woke up today and there was swelling and tenderness to the area so she decided to be evaluated  Pain is mild  No fevers or chills  No pain with ROM of hand or wrist   Patient has no other symptoms  Unknown last tetanus      Review of Systems   Review of Systems   Constitutional: Negative  Negative for chills, fatigue and fever     HENT: Negative  Eyes: Negative  Respiratory: Negative  Cardiovascular: Negative  Gastrointestinal: Negative for abdominal pain, constipation, diarrhea, nausea and vomiting  Endocrine: Negative  Genitourinary: Negative  Musculoskeletal: Negative for back pain, gait problem, neck pain and neck stiffness  Skin: Positive for rash  Negative for pallor  Allergic/Immunologic: Negative  Neurological: Negative  Negative for weakness and numbness  Hematological: Negative  Psychiatric/Behavioral: Negative            Current Medications       Current Outpatient Medications:     Alpha-Lipoic Acid 100 MG CAPS, Take by mouth, Disp: , Rfl:     ALPRAZolam (XANAX) 0 25 mg tablet, TAKE ONE TABLET BY MOUTH EVERY DAY AS NEEDED FOR ANXIETY, Disp: 30 tablet, Rfl: 1    aspirin 81 MG tablet, Take 162 mg by mouth, Disp: , Rfl:     B Complex Vitamins (B COMPLEX 1 PO), Take 2 tablets by mouth daily, Disp: , Rfl:     bimatoprost (LUMIGAN) 0 03 % ophthalmic drops, Administer 1 drop to both eyes daily at bedtime  , Disp: , Rfl:     Calcium Carbonate (CALCIUM 600 PO), Take by mouth, Disp: , Rfl:     Cholecalciferol (VITAMIN D) 2000 units CAPS, Take 2 capsules by mouth daily, Disp: , Rfl:     Coenzyme Q10 (COQ10) 100 MG CAPS, Take by mouth, Disp: , Rfl:     gabapentin (NEURONTIN) 300 mg capsule, TAKE ONE CAPSULE BY MOUTH AT BEDTIME AS NEEDED FOR PAIN, Disp: 30 capsule, Rfl: 0    GLUCOSAMINE-MSM-HYALURONIC ACD PO, Take 1 capsule by mouth 3 (three) times a day, Disp: , Rfl:     GRAPE SEED EXTRACT PO, Take by mouth, Disp: , Rfl:     levETIRAcetam (KEPPRA) 500 mg tablet, Take 1 tablet by mouth twice a day, Disp: 60 tablet, Rfl: 5    losartan (COZAAR) 25 mg tablet, TAKE ONE TABLET BY MOUTH ONCE EVERY DAY, Disp: 30 tablet, Rfl: 5    Magnesium 400 MG TABS, Take by mouth, Disp: , Rfl:     metoprolol succinate (TOPROL-XL) 200 MG 24 hr tablet, TAKE ONE TABLET BY MOUTH ONCE EVERY DAY, Disp: 90 tablet, Rfl: 0    milk thistle 175 MG tablet, Take 175 mg by mouth daily, Disp: , Rfl:     mometasone (ELOCON) 0 1 % cream, Apply to bilateral external ear once weekly at bedtime, Disp: 45 g, Rfl: 3    Multiple Vitamins-Minerals (MULTIVITAMIN ADULT PO), Take 1 capsule by mouth daily  , Disp: , Rfl:     Omega-3 Fatty Acids (FISH OIL PO), Take 1,000 mg by mouth daily  , Disp: , Rfl:     Turmeric 500 MG CAPS, Take 1 capsule by mouth daily  , Disp: , Rfl:     VOLTAREN 1 %, APPLY 2G TOPICALLY TO AFFECTED AREA FOUR TIMES A DAY AS DIRECTED, Disp: 100 g, Rfl: 5    amoxicillin-clavulanate (AUGMENTIN) 875-125 mg per tablet, Take 1 tablet by mouth every 12 (twelve) hours for 7 days, Disp: 14 tablet, Rfl: 0    azelastine (ASTELIN) 0 1 % nasal spray, 2 sprays into each nostril 2 (two) times a day Use in each nostril as directed (Patient not taking: Reported on 2/26/2020), Disp: 30 mL, Rfl: 0    ciprofloxacin-dexamethasone (CIPRODEX) otic suspension, Administer 4 drops into both ears 2 (two) times a day (Patient not taking: Reported on 2/26/2020), Disp: 7 5 mL, Rfl: 0    Current Allergies     Allergies as of 07/23/2020 - Reviewed 07/23/2020   Allergen Reaction Noted    Carbamazepine Myalgia 03/07/2007    Epinephrine Other (See Comments) 03/07/2007    Iodides Itching 12/10/2011    Morphine and related  05/04/2012    Sulfamethoxazole-trimethoprim  05/04/2012    Latex Rash 05/04/2012    Lisinopril Cough 01/23/2020    Topiramate Rash 03/07/2007            The following portions of the patient's history were reviewed and updated as appropriate: allergies, current medications, past family history, past medical history, past social history, past surgical history and problem list      Past Medical History:   Diagnosis Date    Cancer ([facility] [address] )     left breast    Elevated serum alkaline phosphatase level     GERD (gastroesophageal reflux disease)     Glaucoma     Hiatal hernia     Hypercalcemia     Hyperglycemia     diet controlled    Hypertension     Rheumatic fever     Seizures (HCC)     TIA (transient ischemic attack)     Vitamin D deficiency        Past Surgical History:   Procedure Laterality Date    APPENDECTOMY      BREAST LUMPECTOMY Left     COLONOSCOPY      FOOT SURGERY Left     ORIF ANKLE FRACTURE Left     TONSILLECTOMY      TUBAL LIGATION         Family History   Problem Relation Age of Onset    Breast cancer Mother     Coronary artery disease Father     Bone cancer Brother     Lung cancer Brother          Medications have been verified  Objective   BP (!) 171/78   Pulse 70   Temp (!) 96 9 °F (36 1 °C)   Resp 18   Ht 5' 4" (1 626 m)   Wt 86 2 kg (190 lb)   LMP  (LMP Unknown)   SpO2 96%   BMI 32 61 kg/m²        Physical Exam     Physical Exam   Constitutional: She appears well-developed and well-nourished  No distress  HENT:   Head: Normocephalic and atraumatic  Cardiovascular: Normal rate, regular rhythm, normal heart sounds and intact distal pulses  Pulmonary/Chest: Effort normal and breath sounds normal  No respiratory distress  She has no wheezes  She has no rales  Musculoskeletal:        Left wrist: She exhibits tenderness and swelling  She exhibits normal range of motion and no deformity  Arms:       Left hand: She exhibits tenderness and swelling  She exhibits normal range of motion, normal capillary refill and no deformity  Normal sensation noted  Normal strength noted  Hands:  Skin: Skin is warm  She is not diaphoretic  Nursing note and vitals reviewed

## 2020-07-23 NOTE — DISCHARGE INSTRUCTIONS
Your blood pressure was elevated in the emergency department today  You should make an appointment with your doctor in the next 1 week for follow-up and recheck of the blood pressure  Animal Bite   DISCHARGE INSTRUCTIONS:   Return to the emergency department if:   You have a fever  Your wound is red, swollen, and draining pus  You see red streaks on the skin around the wound  You can no longer move the bitten area  Your heartbeat and breathing are much faster than usual     You feel dizzy and confused  Contact your healthcare provider if:   Your pain does not get better, even after you take pain medicine  You have nightmares or flashbacks about the animal bite  You have questions or concerns about your condition or care  Self-care:   Keep the wound clean and covered  Wash the wound every day with soap and water, place a small amount of antibiotic ointment over the bite, then cover with a sterile dressing  Apply ice on your wound  Ice helps decrease swelling and pain  Ice may also help prevent tissue damage  Use an ice pack, or put crushed ice in a plastic bag  Cover it with a towel and place it on your wound for 15 to 20 minutes every hour or as directed  Elevate the wound area  Raise your wound above the level of your heart as often as you can  This will help decrease swelling and pain  Prop your wound on pillows or blankets to keep it elevated comfortably

## 2020-07-23 NOTE — ED PROVIDER NOTES
Emergency Department Note- Ayanna Chilel 70 y o  female MRN: [id number]    Unit/Bed#: ED 29 Encounter: [id number]        History of Present Illness     53-year-old right-hand-dominant female, yesterday evening was bit on her left hand by her domestic cat which is fully immunized  Today she went to urgent care, diagnosed with a cat bite, given a tetanus booster and prescribed Augmentin 875 mg p o  B i d  x7 days  She took the 1st dose at 11:30 a m  This morning  She presents this afternoon because she was told if there was any spread of the redness around the hand or arm that she should go immediately to the emergency department  She said she did noticed the redness at the base of the thumb got a little bit extended proximally into the forearm  She otherwise has no fever, no chills, no numbness, no tingling, and feels fine otherwise      REVIEW OF SYSTEMS     Constitutional:  No recent weight  gains or losses   Eyes:  No visual changes   ENT:  No tinnitus or hearing changes   Cardiac: No chest pain or palpitations   Respiratory:  No cough or shortness of breath   Abdominal:  No nausea or vomiting   Urinary: No dysuria or hematuria   Hematologic: No easy bruising or bleeding   Skin: as per hpi   Musculoskeletal: As per HPI   Neurologic: As per HPI   Psychiatric: No mood changes      Historical Information   Past Medical History:   Diagnosis Date    Cancer ([address] )     left breast    Elevated serum alkaline phosphatase level     GERD (gastroesophageal reflux disease)     Glaucoma     Hiatal hernia     Hypercalcemia     Hyperglycemia     diet controlled    Hypertension     Rheumatic fever     Seizures (HCC)     TIA (transient ischemic attack)     Vitamin D deficiency      Past Surgical History:   Procedure Laterality Date    APPENDECTOMY      BREAST LUMPECTOMY Left     COLONOSCOPY      FOOT SURGERY Left     ORIF ANKLE FRACTURE Left     TONSILLECTOMY      TUBAL LIGATION       Social History Social History     Substance and Sexual Activity   Alcohol Use Yes    Comment: social     Social History     Substance and Sexual Activity   Drug Use No     Social History     Tobacco Use   Smoking Status Former Smoker    Types: Cigarettes    Last attempt to quit: 2010    Years since quitting: 10 5   Smokeless Tobacco Never Used     Family History:   Family History   Problem Relation Age of Onset    Breast cancer Mother     Coronary artery disease Father     Bone cancer Brother     Lung cancer Brother        MEDICATIONS:  No current facility-administered medications on file prior to encounter        Current Outpatient Medications on File Prior to Encounter   Medication Sig Dispense Refill    Alpha-Lipoic Acid 100 MG CAPS Take by mouth      ALPRAZolam (XANAX) 0 25 mg tablet TAKE ONE TABLET BY MOUTH EVERY DAY AS NEEDED FOR ANXIETY 30 tablet 1    amoxicillin-clavulanate (AUGMENTIN) 875-125 mg per tablet Take 1 tablet by mouth every 12 (twelve) hours for 7 days 14 tablet 0    aspirin 81 MG tablet Take 162 mg by mouth      azelastine (ASTELIN) 0 1 % nasal spray 2 sprays into each nostril 2 (two) times a day Use in each nostril as directed (Patient not taking: Reported on 2/26/2020) 30 mL 0    B Complex Vitamins (B COMPLEX 1 PO) Take 2 tablets by mouth daily      bimatoprost (LUMIGAN) 0 03 % ophthalmic drops Administer 1 drop to both eyes daily at bedtime        Calcium Carbonate (CALCIUM 600 PO) Take by mouth      Cholecalciferol (VITAMIN D) 2000 units CAPS Take 2 capsules by mouth daily      ciprofloxacin-dexamethasone (CIPRODEX) otic suspension Administer 4 drops into both ears 2 (two) times a day (Patient not taking: Reported on 2/26/2020) 7 5 mL 0    Coenzyme Q10 (COQ10) 100 MG CAPS Take by mouth      gabapentin (NEURONTIN) 300 mg capsule TAKE ONE CAPSULE BY MOUTH AT BEDTIME AS NEEDED FOR PAIN 30 capsule 0    GLUCOSAMINE-MSM-HYALURONIC ACD PO Take 1 capsule by mouth 3 (three) times a day      GRAPE SEED EXTRACT PO Take by mouth      levETIRAcetam (KEPPRA) 500 mg tablet Take 1 tablet by mouth twice a day 60 tablet 5    losartan (COZAAR) 25 mg tablet TAKE ONE TABLET BY MOUTH ONCE EVERY DAY 30 tablet 5    Magnesium 400 MG TABS Take by mouth      metoprolol succinate (TOPROL-XL) 200 MG 24 hr tablet TAKE ONE TABLET BY MOUTH ONCE EVERY DAY 90 tablet 0    milk thistle 175 MG tablet Take 175 mg by mouth daily      mometasone (ELOCON) 0 1 % cream Apply to bilateral external ear once weekly at bedtime 45 g 3    Multiple Vitamins-Minerals (MULTIVITAMIN ADULT PO) Take 1 capsule by mouth daily        Omega-3 Fatty Acids (FISH OIL PO) Take 1,000 mg by mouth daily        Turmeric 500 MG CAPS Take 1 capsule by mouth daily        VOLTAREN 1 % APPLY 2G TOPICALLY TO AFFECTED AREA FOUR TIMES A DAY AS DIRECTED 100 g 5     ALLERGIES:  Allergies   Allergen Reactions    Carbamazepine Myalgia     flu-like symptoms    Epinephrine Other (See Comments)     "weakness"    Iodides Itching    Morphine And Related     Sulfamethoxazole-Trimethoprim     Latex Rash    Lisinopril Cough    Topiramate Rash       Vitals:    07/23/20 1541   BP: (!) 165/102   TempSrc: Temporal   Pulse: 104   Resp: 18   Patient Position - Orthostatic VS: Sitting   Temp: 98 6 °F (37 °C)       PHYSICAL EXAM    General:  Patient is well-appearing  Head:  Atraumatic  Eyes:  Conjunctiva pink  ENT:  Mucous membranes are moist  Neck:  Supple  Cardiac:  S1-S2, without murmurs  Lungs:  Clear to auscultation bilaterally  Abdomen:  Soft, nontender, normal bowel sounds, no CVA tenderness, no tympany, no rigidity, no guarding  Extremities: At the base of her left thumb is a single puncture jayce, superficial, there is some slight erythema surrounding the thumb, wrist and extending about 3 cm proximal into the forearm  There is no bony tenderness    No pain with passive range of motion at the elbow or wrist The patient can flex and extend well at the MCP, DIP and PIP joints in all 4 fingers of the left hand and the MCP and the IP joint in the left thumb  There are no sensory deficits in the hand, including over the thumb or palm of the hand  Neurologic:  Awake, fluent speech, normal comprehension  AAOx3  Skin:  Pink warm and dry  Psychiatric:  Alert, pleasant, cooperative            Labs Reviewed - No data to display    Medications - No data to display    No orders to display            Assessment/Plan     ED Medical Decision Making:    Patient has mild cellulitis from a cat bite, given that she has only had 1 dose of antibiotics, it is not unusual for there to be some slight spread of the erythema  I do not consider this an antibiotic failure  Believe it is reasonable for her to be discharged home, return if she has worsening symptoms or spread after 48 hours  Supportive care, importance of follow-up and return precautions were discussed with the patient, who expressed understanding  Time reflects when diagnosis was documented in both MDM as applicable and the Disposition within this note     Time User Action Codes Description Comment    7/23/2020  4:13 PM Chyrl Si Add [C51 597G,  W55 01XD] Cat bite of right hand, subsequent encounter       ED Disposition     ED Disposition Condition Date/Time Comment    Discharge Stable u Jul 23, 2020  4:13 PM Kathleen Black discharge to home/self care              Follow-up Information     Follow up With Specialties Details Why [facility], DO Family Medicine In 5 days  [address] [place] [address] [address] [facility]  [phone]            New Prescriptions    No medications on file            Valeria Finn DO  07/23/20 2373

## 2020-07-23 NOTE — PATIENT INSTRUCTIONS
Continue to monitor symptoms  If new or worsening symptoms occur such as worsening redness, discharge, redness spreading up your extremity, fevers chills, nausea vomiting, or any concerning symptoms occur go immediately to the ER  Follow up with family doctor this week  Animal Bite   WHAT YOU NEED TO KNOW:   Animal bite injuries range from shallow cuts to deep, life-threatening wounds  An animal can cut or puncture the skin when it bites  Your skin may be torn from your body  Your skin may swell or bruise even if the bite does not break the skin  Animal bites occur more often on the hands, arms, legs, and face  Bites from dogs and cats are the most common injuries  DISCHARGE INSTRUCTIONS:   Return to the emergency department if:   · You have a fever  · Your wound is red, swollen, and draining pus  · You see red streaks on the skin around the wound  · You can no longer move the bitten area  · Your heartbeat and breathing are much faster than usual     · You feel dizzy and confused  Contact your healthcare provider if:   · Your pain does not get better, even after you take pain medicine  · You have nightmares or flashbacks about the animal bite  · You have questions or concerns about your condition or care  Medicines: You may need any of the following:  · Antibiotics  prevent or treat a bacterial infection  · Prescription pain medicine  may be given  Ask how to take this medicine safely  · A tetanus vaccine  may be needed to prevent tetanus  Tetanus is a life-threatening bacterial infection that affects the nerves and muscles  The bacteria can be spread through animal bites  · A rabies vaccine  may be needed to prevent rabies  Rabies is a life-threatening viral infection  The virus can be spread through animal bites  · Take your medicine as directed  Contact your healthcare provider if you think your medicine is not helping or if you have side effects   Tell him of her if you are allergic to any medicine  Keep a list of the medicines, vitamins, and herbs you take  Include the amounts, and when and why you take them  Bring the list or the pill bottles to follow-up visits  Carry your medicine list with you in case of an emergency  Follow up with your healthcare provider in 1 to 2 days: You may need to return to have your stitches removed  Write down your questions so you remember to ask them during your visits  Self-care:   · Apply antibiotic ointment as directed  This helps prevent infection in minor skin wounds  It is available without a doctor's order  · Keep the wound clean and covered  Wash the wound every day with soap and water or germ-killing cleanser  Ask your healthcare provider about the kinds of bandages to use  · Apply ice on your wound  Ice helps decrease swelling and pain  Ice may also help prevent tissue damage  Use an ice pack, or put crushed ice in a plastic bag  Cover it with a towel and place it on your wound for 15 to 20 minutes every hour or as directed  · Elevate the wound area  Raise your wound above the level of your heart as often as you can  This will help decrease swelling and pain  Prop your wound on pillows or blankets to keep it elevated comfortably  Prevent another animal bite:   · Learn to recognize the signs of a scared or angry pet  Avoid quick, sudden movements  · Do not step between animals that are fighting  · Do not leave a pet alone with a young child  · Do not disturb an animal while it eats, sleeps, or cares for its young  · Do not approach an animal you do not know, especially one that is tied up or caged  · Stay away from animals that seem sick or act strangely  · Do not feed or capture wild animals  © 2017 [facility] [employer] Information is for End User's use only and may not be sold, redistributed or otherwise used for commercial purposes   All illustrations and images included in CareNotes® are the copyrighted property of A D A M , Inc  or Oseas Lauren  The above information is an  only  It is not intended as medical advice for individual conditions or treatments  Talk to your doctor, nurse or pharmacist before following any medical regimen to see if it is safe and effective for you

## 2020-07-27 ENCOUNTER — TELEPHONE (OUTPATIENT)
Dept: OBGYN CLINIC | Facility: CLINIC | Age: 80
End: 2020-07-27

## 2020-08-06 ENCOUNTER — APPOINTMENT (OUTPATIENT)
Dept: LAB | Facility: CLINIC | Age: 80
End: 2020-08-06
Payer: MEDICARE

## 2020-08-06 ENCOUNTER — OFFICE VISIT (OUTPATIENT)
Dept: FAMILY MEDICINE CLINIC | Facility: CLINIC | Age: 80
End: 2020-08-06
Payer: MEDICARE

## 2020-08-06 VITALS
TEMPERATURE: 97.5 F | BODY MASS INDEX: 32.61 KG/M2 | HEART RATE: 68 BPM | WEIGHT: 191 LBS | HEIGHT: 64 IN | DIASTOLIC BLOOD PRESSURE: 80 MMHG | SYSTOLIC BLOOD PRESSURE: 136 MMHG

## 2020-08-06 DIAGNOSIS — I10 ESSENTIAL HYPERTENSION: ICD-10-CM

## 2020-08-06 DIAGNOSIS — D12.6 BENIGN NEOPLASM OF LARGE INTESTINE, UNSPECIFIED PART OF LARGE INTESTINE: Primary | ICD-10-CM

## 2020-08-06 DIAGNOSIS — M85.80 MODERATE OSTEOPENIA: ICD-10-CM

## 2020-08-06 DIAGNOSIS — M15.9 OSTEOARTHRITIS OF MULTIPLE JOINTS, UNSPECIFIED OSTEOARTHRITIS TYPE: ICD-10-CM

## 2020-08-06 DIAGNOSIS — C50.919 MALIGNANT NEOPLASM OF FEMALE BREAST, UNSPECIFIED ESTROGEN RECEPTOR STATUS, UNSPECIFIED LATERALITY, UNSPECIFIED SITE OF BREAST (HCC): ICD-10-CM

## 2020-08-06 DIAGNOSIS — R73.9 HYPERGLYCEMIA: ICD-10-CM

## 2020-08-06 DIAGNOSIS — E78.5 HYPERLIPIDEMIA, UNSPECIFIED HYPERLIPIDEMIA TYPE: ICD-10-CM

## 2020-08-06 DIAGNOSIS — E55.9 VITAMIN D DEFICIENCY: ICD-10-CM

## 2020-08-06 DIAGNOSIS — G40.209 PARTIAL SYMPTOMATIC EPILEPSY WITH COMPLEX PARTIAL SEIZURES, NOT INTRACTABLE, WITHOUT STATUS EPILEPTICUS (HCC): ICD-10-CM

## 2020-08-06 DIAGNOSIS — G45.9 TRANSIENT ISCHEMIC ATTACK: ICD-10-CM

## 2020-08-06 LAB
25(OH)D3 SERPL-MCNC: 59 NG/ML (ref 30–100)
ALBUMIN SERPL BCP-MCNC: 4 G/DL (ref 3.5–5)
ALP SERPL-CCNC: 83 U/L (ref 46–116)
ALT SERPL W P-5'-P-CCNC: 26 U/L (ref 12–78)
ANION GAP SERPL CALCULATED.3IONS-SCNC: 6 MMOL/L (ref 4–13)
AST SERPL W P-5'-P-CCNC: 20 U/L (ref 5–45)
BILIRUB SERPL-MCNC: 0.72 MG/DL (ref 0.2–1)
BILIRUB UR QL STRIP: NEGATIVE
BUN SERPL-MCNC: 17 MG/DL (ref 5–25)
CALCIUM SERPL-MCNC: 10 MG/DL (ref 8.3–10.1)
CHLORIDE SERPL-SCNC: 108 MMOL/L (ref 100–108)
CHOLEST SERPL-MCNC: 241 MG/DL (ref 50–200)
CLARITY UR: CLEAR
CO2 SERPL-SCNC: 27 MMOL/L (ref 21–32)
COLOR UR: YELLOW
CREAT SERPL-MCNC: 0.76 MG/DL (ref 0.6–1.3)
ERYTHROCYTE [DISTWIDTH] IN BLOOD BY AUTOMATED COUNT: 13.1 % (ref 11.6–15.1)
EST. AVERAGE GLUCOSE BLD GHB EST-MCNC: 131 MG/DL
GFR SERPL CREATININE-BSD FRML MDRD: 76 ML/MIN/1.73SQ M
GLUCOSE P FAST SERPL-MCNC: 94 MG/DL (ref 65–99)
GLUCOSE UR STRIP-MCNC: NEGATIVE MG/DL
HBA1C MFR BLD: 6.2 %
HCT VFR BLD AUTO: 47 % (ref 34.8–46.1)
HDLC SERPL-MCNC: 64 MG/DL
HGB BLD-MCNC: 14.7 G/DL (ref 11.5–15.4)
HGB UR QL STRIP.AUTO: NEGATIVE
KETONES UR STRIP-MCNC: NEGATIVE MG/DL
LDLC SERPL CALC-MCNC: 159 MG/DL (ref 0–100)
LEUKOCYTE ESTERASE UR QL STRIP: NEGATIVE
MCH RBC QN AUTO: 30.3 PG (ref 26.8–34.3)
MCHC RBC AUTO-ENTMCNC: 31.3 G/DL (ref 31.4–37.4)
MCV RBC AUTO: 97 FL (ref 82–98)
NITRITE UR QL STRIP: NEGATIVE
PH UR STRIP.AUTO: 5.5 [PH]
PLATELET # BLD AUTO: 234 THOUSANDS/UL (ref 149–390)
PMV BLD AUTO: 11.8 FL (ref 8.9–12.7)
POTASSIUM SERPL-SCNC: 4.7 MMOL/L (ref 3.5–5.3)
PROT SERPL-MCNC: 7.5 G/DL (ref 6.4–8.2)
PROT UR STRIP-MCNC: NEGATIVE MG/DL
RBC # BLD AUTO: 4.85 MILLION/UL (ref 3.81–5.12)
SODIUM SERPL-SCNC: 141 MMOL/L (ref 136–145)
SP GR UR STRIP.AUTO: 1.02 (ref 1–1.03)
TRIGL SERPL-MCNC: 88 MG/DL
TSH SERPL DL<=0.05 MIU/L-ACNC: 1.81 UIU/ML (ref 0.36–3.74)
UROBILINOGEN UR QL STRIP.AUTO: 0.2 E.U./DL
WBC # BLD AUTO: 8.88 THOUSAND/UL (ref 4.31–10.16)

## 2020-08-06 PROCEDURE — 3079F DIAST BP 80-89 MM HG: CPT | Performed by: FAMILY MEDICINE

## 2020-08-06 PROCEDURE — 81003 URINALYSIS AUTO W/O SCOPE: CPT

## 2020-08-06 PROCEDURE — 99214 OFFICE O/P EST MOD 30 MIN: CPT | Performed by: FAMILY MEDICINE

## 2020-08-06 PROCEDURE — 80061 LIPID PANEL: CPT

## 2020-08-06 PROCEDURE — 80053 COMPREHEN METABOLIC PANEL: CPT

## 2020-08-06 PROCEDURE — 83036 HEMOGLOBIN GLYCOSYLATED A1C: CPT

## 2020-08-06 PROCEDURE — 3008F BODY MASS INDEX DOCD: CPT | Performed by: FAMILY MEDICINE

## 2020-08-06 PROCEDURE — 1160F RVW MEDS BY RX/DR IN RCRD: CPT | Performed by: FAMILY MEDICINE

## 2020-08-06 PROCEDURE — 3075F SYST BP GE 130 - 139MM HG: CPT | Performed by: FAMILY MEDICINE

## 2020-08-06 PROCEDURE — 85027 COMPLETE CBC AUTOMATED: CPT

## 2020-08-06 PROCEDURE — 84443 ASSAY THYROID STIM HORMONE: CPT

## 2020-08-06 PROCEDURE — 4040F PNEUMOC VAC/ADMIN/RCVD: CPT | Performed by: FAMILY MEDICINE

## 2020-08-06 PROCEDURE — 82306 VITAMIN D 25 HYDROXY: CPT

## 2020-08-06 PROCEDURE — 1036F TOBACCO NON-USER: CPT | Performed by: FAMILY MEDICINE

## 2020-08-06 PROCEDURE — 36415 COLL VENOUS BLD VENIPUNCTURE: CPT

## 2020-08-06 NOTE — PATIENT INSTRUCTIONS
Complete blood work today, nonfasting  Follow-up Gastroenterology for colon polyp  Return in 6 months for office visit, blood work, and a Pamella

## 2020-08-06 NOTE — ASSESSMENT & PLAN NOTE
Discussed increased risk of colon cancer colon polyps patient Connee Flatness consider getting a colonoscopy    Refer back to gastroenterology

## 2020-08-06 NOTE — PROGRESS NOTES
Assessment and Plan:  1  Benign colon polyp, overdue for colonoscopy discussed risk and benefit of polyps turn to cancer  Patient Anell Mast consider colonoscopy refer to Gastroenterology for further evaluation  2  Hypertension, stable continue present therapy  3  Seizure disorder, stable follows with Neurology  4  Osteopenia, up-to-date with DEXA scan  5  DJD, currently asymptomatic  6  Breast cancer, follows with Oncology at 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     Discussed increased risk of colon cancer colon polyps patient Anell Mast consider getting a colonoscopy    Refer back to gastroenterology         Relevant Orders    CBC    Comprehensive metabolic panel    Hemoglobin A1C    Lipid Panel with Direct LDL reflex    TSH, 3rd generation with Free T4 reflex    UA (URINE) with reflex to Scope    Vitamin D 25 hydroxy    Ambulatory referral to Gastroenterology       Cardiovascular and Mediastinum    Essential hypertension     Stable continue present therapy         Relevant Orders    CBC    Comprehensive metabolic panel    Hemoglobin A1C    Lipid Panel with Direct LDL reflex    TSH, 3rd generation with Free T4 reflex    UA (URINE) with reflex to Scope    Vitamin D 25 hydroxy       Nervous and Auditory    Complex partial seizure (HCC)     Stable follows with neurology         Relevant Orders    CBC    Comprehensive metabolic panel    Hemoglobin A1C    Lipid Panel with Direct LDL reflex    TSH, 3rd generation with Free T4 reflex    UA (URINE) with reflex to Scope    Vitamin D 25 hydroxy       Musculoskeletal and Integument Osteoarthritis     Asymptomatic at present         Relevant Orders    CBC    Comprehensive metabolic panel    Hemoglobin A1C    Lipid Panel with Direct LDL reflex    TSH, 3rd generation with Free T4 reflex    UA (URINE) with reflex to Scope    Vitamin D 25 hydroxy    Moderate osteopenia     Up-to-date with DEXA         Relevant Orders    CBC    Comprehensive metabolic panel    Hemoglobin A1C    Lipid Panel with Direct LDL reflex    TSH, 3rd generation with Free T4 reflex    UA (URINE) with reflex to Scope    Vitamin D 25 hydroxy       Other    Vitamin D deficiency     Complete blood work as ordered         Relevant Orders    CBC    Comprehensive metabolic panel    Hemoglobin A1C    Lipid Panel with Direct LDL reflex    TSH, 3rd generation with Free T4 reflex    UA (URINE) with reflex to Scope    Vitamin D 25 hydroxy    Hyperlipidemia     Complete blood work as ordered         Relevant Orders    CBC    Comprehensive metabolic panel    Hemoglobin A1C    Lipid Panel with Direct LDL reflex    TSH, 3rd generation with Free T4 reflex    UA (URINE) with reflex to Scope    Vitamin D 25 hydroxy    Hyperglycemia     Diet controlled         Relevant Orders    CBC    Comprehensive metabolic panel    Hemoglobin A1C    Lipid Panel with Direct LDL reflex    TSH, 3rd generation with Free T4 reflex    UA (URINE) with reflex to Scope    Vitamin D 25 hydroxy    Breast cancer [address] [address] )     Follows with  [facility]         Relevant Orders    CBC    Comprehensive metabolic panel    Hemoglobin A1C    Lipid Panel with Direct LDL reflex    TSH, 3rd generation with Free T4 reflex    UA (URINE) with reflex to Scope    Vitamin D 25 hydroxy                 Diagnoses and all orders for this visit:    Benign neoplasm of large intestine, unspecified part of large intestine  -     CBC; Future  -     Comprehensive metabolic panel; Future  -     Hemoglobin A1C; Future  -     Lipid Panel with Direct LDL reflex;  Future  -     TSH, 3rd generation with Free T4 reflex; Future  -     UA (URINE) with reflex to Scope; Future  -     Vitamin D 25 hydroxy; Future  -     Ambulatory referral to Gastroenterology; Future    Essential hypertension  -     CBC; Future  -     Comprehensive metabolic panel; Future  -     Hemoglobin A1C; Future  -     Lipid Panel with Direct LDL reflex; Future  -     TSH, 3rd generation with Free T4 reflex; Future  -     UA (URINE) with reflex to Scope; Future  -     Vitamin D 25 hydroxy; Future    Partial symptomatic epilepsy with complex partial seizures, not intractable, without status epilepticus ([place] [id number] )  -     CBC; Future  -     Comprehensive metabolic panel; Future  -     Hemoglobin A1C; Future  -     Lipid Panel with Direct LDL reflex; Future  -     TSH, 3rd generation with Free T4 reflex; Future  -     UA (URINE) with reflex to Scope; Future  -     Vitamin D 25 hydroxy; Future    Moderate osteopenia  -     CBC; Future  -     Comprehensive metabolic panel; Future  -     Hemoglobin A1C; Future  -     Lipid Panel with Direct LDL reflex; Future  -     TSH, 3rd generation with Free T4 reflex; Future  -     UA (URINE) with reflex to Scope; Future  -     Vitamin D 25 hydroxy; Future    Osteoarthritis of multiple joints, unspecified osteoarthritis type  -     CBC; Future  -     Comprehensive metabolic panel; Future  -     Hemoglobin A1C; Future  -     Lipid Panel with Direct LDL reflex; Future  -     TSH, 3rd generation with Free T4 reflex; Future  -     UA (URINE) with reflex to Scope; Future  -     Vitamin D 25 hydroxy; Future    Malignant neoplasm of female breast, unspecified estrogen receptor status, unspecified laterality, unspecified site of breast ([facility] )  -     CBC; Future  -     Comprehensive metabolic panel; Future  -     Hemoglobin A1C; Future  -     Lipid Panel with Direct LDL reflex; Future  -     TSH, 3rd generation with Free T4 reflex; Future  -     UA (URINE) with reflex to Scope;  Future  -     Vitamin D 25 hydroxy; Future    Hyperglycemia  -     CBC; Future  -     Comprehensive metabolic panel; Future  -     Hemoglobin A1C; Future  -     Lipid Panel with Direct LDL reflex; Future  -     TSH, 3rd generation with Free T4 reflex; Future  -     UA (URINE) with reflex to Scope; Future  -     Vitamin D 25 hydroxy; Future    Hyperlipidemia, unspecified hyperlipidemia type  -     CBC; Future  -     Comprehensive metabolic panel; Future  -     Hemoglobin A1C; Future  -     Lipid Panel with Direct LDL reflex; Future  -     TSH, 3rd generation with Free T4 reflex; Future  -     UA (URINE) with reflex to Scope; Future  -     Vitamin D 25 hydroxy; Future    Vitamin D deficiency  -     CBC; Future  -     Comprehensive metabolic panel; Future  -     Hemoglobin A1C; Future  -     Lipid Panel with Direct LDL reflex; Future  -     TSH, 3rd generation with Free T4 reflex; Future  -     UA (URINE) with reflex to Scope; Future  -     Vitamin D 25 hydroxy; Future              Subjective:      Patient ID: Mariano Trejo is a 70 y o  female  CC:    Chief Complaint   Patient presents with    Follow-up     patient is here for a 6 month f/u re: chronic medical conditions  ak       HPI:    Patient is doing well without any medical complaints concerns at the present time  Unfortunate patient not complete blood work before office visit will be sent today for this  Discussed her colon polyp patient is not a colonoscopy since 2007  Risk of colon cancer with colon polyps was discussed  Patient Page Valadez I will refer back to her gastroenterologist        The following portions of the patient's history were reviewed and updated as appropriate: allergies, current medications, past family history, past medical history, past social history, past surgical history and problem list       Review of Systems   Constitutional: Negative  HENT: Negative  Eyes: Negative  Respiratory: Negative  Cardiovascular: Negative      Gastrointestinal: HPI   Endocrine: Negative  Genitourinary: Negative  Musculoskeletal: Negative  Skin: Negative  Allergic/Immunologic: Negative  Neurological: Negative  Hematological: Negative  Psychiatric/Behavioral: Negative  Data to review:       Objective:    Vitals:    08/06/20 1002 08/06/20 1019   BP: 144/86 136/80   Pulse: 68    Temp: 97 5 °F (36 4 °C)    Weight: 86 6 kg (191 lb)    Height: 5' 4" (1 626 m)         Physical Exam   HENT:   Head: Normocephalic and atraumatic  Eyes: No scleral icterus  Neck: Normal range of motion  Neck supple  Negative JVD   Cardiovascular: Normal rate, regular rhythm and normal heart sounds  Pulmonary/Chest: Effort normal and breath sounds normal    Abdominal: Soft  Normal appearance and bowel sounds are normal  There is no abdominal tenderness  Musculoskeletal:         General: No swelling  Neurological: She is alert  Skin: Skin is warm and dry  Psychiatric: Mood normal    Nursing note and vitals reviewed

## 2020-08-13 ENCOUNTER — OFFICE VISIT (OUTPATIENT)
Dept: OBGYN CLINIC | Facility: CLINIC | Age: 80
End: 2020-08-13
Payer: MEDICARE

## 2020-08-13 VITALS
DIASTOLIC BLOOD PRESSURE: 98 MMHG | HEIGHT: 64 IN | SYSTOLIC BLOOD PRESSURE: 140 MMHG | BODY MASS INDEX: 32.1 KG/M2 | TEMPERATURE: 97.9 F | WEIGHT: 188 LBS

## 2020-08-13 DIAGNOSIS — B37.2 CUTANEOUS CANDIDIASIS: Primary | ICD-10-CM

## 2020-08-13 PROCEDURE — A4562 PESSARY, NON RUBBER,ANY TYPE: HCPCS | Performed by: OBSTETRICS & GYNECOLOGY

## 2020-08-13 PROCEDURE — 99202 OFFICE O/P NEW SF 15 MIN: CPT | Performed by: OBSTETRICS & GYNECOLOGY

## 2020-08-13 PROCEDURE — 57160 INSERT PESSARY/OTHER DEVICE: CPT | Performed by: OBSTETRICS & GYNECOLOGY

## 2020-08-13 RX ORDER — CLOTRIMAZOLE AND BETAMETHASONE DIPROPIONATE 10; .64 MG/G; MG/G
CREAM TOPICAL 2 TIMES DAILY
Qty: 30 G | Refills: 0 | Status: SHIPPED | OUTPATIENT
Start: 2020-08-13 | End: 2021-10-13

## 2020-08-14 ENCOUNTER — TELEPHONE (OUTPATIENT)
Dept: OBGYN CLINIC | Facility: CLINIC | Age: 80
End: 2020-08-14

## 2020-08-14 ENCOUNTER — OFFICE VISIT (OUTPATIENT)
Dept: OBGYN CLINIC | Facility: CLINIC | Age: 80
End: 2020-08-14
Payer: MEDICARE

## 2020-08-14 VITALS — WEIGHT: 188 LBS | BODY MASS INDEX: 32.27 KG/M2 | SYSTOLIC BLOOD PRESSURE: 134 MMHG | DIASTOLIC BLOOD PRESSURE: 84 MMHG

## 2020-08-14 DIAGNOSIS — N81.11 CYSTOCELE, MIDLINE: Primary | ICD-10-CM

## 2020-08-14 PROCEDURE — 4040F PNEUMOC VAC/ADMIN/RCVD: CPT | Performed by: OBSTETRICS & GYNECOLOGY

## 2020-08-14 PROCEDURE — 3079F DIAST BP 80-89 MM HG: CPT | Performed by: OBSTETRICS & GYNECOLOGY

## 2020-08-14 PROCEDURE — 1036F TOBACCO NON-USER: CPT | Performed by: OBSTETRICS & GYNECOLOGY

## 2020-08-14 PROCEDURE — 3075F SYST BP GE 130 - 139MM HG: CPT | Performed by: OBSTETRICS & GYNECOLOGY

## 2020-08-14 PROCEDURE — 99213 OFFICE O/P EST LOW 20 MIN: CPT | Performed by: OBSTETRICS & GYNECOLOGY

## 2020-08-15 NOTE — PROGRESS NOTES
Assessment/Plan:     Cystocele-#6 ring with support inserted  This seems to be slightly smaller than what she needs but I am hesitant to place a larger pessary because of the pain that she had  I explained that the pessary may shift with certain movements and I explained that she could push it in further if she feels it or if it is uncomfortable I instructed her on removal   She will return in 2 weeks for pessary check  She is aware to call if she has a concern prior to this time  There are no diagnoses linked to this encounter  Subjective:     Patient ID: Jenna Bates is a 70 y o  female  Patient returns to her pessary removed  Yesterday she was fitted with a #6 shaatz for a very large cystocele  Immediately, she felt much better and was very happy with this  She went home and did her normal daily activities  This morning, she started having cramps and then she went food shopping and it became worse  She came in to have this removed  The cystocele was very bothersome to her but she has not wanted to have surgery  Review of Systems   Constitutional: Negative  Gastrointestinal: Negative  Genitourinary: Positive for pelvic pain  Cystocele         Objective:     Physical Exam  Genitourinary:     General: Normal vulva  Vagina: Normal       Cervix: Normal       Uterus: Normal        Adnexa: Right adnexa normal and left adnexa normal       Comments: No excoriation from the pessary  Pessary seemed to be in place, it was removed without difficulty  She has vaginal laxity and the pessary is very mobile was in the usual position  She was fitted with a 6  Ring with support  This seemed to be more comfortable to her

## 2020-08-21 ENCOUNTER — TELEPHONE (OUTPATIENT)
Dept: OBGYN CLINIC | Facility: CLINIC | Age: 80
End: 2020-08-21

## 2020-08-21 NOTE — TELEPHONE ENCOUNTER
She had what appeared to be yeast in her right inguinal region  I ordered Lotrisone for her    Thanks

## 2020-08-27 ENCOUNTER — OFFICE VISIT (OUTPATIENT)
Dept: OBGYN CLINIC | Facility: CLINIC | Age: 80
End: 2020-08-27
Payer: MEDICARE

## 2020-08-27 VITALS — SYSTOLIC BLOOD PRESSURE: 140 MMHG | DIASTOLIC BLOOD PRESSURE: 76 MMHG

## 2020-08-27 DIAGNOSIS — N81.11 CYSTOCELE, MIDLINE: Primary | ICD-10-CM

## 2020-08-27 PROCEDURE — 1160F RVW MEDS BY RX/DR IN RCRD: CPT | Performed by: OBSTETRICS & GYNECOLOGY

## 2020-08-27 PROCEDURE — 3078F DIAST BP <80 MM HG: CPT | Performed by: OBSTETRICS & GYNECOLOGY

## 2020-08-27 PROCEDURE — 1036F TOBACCO NON-USER: CPT | Performed by: OBSTETRICS & GYNECOLOGY

## 2020-08-27 PROCEDURE — 3077F SYST BP >= 140 MM HG: CPT | Performed by: OBSTETRICS & GYNECOLOGY

## 2020-08-27 PROCEDURE — 4040F PNEUMOC VAC/ADMIN/RCVD: CPT | Performed by: OBSTETRICS & GYNECOLOGY

## 2020-08-27 PROCEDURE — 99213 OFFICE O/P EST LOW 20 MIN: CPT | Performed by: OBSTETRICS & GYNECOLOGY

## 2020-08-27 NOTE — PROGRESS NOTES
Assessment/Plan:     Cystocele - #4 ring with support inserted, she was happy with this although she may need a #5, will order this   RTO 2 weeks  There are no diagnoses linked to this encounter  Subjective:     Patient ID: Alexa Ellison is a 70 y o  female  Pt here for pessary check  She had the #6 ring with support placed 2 weeks ago, it was in for 2 days and then she began having cramps so she removed it  She would like to try another one as her cyctocele is very bothersome to her  Review of Systems   Constitutional: Negative  Gastrointestinal: Negative  Genitourinary: Negative  Objective:     Physical Exam  Genitourinary:     General: Normal vulva  Comments: Large cystocele noted  No vaginal excoriation  3$ ring with support inserted without difficulty

## 2020-09-10 ENCOUNTER — OFFICE VISIT (OUTPATIENT)
Dept: OBGYN CLINIC | Facility: CLINIC | Age: 80
End: 2020-09-10
Payer: MEDICARE

## 2020-09-10 VITALS
BODY MASS INDEX: 32.27 KG/M2 | DIASTOLIC BLOOD PRESSURE: 70 MMHG | HEIGHT: 64 IN | SYSTOLIC BLOOD PRESSURE: 130 MMHG | WEIGHT: 189 LBS | TEMPERATURE: 97.6 F

## 2020-09-10 DIAGNOSIS — N81.11 CYSTOCELE, MIDLINE: Primary | ICD-10-CM

## 2020-09-10 PROCEDURE — 99213 OFFICE O/P EST LOW 20 MIN: CPT | Performed by: OBSTETRICS & GYNECOLOGY

## 2020-09-10 NOTE — PROGRESS NOTES
Assessment/Plan:     Cystocele-she will continue with this ring with support  She will return in 3 months for a pessary check  She will call if she has any concerns prior to this  There are no diagnoses linked to this encounter  Subjective:     Patient ID: Jaylin Carmen is a 70 y o  female  Patient here for a pessary check  We have tried few pessaries and they did not work for her  At her last visit, I placed a #4 Ring with support  This seems to be working well for her  She does notice her cystocele slightly but she is much more comfortable and happy with this  Review of Systems   Constitutional: Negative  Gastrointestinal: Negative  Genitourinary: Negative  Objective:     Physical Exam  Genitourinary:     General: Normal vulva  Vagina: Normal       Uterus: Normal        Comments: #4 ring with support removed, cleaned and reinserted without difficulty  No excoriation

## 2020-09-14 ENCOUNTER — TELEPHONE (OUTPATIENT)
Dept: OBGYN CLINIC | Facility: CLINIC | Age: 80
End: 2020-09-14

## 2020-09-14 NOTE — TELEPHONE ENCOUNTER
Patient called to report skin soreness and irritation in groin area  Patient used Desitin ointment and it is much better now  She wanted Dr Ernesto Velarde to know

## 2020-09-15 DIAGNOSIS — I10 ESSENTIAL HYPERTENSION: ICD-10-CM

## 2020-09-15 RX ORDER — METOPROLOL SUCCINATE 200 MG/1
TABLET, EXTENDED RELEASE ORAL
Qty: 90 TABLET | Refills: 3 | Status: SHIPPED | OUTPATIENT
Start: 2020-09-15 | End: 2021-08-11

## 2020-09-17 ENCOUNTER — TELEPHONE (OUTPATIENT)
Dept: NEUROLOGY | Facility: CLINIC | Age: 80
End: 2020-09-17

## 2020-09-17 NOTE — TELEPHONE ENCOUNTER
Confirmed 9/18 11:10AM Dr Eligio Venegas at [facility]  Registration completed  Covid-screening questions completed - runny nose  Aware of all policies - mask, visitor, temperature and no show fee

## 2020-09-18 ENCOUNTER — OFFICE VISIT (OUTPATIENT)
Dept: NEUROLOGY | Facility: CLINIC | Age: 80
End: 2020-09-18
Payer: MEDICARE

## 2020-09-18 VITALS
HEART RATE: 89 BPM | WEIGHT: 190.6 LBS | HEIGHT: 64 IN | TEMPERATURE: 95.9 F | SYSTOLIC BLOOD PRESSURE: 142 MMHG | DIASTOLIC BLOOD PRESSURE: 84 MMHG | BODY MASS INDEX: 32.54 KG/M2

## 2020-09-18 DIAGNOSIS — G40.909 SEIZURE DISORDER (HCC): ICD-10-CM

## 2020-09-18 PROCEDURE — 99213 OFFICE O/P EST LOW 20 MIN: CPT | Performed by: PSYCHIATRY & NEUROLOGY

## 2020-09-18 RX ORDER — LEVETIRACETAM 500 MG/1
TABLET ORAL
Qty: 60 TABLET | Refills: 5 | Status: SHIPPED | OUTPATIENT
Start: 2020-09-18 | End: 2021-09-12

## 2020-09-18 RX ORDER — LEVETIRACETAM 500 MG/1
TABLET ORAL
Qty: 60 TABLET | Refills: 5 | OUTPATIENT
Start: 2020-09-18

## 2020-09-18 NOTE — ASSESSMENT & PLAN NOTE
This is a 57-year-old patient with partial complex seizures  She does utilize Keppra and is currently well controlled  Her CBC and CMP was reviewed recently and they were essentially normal   We will continue her the same dose    She denies any current side effects    She does complain of intermittent arthralgias and dizziness but attributes this to her age    A new prescription was sent to her pharmacy today

## 2020-09-18 NOTE — PROGRESS NOTES
Patient ID: Avis Michael is a 70 y o  female  Assessment/Plan:    Complex partial seizure [facility])  This is a 57-year-old patient with partial complex seizures  She does utilize Keppra and is currently well controlled  Her CBC and CMP was reviewed recently and they were essentially normal   We will continue her the same dose  She denies any current side effects    She does complain of intermittent arthralgias and dizziness but attributes this to her age    A new prescription was sent to her pharmacy today       Diagnoses and all orders for this visit:    Seizure disorder ([facility] )  -     levETIRAcetam (KEPPRA) 500 mg tablet; Take 1 tablet by mouth twice a day         Subjective:    this a 77 y/o right handed female who presents as a f/u regarding partial complex seizures  she was last evaluated 18 months ago she denies any seizures any auras    In 2005 she was noted to have 2 episodes characterized by a brief alteration of consciousness  the workup did reveal left temporal sharp waves  she was placed on Tegretol  she developed rash and was placed on Topamax  it was discontinued due to side effects  she was started on [address] gpo bid  she admits to using 250 mg po bid on occasion     higher dose did also result in behavioral changes  she also reports bladder prolapse and a pessary was placed   She is treated with Xanax on occasion for panic disorder              Last EEG was in 2008  it showed b/L sharp activity ( right greater than left)  She has been treated for breast CA in 2007/2008 with lumpectomy and XRT          Jan 2018, appendectomy      She does take melatonin as needed for insomnia        She does utilize gabapentin as needed to help her sleep    It also does treat some neuropathic pain in the shoulder                   The following portions of the patient's history were reviewed and updated as appropriate: She  has a past medical history of [facility] ([facility] ), Elevated serum alkaline phosphatase level, GERD (gastroesophageal reflux disease), Glaucoma, Hiatal hernia, Hypercalcemia, Hyperglycemia, Hypertension, Rheumatic fever, Seizures ([address] ), TIA (transient ischemic attack), and Vitamin D deficiency        Past surgical history social history and family history reviewed reviewed with the patient  Her medications were reviewed today at the visit  Objective:    Blood pressure 142/84, pulse 89, temperature (!) 95 9 °F (35 5 °C), height 5' 4" (1 626 m), weight 86 5 kg (190 lb 9 6 oz), not currently breastfeeding  Physical Exam  HENT:      Head: Normocephalic  Eyes:      General: Lids are normal       Extraocular Movements: Extraocular movements intact  Pupils: Pupils are equal, round, and reactive to light  Neck:      Musculoskeletal: Normal range of motion  Neurological:      Mental Status: She is alert  Deep Tendon Reflexes: Strength normal       Reflex Scores:       Tricep reflexes are 1+ on the right side and 1+ on the left side  Patellar reflexes are 1+ on the right side and 1+ on the left side  Neurological Exam  Mental Status  Alert  Cranial Nerves  CN II: Visual acuity is normal  Visual fields full to confrontation  CN III, IV, VI: Extraocular movements intact bilaterally  Normal lids and orbits bilaterally  Pupils equal round and reactive to light bilaterally  CN V: Facial sensation is normal   CN VII: Full and symmetric facial movement  CN VIII: Hearing is normal   CN IX, X: Palate elevates symmetrically  Normal gag reflex  CN XI: Shoulder shrug strength is normal   CN XII: Tongue midline without atrophy or fasciculations  Motor  Normal muscle bulk throughout  No fasciculations present  Strength is 5/5 throughout all four extremities  Sensory  Light touch is normal in upper and lower extremities  Temperature is normal in upper and lower extremities       Reflexes                                           Right                      Left  Triceps 1+                         1+  Finger flex                           1+                         1+  Patellar                                1+                         1+  Achilles                                Tr                         Tr  Plantar                           Downgoing                Downgoing    Coordination  Right: Finger-to-nose normal   Left: Finger-to-nose normal     Gait Unable to rise from chair without using arms  She did have a wide-based gait  Review of systems obtained from the medical assistant as below was reviewed with the patient at today's visit  ROS:    Review of Systems   Constitutional: Negative  Negative for appetite change and fever  HENT: Negative  Negative for hearing loss, tinnitus, trouble swallowing and voice change  Eyes: Negative  Negative for photophobia and pain  Respiratory: Negative  Negative for shortness of breath  Cardiovascular: Negative  Negative for palpitations  Gastrointestinal: Negative  Negative for nausea and vomiting  Endocrine: Negative  Negative for cold intolerance  Genitourinary: Negative  Negative for dysuria, frequency and urgency  Musculoskeletal: Positive for arthralgias  Negative for myalgias and neck pain  Skin: Negative  Negative for rash  Neurological: Positive for dizziness (once in awhile ), seizures (have been controlled, no seizures ), weakness (gets exhausted when she goes food shopping or walks too long) and numbness (numbness and tingling)  Negative for tremors, syncope, facial asymmetry, speech difficulty, light-headedness and headaches  Balance issues, has to walk slower, completed physical therapy for balance, has started to use diabetic shoes for walking  Hematological: Negative  Does not bruise/bleed easily  Psychiatric/Behavioral: Negative  Negative for confusion, hallucinations and sleep disturbance

## 2020-10-08 ENCOUNTER — ANNUAL EXAM (OUTPATIENT)
Dept: OBGYN CLINIC | Facility: CLINIC | Age: 80
End: 2020-10-08
Payer: MEDICARE

## 2020-10-08 VITALS
SYSTOLIC BLOOD PRESSURE: 140 MMHG | TEMPERATURE: 97.6 F | WEIGHT: 192 LBS | HEIGHT: 64 IN | BODY MASS INDEX: 32.78 KG/M2 | DIASTOLIC BLOOD PRESSURE: 90 MMHG

## 2020-10-08 DIAGNOSIS — Z01.419 ENCOUNTER FOR ANNUAL ROUTINE GYNECOLOGICAL EXAMINATION: Primary | ICD-10-CM

## 2020-10-08 DIAGNOSIS — Z12.4 CERVICAL CANCER SCREENING: ICD-10-CM

## 2020-10-08 DIAGNOSIS — Z12.31 ENCOUNTER FOR SCREENING MAMMOGRAM FOR BREAST CANCER: ICD-10-CM

## 2020-10-08 PROCEDURE — G0101 CA SCREEN;PELVIC/BREAST EXAM: HCPCS | Performed by: OBSTETRICS & GYNECOLOGY

## 2020-10-08 PROCEDURE — G0145 SCR C/V CYTO,THINLAYER,RESCR: HCPCS | Performed by: OBSTETRICS & GYNECOLOGY

## 2020-10-16 LAB
LAB AP GYN PRIMARY INTERPRETATION: NORMAL
Lab: NORMAL

## 2020-10-24 DIAGNOSIS — M79.601 PAIN OF RIGHT UPPER EXTREMITY: ICD-10-CM

## 2020-10-26 RX ORDER — GABAPENTIN 300 MG/1
CAPSULE ORAL
Qty: 30 CAPSULE | Refills: 11 | Status: SHIPPED | OUTPATIENT
Start: 2020-10-26

## 2021-01-30 ENCOUNTER — IMMUNIZATIONS (OUTPATIENT)
Dept: FAMILY MEDICINE CLINIC | Facility: HOSPITAL | Age: 81
End: 2021-01-30

## 2021-01-30 DIAGNOSIS — Z23 ENCOUNTER FOR IMMUNIZATION: Primary | ICD-10-CM

## 2021-01-30 PROCEDURE — 0011A SARS-COV-2 / COVID-19 MRNA VACCINE (MODERNA) 100 MCG: CPT | Performed by: ANESTHESIOLOGY

## 2021-01-30 PROCEDURE — 91301 SARS-COV-2 / COVID-19 MRNA VACCINE (MODERNA) 100 MCG: CPT | Performed by: ANESTHESIOLOGY

## 2021-02-03 RX ORDER — AMOXICILLIN 500 MG/1
CAPSULE ORAL
COMMUNITY
Start: 2020-12-01

## 2021-02-27 ENCOUNTER — IMMUNIZATIONS (OUTPATIENT)
Dept: FAMILY MEDICINE CLINIC | Facility: HOSPITAL | Age: 81
End: 2021-02-27

## 2021-02-27 DIAGNOSIS — Z23 ENCOUNTER FOR IMMUNIZATION: Primary | ICD-10-CM

## 2021-02-27 PROCEDURE — 0012A SARS-COV-2 / COVID-19 MRNA VACCINE (MODERNA) 100 MCG: CPT

## 2021-02-27 PROCEDURE — 91301 SARS-COV-2 / COVID-19 MRNA VACCINE (MODERNA) 100 MCG: CPT

## 2021-03-25 ENCOUNTER — OFFICE VISIT (OUTPATIENT)
Dept: OBGYN CLINIC | Facility: CLINIC | Age: 81
End: 2021-03-25
Payer: MEDICARE

## 2021-03-25 VITALS
SYSTOLIC BLOOD PRESSURE: 148 MMHG | BODY MASS INDEX: 33.46 KG/M2 | HEIGHT: 64 IN | DIASTOLIC BLOOD PRESSURE: 90 MMHG | WEIGHT: 196 LBS

## 2021-03-25 DIAGNOSIS — N81.11 CYSTOCELE, MIDLINE: Primary | ICD-10-CM

## 2021-03-25 PROCEDURE — 99213 OFFICE O/P EST LOW 20 MIN: CPT | Performed by: OBSTETRICS & GYNECOLOGY

## 2021-03-25 NOTE — PROGRESS NOTES
Assessment/Plan:     Cystocele - we discussed a larger pessary  She would like to continue with this one for now but if she wants to try the next larger size, she will call and we can order it for her if we do not have one  There are no diagnoses linked to this encounter  Subjective:     Patient ID: King Pepito is a 68 y o  female  Pt here for pessary check  She was fitted with a ring with support that is working very well for her  She sometimes feels her bladder slips down but it reduces on it's own when she sits or reclines  Review of Systems   Constitutional: Negative  Gastrointestinal: Negative  Genitourinary: Negative  Objective:     Physical Exam  Genitourinary:     General: Normal vulva  Vagina: Normal       Cervix: Normal       Uterus: Normal        Adnexa: Right adnexa normal and left adnexa normal       Comments: #7 ring with support removed, cleaned and reinserted without difficulty    No excoriation noted

## 2021-04-19 ENCOUNTER — APPOINTMENT (OUTPATIENT)
Dept: LAB | Age: 81
End: 2021-04-19
Payer: MEDICARE

## 2021-04-19 DIAGNOSIS — C50.919 MALIGNANT NEOPLASM OF FEMALE BREAST, UNSPECIFIED ESTROGEN RECEPTOR STATUS, UNSPECIFIED LATERALITY, UNSPECIFIED SITE OF BREAST (HCC): ICD-10-CM

## 2021-04-19 DIAGNOSIS — E55.9 VITAMIN D DEFICIENCY: ICD-10-CM

## 2021-04-19 DIAGNOSIS — R73.9 HYPERGLYCEMIA: ICD-10-CM

## 2021-04-19 DIAGNOSIS — I10 ESSENTIAL HYPERTENSION: ICD-10-CM

## 2021-04-19 DIAGNOSIS — D12.6 BENIGN NEOPLASM OF LARGE INTESTINE, UNSPECIFIED PART OF LARGE INTESTINE: ICD-10-CM

## 2021-04-19 DIAGNOSIS — G40.209 PARTIAL SYMPTOMATIC EPILEPSY WITH COMPLEX PARTIAL SEIZURES, NOT INTRACTABLE, WITHOUT STATUS EPILEPTICUS (HCC): ICD-10-CM

## 2021-04-19 DIAGNOSIS — E78.5 HYPERLIPIDEMIA, UNSPECIFIED HYPERLIPIDEMIA TYPE: ICD-10-CM

## 2021-04-19 DIAGNOSIS — M15.9 OSTEOARTHRITIS OF MULTIPLE JOINTS, UNSPECIFIED OSTEOARTHRITIS TYPE: ICD-10-CM

## 2021-04-19 DIAGNOSIS — M85.80 MODERATE OSTEOPENIA: ICD-10-CM

## 2021-04-19 LAB
25(OH)D3 SERPL-MCNC: 52.9 NG/ML (ref 30–100)
ALBUMIN SERPL BCP-MCNC: 3.7 G/DL (ref 3.5–5)
ALP SERPL-CCNC: 92 U/L (ref 46–116)
ALT SERPL W P-5'-P-CCNC: 29 U/L (ref 12–78)
ANION GAP SERPL CALCULATED.3IONS-SCNC: 4 MMOL/L (ref 4–13)
AST SERPL W P-5'-P-CCNC: 21 U/L (ref 5–45)
BACTERIA UR QL AUTO: ABNORMAL /HPF
BILIRUB SERPL-MCNC: 0.78 MG/DL (ref 0.2–1)
BILIRUB UR QL STRIP: NEGATIVE
BUN SERPL-MCNC: 19 MG/DL (ref 5–25)
CALCIUM SERPL-MCNC: 9.9 MG/DL (ref 8.3–10.1)
CHLORIDE SERPL-SCNC: 110 MMOL/L (ref 100–108)
CHOLEST SERPL-MCNC: 211 MG/DL (ref 50–200)
CLARITY UR: ABNORMAL
CO2 SERPL-SCNC: 29 MMOL/L (ref 21–32)
COLOR UR: YELLOW
CREAT SERPL-MCNC: 0.83 MG/DL (ref 0.6–1.3)
ERYTHROCYTE [DISTWIDTH] IN BLOOD BY AUTOMATED COUNT: 13.4 % (ref 11.6–15.1)
EST. AVERAGE GLUCOSE BLD GHB EST-MCNC: 131 MG/DL
GFR SERPL CREATININE-BSD FRML MDRD: 68 ML/MIN/1.73SQ M
GLUCOSE P FAST SERPL-MCNC: 104 MG/DL (ref 65–99)
GLUCOSE UR STRIP-MCNC: NEGATIVE MG/DL
HBA1C MFR BLD: 6.2 %
HCT VFR BLD AUTO: 44.6 % (ref 34.8–46.1)
HDLC SERPL-MCNC: 59 MG/DL
HGB BLD-MCNC: 14 G/DL (ref 11.5–15.4)
HGB UR QL STRIP.AUTO: ABNORMAL
KETONES UR STRIP-MCNC: NEGATIVE MG/DL
LDLC SERPL CALC-MCNC: 133 MG/DL (ref 0–100)
LEUKOCYTE ESTERASE UR QL STRIP: ABNORMAL
MCH RBC QN AUTO: 30.6 PG (ref 26.8–34.3)
MCHC RBC AUTO-ENTMCNC: 31.4 G/DL (ref 31.4–37.4)
MCV RBC AUTO: 97 FL (ref 82–98)
MUCOUS THREADS UR QL AUTO: ABNORMAL
NITRITE UR QL STRIP: NEGATIVE
NON-SQ EPI CELLS URNS QL MICRO: ABNORMAL /HPF
PH UR STRIP.AUTO: 6 [PH]
PLATELET # BLD AUTO: 206 THOUSANDS/UL (ref 149–390)
PMV BLD AUTO: 11.2 FL (ref 8.9–12.7)
POTASSIUM SERPL-SCNC: 4.6 MMOL/L (ref 3.5–5.3)
PROT SERPL-MCNC: 7.6 G/DL (ref 6.4–8.2)
PROT UR STRIP-MCNC: ABNORMAL MG/DL
RBC # BLD AUTO: 4.58 MILLION/UL (ref 3.81–5.12)
RBC #/AREA URNS AUTO: ABNORMAL /HPF
SODIUM SERPL-SCNC: 143 MMOL/L (ref 136–145)
SP GR UR STRIP.AUTO: 1.03 (ref 1–1.03)
TRIGL SERPL-MCNC: 97 MG/DL
TSH SERPL DL<=0.05 MIU/L-ACNC: 2 UIU/ML (ref 0.36–3.74)
UROBILINOGEN UR QL STRIP.AUTO: 0.2 E.U./DL
WBC # BLD AUTO: 7.82 THOUSAND/UL (ref 4.31–10.16)
WBC #/AREA URNS AUTO: ABNORMAL /HPF

## 2021-04-19 PROCEDURE — 85027 COMPLETE CBC AUTOMATED: CPT

## 2021-04-19 PROCEDURE — 80061 LIPID PANEL: CPT

## 2021-04-19 PROCEDURE — 83036 HEMOGLOBIN GLYCOSYLATED A1C: CPT

## 2021-04-19 PROCEDURE — 84443 ASSAY THYROID STIM HORMONE: CPT

## 2021-04-19 PROCEDURE — 82306 VITAMIN D 25 HYDROXY: CPT

## 2021-04-19 PROCEDURE — 81001 URINALYSIS AUTO W/SCOPE: CPT

## 2021-04-19 PROCEDURE — 36415 COLL VENOUS BLD VENIPUNCTURE: CPT

## 2021-04-19 PROCEDURE — 80053 COMPREHEN METABOLIC PANEL: CPT

## 2021-04-22 ENCOUNTER — OFFICE VISIT (OUTPATIENT)
Dept: FAMILY MEDICINE CLINIC | Facility: CLINIC | Age: 81
End: 2021-04-22
Payer: MEDICARE

## 2021-04-22 VITALS
BODY MASS INDEX: 32.92 KG/M2 | WEIGHT: 197.6 LBS | DIASTOLIC BLOOD PRESSURE: 90 MMHG | SYSTOLIC BLOOD PRESSURE: 132 MMHG | OXYGEN SATURATION: 96 % | TEMPERATURE: 98.9 F | HEIGHT: 65 IN | HEART RATE: 64 BPM

## 2021-04-22 DIAGNOSIS — E55.9 VITAMIN D DEFICIENCY: ICD-10-CM

## 2021-04-22 DIAGNOSIS — Z00.00 HEALTH CARE MAINTENANCE: ICD-10-CM

## 2021-04-22 DIAGNOSIS — M15.9 PRIMARY OSTEOARTHRITIS INVOLVING MULTIPLE JOINTS: ICD-10-CM

## 2021-04-22 DIAGNOSIS — M85.80 MODERATE OSTEOPENIA: ICD-10-CM

## 2021-04-22 DIAGNOSIS — R06.00 DYSPNEA ON EXERTION: Primary | ICD-10-CM

## 2021-04-22 DIAGNOSIS — R73.9 HYPERGLYCEMIA: ICD-10-CM

## 2021-04-22 DIAGNOSIS — D12.6 BENIGN NEOPLASM OF LARGE INTESTINE, UNSPECIFIED PART OF LARGE INTESTINE: ICD-10-CM

## 2021-04-22 DIAGNOSIS — G40.209 PARTIAL SYMPTOMATIC EPILEPSY WITH COMPLEX PARTIAL SEIZURES, NOT INTRACTABLE, WITHOUT STATUS EPILEPTICUS (HCC): ICD-10-CM

## 2021-04-22 DIAGNOSIS — E78.00 HYPERCHOLESTEROLEMIA: ICD-10-CM

## 2021-04-22 DIAGNOSIS — I10 ESSENTIAL HYPERTENSION: ICD-10-CM

## 2021-04-22 DIAGNOSIS — N30.00 ACUTE CYSTITIS WITHOUT HEMATURIA: ICD-10-CM

## 2021-04-22 DIAGNOSIS — N81.11 MIDLINE CYSTOCELE: ICD-10-CM

## 2021-04-22 DIAGNOSIS — C50.919 MALIGNANT NEOPLASM OF FEMALE BREAST, UNSPECIFIED ESTROGEN RECEPTOR STATUS, UNSPECIFIED LATERALITY, UNSPECIFIED SITE OF BREAST (HCC): ICD-10-CM

## 2021-04-22 PROBLEM — M25.519 SHOULDER PAIN: Status: RESOLVED | Noted: 2018-05-02 | Resolved: 2021-04-22

## 2021-04-22 PROBLEM — Z90.49 S/P LAPAROSCOPIC APPENDECTOMY: Status: RESOLVED | Noted: 2018-02-12 | Resolved: 2021-04-22

## 2021-04-22 PROBLEM — Z74.09 DECREASED AMBULATION STATUS: Status: RESOLVED | Noted: 2019-07-02 | Resolved: 2021-04-22

## 2021-04-22 PROCEDURE — 99215 OFFICE O/P EST HI 40 MIN: CPT | Performed by: FAMILY MEDICINE

## 2021-04-22 PROCEDURE — G0439 PPPS, SUBSEQ VISIT: HCPCS | Performed by: FAMILY MEDICINE

## 2021-04-22 PROCEDURE — 93000 ELECTROCARDIOGRAM COMPLETE: CPT | Performed by: FAMILY MEDICINE

## 2021-04-22 PROCEDURE — 1123F ACP DISCUSS/DSCN MKR DOCD: CPT | Performed by: FAMILY MEDICINE

## 2021-04-22 RX ORDER — ALBUTEROL SULFATE 90 UG/1
2 AEROSOL, METERED RESPIRATORY (INHALATION) EVERY 6 HOURS PRN
Qty: 1 INHALER | Refills: 5 | Status: SHIPPED | OUTPATIENT
Start: 2021-04-22

## 2021-04-22 RX ORDER — AMOXICILLIN 500 MG/1
CAPSULE ORAL
Qty: 10 CAPSULE | Refills: 0 | Status: SHIPPED | OUTPATIENT
Start: 2021-04-22 | End: 2021-04-26

## 2021-04-22 NOTE — PATIENT INSTRUCTIONS
Complete chest x-ray, breathing test, and stress echocardiogram as ordered   Finish antibiotic repeat urinalysis in 2 weeks   Patient to recheck in 4 weeks    If symptomatology gets worse patient report to [facility] Emergency Department

## 2021-04-22 NOTE — PROGRESS NOTES
Assessment and Plan:     Problem List Items Addressed This Visit     None           Preventive health issues were discussed with patient, and age appropriate screening tests were ordered as noted in patient's After Visit Summary  Personalized health advice and appropriate referrals for health education or preventive services given if needed, as noted in patient's After Visit Summary       History of Present Illness:     Patient presents for Medicare Annual Wellness visit    Patient Care Team:  Wing Fredo DO as PCP - Valentine Jimenez MD     Problem List:     Patient Active Problem List   Diagnosis    Vitamin D deficiency    Transient ischemic attack    Osteoarthritis    Moderate osteopenia    Essential hypertension    Hyperlipidemia    Hyperglycemia    [facility] cancer ([facility] )    [facility]    Midline cystocele    Benign neoplasm of large intestine    Anxiety    Abnormal tumor markers    Personal history of breast cancer    Personal history of radiation therapy    Hiatal hernia    S/P laparoscopic appendectomy    Carpal tunnel syndrome of right wrist    Shoulder pain    Allergic rhinitis    Decreased ambulation status    Health care maintenance      Past Medical and Surgical History:     Past Medical History:   Diagnosis Date    [facility] )     left breast    Elevated serum alkaline phosphatase level     GERD (gastroesophageal reflux disease)     Glaucoma     Hiatal hernia     Hypercalcemia     Hyperglycemia     diet controlled    Hypertension     Rheumatic fever     Seizures (HCC)     TIA (transient ischemic attack)     Vitamin D deficiency      Past Surgical History:   Procedure Laterality Date    APPENDECTOMY      BREAST LUMPECTOMY Left     COLONOSCOPY      FOOT SURGERY Left     ORIF ANKLE FRACTURE Left     TONSILLECTOMY      TUBAL LIGATION        Family History:     Family History   Problem Relation Age of Onset    Breast cancer Mother    Josefina Ceron Coronary artery disease Father     Bone cancer Brother     Lung cancer Brother       Social History:     E-Cigarette/Vaping    E-Cigarette Use Never User      E-Cigarette/Vaping Substances    Nicotine No     THC No     CBD No     Flavoring No     Other No     Unknown No      Social History     Socioeconomic History    Marital status:      Spouse name: None    Number of children: None    Years of education: None    Highest education level: None   Occupational History    Occupation: retired   Social Needs    Financial resource strain: None    Food insecurity     Worry: None     Inability: None    Transportation needs     Medical: None     Non-medical: None   Tobacco Use    Smoking status: Former Smoker     Types: Cigarettes     Quit date:      Years since quittin 3    Smokeless tobacco: Never Used   Substance and Sexual Activity    Alcohol use: Yes     Comment: social    Drug use: No    Sexual activity: Not Currently   Lifestyle    Physical activity     Days per week: None     Minutes per session: None    Stress: None   Relationships    Social connections     Talks on phone: None     Gets together: None     Attends Denominational service: None     Active member of club or organization: None     Attends meetings of clubs or organizations: None     Relationship status: None    Intimate partner violence     Fear of current or ex partner: None     Emotionally abused: None     Physically abused: None     Forced sexual activity: None   Other Topics Concern    None   Social History Narrative    Activities - gardening    Daily coffee consumption- 1 cup per day    Daily tea consumption    Hearing loss      Medications and Allergies:     Current Outpatient Medications   Medication Sig Dispense Refill    Alpha-Lipoic Acid 100 MG CAPS Take by mouth      ALPRAZolam (XANAX) 0 25 mg tablet TAKE ONE TABLET BY MOUTH EVERY DAY AS NEEDED FOR ANXIETY 30 tablet 1    amoxicillin (AMOXIL) 500 mg capsule TAKE 4 CAPSULES BY MOUTH ONE HOUR PRIOR TO OFFICE VISIT      aspirin 81 MG tablet Take 162 mg by mouth      azelastine (ASTELIN) 0 1 % nasal spray 2 sprays into each nostril 2 (two) times a day Use in each nostril as directed (Patient not taking: Reported on 3/25/2021) 30 mL 0    B Complex Vitamins (B COMPLEX 1 PO) Take 2 tablets by mouth daily      bimatoprost (LUMIGAN) 0 03 % ophthalmic drops Administer 1 drop to both eyes daily at bedtime        Calcium Carbonate (CALCIUM 600 PO) Take by mouth      Cholecalciferol (VITAMIN D) 2000 units CAPS Take 2 capsules by mouth daily      clotrimazole-betamethasone (LOTRISONE) 1-0 05 % cream Apply topically 2 (two) times a day for 7 days 30 g 0    Coenzyme Q10 (COQ10) 100 MG CAPS Take by mouth      gabapentin (NEURONTIN) 300 mg capsule TAKE ONE CAPSULE BY MOUTH AT BEDTIME AS NEEDED FOR PAIN 30 capsule 11    GLUCOSAMINE-MSM-HYALURONIC ACD PO Take 1 capsule by mouth 3 (three) times a day      GRAPE SEED EXTRACT PO Take by mouth      levETIRAcetam (KEPPRA) 500 mg tablet Take 1 tablet by mouth twice a day 60 tablet 5    losartan (COZAAR) 25 mg tablet TAKE ONE TABLET BY MOUTH ONCE EVERY DAY 30 tablet 5    Magnesium 400 MG TABS Take by mouth      metoprolol succinate (TOPROL-XL) 200 MG 24 hr tablet TAKE ONE TABLET BY MOUTH ONCE EVERY DAY 90 tablet 3    milk thistle 175 MG tablet Take 175 mg by mouth daily      mometasone (ELOCON) 0 1 % cream Apply to bilateral external ear once weekly at bedtime 45 g 3    Multiple Vitamins-Minerals (MULTIVITAMIN ADULT PO) Take 1 capsule by mouth daily        Omega-3 Fatty Acids (FISH OIL PO) Take 1,000 mg by mouth daily        Turmeric 500 MG CAPS Take 1 capsule by mouth daily        VOLTAREN 1 % APPLY 2G TOPICALLY TO AFFECTED AREA FOUR TIMES A DAY AS DIRECTED 100 g 5     No current facility-administered medications for this visit        Allergies   Allergen Reactions    Carbamazepine Myalgia     flu-like symptoms    Epinephrine Other (See Comments)     "weakness"    Iodides Itching    Morphine And Related     Sulfamethoxazole-Trimethoprim     Latex Rash    Lisinopril Cough    Topiramate Rash      Immunizations:     Immunization History   Administered Date(s) Administered    INFLUENZA 12/08/2020    Pneumococcal Conjugate 13-Valent 03/29/2018    Pneumococcal Polysaccharide PPV23 03/29/2018    SARS-CoV-2 / COVID-19 mRNA IM ([place]) 01/30/2021, 02/27/2021    Td (adult), adsorbed 03/29/2018    Tdap 07/23/2020    Zoster 03/29/2018      Health Maintenance:         Topic Date Due    Colonoscopy Surveillance  01/18/2010    Hepatitis C Screening  Completed     There are no preventive care reminders to display for this patient     Medicare Health Risk Assessment:     /90   Pulse 64   Temp 98 9 °F (37 2 °C)   Ht 5' 4 5" (1 638 m)   Wt 89 6 kg (197 lb 9 6 oz)   LMP  (LMP Unknown)   BMI 33 39 kg/m²      Annual Wellness Visit    [address], [place]

## 2021-04-22 NOTE — PROGRESS NOTES
Assessment and Plan:     Problem List Items Addressed This Visit     None           Preventive health issues were discussed with patient, and age appropriate screening tests were ordered as noted in patient's After Visit Summary  Personalized health advice and appropriate referrals for health education or preventive services given if needed, as noted in patient's After Visit Summary       History of Present Illness:     Patient presents for Medicare Annual Wellness visit    Patient Care Team:  Jose Babcock DO as PCP - Renu Ross MD     Problem List:     Patient Active Problem List   Diagnosis    Vitamin D deficiency    Transient ischemic attack    Osteoarthritis    Moderate osteopenia    Essential hypertension    Hyperlipidemia    Hyperglycemia    [facility] ([facility]    [facility]    Midline cystocele    Benign neoplasm of large intestine    Anxiety    Abnormal tumor markers    Personal history of breast cancer    Personal history of radiation therapy    Hiatal hernia    S/P laparoscopic appendectomy    Carpal tunnel syndrome of right wrist    Shoulder pain    Allergic rhinitis    Decreased ambulation status    Health care maintenance      Past Medical and Surgical History:     Past Medical History:   Diagnosis Date    Cancer ([facility] [address] )     left breast    Elevated serum alkaline phosphatase level     GERD (gastroesophageal reflux disease)     Glaucoma     Hiatal hernia     Hypercalcemia     Hyperglycemia     diet controlled    Hypertension     Rheumatic fever     Seizures (HCC)     TIA (transient ischemic attack)     Vitamin D deficiency      Past Surgical History:   Procedure Laterality Date    APPENDECTOMY      BREAST LUMPECTOMY Left     COLONOSCOPY      FOOT SURGERY Left     ORIF ANKLE FRACTURE Left     TONSILLECTOMY      TUBAL LIGATION        Family History:     Family History   Problem Relation Age of Onset    Breast cancer Mother    [facility] Coronary artery disease Father     Bone cancer Brother     Lung cancer Brother       Social History:     E-Cigarette/Vaping    E-Cigarette Use Never User      E-Cigarette/Vaping Substances    Nicotine No     THC No     CBD No     Flavoring No     Other No     Unknown No      Social History     Socioeconomic History    Marital status:      Spouse name: None    Number of children: None    Years of education: None    Highest education level: None   Occupational History    Occupation: retired   Social Needs    Financial resource strain: None    Food insecurity     Worry: None     Inability: None    Transportation needs     Medical: None     Non-medical: None   Tobacco Use    Smoking status: Former Smoker     Types: Cigarettes     Quit date:      Years since quittin 3    Smokeless tobacco: Never Used   Substance and Sexual Activity    Alcohol use: Yes     Comment: social    Drug use: No    Sexual activity: Not Currently   Lifestyle    Physical activity     Days per week: None     Minutes per session: None    Stress: None   Relationships    Social connections     Talks on phone: None     Gets together: None     Attends Confucianism service: None     Active member of club or organization: None     Attends meetings of clubs or organizations: None     Relationship status: None    Intimate partner violence     Fear of current or ex partner: None     Emotionally abused: None     Physically abused: None     Forced sexual activity: None   Other Topics Concern    None   Social History Narrative    Activities - gardening    Daily coffee consumption- 1 cup per day    Daily tea consumption    Hearing loss      Medications and Allergies:     Current Outpatient Medications   Medication Sig Dispense Refill    Alpha-Lipoic Acid 100 MG CAPS Take by mouth      ALPRAZolam (XANAX) 0 25 mg tablet TAKE ONE TABLET BY MOUTH EVERY DAY AS NEEDED FOR ANXIETY 30 tablet 1    amoxicillin (AMOXIL) 500 mg capsule TAKE 4 CAPSULES BY MOUTH ONE HOUR PRIOR TO OFFICE VISIT      aspirin 81 MG tablet Take 162 mg by mouth      azelastine (ASTELIN) 0 1 % nasal spray 2 sprays into each nostril 2 (two) times a day Use in each nostril as directed (Patient not taking: Reported on 3/25/2021) 30 mL 0    B Complex Vitamins (B COMPLEX 1 PO) Take 2 tablets by mouth daily      bimatoprost (LUMIGAN) 0 03 % ophthalmic drops Administer 1 drop to both eyes daily at bedtime        Calcium Carbonate (CALCIUM 600 PO) Take by mouth      Cholecalciferol (VITAMIN D) 2000 units CAPS Take 2 capsules by mouth daily      clotrimazole-betamethasone (LOTRISONE) 1-0 05 % cream Apply topically 2 (two) times a day for 7 days 30 g 0    Coenzyme Q10 (COQ10) 100 MG CAPS Take by mouth      gabapentin (NEURONTIN) 300 mg capsule TAKE ONE CAPSULE BY MOUTH AT BEDTIME AS NEEDED FOR PAIN 30 capsule 11    GLUCOSAMINE-MSM-HYALURONIC ACD PO Take 1 capsule by mouth 3 (three) times a day      GRAPE SEED EXTRACT PO Take by mouth      levETIRAcetam (KEPPRA) 500 mg tablet Take 1 tablet by mouth twice a day 60 tablet 5    losartan (COZAAR) 25 mg tablet TAKE ONE TABLET BY MOUTH ONCE EVERY DAY 30 tablet 5    Magnesium 400 MG TABS Take by mouth      metoprolol succinate (TOPROL-XL) 200 MG 24 hr tablet TAKE ONE TABLET BY MOUTH ONCE EVERY DAY 90 tablet 3    milk thistle 175 MG tablet Take 175 mg by mouth daily      mometasone (ELOCON) 0 1 % cream Apply to bilateral external ear once weekly at bedtime 45 g 3    Multiple Vitamins-Minerals (MULTIVITAMIN ADULT PO) Take 1 capsule by mouth daily        Omega-3 Fatty Acids (FISH OIL PO) Take 1,000 mg by mouth daily        Turmeric 500 MG CAPS Take 1 capsule by mouth daily        VOLTAREN 1 % APPLY 2G TOPICALLY TO AFFECTED AREA FOUR TIMES A DAY AS DIRECTED 100 g 5     No current facility-administered medications for this visit        Allergies   Allergen Reactions    Carbamazepine Myalgia     flu-like symptoms    Epinephrine Other (See Comments)     "weakness"    Iodides Itching    Morphine And Related     Sulfamethoxazole-Trimethoprim     Latex Rash    Lisinopril Cough    Topiramate Rash      Immunizations:     Immunization History   Administered Date(s) Administered    INFLUENZA 12/08/2020    Pneumococcal Conjugate 13-Valent 03/29/2018    Pneumococcal Polysaccharide PPV23 03/29/2018    SARS-CoV-2 / COVID-19 mRNA IM ([facility]) 01/30/2021, 02/27/2021    Td (adult), adsorbed 03/29/2018    Tdap 07/23/2020    Zoster 03/29/2018      Health Maintenance:         Topic Date Due    Colonoscopy Surveillance  01/18/2010    Hepatitis C Screening  Completed     There are no preventive care reminders to display for this patient  Medicare Health Risk Assessment:     /90   Pulse 64   Temp 98 9 °F (37 2 °C)   Ht 5' 4 5" (1 638 m)   Wt 89 6 kg (197 lb 9 6 oz)   LMP  (LMP Unknown)   BMI 33 39 kg/m²          Health Risk Assessment:   Patient rates overall health as fair  Patient feels that their physical health rating is slightly worse  Patient is very satisfied with their life  Eyesight was rated as same  Hearing was rated as same  Patient feels that their emotional and mental health rating is same  Patients states they are never, rarely angry  Patient states they are sometimes unusually tired/fatigued  Pain experienced in the last 7 days has been some  Patient states that she has experienced weight loss or gain in last 6 months  Fall Risk Screening: In the past year, patient has experienced: no history of falling in past year      Urinary Incontinence Screening:   Patient has not leaked urine accidently in the last six months  Home Safety:  Patient has trouble with stairs inside or outside of their home  Patient has working smoke alarms and has no working carbon monoxide detector  Home safety hazards include: loose rugs on the floor   Pt claims has a loose carpet in bathroom    Nutrition:   Current diet is Regular  Medications:   Patient is currently taking over-the-counter supplements  OTC medications include: see medication list  Patient is able to manage medications  Activities of Daily Living (ADLs)/Instrumental Activities of Daily Living (IADLs):   Walk and transfer into and out of bed and chair?: Yes  Dress and groom yourself?: Yes    Bathe or shower yourself?: Yes    Feed yourself? Yes  Do your laundry/housekeeping?: Yes  Manage your money, pay your bills and track your expenses?: Yes  Make your own meals?: Yes    Do your own shopping?: Yes    Previous Hospitalizations:   Any hospitalizations or ED visits within the last 12 months?: No      Advance Care Planning:   Living will: Yes    Durable POA for healthcare: Yes    Advanced directive: Yes    Advanced directive counseling given: Yes    Five wishes given: No    Patient declined ACP directive: Yes    End of Life Decisions reviewed with patient: Yes    Provider agrees with end of life decisions: Yes      Cognitive Screening:   Provider or family/friend/caregiver concerned regarding cognition?: No    PREVENTIVE SCREENINGS      Cardiovascular Screening:    General: Screening Not Indicated and History Lipid Disorder      Diabetes Screening:     General: Screening Current      Breast Cancer Screening:     General: History Breast Cancer      Cervical Cancer Screening:    General: Screening Not Indicated      Abdominal Aortic Aneurysm (AAA) Screening:        General: Screening Not Indicated      Lung Cancer Screening:     General: Screening Not Indicated      Hepatitis C Screening:    General: Screening Current    Screening, Brief Intervention, and Referral to Treatment (SBIRT)    Screening  Typical number of drinks in a day: 0  Typical number of drinks in a week: 1  Interpretation: Low risk drinking behavior      Single Item Drug Screening:  How often have you used an illegal drug (including marijuana) or a prescription medication for non-medical reasons in the past year? never    Single Item Drug Screen Score: 0  Interpretation: Negative screen for possible drug use disorder    Brief Intervention  Alcohol & drug use screenings were reviewed  No concerns regarding substance use disorder identified         Duc Azar, DO

## 2021-04-22 NOTE — PROGRESS NOTES
Assessment and Plan:  1  Dyspnea exertion, PO2 in office 96%, EKG was normal   Will check pulmonary function testing is stress echocardiogram and chest x-ray  Albuterol was ordered  Recheck in 1 month if worsen patient to call office   2  Hyperglycemia diet controlled hemoglobin A1c is 6 2  Explained is 6 5 she would be diabetic  3  Hyperlipidemia, stable not at goal with patient declines any medication  4  Breast cancer, patient follows with Oncology at [facility]  5  Vitamin-D deficiency, stable continue present therapy   6  Seizure disorder, stable follows with Neurology  7  Hypertension, stable continue present therapy  8  Colon polyp  Patient has not followed up with Gastroenterology  9  DJD, patient was given a handicap parking placard  10  Osteopenia DEXA 11/19  11  Acute cystitis, treated with amoxicillin repeat UA with culture in 2 weeks  12  Health care maintenance, Medicare a [address] completed   13  A midline cystocele patient has pessary follows with Urogynecology  14   Recheck 1 month sooner if needed it if symptomatology gets worse patient should report to [facility] Department    Problem List Items Addressed This Visit        Digestive    Benign neoplasm of large intestine      Patient has not followed up with Gastroenterology            Cardiovascular and Mediastinum    Essential hypertension      Stable continue present therapy            Nervous and Auditory    Complex partial seizure ([address] )      Stable follows with neurology            Musculoskeletal and Integument    Osteoarthritis      Handicap parking placard completed         Moderate osteopenia      DEXA 11/19            Genitourinary    Midline cystocele      Follows with Urogynecology using pessary            Other    Vitamin D deficiency      Blood work ordered         Hypercholesterolemia      LDL still mildly elevated patient declines any medication         Hyperglycemia      Diet controlled hemoglobin A1c is 6 2 explained at 6 5 she would be diabetic         Breast cancer [facility])      Patient follows with [id number] care maintenance      Medicare a [address] completed           Other Visit Diagnoses     Dyspnea on exertion    -  Primary    Relevant Medications    albuterol (PROVENTIL HFA,VENTOLIN HFA) 90 mcg/act inhaler    Other Relevant Orders    XR chest pa & lateral    Complete PFT with post bronchodilator    Echo stress test w contrast if indicated    Acute cystitis without hematuria        Relevant Medications    amoxicillin (AMOXIL) 500 mg capsule    Other Relevant Orders    UA (URINE) with reflex to Scope    Urine culture                 Diagnoses and all orders for this visit:    Dyspnea on exertion  -     XR chest pa & lateral; Future  -     Complete PFT with post bronchodilator  -     Echo stress test w contrast if indicated; Future  -     albuterol (PROVENTIL HFA,VENTOLIN HFA) 90 mcg/act inhaler; Inhale 2 puffs every 6 (six) hours as needed for wheezing    Hyperglycemia    Midline cystocele    Malignant neoplasm of female breast, unspecified estrogen receptor status, unspecified laterality, unspecified site of breast ([address] )    Hypercholesterolemia    Vitamin D deficiency    Partial symptomatic epilepsy with complex partial seizures, not intractable, without status epilepticus ([address] )    Essential hypertension    Benign neoplasm of large intestine, unspecified part of large intestine    Primary osteoarthritis involving multiple joints    Moderate osteopenia    Acute cystitis without hematuria  -     amoxicillin (AMOXIL) 500 mg capsule; Take 1 capsule twice daily for 5 days for bladder infection  -     UA (URINE) with reflex to Scope; Future  -     Urine culture; Future    Health care maintenance              Subjective:      Patient ID: Tejas Valentin is a 68 y o  female      CC:    Chief Complaint   Patient presents with    Follow-up     Pt here for f/u to chronic condition, lab results  HPI:     Patient states occasion she gets short winded with activity  Denies chest pain nausea vomiting palpitations or diaphoresis  Denies fever chills  Patient does states she does have some more frequent urination  Negative dysuria back pain  Blood work was discussed with the patient      The following portions of the patient's history were reviewed and updated as appropriate: allergies, current medications, past family history, past medical history, past social history, past surgical history and problem list       Review of Systems   Constitutional:        HPI   HENT: Negative  Eyes: Negative  Respiratory:        HPI   Cardiovascular:        HPI   Gastrointestinal: Negative  Endocrine: Negative  Genitourinary:        HPI   Musculoskeletal: Negative  Skin: Negative  Allergic/Immunologic: Negative  Neurological: Negative  Hematological: Negative  Psychiatric/Behavioral: Negative  Data to review:       Objective:    Vitals:    04/22/21 1033 04/22/21 1101   BP: 132/90    Pulse: 64    Temp: 98 9 °F (37 2 °C)    SpO2:  96%   Weight: 89 6 kg (197 lb 9 6 oz)    Height: 5' 4 5" (1 638 m)         Physical Exam  Vitals signs and nursing note reviewed  Constitutional:       Appearance: Normal appearance  HENT:      Head: Normocephalic and atraumatic  Eyes:      General: No scleral icterus  Neck:      Musculoskeletal: Neck supple  Vascular: No carotid bruit  Cardiovascular:      Rate and Rhythm: Normal rate and regular rhythm  Heart sounds: Normal heart sounds  Pulmonary:      Effort: Pulmonary effort is normal       Breath sounds: Normal breath sounds  Abdominal:      General: Bowel sounds are normal       Palpations: Abdomen is soft  Tenderness: There is no abdominal tenderness  There is no right CVA tenderness or left CVA tenderness  Musculoskeletal:      Right lower leg: No edema  Left lower leg: No edema     Skin:     General: Skin is warm and dry  Neurological:      General: No focal deficit present  Mental Status: She is alert  Psychiatric:         Mood and Affect: Mood normal            BMI Counseling: Body mass index is 33 39 kg/m²  The BMI is above normal  Nutrition recommendations include moderation in carbohydrate intake and reducing intake of cholesterol  Exercise recommendations include exercising 3-5 times per week

## 2021-05-03 ENCOUNTER — APPOINTMENT (OUTPATIENT)
Dept: LAB | Age: 81
End: 2021-05-03
Payer: MEDICARE

## 2021-05-03 ENCOUNTER — APPOINTMENT (OUTPATIENT)
Dept: RADIOLOGY | Age: 81
End: 2021-05-03
Payer: MEDICARE

## 2021-05-03 DIAGNOSIS — R06.00 DYSPNEA ON EXERTION: Primary | ICD-10-CM

## 2021-05-03 DIAGNOSIS — I10 ESSENTIAL HYPERTENSION: ICD-10-CM

## 2021-05-03 DIAGNOSIS — R06.00 DYSPNEA ON EXERTION: ICD-10-CM

## 2021-05-03 DIAGNOSIS — N30.00 ACUTE CYSTITIS WITHOUT HEMATURIA: ICD-10-CM

## 2021-05-03 LAB
BACTERIA UR QL AUTO: ABNORMAL /HPF
BILIRUB UR QL STRIP: NEGATIVE
CAOX CRY URNS QL MICRO: ABNORMAL /HPF
CLARITY UR: ABNORMAL
COLOR UR: YELLOW
GLUCOSE UR STRIP-MCNC: NEGATIVE MG/DL
HGB UR QL STRIP.AUTO: NEGATIVE
KETONES UR STRIP-MCNC: NEGATIVE MG/DL
LEUKOCYTE ESTERASE UR QL STRIP: ABNORMAL
NITRITE UR QL STRIP: NEGATIVE
NON-SQ EPI CELLS URNS QL MICRO: ABNORMAL /HPF
PH UR STRIP.AUTO: 5.5 [PH]
PROT UR STRIP-MCNC: NEGATIVE MG/DL
RBC #/AREA URNS AUTO: ABNORMAL /HPF
SP GR UR STRIP.AUTO: 1.02 (ref 1–1.03)
UROBILINOGEN UR QL STRIP.AUTO: 0.2 E.U./DL
WBC #/AREA URNS AUTO: ABNORMAL /HPF

## 2021-05-03 PROCEDURE — 81001 URINALYSIS AUTO W/SCOPE: CPT

## 2021-05-03 PROCEDURE — 71046 X-RAY EXAM CHEST 2 VIEWS: CPT

## 2021-05-03 PROCEDURE — 87086 URINE CULTURE/COLONY COUNT: CPT

## 2021-05-03 RX ORDER — BIMATOPROST 0.3 MG/ML
1 SOLUTION/ DROPS OPHTHALMIC
Qty: 7.5 ML | Refills: 3 | OUTPATIENT
Start: 2021-05-03

## 2021-05-03 RX ORDER — LOSARTAN POTASSIUM 25 MG/1
TABLET ORAL
Qty: 30 TABLET | Refills: 11 | Status: SHIPPED | OUTPATIENT
Start: 2021-05-03 | End: 2022-03-17

## 2021-05-03 NOTE — TELEPHONE ENCOUNTER
Spoke to Jeyson at central scheduling  She cancelled the original order and put her in for the Dobutamine Stress test on the same date and time as the original Patient aware of prep per Jeyson

## 2021-05-03 NOTE — TELEPHONE ENCOUNTER
TS- I spoke to patient today and she said for her Echo that is scheduled for 5/19 she can NOT walk on the treadmill  I called the department  And they need a new order put in  For a stress echo with Dobutamine (not exercise)   Thank You

## 2021-05-05 LAB — BACTERIA UR CULT: NORMAL

## 2021-05-19 ENCOUNTER — HOSPITAL ENCOUNTER (OUTPATIENT)
Dept: NON INVASIVE DIAGNOSTICS | Facility: HOSPITAL | Age: 81
Discharge: HOME | End: 2021-05-19
Payer: MEDICARE

## 2021-05-19 VITALS
SYSTOLIC BLOOD PRESSURE: 180 MMHG | BODY MASS INDEX: 33.63 KG/M2 | DIASTOLIC BLOOD PRESSURE: 90 MMHG | WEIGHT: 197 LBS | HEIGHT: 64 IN

## 2021-05-19 DIAGNOSIS — R06.00 DYSPNEA ON EXERTION: Primary | ICD-10-CM

## 2021-05-19 DIAGNOSIS — R94.31 ABNORMAL EKG: ICD-10-CM

## 2021-05-19 DIAGNOSIS — R00.8 TRIGEMINY: ICD-10-CM

## 2021-05-19 DIAGNOSIS — R06.00 DYSPNEA ON EXERTION: ICD-10-CM

## 2021-05-19 PROBLEM — R06.09 DYSPNEA ON EXERTION: Status: ACTIVE | Noted: 2021-05-19

## 2021-05-19 PROCEDURE — 93351 STRESS TTE COMPLETE: CPT | Performed by: INTERNAL MEDICINE

## 2021-05-19 PROCEDURE — 93350 STRESS TTE ONLY: CPT

## 2021-05-19 RX ORDER — DOBUTAMINE HYDROCHLORIDE 200 MG/100ML
10 INJECTION INTRAVENOUS CONTINUOUS
Status: DISCONTINUED | OUTPATIENT
Start: 2021-05-19 | End: 2021-05-20 | Stop reason: HOSPADM

## 2021-05-19 RX ADMIN — DOBUTAMINE HYDROCHLORIDE 10 MCG/KG/MIN: 200 INJECTION INTRAVENOUS at 14:28

## 2021-05-20 LAB
CHEST PAIN STATEMENT: NORMAL
MAX DIASTOLIC BP: 110 MMHG
MAX HEART RATE: 155 BPM
MAX PREDICTED HEART RATE: 142 BPM
MAX. SYSTOLIC BP: 180 MMHG
PROTOCOL NAME: NORMAL
TARGET HR FORMULA: NORMAL
TEST INDICATION: NORMAL
TIME IN EXERCISE PHASE: NORMAL

## 2021-05-31 ENCOUNTER — OFFICE VISIT (OUTPATIENT)
Dept: URGENT CARE | Age: 81
End: 2021-05-31
Payer: MEDICARE

## 2021-05-31 VITALS
DIASTOLIC BLOOD PRESSURE: 84 MMHG | TEMPERATURE: 97.5 F | RESPIRATION RATE: 18 BRPM | SYSTOLIC BLOOD PRESSURE: 168 MMHG | WEIGHT: 198 LBS | BODY MASS INDEX: 33.8 KG/M2 | HEIGHT: 64 IN | HEART RATE: 78 BPM | OXYGEN SATURATION: 96 %
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
## 2021-05-31 PROCEDURE — G0463 HOSPITAL OUTPT CLINIC VISIT: HCPCS | Performed by: NURSE PRACTITIONER

## 2021-05-31 PROCEDURE — 99213 OFFICE O/P EST LOW 20 MIN: CPT | Performed by: NURSE PRACTITIONER

## 2021-05-31 RX ORDER — DOXYCYCLINE 100 MG/1
100 TABLET ORAL 2 TIMES DAILY
Qty: 28 TABLET | Refills: 0 | Status: SHIPPED | OUTPATIENT
Start: 2021-05-31 | End: 2021-06-14

## 2021-05-31 NOTE — PATIENT INSTRUCTIONS
Follow upw ith pcp   Take meds as directed  Keep the area clean  Redness is 4 5in by 2 in, marked, keep an eye on the area  If worse go to the ER  Discussed the use of antibiotic and the effects on magnesium         Tick Bite   WHAT YOU NEED TO KNOW:   Most tick bites are not dangerous, but ticks can pass disease or infection when they bite  Ticks need to be removed quickly  You may have redness, pain, itching, and swelling near the bite  Blisters may also develop  DISCHARGE INSTRUCTIONS:   Return to the emergency department if:   · You have trouble walking or moving your legs  · You have joint pain, muscle pain, or muscle weakness within 1 month of a tick bite  · You have a fever, chills, headache, or rash  Contact your healthcare provider if:   · You cannot remove the tick  · The tick's head is stuck in your skin  · You have questions or concerns about your condition or care  Medicines:   · Medicines  help decrease pain, redness, itching, and swelling  You may also need medicine to prevent or fight a bacterial infection  These medicines may be given as a cream, lotion, or pill  · Take your medicine as directed  Contact your healthcare provider if you think your medicine is not helping or if you have side effects  Tell him of her if you are allergic to any medicine  Keep a list of the medicines, vitamins, and herbs you take  Include the amounts, and when and why you take them  Bring the list or the pill bottles to follow-up visits  Carry your medicine list with you in case of an emergency  How to remove a tick:  Remove the tick as soon as possible to help prevent disease or infection  You are less likely to get sick from a tick bite if you remove the tick within 24 hours  Do not use petroleum jelly, nail polish, rubbing alcohol, or heat  These do not work and may be dangerous  Do the following to remove a tick:  · First, try a soapy cotton ball  Soak a cotton ball in liquid soap   Cover the tick with the cotton ball for 30 seconds  The tick may come off with the cotton ball when you pull it away  · Use tweezers if the soapy cotton ball does not work  Grasp the tick as close to your skin as possible  Pull the tick straight up and out  Do not touch the tick with your bare hands  · Do not twist or jerk the tick suddenly, because this may break off the tick's head or mouth parts  Do not leave any part of the tick in your skin  · Do not crush or squeeze the tick since its body may be infected with germs  Flush the tick down the toilet  · After the tick is removed, clean the area of the bite with rubbing alcohol  Then wash your hands with soap and water  Apply ice  on your bite for 15 to 20 minutes every hour or as directed  Use an ice pack, or put crushed ice in a plastic bag  Cover it with a towel before you apply it to your skin  Ice helps prevent tissue damage and decreases swelling and pain  Prevent a tick bite:  Ticks live in areas covered by brush and grass  They may even be found in your lawn if you live in certain areas  Outdoor pets can carry ticks inside the house  Ticks can grab onto you or your clothes when you walk by grass or brush  If you go into areas that contain many trees, tall grasses, and underbrush, do the following:  · Wear light colored pants and a long-sleeved shirt  Tuck your pants into your socks or boots  Tuck in your shirt  Wear sleeves that fit close to the skin at your wrists and neck  This will help prevent ticks from crawling through gaps in your clothing and onto your skin  Wear a hat in areas with trees  · Apply insect repellant on your skin  The insect repellant should contain DEET  Do not put insect repellant on skin that is cut, scratched, or irritated  Always use soap and water to wash the insect repellant off as soon as possible once you are indoors  Do not apply insect repellant on your child's face or hands      · Spray insect repellant onto your clothes  Use permethrin spray  This spray kills ticks that crawl on your clothing  Be sure to spray the tops of your boots, bottom of pant legs, and sleeve cuffs  As soon as possible, wash and dry clothing in hot water and high heat  · Check your clothing, hair, and skin for ticks  Shower within 2 hours of coming indoors  Carefully check the hairline, armpits, neck, and waist  Check your pets and children for ticks  Remove ticks from pets the same way as you remove them from people  · Decrease the risk for ticks in your yard  Ticks like to live in shady, moist areas  [place] Gera your lawn regularly to keep the grass short  Trim the grass around birdbaths and fences  Cut branches that are overgrown and take them out of the yard  Clear out leaf piles  Chasidy Velha firewood in a dry, malou area  Follow up with your healthcare provider as directed:  Write down your questions so you remember to ask them during your visits  © Copyright [address] Information is for End User's use only and may not be sold, redistributed or otherwise used for commercial purposes  All illustrations and images included in CareNotes® are the copyrighted property of A D [employer] M [employer]  or [address] [place] [address] [place]   The above information is an  only  It is not intended as medical advice for individual conditions or treatments  Talk to your doctor, nurse or pharmacist before following any medical regimen to see if it is safe and effective for you

## 2021-05-31 NOTE — PROGRESS NOTES
NAME: Festus Cartagena is a 68 y o  female  : 1940    MRN: [id number]    /84 Comment: manual  Pulse 78   Temp 97 5 °F (36 4 °C)   Resp 18   Ht 5' 4" (1 626 m)   Wt 89 8 kg (198 lb)   LMP  (LMP Unknown)   SpO2 96%   BMI 33 99 kg/m²     Assessment and Plan   Tick bite, initial encounter [Q99  XXXA]  1  Tick bite, initial encounter  doxycycline (ADOXA) 100 MG tablet       Minerva Springergartner was seen today for insect bite  Diagnoses and all orders for this visit:    Tick bite, initial encounter  -     doxycycline (ADOXA) 100 MG tablet; Take 1 tablet (100 mg total) by mouth 2 (two) times a day for 14 days        Patient Instructions   Patient Instructions   Follow upw ith pcp   Take meds as directed  Keep the area clean  Redness is 4 5in by 2 in, marked, keep an eye on the area  If worse go to the ER  Discussed the use of antibiotic and the effects on magnesium         Tick Bite   WHAT YOU NEED TO KNOW:   Most tick bites are not dangerous, but ticks can pass disease or infection when they bite  Ticks need to be removed quickly  You may have redness, pain, itching, and swelling near the bite  Blisters may also develop  DISCHARGE INSTRUCTIONS:   Return to the emergency department if:   · You have trouble walking or moving your legs  · You have joint pain, muscle pain, or muscle weakness within 1 month of a tick bite  · You have a fever, chills, headache, or rash  Contact your healthcare provider if:   · You cannot remove the tick  · The tick's head is stuck in your skin  · You have questions or concerns about your condition or care  Medicines:   · Medicines  help decrease pain, redness, itching, and swelling  You may also need medicine to prevent or fight a bacterial infection  These medicines may be given as a cream, lotion, or pill  · Take your medicine as directed  Contact your healthcare provider if you think your medicine is not helping or if you have side effects   Tell him of her if you are allergic to any medicine  Keep a list of the medicines, vitamins, and herbs you take  Include the amounts, and when and why you take them  Bring the list or the pill bottles to follow-up visits  Carry your medicine list with you in case of an emergency  How to remove a tick:  Remove the tick as soon as possible to help prevent disease or infection  You are less likely to get sick from a tick bite if you remove the tick within 24 hours  Do not use petroleum jelly, nail polish, rubbing alcohol, or heat  These do not work and may be dangerous  Do the following to remove a tick:  · First, try a soapy cotton ball  Soak a cotton ball in liquid soap  Cover the tick with the cotton ball for 30 seconds  The tick may come off with the cotton ball when you pull it away  · Use tweezers if the soapy cotton ball does not work  Grasp the tick as close to your skin as possible  Pull the tick straight up and out  Do not touch the tick with your bare hands  · Do not twist or jerk the tick suddenly, because this may break off the tick's head or mouth parts  Do not leave any part of the tick in your skin  · Do not crush or squeeze the tick since its body may be infected with germs  Flush the tick down the toilet  · After the tick is removed, clean the area of the bite with rubbing alcohol  Then wash your hands with soap and water  Apply ice  on your bite for 15 to 20 minutes every hour or as directed  Use an ice pack, or put crushed ice in a plastic bag  Cover it with a towel before you apply it to your skin  Ice helps prevent tissue damage and decreases swelling and pain  Prevent a tick bite:  Ticks live in areas covered by brush and grass  They may even be found in your lawn if you live in certain areas  Outdoor pets can carry ticks inside the house  Ticks can grab onto you or your clothes when you walk by grass or brush   If you go into areas that contain many trees, tall grasses, and underbrush, do the following:  · Wear light colored pants and a long-sleeved shirt  Tuck your pants into your socks or boots  Tuck in your shirt  Wear sleeves that fit close to the skin at your wrists and neck  This will help prevent ticks from crawling through gaps in your clothing and onto your skin  Wear a hat in areas with trees  · Apply insect repellant on your skin  The insect repellant should contain DEET  Do not put insect repellant on skin that is cut, scratched, or irritated  Always use soap and water to wash the insect repellant off as soon as possible once you are indoors  Do not apply insect repellant on your child's face or hands  · Spray insect repellant onto your clothes  Use permethrin spray  This spray kills ticks that crawl on your clothing  Be sure to spray the tops of your boots, bottom of pant legs, and sleeve cuffs  As soon as possible, wash and dry clothing in hot water and high heat  · Check your clothing, hair, and skin for ticks  Shower within 2 hours of coming indoors  Carefully check the hairline, armpits, neck, and waist  Check your pets and children for ticks  Remove ticks from pets the same way as you remove them from people  · Decrease the risk for ticks in your yard  Ticks like to live in shady, moist areas  [place] your lawn regularly to keep the grass short  Trim the grass around birdbaths and fences  Cut branches that are overgrown and take them out of the yard  Clear out leaf piles  [place] [employer] firewood in a dry, malou area  Follow up with your healthcare provider as directed:  Write down your questions so you remember to ask them during your visits  © Copyright [address] Information is for End User's use only and may not be sold, redistributed or otherwise used for commercial purposes  All illustrations and images included in CareNotes® are the copyrighted property of A D A M , Inc  or [employer]  The above information is an  only   It is not intended as medical advice for individual conditions or treatments  Talk to your doctor, nurse or pharmacist before following any medical regimen to see if it is safe and effective for you  Proceed to the nearest ER if symptoms worsen, Follow up with your PCP  Continue to social distance, wash your hands, and wear your masks  Please continue to follow the CDC  gov guidelines daily for they are subject to change on COVID-19    Chief Complaint     Chief Complaint   Patient presents with   [address]     pt reports tick bite 3 days ago on abd RLQ and now redness and warmth in area         History of Present Illness       Patient is a 53-year-old female who is here today with eye tick bite that was on her lower right abdomen present for the past 3 days  She noticed it 3 days ago when she removed the tick it was slightly engorged has been putting topical antibiotic ointment to the top of it but came in today to have it seen  I measured the area of redness which is 4 5 in x 2 in in size  Denies pain to the sites is no drainage or oozing but has a small bull's-eye rash  Patient will be placed on antibiotic to take twice a day for 14 days and aware to follow-up her family doctor  She denies having any worsening symptoms that her normal typical aches and pains and headaches  She denies having any body aches increased lethargy fatigue or fevers  Review of Systems   Review of Systems   Skin: Positive for rash (lower right quadrant)           Current Medications       Current Outpatient Medications:     albuterol (PROVENTIL HFA,VENTOLIN HFA) 90 mcg/act inhaler, Inhale 2 puffs every 6 (six) hours as needed for wheezing, Disp: 1 Inhaler, Rfl: 5    Alpha-Lipoic Acid 100 MG CAPS, Take by mouth, Disp: , Rfl:     ALPRAZolam (XANAX) 0 25 mg tablet, TAKE ONE TABLET BY MOUTH EVERY DAY AS NEEDED FOR ANXIETY, Disp: 30 tablet, Rfl: 1    amoxicillin (AMOXIL) 500 mg capsule, TAKE 4 CAPSULES BY MOUTH ONE HOUR PRIOR TO OFFICE VISIT, Disp: , Rfl:     aspirin 81 MG tablet, Take 162 mg by mouth, Disp: , Rfl:     B Complex Vitamins (B COMPLEX 1 PO), Take 2 tablets by mouth daily, Disp: , Rfl:     bimatoprost (LUMIGAN) 0 03 % ophthalmic drops, Administer 1 drop to both eyes daily at bedtime  , Disp: , Rfl:     Calcium Carbonate (CALCIUM 600 PO), Take by mouth, Disp: , Rfl:     Cholecalciferol (VITAMIN D) 2000 units CAPS, Take 2 capsules by mouth daily, Disp: , Rfl:     Coenzyme Q10 (COQ10) 100 MG CAPS, Take by mouth, Disp: , Rfl:     gabapentin (NEURONTIN) 300 mg capsule, TAKE ONE CAPSULE BY MOUTH AT BEDTIME AS NEEDED FOR PAIN, Disp: 30 capsule, Rfl: 11    GLUCOSAMINE-MSM-HYALURONIC ACD PO, Take 1 capsule by mouth 3 (three) times a day, Disp: , Rfl:     GRAPE SEED EXTRACT PO, Take by mouth, Disp: , Rfl:     levETIRAcetam (KEPPRA) 500 mg tablet, Take 1 tablet by mouth twice a day, Disp: 60 tablet, Rfl: 5    losartan (COZAAR) 25 mg tablet, TAKE 1 TABLET BY MOUTH EVERY DAY, Disp: 30 tablet, Rfl: 11    Magnesium 400 MG TABS, Take by mouth, Disp: , Rfl:     metoprolol succinate (TOPROL-XL) 200 MG 24 hr tablet, TAKE ONE TABLET BY MOUTH ONCE EVERY DAY, Disp: 90 tablet, Rfl: 3    milk thistle 175 MG tablet, Take 175 mg by mouth daily, Disp: , Rfl:     mometasone (ELOCON) 0 1 % cream, Apply to bilateral external ear once weekly at bedtime, Disp: 45 g, Rfl: 3    Multiple Vitamins-Minerals (MULTIVITAMIN ADULT PO), Take 1 capsule by mouth daily  , Disp: , Rfl:     Omega-3 Fatty Acids (FISH OIL PO), Take 1,000 mg by mouth daily  , Disp: , Rfl:     Turmeric 500 MG CAPS, Take 1 capsule by mouth daily  , Disp: , Rfl:     VOLTAREN 1 %, APPLY 2G TOPICALLY TO AFFECTED AREA FOUR TIMES A DAY AS DIRECTED, Disp: 100 g, Rfl: 5    azelastine (ASTELIN) 0 1 % nasal spray, 2 sprays into each nostril 2 (two) times a day Use in each nostril as directed (Patient not taking: Reported on 3/25/2021), Disp: 30 mL, Rfl: 0   clotrimazole-betamethasone (LOTRISONE) 1-0 05 % cream, Apply topically 2 (two) times a day for 7 days, Disp: 30 g, Rfl: 0    doxycycline (ADOXA) 100 MG tablet, Take 1 tablet (100 mg total) by mouth 2 (two) times a day for 14 days, Disp: 28 tablet, Rfl: 0    Current Allergies     Allergies as of 05/31/2021 - Reviewed 05/31/2021   Allergen Reaction Noted    Carbamazepine Myalgia 03/07/2007    Epinephrine Other (See Comments) 03/07/2007    Iodides Itching 12/10/2011    Morphine and related  05/04/2012    Sulfamethoxazole-trimethoprim  05/04/2012    Latex Rash 05/04/2012    Lisinopril Cough 01/23/2020    Topiramate Rash 03/07/2007              Past Medical History:   Diagnosis Date    Cancer ([facility] [address] )     left breast    Elevated serum alkaline phosphatase level     GERD (gastroesophageal reflux disease)     Glaucoma     Hiatal hernia     Hypercalcemia     Hyperglycemia     diet controlled    Hypertension     Rheumatic fever     Seizures (HCC)     TIA (transient ischemic attack)     Vitamin D deficiency        Past Surgical History:   Procedure Laterality Date    APPENDECTOMY      BREAST LUMPECTOMY Left     COLONOSCOPY      FOOT SURGERY Left     ORIF ANKLE FRACTURE Left     TONSILLECTOMY      TUBAL LIGATION         Family History   Problem Relation Age of Onset    Breast cancer Mother     Coronary artery disease Father     Bone cancer Brother     Lung cancer Brother          Medications have been verified      The following portions of the patient's history were reviewed and updated as appropriate: allergies, current medications, past family history, past medical history, past social history, past surgical history and problem list     Objective   /84 Comment: manual  Pulse 78   Temp 97 5 °F (36 4 °C)   Resp 18   Ht 5' 4" (1 626 m)   Wt 89 8 kg (198 lb)   LMP  (LMP Unknown)   SpO2 96%   BMI 33 99 kg/m²      Physical Exam     Physical Exam  Constitutional:       Appearance: Normal appearance  Cardiovascular:      Rate and Rhythm: Normal rate and regular rhythm  Pulmonary:      Effort: Pulmonary effort is normal       Breath sounds: Normal breath sounds and air entry  No decreased breath sounds  Skin:     General: Skin is warm  Capillary Refill: Capillary refill takes less than 2 seconds  Findings: Rash present  Neurological:      Mental Status: She is alert  Note: Portions of this record may have been created with voice recognition software  Occasional wrong word or "sound a like" substitutions may have occurred due to the inherent limitations of voice recognition software  Please read the chart carefully and recognize, using context, where substitutions have occurred  MARIE Mark

## 2021-06-04 ENCOUNTER — TELEPHONE (OUTPATIENT)
Dept: OBGYN CLINIC | Facility: CLINIC | Age: 81
End: 2021-06-04

## 2021-06-21 ENCOUNTER — HOSPITAL ENCOUNTER (OUTPATIENT)
Dept: PULMONOLOGY | Facility: HOSPITAL | Age: 81
Discharge: HOME | End: 2021-06-21
Payer: MEDICARE

## 2021-06-21 PROCEDURE — 94726 PLETHYSMOGRAPHY LUNG VOLUMES: CPT

## 2021-06-21 PROCEDURE — 94729 DIFFUSING CAPACITY: CPT

## 2021-06-21 PROCEDURE — 94726 PLETHYSMOGRAPHY LUNG VOLUMES: CPT | Performed by: INTERNAL MEDICINE

## 2021-06-21 PROCEDURE — 94060 EVALUATION OF WHEEZING: CPT | Performed by: INTERNAL MEDICINE

## 2021-06-21 PROCEDURE — 94729 DIFFUSING CAPACITY: CPT | Performed by: INTERNAL MEDICINE

## 2021-06-21 PROCEDURE — 94760 N-INVAS EAR/PLS OXIMETRY 1: CPT

## 2021-06-21 PROCEDURE — 94060 EVALUATION OF WHEEZING: CPT

## 2021-06-21 RX ORDER — ALBUTEROL SULFATE 2.5 MG/3ML
2.5 SOLUTION RESPIRATORY (INHALATION) ONCE AS NEEDED
Status: COMPLETED | OUTPATIENT
Start: 2021-06-21 | End: 2021-06-21

## 2021-06-21 RX ADMIN — ALBUTEROL SULFATE 2.5 MG: 2.5 SOLUTION RESPIRATORY (INHALATION) at 17:20

## 2021-07-11 ENCOUNTER — NURSE TRIAGE (OUTPATIENT)
Dept: OTHER | Facility: OTHER | Age: 81
End: 2021-07-11

## 2021-07-11 NOTE — TELEPHONE ENCOUNTER
Regarding: Went to the bathroom and notice blood on the tissue and concern   ----- Message from Harry Saldaña sent at 7/11/2021  3:52 PM EDT -----  " I just went to the bathroom and wet myself and I notice blood on the tissue and this been happening for the past week or ss and I am concern "

## 2021-07-11 NOTE — TELEPHONE ENCOUNTER
Reason for Disposition   Bleeding lasts for > 7 days    Answer Assessment - Initial Assessment Questions  1  AMOUNT: "Describe the bleeding that you are having " "How much bleeding is there?"     - SPOTTING: spotting, or pinkish / brownish mucous discharge; does not fill panti-liner or pad     - MILD:  less than 1 pad / hour; less than patient's usual menstrual bleeding    - MODERATE: 1-2 pads / hour; 1 menstrual cup every 6 hours; small-medium blood clots (e g , pea, grape, small coin)    - SEVERE: soaking 2 or more pads/hour for 2 or more hours; 1 menstrual cup every 2 hours; bleeding not contained by pads or continuous red blood from vagina; large blood clots (e g , golf ball, large coin)       Spotting     2  ONSET: "When did the bleeding begin?" "Is it continuing now?"      A few weeks ago     3  MENOPAUSE: "When was your last menstrual period?"       Last menstrual cycle was 22 years ago     4  ABDOMINAL PAIN: "Do you have any pain?" "How bad is the pain?"  (e g , Scale 1-10; mild, moderate, or severe)    - MILD (1-3): doesn't interfere with normal activities, abdomen soft and not tender to touch     - MODERATE (4-7): interferes with normal activities or awakens from sleep, tender to touch     - SEVERE (8-10): excruciating pain, doubled over, unable to do any normal activities       Denies     5  BLOOD THINNERS: "Do you take any blood thinners?" (e g , Coumadin/warfarin, Pradaxa/dabigatran, aspirin)      Denies     6  HORMONES: "Are you taking any hormone medications, prescription or OTC?" (e g , birth control pills, estrogen)      Denies     7  CAUSE: "What do you think is causing the bleeding?" (e g , recent gyn surgery, recent gyn procedure; known bleeding disorder, uterine cancer)        Unsure     8  HEMODYNAMIC STATUS: "Are you weak or feeling lightheaded?" If Yes, ask: "Can you stand and walk normally?"        Denies     9   OTHER SYMPTOMS: "What other symptoms are you having with the bleeding?" (e g , back pain, burning with urination, fever)      There is a bulging from the vagina  Patient believes her pessary may have dislodged or is causing the symptoms      Protocols used: VAGINAL BLEEDING - POSTMENOPAUSAL-ADULT-AH

## 2021-07-12 ENCOUNTER — TELEPHONE (OUTPATIENT)
Dept: OBGYN CLINIC | Facility: CLINIC | Age: 81
End: 2021-07-12

## 2021-07-12 NOTE — TELEPHONE ENCOUNTER
If the bleeding is only spotting, she can wait until her appt but if she is concerned, or it is heavy, we can bring her in sooner

## 2021-07-15 ENCOUNTER — OFFICE VISIT (OUTPATIENT)
Dept: OBGYN CLINIC | Facility: CLINIC | Age: 81
End: 2021-07-15
Payer: MEDICARE

## 2021-07-15 ENCOUNTER — ULTRASOUND (OUTPATIENT)
Dept: OBGYN CLINIC | Facility: CLINIC | Age: 81
End: 2021-07-15
Payer: MEDICARE

## 2021-07-15 VITALS
WEIGHT: 195.2 LBS | SYSTOLIC BLOOD PRESSURE: 122 MMHG | BODY MASS INDEX: 33.32 KG/M2 | DIASTOLIC BLOOD PRESSURE: 80 MMHG | HEIGHT: 64 IN

## 2021-07-15 DIAGNOSIS — N95.0 POST-MENOPAUSAL BLEEDING: Primary | ICD-10-CM

## 2021-07-15 DIAGNOSIS — N95.0 PMB (POSTMENOPAUSAL BLEEDING): Primary | ICD-10-CM

## 2021-07-15 DIAGNOSIS — N81.4 UTERINE PROLAPSE: ICD-10-CM

## 2021-07-15 DIAGNOSIS — N81.11 CYSTOCELE, MIDLINE: ICD-10-CM

## 2021-07-15 PROCEDURE — 99213 OFFICE O/P EST LOW 20 MIN: CPT | Performed by: OBSTETRICS & GYNECOLOGY

## 2021-07-15 PROCEDURE — 88305 TISSUE EXAM BY PATHOLOGIST: CPT | Performed by: PATHOLOGY

## 2021-07-15 PROCEDURE — 76830 TRANSVAGINAL US NON-OB: CPT | Performed by: OBSTETRICS & GYNECOLOGY

## 2021-07-15 PROCEDURE — 58100 BIOPSY OF UTERUS LINING: CPT | Performed by: OBSTETRICS & GYNECOLOGY

## 2021-07-15 RX ORDER — BIMATOPROST 0.01 %
DROPS OPHTHALMIC (EYE)
COMMUNITY
Start: 2021-05-03

## 2021-07-15 NOTE — PROGRESS NOTES
Endometrial biopsy    Date/Time: 7/15/2021 12:13 PM  Performed by: Magalie Damian DO  Authorized by: Magalie Damian DO   Universal Protocol:  Consent: Verbal consent obtained  Written consent obtained  Consent given by: patient  Patient understanding: patient states understanding of the procedure being performed  Patient consent: the patient's understanding of the procedure matches consent given  Patient identity confirmed: verbally with patient      Indication:     Indications: Post-menopausal bleeding      Progression of post-menopausal bleeding:  Partially resolved  Procedure:     Procedure: endometrial biopsy with Pipelle      A bivalve speculum was placed in the vagina: yes      Cervix cleaned and prepped: yes      The cervix was dilated: yes      Uterus sounded: yes      Uterus sound depth (cm):  8    Specimen collected: specimen collected and sent to pathology      Patient tolerated procedure well with no complications: yes    Findings:     Uterus size:  Non-gravid    Cervix: normal      Adnexa: normal    Comments:     Procedure comments:  EMB done without difficulty  Will await results  Pelvic US ordered  Pessary reinserted

## 2021-07-15 NOTE — PROGRESS NOTES
Assessment/Plan:     ADDENDUM -     US shows fluid in the endometrium and two polyps are noted  Ovaries are normal  I reviewed this with the patient and discussed D&C, hysteroscopy  Pt is very reluctant to do this  She wants to await the results of the emb  I explained the recommendations to remove polyps in menopausal patients  She understands this  Will discuss again after her results are in  She will also RTO 3 months for a pessary check  Uterine prolapse, cystocele- she will continue with the ring with support pessary  We will remove when she has her ultrasound and then reinserted after  Postmenopausal bleeding -there is no sign of vaginal or cervical excoriation from the pessary, no sign of blood at all in the vagina  Endometrial biopsy done without difficulty, see separate note  Ultrasound ordered  Diagnoses and all orders for this visit:    Post-menopausal bleeding  -     Endometrial biopsy    Other orders  -     Lumigan 0 01 % ophthalmic drops; INSTILL 1 DROP INTO BOTH EYES AT BEDTIME          Subjective:     Patient ID: Leah Jose is a 68 y o  female  Patient here for pessary check  She has also been having vaginal bleeding for about 1 week  It is light, she is wearing a panty liner every  She denies pain she is fairly certain that it is vaginal     No complaints regarding the pessary  Review of Systems   Constitutional: Negative  Gastrointestinal: Negative  Genitourinary: Positive for vaginal bleeding  Objective:     Physical Exam  Genitourinary:     General: Normal vulva  Vagina: Normal       Cervix: Normal       Uterus: Normal  With uterine prolapse  Adnexa: Right adnexa normal and left adnexa normal       Comments: No sign of blood in vaginal or at cervical os    #7 ring with support removed, cleaned and reinserted after her EMB

## 2021-07-15 NOTE — PROGRESS NOTES
AMB US Pelvic Non OB    Date/Time: 7/15/2021 12:31 PM  Performed by: Emily Law  Authorized by: Halima Land DO   Universal Protocol:  Patient identity confirmed: verbally with patient      Procedure details:     Technique:  Transvaginal US, Non-OB    Position: lithotomy exam    Uterine findings:     Length (cm): 7 71    Height (cm):  3 54    Width (cm):  5 5    Endometrial stripe: identified      Endometrium thickness (mm):  2 5  Left ovary findings:     Left ovary:  Visualized    Length (cm): 2    Height (cm): 1 1    Width (cm): 1 16  Right ovary findings:     Right ovary:  Visualized    Length (cm): 1 75    Height (cm): 1 05    Width (cm): 1 18  Other findings:     Free pelvic fluid: not identified      Free peritoneal fluid: not identified    Post-Procedure Details:     Impression:  Anteverted uterus is inhomogeneous throughout without fibroids noted  The endometrium contains fluid and also demonstrates two small echogenic masses which most likely represent endometrial polyps  The bilateral ovaries appear within normal limits  No free fluid  Tolerance: Tolerated well, no immediate complications    Complications: no complications    Additional Procedure Comments:      [employer] F8 E8C-RS transvaginal transducer Serial # [id number] was used to perform the examination today and subsequently followed with high level disinfection utilizing Trophon EPR procedure  Ultrasound performed at:     [address] [address]  Phone:  [phone]  Fax:  [phone]

## 2021-08-06 ENCOUNTER — OFFICE VISIT (OUTPATIENT)
Dept: OBGYN CLINIC | Facility: CLINIC | Age: 81
End: 2021-08-06
Payer: MEDICARE

## 2021-08-06 VITALS
WEIGHT: 193.8 LBS | HEIGHT: 64 IN | SYSTOLIC BLOOD PRESSURE: 122 MMHG | DIASTOLIC BLOOD PRESSURE: 78 MMHG | BODY MASS INDEX: 33.09 KG/M2

## 2021-08-06 DIAGNOSIS — N75.0 BARTHOLIN'S GLAND CYST: Primary | ICD-10-CM

## 2021-08-06 DIAGNOSIS — N95.0 POST-MENOPAUSAL BLEEDING: ICD-10-CM

## 2021-08-06 DIAGNOSIS — N81.11 CYSTOCELE, MIDLINE: ICD-10-CM

## 2021-08-06 DIAGNOSIS — N81.4 UTERINE PROLAPSE: ICD-10-CM

## 2021-08-06 PROCEDURE — 99213 OFFICE O/P EST LOW 20 MIN: CPT | Performed by: OBSTETRICS & GYNECOLOGY

## 2021-08-06 NOTE — PROGRESS NOTES
Assessment/Plan:       Right Bartholin cyst-this seems to be completely drained however I recommended that she use warm compresses 3 times a day  She may still no more drainage  If she has any pain or increase in size, she will call the office  Endometrial polyps-I recommended that she consider D&C, hysteroscopy or at the very least repeat endometrial biopsy but she very strongly declines this  She is worried that there will be an issue at the time of D&C because of her prolapse but I reassured her that this would not be a problem  Cystocele /uterine prolapse-she will return in 3 months for a pessary check  There are no diagnoses linked to this encounter  Subjective:     Patient ID: Portia Saavedra is a 68 y o  female  Patient here for evaluation of bleeding  She has a pessary that has been working quite well for her  Last month, she had bleeding and it was felt to be uterine and not from the pessary  Endometrial biopsy had insufficient tissue  Ultrasound was done and there was fluid noted to be in the endometrial cavity and 2 filling defects that appeared to be consistent endometrial polyps  I recommended a D&C, hysteroscopy to remove the polyps however the patient was extremely reluctant to have this done, she was very anxious about  I then suggested a repeat endometrial biopsy  This would not be ideal however we did not see any endometrial tissue on the 1st biopsy and we could at least attempt to sample her endometrium  She did not want to do this because it was very uncomfortable  Last night she had an episode of heavier bleeding and is still having some brown discharge today  She feels that it is more towards the front of her pad  She was very uncomfortable last night and felt something hard in the vagina  Review of Systems   Constitutional: Negative  Genitourinary: Positive for vaginal bleeding, vaginal discharge and vaginal pain           Objective:     Physical Exam  Genitourinary:         Comments: There was a large Bartholin cyst noted on the right side  There was a small amount of dark red to brown drainage noted  While palpating the size of the cyst, a large amount of fluid drained  This was mostly dark blood with some yellow fluid  No odor  Unable to obtain a culture due to the amount of blood  This completely drained  Excellent hemostasis  Ring with support pessary removed, cleaned and reinserted without difficulty    Remainder of the vagina was normal

## 2021-08-11 DIAGNOSIS — I10 ESSENTIAL HYPERTENSION: ICD-10-CM

## 2021-08-11 RX ORDER — METOPROLOL SUCCINATE 200 MG/1
TABLET, EXTENDED RELEASE ORAL
Qty: 90 TABLET | Refills: 2 | Status: SHIPPED | OUTPATIENT
Start: 2021-08-11 | End: 2022-03-07

## 2021-08-18 ENCOUNTER — CONSULT (OUTPATIENT)
Dept: CARDIOLOGY CLINIC | Facility: CLINIC | Age: 81
End: 2021-08-18
Payer: MEDICARE

## 2021-08-18 VITALS
WEIGHT: 195.4 LBS | SYSTOLIC BLOOD PRESSURE: 136 MMHG | HEIGHT: 64 IN | HEART RATE: 94 BPM | BODY MASS INDEX: 33.36 KG/M2 | DIASTOLIC BLOOD PRESSURE: 70 MMHG

## 2021-08-18 DIAGNOSIS — R00.8 TRIGEMINY: Primary | ICD-10-CM

## 2021-08-18 DIAGNOSIS — R94.31 ABNORMAL EKG: ICD-10-CM

## 2021-08-18 DIAGNOSIS — R06.00 DYSPNEA ON EXERTION: ICD-10-CM

## 2021-08-18 PROCEDURE — 93000 ELECTROCARDIOGRAM COMPLETE: CPT | Performed by: INTERNAL MEDICINE

## 2021-08-18 PROCEDURE — 99204 OFFICE O/P NEW MOD 45 MIN: CPT | Performed by: INTERNAL MEDICINE

## 2021-08-18 NOTE — PROGRESS NOTES
Cardiology   Aloma Kussmaul, MD Kayren Cartwright, MD Ather Mansoor, MD Arlyss Munda, DO, Tim Fiore DO, Sandra Coyle DO, [facility] [place]  -------------------------------------------------------------------  [facility], [address] [address]  [place]  [phone]                        [facility]                     1940                     [id number]        Reason for consultation: Exertional dyspnea      Assessment/Plan:    1  Exertional dyspnea:  Suspect multifactorial, secondary to age, deconditioning, obesity, possible underlying diastolic dysfunction  She does have PVCs at rest which actually improved with dobutamine administration with no evidence of myocardial ischemia by ECG or echocardiography  I do not think her PVCs are contributing to her exertional dyspnea, as they do not seem to be pathologic  I have recommended consideration of a low-dose diuretic InCase had diastolic dysfunction is contributing significantly to her shortness of breath, but at this time she politely declines and will discuss further with her PCP  I recommend checking an NT proBNP at some point  I recommended a heart healthy diet, exercise, and pursuing weight loss  I have offered to follow up with her in 6 months which she politely declines, and states she would like to follow-up with her PCP only at this time  She states albuterol inhaler has been helping, in light of her chronic tobacco use, there may be a component of COPD, although PFTs were unremarkable 04/2021 with normal spirometry  Interval History: This is a 46-year-old female with history of tobacco use up until 2010, presents today for evaluation of exertional dyspnea  She states this started over the past few months, and had undergone PFTs 04/2021 which were unremarkable  Subsequent stress echocardiogram 05/2021 was also unremarkable    There were frequent PVCs noted at rest with resolved with dobutamine administration  There is no evidence of myocardial ischemia by ECG or echocardiography  She underwent a chest x-ray 2021 which was also unremarkable  She states she does not sleep well at night, waking up early  She lives alone, is not sure if she snores at night  She denies significant lower extremity edema, orthopnea, paroxysmal nocturnal dyspnea               Vitals:  Vitals:    21 1620   BP: 136/70   Pulse: 94   Weight: 88 6 kg (195 lb 6 4 oz)   Height: 5' 4" (1 626 m)         Past Medical History:   Diagnosis Date    Cancer ([facility] [address] )     left breast    Elevated serum alkaline phosphatase level     GERD (gastroesophageal reflux disease)     Glaucoma     Hiatal hernia     Hypercalcemia     Hyperglycemia     diet controlled    Hypertension     Rheumatic fever     Seizures (HCC)     TIA (transient ischemic attack)     Vitamin D deficiency      Social History     Socioeconomic History    Marital status:      Spouse name: Not on file    Number of children: Not on file    Years of education: Not on file    Highest education level: Not on file   Occupational History    Occupation: retired   Tobacco Use    Smoking status: Former Smoker     Types: Cigarettes     Quit date:      Years since quittin 6    Smokeless tobacco: Never Used   Vaping Use    Vaping Use: Never used   Substance and Sexual Activity    Alcohol use: Yes     Comment: social    Drug use: No    Sexual activity: Not Currently   Other Topics Concern    Not on file   Social History Narrative    Activities - gardening    Daily coffee consumption- 1 cup per day    Daily tea consumption    Hearing loss     Social Determinants of Health     Financial Resource Strain:     Difficulty of Paying Living Expenses:    Food Insecurity:     Worried About Running Out of Food in the Last Year:     Krystian of Food in the Last Year:    Transportation Needs:     Lack of Transportation (Medical):      Lack of Transportation (Non-Medical):    Physical Activity:     Days of Exercise per Week:     Minutes of Exercise per Session:    Stress:     Feeling of Stress :    Social Connections:     Frequency of Communication with Friends and Family:     Frequency of Social Gatherings with Friends and Family:     Attends Worship Services:     Active Member of Clubs or Organizations:     Attends Club or Organization Meetings:     Marital Status:    Intimate Partner Violence:     Fear of Current or Ex-Partner:     Emotionally Abused:     Physically Abused:     Sexually Abused:       Family History   Problem Relation Age of Onset    Breast cancer Mother     Coronary artery disease Father     Bone cancer Brother     Lung cancer Brother      Past Surgical History:   Procedure Laterality Date    APPENDECTOMY      BREAST LUMPECTOMY Left     COLONOSCOPY      FOOT SURGERY Left     ORIF ANKLE FRACTURE Left     TONSILLECTOMY      TUBAL LIGATION         Current Outpatient Medications:     albuterol (PROVENTIL HFA,VENTOLIN HFA) 90 mcg/act inhaler, Inhale 2 puffs every 6 (six) hours as needed for wheezing, Disp: 1 Inhaler, Rfl: 5    amoxicillin (AMOXIL) 500 mg capsule, TAKE 4 CAPSULES BY MOUTH ONE HOUR PRIOR TO OFFICE VISIT, Disp: , Rfl:     aspirin 81 MG tablet, Take 162 mg by mouth, Disp: , Rfl:     B Complex Vitamins (B COMPLEX 1 PO), Take 2 tablets by mouth daily, Disp: , Rfl:     bimatoprost (LUMIGAN) 0 03 % ophthalmic drops, Administer 1 drop to both eyes daily at bedtime  , Disp: , Rfl:     Calcium Carbonate (CALCIUM 600 PO), Take by mouth, Disp: , Rfl:     Cholecalciferol (VITAMIN D) 2000 units CAPS, Take 2 capsules by mouth daily, Disp: , Rfl:     clotrimazole-betamethasone (LOTRISONE) 1-0 05 % cream, Apply topically 2 (two) times a day for 7 days (Patient taking differently: Apply topically 2 (two) times a day PRN), Disp: 30 g, Rfl: 0    Coenzyme Q10 (COQ10) 100 MG CAPS, Take by mouth, Disp: , Rfl:     gabapentin (NEURONTIN) 300 mg capsule, TAKE ONE CAPSULE BY MOUTH AT BEDTIME AS NEEDED FOR PAIN, Disp: 30 capsule, Rfl: 11    GLUCOSAMINE-MSM-HYALURONIC ACD PO, Take 1 capsule by mouth 3 (three) times a day, Disp: , Rfl:     GRAPE SEED EXTRACT PO, Take by mouth, Disp: , Rfl:     levETIRAcetam (KEPPRA) 500 mg tablet, Take 1 tablet by mouth twice a day, Disp: 60 tablet, Rfl: 5    losartan (COZAAR) 25 mg tablet, TAKE 1 TABLET BY MOUTH EVERY DAY, Disp: 30 tablet, Rfl: 11    Lumigan 0 01 % ophthalmic drops, INSTILL 1 DROP INTO BOTH EYES AT BEDTIME, Disp: , Rfl:     Magnesium 400 MG TABS, Take by mouth, Disp: , Rfl:     metoprolol succinate (TOPROL-XL) 200 MG 24 hr tablet, TAKE 1 TABLET BY MOUTH EVERY DAY, Disp: 90 tablet, Rfl: 2    milk thistle 175 MG tablet, Take 175 mg by mouth daily, Disp: , Rfl:     mometasone (ELOCON) 0 1 % cream, Apply to bilateral external ear once weekly at bedtime, Disp: 45 g, Rfl: 3    Multiple Vitamins-Minerals (MULTIVITAMIN ADULT PO), Take 1 capsule by mouth daily  , Disp: , Rfl:     Omega-3 Fatty Acids (FISH OIL PO), Take 1,000 mg by mouth daily  , Disp: , Rfl:     Turmeric 500 MG CAPS, Take 1 capsule by mouth daily  , Disp: , Rfl:     VOLTAREN 1 %, APPLY 2G TOPICALLY TO AFFECTED AREA FOUR TIMES A DAY AS DIRECTED, Disp: 100 g, Rfl: 5    Alpha-Lipoic Acid 100 MG CAPS, Take by mouth (Patient not taking: Reported on 8/18/2021), Disp: , Rfl:     ALPRAZolam (XANAX) 0 25 mg tablet, TAKE ONE TABLET BY MOUTH EVERY DAY AS NEEDED FOR ANXIETY, Disp: 30 tablet, Rfl: 1    azelastine (ASTELIN) 0 1 % nasal spray, 2 sprays into each nostril 2 (two) times a day Use in each nostril as directed (Patient not taking: Reported on 3/25/2021), Disp: 30 mL, Rfl: 0        Review of Systems:  Review of Systems      Physical Exam:  Physical Exam    This note was completed in part utilizing [employer] Software    Grammatical errors, random word insertions, spelling mistakes, and incomplete sentences can be an occasional consequence of this system secondary to software limitations, ambient noise, and hardware issues  If you have any questions or concerns about the content, text, or information contained within the body of this dictation, please contact the provider for clarification

## 2021-08-25 ENCOUNTER — TELEPHONE (OUTPATIENT)
Dept: CARDIOLOGY CLINIC | Facility: CLINIC | Age: 81
End: 2021-08-25

## 2021-08-25 NOTE — TELEPHONE ENCOUNTER
Pt is calling and stating that she would like call to discuss the results of her cardiac testing  She did not understand why she is to discuss with her PCP  Read ov and ask Dr Jacquelyn Sheets to please call patient

## 2021-09-11 DIAGNOSIS — G40.909 SEIZURE DISORDER (HCC): ICD-10-CM

## 2021-09-12 RX ORDER — LEVETIRACETAM 500 MG/1
TABLET ORAL
Qty: 60 TABLET | Refills: 5 | Status: SHIPPED | OUTPATIENT
Start: 2021-09-12 | End: 2022-07-28

## 2021-09-17 NOTE — TELEPHONE ENCOUNTER
Called and spoke to patient  Patient confirmed upcoming video appt with Dr Sage Hardwick on 9/22/21@ 10:30 am  Patient has no issues or concerns at this time

## 2021-09-21 ENCOUNTER — TELEPHONE (OUTPATIENT)
Dept: NEUROLOGY | Facility: CLINIC | Age: 81
End: 2021-09-21

## 2021-09-21 NOTE — TELEPHONE ENCOUNTER
Spoke with patient to confirm her [facility] visit and she was going to reschedule but when I offered her 1/10/2022, she stated that she is going to attempt the virtual visit tomorrow  Radha Wade, she requested that you call her home # [phone] for the rooming process so she has her cell phone available  I tried to explain the process to her but she was having some difficulty grasping the concept of the video visit via [address] [place] [address] Street  She also told me to schedule her on 1/10/2022 just in case, she is not able to complete the virtual video visit tomorrow  If is able to complete the visit tomorrow, then please cancel out this appt  She also stated that she is not able to come into the office tomorrow because her car is in the shop

## 2021-09-27 ENCOUNTER — TELEPHONE (OUTPATIENT)
Dept: OTHER | Facility: OTHER | Age: 81
End: 2021-09-27

## 2021-10-13 ENCOUNTER — OFFICE VISIT (OUTPATIENT)
Dept: FAMILY MEDICINE CLINIC | Facility: CLINIC | Age: 81
End: 2021-10-13
Payer: MEDICARE

## 2021-10-13 VITALS
HEIGHT: 64 IN | SYSTOLIC BLOOD PRESSURE: 138 MMHG | OXYGEN SATURATION: 94 % | WEIGHT: 199 LBS | BODY MASS INDEX: 33.97 KG/M2 | DIASTOLIC BLOOD PRESSURE: 84 MMHG | HEART RATE: 56 BPM

## 2021-10-13 DIAGNOSIS — I10 ESSENTIAL HYPERTENSION: ICD-10-CM

## 2021-10-13 DIAGNOSIS — E55.9 VITAMIN D DEFICIENCY: ICD-10-CM

## 2021-10-13 DIAGNOSIS — Z00.00 HEALTH CARE MAINTENANCE: ICD-10-CM

## 2021-10-13 DIAGNOSIS — G45.9 TRANSIENT ISCHEMIC ATTACK: ICD-10-CM

## 2021-10-13 DIAGNOSIS — R00.8 TRIGEMINY: ICD-10-CM

## 2021-10-13 DIAGNOSIS — E78.00 HYPERCHOLESTEROLEMIA: ICD-10-CM

## 2021-10-13 DIAGNOSIS — Z78.0 POSTMENOPAUSAL ESTROGEN DEFICIENCY: ICD-10-CM

## 2021-10-13 DIAGNOSIS — C50.919 MALIGNANT NEOPLASM OF FEMALE BREAST, UNSPECIFIED ESTROGEN RECEPTOR STATUS, UNSPECIFIED LATERALITY, UNSPECIFIED SITE OF BREAST (HCC): ICD-10-CM

## 2021-10-13 DIAGNOSIS — M85.80 MODERATE OSTEOPENIA: ICD-10-CM

## 2021-10-13 DIAGNOSIS — R73.9 HYPERGLYCEMIA: ICD-10-CM

## 2021-10-13 DIAGNOSIS — G40.209 PARTIAL SYMPTOMATIC EPILEPSY WITH COMPLEX PARTIAL SEIZURES, NOT INTRACTABLE, WITHOUT STATUS EPILEPTICUS (HCC): ICD-10-CM

## 2021-10-13 DIAGNOSIS — D12.6 BENIGN NEOPLASM OF LARGE INTESTINE, UNSPECIFIED PART OF LARGE INTESTINE: Primary | ICD-10-CM

## 2021-10-13 DIAGNOSIS — Z23 NEED FOR INFLUENZA VACCINATION: ICD-10-CM

## 2021-10-13 DIAGNOSIS — R06.00 DYSPNEA ON EXERTION: ICD-10-CM

## 2021-10-13 PROCEDURE — G0008 ADMIN INFLUENZA VIRUS VAC: HCPCS | Performed by: FAMILY MEDICINE

## 2021-10-13 PROCEDURE — 90662 IIV NO PRSV INCREASED AG IM: CPT | Performed by: FAMILY MEDICINE

## 2021-10-13 PROCEDURE — 99214 OFFICE O/P EST MOD 30 MIN: CPT | Performed by: FAMILY MEDICINE

## 2021-10-14 ENCOUNTER — APPOINTMENT (OUTPATIENT)
Dept: LAB | Age: 81
End: 2021-10-14
Payer: MEDICARE

## 2021-10-14 DIAGNOSIS — G40.209 PARTIAL SYMPTOMATIC EPILEPSY WITH COMPLEX PARTIAL SEIZURES, NOT INTRACTABLE, WITHOUT STATUS EPILEPTICUS (HCC): ICD-10-CM

## 2021-10-14 DIAGNOSIS — I10 ESSENTIAL HYPERTENSION: ICD-10-CM

## 2021-10-14 DIAGNOSIS — D12.6 BENIGN NEOPLASM OF LARGE INTESTINE, UNSPECIFIED PART OF LARGE INTESTINE: ICD-10-CM

## 2021-10-14 DIAGNOSIS — R73.9 HYPERGLYCEMIA: ICD-10-CM

## 2021-10-14 DIAGNOSIS — R00.8 TRIGEMINY: ICD-10-CM

## 2021-10-14 DIAGNOSIS — G45.9 TRANSIENT ISCHEMIC ATTACK: ICD-10-CM

## 2021-10-14 DIAGNOSIS — E55.9 VITAMIN D DEFICIENCY: ICD-10-CM

## 2021-10-14 DIAGNOSIS — E78.00 HYPERCHOLESTEROLEMIA: ICD-10-CM

## 2021-10-14 DIAGNOSIS — C50.919 MALIGNANT NEOPLASM OF FEMALE BREAST, UNSPECIFIED ESTROGEN RECEPTOR STATUS, UNSPECIFIED LATERALITY, UNSPECIFIED SITE OF BREAST (HCC): ICD-10-CM

## 2021-10-14 DIAGNOSIS — M85.80 MODERATE OSTEOPENIA: ICD-10-CM

## 2021-10-14 DIAGNOSIS — R06.00 DYSPNEA ON EXERTION: ICD-10-CM

## 2021-10-14 LAB
25(OH)D3 SERPL-MCNC: 63.5 NG/ML (ref 30–100)
ALBUMIN SERPL BCP-MCNC: 3.6 G/DL (ref 3.5–5)
ALP SERPL-CCNC: 97 U/L (ref 46–116)
ALT SERPL W P-5'-P-CCNC: 28 U/L (ref 12–78)
ANION GAP SERPL CALCULATED.3IONS-SCNC: 4 MMOL/L (ref 4–13)
AST SERPL W P-5'-P-CCNC: 22 U/L (ref 5–45)
BACTERIA UR QL AUTO: ABNORMAL /HPF
BILIRUB SERPL-MCNC: 1.17 MG/DL (ref 0.2–1)
BILIRUB UR QL STRIP: NEGATIVE
BUN SERPL-MCNC: 14 MG/DL (ref 5–25)
CALCIUM SERPL-MCNC: 10.1 MG/DL (ref 8.3–10.1)
CHLORIDE SERPL-SCNC: 107 MMOL/L (ref 100–108)
CHOLEST SERPL-MCNC: 234 MG/DL (ref 50–200)
CLARITY UR: ABNORMAL
CO2 SERPL-SCNC: 28 MMOL/L (ref 21–32)
COLOR UR: ABNORMAL
CREAT SERPL-MCNC: 0.98 MG/DL (ref 0.6–1.3)
ERYTHROCYTE [DISTWIDTH] IN BLOOD BY AUTOMATED COUNT: 13.6 % (ref 11.6–15.1)
EST. AVERAGE GLUCOSE BLD GHB EST-MCNC: 137 MG/DL
GFR SERPL CREATININE-BSD FRML MDRD: 55 ML/MIN/1.73SQ M
GLUCOSE P FAST SERPL-MCNC: 115 MG/DL (ref 65–99)
GLUCOSE UR STRIP-MCNC: NEGATIVE MG/DL
HBA1C MFR BLD: 6.4 %
HCT VFR BLD AUTO: 47.7 % (ref 34.8–46.1)
HDLC SERPL-MCNC: 67 MG/DL
HGB BLD-MCNC: 15 G/DL (ref 11.5–15.4)
HGB UR QL STRIP.AUTO: ABNORMAL
KETONES UR STRIP-MCNC: NEGATIVE MG/DL
LDLC SERPL CALC-MCNC: 148 MG/DL (ref 0–100)
LEUKOCYTE ESTERASE UR QL STRIP: ABNORMAL
MCH RBC QN AUTO: 30.5 PG (ref 26.8–34.3)
MCHC RBC AUTO-ENTMCNC: 31.4 G/DL (ref 31.4–37.4)
MCV RBC AUTO: 97 FL (ref 82–98)
NITRITE UR QL STRIP: NEGATIVE
NON-SQ EPI CELLS URNS QL MICRO: ABNORMAL /HPF
PH UR STRIP.AUTO: 6 [PH]
PLATELET # BLD AUTO: 215 THOUSANDS/UL (ref 149–390)
PMV BLD AUTO: 11.7 FL (ref 8.9–12.7)
POTASSIUM SERPL-SCNC: 4.6 MMOL/L (ref 3.5–5.3)
PROT SERPL-MCNC: 7.6 G/DL (ref 6.4–8.2)
PROT UR STRIP-MCNC: ABNORMAL MG/DL
RBC # BLD AUTO: 4.91 MILLION/UL (ref 3.81–5.12)
RBC #/AREA URNS AUTO: ABNORMAL /HPF
SODIUM SERPL-SCNC: 139 MMOL/L (ref 136–145)
SP GR UR STRIP.AUTO: 1.02 (ref 1–1.03)
TRIGL SERPL-MCNC: 94 MG/DL
TSH SERPL DL<=0.05 MIU/L-ACNC: 2.33 UIU/ML (ref 0.36–3.74)
UROBILINOGEN UR QL STRIP.AUTO: 0.2 E.U./DL
WBC # BLD AUTO: 8.59 THOUSAND/UL (ref 4.31–10.16)
WBC #/AREA URNS AUTO: ABNORMAL /HPF

## 2021-10-14 PROCEDURE — 85027 COMPLETE CBC AUTOMATED: CPT

## 2021-10-14 PROCEDURE — 82306 VITAMIN D 25 HYDROXY: CPT

## 2021-10-14 PROCEDURE — 80061 LIPID PANEL: CPT

## 2021-10-14 PROCEDURE — 81001 URINALYSIS AUTO W/SCOPE: CPT

## 2021-10-14 PROCEDURE — 36415 COLL VENOUS BLD VENIPUNCTURE: CPT

## 2021-10-14 PROCEDURE — 83036 HEMOGLOBIN GLYCOSYLATED A1C: CPT

## 2021-10-14 PROCEDURE — 84443 ASSAY THYROID STIM HORMONE: CPT

## 2021-10-14 PROCEDURE — 80053 COMPREHEN METABOLIC PANEL: CPT

## 2021-11-01 ENCOUNTER — TELEPHONE (OUTPATIENT)
Dept: NEUROLOGY | Facility: CLINIC | Age: 81
End: 2021-11-01

## 2021-11-11 ENCOUNTER — RA CDI HCC (OUTPATIENT)
Dept: OTHER | Facility: HOSPITAL | Age: 81
End: 2021-11-11

## 2021-11-11 ENCOUNTER — OFFICE VISIT (OUTPATIENT)
Dept: OBGYN CLINIC | Facility: CLINIC | Age: 81
End: 2021-11-11
Payer: MEDICARE

## 2021-11-11 VITALS
DIASTOLIC BLOOD PRESSURE: 78 MMHG | SYSTOLIC BLOOD PRESSURE: 132 MMHG | BODY MASS INDEX: 34.01 KG/M2 | WEIGHT: 199.2 LBS | HEIGHT: 64 IN

## 2021-11-11 DIAGNOSIS — N81.4 UTERINE PROLAPSE: ICD-10-CM

## 2021-11-11 DIAGNOSIS — N81.11 CYSTOCELE, MIDLINE: Primary | ICD-10-CM

## 2021-11-11 PROCEDURE — 99213 OFFICE O/P EST LOW 20 MIN: CPT | Performed by: OBSTETRICS & GYNECOLOGY

## 2021-11-17 ENCOUNTER — TELEPHONE (OUTPATIENT)
Dept: OTHER | Facility: OTHER | Age: 81
End: 2021-11-17

## 2021-11-29 ENCOUNTER — RA CDI HCC (OUTPATIENT)
Dept: OTHER | Facility: HOSPITAL | Age: 81
End: 2021-11-29

## 2021-12-03 ENCOUNTER — TELEPHONE (OUTPATIENT)
Dept: OTHER | Facility: OTHER | Age: 81
End: 2021-12-03

## 2021-12-03 ENCOUNTER — TELEMEDICINE (OUTPATIENT)
Dept: FAMILY MEDICINE CLINIC | Facility: CLINIC | Age: 81
End: 2021-12-03
Payer: MEDICARE

## 2021-12-03 DIAGNOSIS — I10 ESSENTIAL HYPERTENSION: ICD-10-CM

## 2021-12-03 DIAGNOSIS — N30.00 ACUTE CYSTITIS WITHOUT HEMATURIA: Primary | ICD-10-CM

## 2021-12-03 DIAGNOSIS — G40.209 PARTIAL SYMPTOMATIC EPILEPSY WITH COMPLEX PARTIAL SEIZURES, NOT INTRACTABLE, WITHOUT STATUS EPILEPTICUS (HCC): ICD-10-CM

## 2021-12-03 DIAGNOSIS — R00.8 TRIGEMINY: ICD-10-CM

## 2021-12-03 DIAGNOSIS — C50.919 MALIGNANT NEOPLASM OF FEMALE BREAST, UNSPECIFIED ESTROGEN RECEPTOR STATUS, UNSPECIFIED LATERALITY, UNSPECIFIED SITE OF BREAST (HCC): ICD-10-CM

## 2021-12-03 DIAGNOSIS — E55.9 VITAMIN D DEFICIENCY: ICD-10-CM

## 2021-12-03 DIAGNOSIS — R73.9 HYPERGLYCEMIA: ICD-10-CM

## 2021-12-03 DIAGNOSIS — E78.00 HYPERCHOLESTEROLEMIA: ICD-10-CM

## 2021-12-03 PROCEDURE — 99442 PR PHYS/QHP TELEPHONE EVALUATION 11-20 MIN: CPT | Performed by: FAMILY MEDICINE

## 2021-12-03 RX ORDER — AMOXICILLIN 875 MG/1
TABLET, COATED ORAL
Qty: 10 TABLET | Refills: 0 | Status: SHIPPED | OUTPATIENT
Start: 2021-12-03 | End: 2021-12-08

## 2021-12-09 ENCOUNTER — TELEPHONE (OUTPATIENT)
Dept: OBGYN CLINIC | Facility: CLINIC | Age: 81
End: 2021-12-09

## 2021-12-28 ENCOUNTER — TELEPHONE (OUTPATIENT)
Dept: NEUROLOGY | Facility: CLINIC | Age: 81
End: 2021-12-28

## 2022-03-01 ENCOUNTER — CONSULT (OUTPATIENT)
Dept: PULMONOLOGY | Facility: CLINIC | Age: 82
End: 2022-03-01
Payer: MEDICARE

## 2022-03-01 VITALS
RESPIRATION RATE: 18 BRPM | DIASTOLIC BLOOD PRESSURE: 90 MMHG | SYSTOLIC BLOOD PRESSURE: 160 MMHG | WEIGHT: 199 LBS | HEIGHT: 64 IN | HEART RATE: 100 BPM | BODY MASS INDEX: 33.97 KG/M2 | OXYGEN SATURATION: 96 %

## 2022-03-01 DIAGNOSIS — F17.211 CIGARETTE NICOTINE DEPENDENCE IN REMISSION: ICD-10-CM

## 2022-03-01 DIAGNOSIS — J30.9 ALLERGIC RHINITIS, UNSPECIFIED SEASONALITY, UNSPECIFIED TRIGGER: ICD-10-CM

## 2022-03-01 DIAGNOSIS — C50.319 MALIGNANT NEOPLASM OF LOWER-INNER QUADRANT OF FEMALE BREAST, UNSPECIFIED ESTROGEN RECEPTOR STATUS, UNSPECIFIED LATERALITY (HCC): ICD-10-CM

## 2022-03-01 DIAGNOSIS — R06.00 DYSPNEA ON EXERTION: Primary | ICD-10-CM

## 2022-03-01 PROCEDURE — 99204 OFFICE O/P NEW MOD 45 MIN: CPT | Performed by: INTERNAL MEDICINE

## 2022-03-01 NOTE — ASSESSMENT & PLAN NOTE
She remains committed to abstinence from tobacco     Qualify for screening CT scan based history  We discussed the role for CT scan today and at this time she declines

## 2022-03-01 NOTE — PROGRESS NOTES
Pulmonary Consultation   Radha Khan 80 y o  female MRN: [id number]  3/1/2022      Referring provider: Dr Collins Anguiano  Reason for consult: SOB    Assessment and Plan:  Allergic rhinitis  This is likely related to the patient's exposure to mold in her apartment  She does not feel it is related to her cats although this should also be considered if it becomes more of an issue for her  Cigarette nicotine dependence in remission  She remains committed to abstinence from tobacco     Qualify for screening CT scan based history  We discussed the role for CT scan today and at this time she declines  Dyspnea on exertion  The patient has had ongoing breathlessness for 1-2 years  Her cardiac workup with negative  Her chest x-ray is clear  Her PFTs show normal spirometry with normal lung volumes  Her diffusion capacity is mildly decreased in her ERV is significantly decreased, likely secondary to obesity  There is no obvious reason for the patient's breathlessness  We discussed options of checking an echocardiogram to evaluate for pulmonary hypertension although have a low suspicion  We also checked option of checking a CT scan to evaluate for radiation pneumonitis or other occult disease is not seen on x-ray  The patient's exam is clear and her PFTs are normal; therefore, I suspect the CT chest to be of low yield  After discussion, the patient would like to hold off on CT scan at this time  Despite the patient's heavy tobacco use, there is no evidence of emphysema on previous CT scans or fixed obstruction on pulmonary function testing  The most likely cause of the patient's breathlessness seems to be related to her obesity and deconditioning  I encouraged her to try to increase her regular activity with daily walking  I also encouraged her for lifestyle modifications for weight loss  Breast cancer ([facility] [address] )  History of breast cancer  Treated in 2012 with radiation    At risk for radiation pneumonitis  Diagnoses and all orders for this visit:    Dyspnea on exertion  -     Ambulatory referral to Pulmonology    Cigarette nicotine dependence in remission    Allergic rhinitis, unspecified seasonality, unspecified trigger    Malignant neoplasm of lower-inner quadrant of female breast, unspecified estrogen receptor status, unspecified laterality ([facility] )      Plan of care discussed at length with patient  Questions and concerns addressed  Return for follow-up in 4 months  History of Present Illness   HPI:  Job Grijalva is a 80 y o  female who presents for evaluation of shortness of breath  States she has been SOB x 1-2 years  States "I had my lungs checked and my lungs are clear"  She feels her chest gets heavy when she exerts herself  She can ambulate a few steps and then gets winded  Trialed an Albuterol inhaler and it is helpful  She feels that her SOB is related to her weight  States she is gaining weight since quit smoking  Only SOB on exertion, no symptoms at rest      Concerned that her cats contribute to her breathlessness  No known lung disease  No home oxygen  States she is bored, eating is the last enjoyable thing she can do  Eats a lot of potato chips  Coughing with rhinorrhea when she is home  Attributes this to mold in the ceiling       Review of Systems  Positive as above and negative otherwise    Historical Information   Past Medical History:   Diagnosis Date    Cancer ([facility] )     left breast    Elevated serum alkaline phosphatase level     GERD (gastroesophageal reflux disease)     Glaucoma     Hiatal hernia     Hypercalcemia     Hyperglycemia     diet controlled    Hypertension     Rheumatic fever     Seizures (HCC)     TIA (transient ischemic attack)     Vitamin D deficiency      Past Surgical History:   Procedure Laterality Date    APPENDECTOMY      BREAST LUMPECTOMY Left     COLONOSCOPY      FOOT SURGERY Left     ORIF ANKLE FRACTURE Left     TONSILLECTOMY      TUBAL LIGATION       Family History   Problem Relation Age of Onset    Breast cancer Mother     Coronary artery disease Father     Bone cancer Brother     Lung cancer Brother        Occupational History: 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, exposed to [employer]    Social History: Quit smoking 2010; smoked 1 ppd x 55 years  Pets: 3 outdoor/indoor cats    Meds/Allergies     Current Outpatient Medications:     albuterol (PROVENTIL HFA,VENTOLIN HFA) 90 mcg/act inhaler, Inhale 2 puffs every 6 (six) hours as needed for wheezing, Disp: 1 Inhaler, Rfl: 5    Alpha-Lipoic Acid 100 MG CAPS, Take by mouth , Disp: , Rfl:     ALPRAZolam (XANAX) 0 25 mg tablet, TAKE ONE TABLET BY MOUTH EVERY DAY AS NEEDED FOR ANXIETY, Disp: 30 tablet, Rfl: 1    amoxicillin (AMOXIL) 500 mg capsule, TAKE 4 CAPSULES BY MOUTH ONE HOUR PRIOR TO OFFICE VISIT, Disp: , Rfl:     aspirin 81 MG tablet, Take 162 mg by mouth, Disp: , Rfl:     B Complex Vitamins (B COMPLEX 1 PO), Take 2 tablets by mouth daily, Disp: , Rfl:     bimatoprost (LUMIGAN) 0 03 % ophthalmic drops, Administer 1 drop to both eyes daily at bedtime  , Disp: , Rfl:     Calcium Carbonate (CALCIUM 600 PO), Take by mouth, Disp: , Rfl:     Cholecalciferol (VITAMIN D) 2000 units CAPS, Take 2 capsules by mouth daily, Disp: , Rfl:     Coenzyme Q10 (COQ10) 100 MG CAPS, Take by mouth, Disp: , Rfl:     gabapentin (NEURONTIN) 300 mg capsule, TAKE ONE CAPSULE BY MOUTH AT BEDTIME AS NEEDED FOR PAIN, Disp: 30 capsule, Rfl: 11    GLUCOSAMINE-MSM-HYALURONIC ACD PO, Take 1 capsule by mouth 3 (three) times a day, Disp: , Rfl:     GRAPE SEED EXTRACT PO, Take by mouth, Disp: , Rfl:     levETIRAcetam (KEPPRA) 500 mg tablet, TAKE 1 TABLET BY MOUTH TWICE A DAY, Disp: 60 tablet, Rfl: 5    losartan (COZAAR) 25 mg tablet, TAKE 1 TABLET BY MOUTH EVERY DAY, Disp: 30 tablet, Rfl: 11    Lumigan 0 01 % ophthalmic drops, INSTILL 1 DROP INTO BOTH EYES AT BEDTIME, Disp: , Rfl:    Magnesium 400 MG TABS, Take by mouth, Disp: , Rfl:     metoprolol succinate (TOPROL-XL) 200 MG 24 hr tablet, TAKE 1 TABLET BY MOUTH EVERY DAY, Disp: 90 tablet, Rfl: 2    milk thistle 175 MG tablet, Take 175 mg by mouth daily, Disp: , Rfl:     mometasone (ELOCON) 0 1 % cream, Apply to bilateral external ear once weekly at bedtime, Disp: 45 g, Rfl: 3    Multiple Vitamins-Minerals (MULTIVITAMIN ADULT PO), Take 1 capsule by mouth daily  , Disp: , Rfl:     Omega-3 Fatty Acids (FISH OIL PO), Take 1,000 mg by mouth daily  , Disp: , Rfl:     Turmeric 500 MG CAPS, Take 1 capsule by mouth daily  , Disp: , Rfl:     VOLTAREN 1 %, APPLY 2G TOPICALLY TO AFFECTED AREA FOUR TIMES A DAY AS DIRECTED, Disp: 100 g, Rfl: 5  Allergies   Allergen Reactions    Carbamazepine Myalgia     flu-like symptoms    Epinephrine Other (See Comments)     "weakness"    Iodides Itching    Morphine And Related     Sulfamethoxazole-Trimethoprim     Latex Rash    Lisinopril Cough    Topiramate Rash       Vitals: Blood pressure 160/90, pulse 100, resp  rate 18, height 5' 4" (1 626 m), weight 90 3 kg (199 lb), SpO2 96 %, not currently breastfeeding , Body mass index is 34 16 kg/m²  Oxygen Therapy  SpO2: 96 %    Physical Exam  GEN: WDWN, nad, comfortable  HEENT: NCAT, EOMI  CVS: Regular, no m/r/g  LUNGS: CTA b/l  ABD: soft  EXT: No c/c/e  NEURO: No focal deficits  MS: Moving all extremities  SKIN: warm, dry  PSYCH: calm, cooperative      Labs: I have personally reviewed pertinent lab results    Lab Results   Component Value Date    WBC 8 59 10/14/2021    HGB 15 0 10/14/2021    HCT 47 7 (H) 10/14/2021    MCV 97 10/14/2021     10/14/2021     Lab Results   Component Value Date    CALCIUM 10 1 10/14/2021     09/08/2017    K 4 6 10/14/2021    CO2 28 10/14/2021     10/14/2021    BUN 14 10/14/2021    CREATININE 0 98 10/14/2021     No results found for: IGE  Lab Results   Component Value Date    ALT 28 10/14/2021    AST 22 10/14/2021 ALKPHOS 97 10/14/2021    BILITOT 0 8 2017     Labs per my interpretation show normal renal function; normal hemoglobin    Imaging and other studies: I have personally reviewed pertinent films in PACS  CXR per my review shows clear b/l lungs    Pulmonary function testin/21 per my review shows normal spirometry, normal lung volumes, mildly decreased diffusion    EKG, Pathology, and Other Studies: I have personally reviewed pertinent reports  Stress ECHO : negative for ischemia    [facility]

## 2022-03-01 NOTE — ASSESSMENT & PLAN NOTE
This is likely related to the patient's exposure to mold in her apartment  She does not feel it is related to her cats although this should also be considered if it becomes more of an issue for her

## 2022-03-01 NOTE — ASSESSMENT & PLAN NOTE
The patient has had ongoing breathlessness for 1-2 years  Her cardiac workup with negative  Her chest x-ray is clear  Her PFTs show normal spirometry with normal lung volumes  Her diffusion capacity is mildly decreased in her ERV is significantly decreased, likely secondary to obesity  There is no obvious reason for the patient's breathlessness  We discussed options of checking an echocardiogram to evaluate for pulmonary hypertension although have a low suspicion  We also checked option of checking a CT scan to evaluate for radiation pneumonitis or other occult disease is not seen on x-ray  The patient's exam is clear and her PFTs are normal; therefore, I suspect the CT chest to be of low yield  After discussion, the patient would like to hold off on CT scan at this time  Despite the patient's heavy tobacco use, there is no evidence of emphysema on previous CT scans or fixed obstruction on pulmonary function testing  The most likely cause of the patient's breathlessness seems to be related to her obesity and deconditioning  I encouraged her to try to increase her regular activity with daily walking  I also encouraged her for lifestyle modifications for weight loss

## 2022-03-04 DIAGNOSIS — I10 ESSENTIAL HYPERTENSION: ICD-10-CM

## 2022-03-07 RX ORDER — METOPROLOL SUCCINATE 200 MG/1
TABLET, EXTENDED RELEASE ORAL
Qty: 90 TABLET | Refills: 2 | Status: SHIPPED | OUTPATIENT
Start: 2022-03-07

## 2022-03-17 DIAGNOSIS — I10 ESSENTIAL HYPERTENSION: ICD-10-CM

## 2022-03-17 RX ORDER — LOSARTAN POTASSIUM 25 MG/1
TABLET ORAL
Qty: 90 TABLET | Refills: 3 | Status: SHIPPED | OUTPATIENT
Start: 2022-03-17

## 2022-04-05 ENCOUNTER — HOSPITAL ENCOUNTER (OUTPATIENT)
Dept: BONE DENSITY | Facility: MEDICAL CENTER | Age: 82
Discharge: HOME | End: 2022-04-05
Payer: MEDICARE

## 2022-04-05 DIAGNOSIS — M85.80 MODERATE OSTEOPENIA: ICD-10-CM

## 2022-04-05 DIAGNOSIS — Z78.0 POSTMENOPAUSAL ESTROGEN DEFICIENCY: ICD-10-CM

## 2022-04-05 PROCEDURE — 77080 DXA BONE DENSITY AXIAL: CPT

## 2022-04-25 ENCOUNTER — APPOINTMENT (OUTPATIENT)
Dept: LAB | Age: 82
End: 2022-04-25
Payer: MEDICARE

## 2022-04-25 DIAGNOSIS — E78.00 HYPERCHOLESTEROLEMIA: ICD-10-CM

## 2022-04-25 DIAGNOSIS — R00.8 TRIGEMINY: ICD-10-CM

## 2022-04-25 DIAGNOSIS — G40.209 PARTIAL SYMPTOMATIC EPILEPSY WITH COMPLEX PARTIAL SEIZURES, NOT INTRACTABLE, WITHOUT STATUS EPILEPTICUS (HCC): ICD-10-CM

## 2022-04-25 DIAGNOSIS — R73.9 HYPERGLYCEMIA: ICD-10-CM

## 2022-04-25 DIAGNOSIS — N30.00 ACUTE CYSTITIS WITHOUT HEMATURIA: ICD-10-CM

## 2022-04-25 DIAGNOSIS — I10 ESSENTIAL HYPERTENSION: ICD-10-CM

## 2022-04-25 DIAGNOSIS — C50.919 MALIGNANT NEOPLASM OF FEMALE BREAST, UNSPECIFIED ESTROGEN RECEPTOR STATUS, UNSPECIFIED LATERALITY, UNSPECIFIED SITE OF BREAST (HCC): ICD-10-CM

## 2022-04-25 DIAGNOSIS — E55.9 VITAMIN D DEFICIENCY: ICD-10-CM

## 2022-04-25 LAB
25(OH)D3 SERPL-MCNC: 59.7 NG/ML (ref 30–100)
ALBUMIN SERPL BCP-MCNC: 3.7 G/DL (ref 3.5–5)
ALP SERPL-CCNC: 80 U/L (ref 46–116)
ALT SERPL W P-5'-P-CCNC: 27 U/L (ref 12–78)
ANION GAP SERPL CALCULATED.3IONS-SCNC: 5 MMOL/L (ref 4–13)
AST SERPL W P-5'-P-CCNC: 26 U/L (ref 5–45)
BACTERIA UR QL AUTO: ABNORMAL /HPF
BILIRUB SERPL-MCNC: 0.77 MG/DL (ref 0.2–1)
BILIRUB UR QL STRIP: NEGATIVE
BUN SERPL-MCNC: 18 MG/DL (ref 5–25)
CALCIUM SERPL-MCNC: 10.1 MG/DL (ref 8.3–10.1)
CHLORIDE SERPL-SCNC: 107 MMOL/L (ref 100–108)
CHOLEST SERPL-MCNC: 205 MG/DL
CLARITY UR: ABNORMAL
CO2 SERPL-SCNC: 28 MMOL/L (ref 21–32)
COLOR UR: YELLOW
CREAT SERPL-MCNC: 0.96 MG/DL (ref 0.6–1.3)
ERYTHROCYTE [DISTWIDTH] IN BLOOD BY AUTOMATED COUNT: 13.2 % (ref 11.6–15.1)
EST. AVERAGE GLUCOSE BLD GHB EST-MCNC: 134 MG/DL
GFR SERPL CREATININE-BSD FRML MDRD: 56 ML/MIN/1.73SQ M
GLUCOSE P FAST SERPL-MCNC: 127 MG/DL (ref 65–99)
GLUCOSE UR STRIP-MCNC: NEGATIVE MG/DL
HBA1C MFR BLD: 6.3 %
HCT VFR BLD AUTO: 45.9 % (ref 34.8–46.1)
HDLC SERPL-MCNC: 59 MG/DL
HGB BLD-MCNC: 14.1 G/DL (ref 11.5–15.4)
HGB UR QL STRIP.AUTO: NEGATIVE
HYALINE CASTS #/AREA URNS LPF: ABNORMAL /LPF
KETONES UR STRIP-MCNC: NEGATIVE MG/DL
LDLC SERPL CALC-MCNC: 132 MG/DL (ref 0–100)
LEUKOCYTE ESTERASE UR QL STRIP: ABNORMAL
MCH RBC QN AUTO: 29.7 PG (ref 26.8–34.3)
MCHC RBC AUTO-ENTMCNC: 30.7 G/DL (ref 31.4–37.4)
MCV RBC AUTO: 97 FL (ref 82–98)
MUCOUS THREADS UR QL AUTO: ABNORMAL
NITRITE UR QL STRIP: NEGATIVE
NON-SQ EPI CELLS URNS QL MICRO: ABNORMAL /HPF
PH UR STRIP.AUTO: 6 [PH]
PLATELET # BLD AUTO: 208 THOUSANDS/UL (ref 149–390)
PMV BLD AUTO: 11.5 FL (ref 8.9–12.7)
POTASSIUM SERPL-SCNC: 4.3 MMOL/L (ref 3.5–5.3)
PROT SERPL-MCNC: 7.2 G/DL (ref 6.4–8.2)
PROT UR STRIP-MCNC: ABNORMAL MG/DL
RBC # BLD AUTO: 4.74 MILLION/UL (ref 3.81–5.12)
RBC #/AREA URNS AUTO: ABNORMAL /HPF
SODIUM SERPL-SCNC: 140 MMOL/L (ref 136–145)
SP GR UR STRIP.AUTO: 1.02 (ref 1–1.03)
TRANS CELLS #/AREA URNS HPF: PRESENT /[HPF]
TRIGL SERPL-MCNC: 70 MG/DL
TSH SERPL DL<=0.05 MIU/L-ACNC: 1.77 UIU/ML (ref 0.45–4.5)
UROBILINOGEN UR STRIP-ACNC: <2 MG/DL
WBC # BLD AUTO: 7.85 THOUSAND/UL (ref 4.31–10.16)
WBC #/AREA URNS AUTO: ABNORMAL /HPF

## 2022-04-25 PROCEDURE — 36415 COLL VENOUS BLD VENIPUNCTURE: CPT

## 2022-04-25 PROCEDURE — 80061 LIPID PANEL: CPT

## 2022-04-25 PROCEDURE — 80053 COMPREHEN METABOLIC PANEL: CPT

## 2022-04-25 PROCEDURE — 82306 VITAMIN D 25 HYDROXY: CPT

## 2022-04-25 PROCEDURE — 84443 ASSAY THYROID STIM HORMONE: CPT

## 2022-04-25 PROCEDURE — 81001 URINALYSIS AUTO W/SCOPE: CPT

## 2022-04-25 PROCEDURE — 83036 HEMOGLOBIN GLYCOSYLATED A1C: CPT

## 2022-04-25 PROCEDURE — 85027 COMPLETE CBC AUTOMATED: CPT

## 2022-05-02 ENCOUNTER — RA CDI HCC (OUTPATIENT)
Dept: OTHER | Facility: HOSPITAL | Age: 82
End: 2022-05-02

## 2022-05-02 NOTE — PROGRESS NOTES
[address]  coding opportunities       Chart reviewed, no opportunity found: CHART REVIEWED, NO OPPORTUNITY FOUND        Patients Insurance     Medicare Insurance: Medicare

## 2022-05-06 ENCOUNTER — OFFICE VISIT (OUTPATIENT)
Dept: FAMILY MEDICINE CLINIC | Facility: CLINIC | Age: 82
End: 2022-05-06
Payer: MEDICARE

## 2022-05-06 VITALS
WEIGHT: 198 LBS | DIASTOLIC BLOOD PRESSURE: 84 MMHG | HEART RATE: 62 BPM | BODY MASS INDEX: 33.8 KG/M2 | HEIGHT: 64 IN | SYSTOLIC BLOOD PRESSURE: 138 MMHG

## 2022-05-06 DIAGNOSIS — R06.00 DYSPNEA ON EXERTION: ICD-10-CM

## 2022-05-06 DIAGNOSIS — E55.9 VITAMIN D DEFICIENCY: ICD-10-CM

## 2022-05-06 DIAGNOSIS — M15.9 PRIMARY OSTEOARTHRITIS INVOLVING MULTIPLE JOINTS: ICD-10-CM

## 2022-05-06 DIAGNOSIS — Z85.3 PERSONAL HISTORY OF BREAST CANCER: Primary | ICD-10-CM

## 2022-05-06 DIAGNOSIS — I10 ESSENTIAL HYPERTENSION: ICD-10-CM

## 2022-05-06 DIAGNOSIS — M85.80 MODERATE OSTEOPENIA: ICD-10-CM

## 2022-05-06 DIAGNOSIS — Z12.31 SCREENING MAMMOGRAM FOR HIGH-RISK PATIENT: ICD-10-CM

## 2022-05-06 DIAGNOSIS — Z74.8 ASSISTANCE NEEDED WITH TRANSPORTATION: ICD-10-CM

## 2022-05-06 DIAGNOSIS — N18.31 STAGE 3A CHRONIC KIDNEY DISEASE (HCC): ICD-10-CM

## 2022-05-06 DIAGNOSIS — G45.9 TRANSIENT ISCHEMIC ATTACK: ICD-10-CM

## 2022-05-06 DIAGNOSIS — Z00.00 HEALTH CARE MAINTENANCE: ICD-10-CM

## 2022-05-06 DIAGNOSIS — E78.00 HYPERCHOLESTEROLEMIA: ICD-10-CM

## 2022-05-06 DIAGNOSIS — R73.9 HYPERGLYCEMIA: ICD-10-CM

## 2022-05-06 DIAGNOSIS — G40.209 PARTIAL SYMPTOMATIC EPILEPSY WITH COMPLEX PARTIAL SEIZURES, NOT INTRACTABLE, WITHOUT STATUS EPILEPTICUS (HCC): ICD-10-CM

## 2022-05-06 PROBLEM — R97.8 ABNORMAL TUMOR MARKERS: Status: RESOLVED | Noted: 2017-04-27 | Resolved: 2022-05-06

## 2022-05-06 PROCEDURE — 1123F ACP DISCUSS/DSCN MKR DOCD: CPT | Performed by: FAMILY MEDICINE

## 2022-05-06 PROCEDURE — 99214 OFFICE O/P EST MOD 30 MIN: CPT | Performed by: FAMILY MEDICINE

## 2022-05-06 PROCEDURE — G0439 PPPS, SUBSEQ VISIT: HCPCS | Performed by: FAMILY MEDICINE

## 2022-05-06 NOTE — ASSESSMENT & PLAN NOTE
Lab Results   Component Value Date    EGFR 56 04/25/2022    EGFR 55 10/14/2021    EGFR 68 04/19/2021    CREATININE 0 96 04/25/2022    CREATININE 0 98 10/14/2021    CREATININE 0 83 04/19/2021   Age-appropriate continue Arb

## 2022-05-06 NOTE — ASSESSMENT & PLAN NOTE
In seen by Cardiology now seeing pulmonology, no ablate Cristine positive testing is noted    Question deconditioning patient to continue follow-up with pulmonology

## 2022-05-06 NOTE — ASSESSMENT & PLAN NOTE
Improving hemoglobin A1c has dropped from 6 4-6 3    Discussed with patient 6 5 she would be diabetic so continue low sugar low-carbohydrate diet

## 2022-05-06 NOTE — PATIENT INSTRUCTIONS
Low-fat low-carbohydrate low sugar diet recommended  Increase exercise  Follow all specialist per their instructions  Complete mammography as ordered   Return in 6 months for office visit blood work sooner if needed

## 2022-05-06 NOTE — PROGRESS NOTES
Assessment and Plan:     Problem List Items Addressed This Visit        Cardiovascular and Mediastinum    Transient ischemic attack     Stable to follow-up Neurology         Relevant Orders    CBC    Comprehensive metabolic panel    Hemoglobin A1C    Lipid Panel with Direct LDL reflex    TSH, 3rd generation with Free T4 reflex    UA (URINE) with reflex to Scope    Vitamin D 25 hydroxy    Essential hypertension     Component of white coat sooner by end of office visit blood pressure normalized this         Relevant Orders    CBC    Comprehensive metabolic panel    Hemoglobin A1C    Lipid Panel with Direct LDL reflex    TSH, 3rd generation with Free T4 reflex    UA (URINE) with reflex to Scope    Vitamin D 25 hydroxy       Nervous and Auditory    Complex partial seizure (HCC)     Stable follows with Neurology         Relevant Orders    CBC    Comprehensive metabolic panel    Hemoglobin A1C    Lipid Panel with Direct LDL reflex    TSH, 3rd generation with Free T4 reflex    UA (URINE) with reflex to Scope    Vitamin D 25 hydroxy       Musculoskeletal and Integument    Osteoarthritis     Asymptomatic at the present time         Relevant Orders    CBC    Comprehensive metabolic panel    Hemoglobin A1C    Lipid Panel with Direct LDL reflex    TSH, 3rd generation with Free T4 reflex    UA (URINE) with reflex to Scope    Vitamin D 25 hydroxy    Moderate osteopenia     DEXA 4/22         Relevant Orders    CBC    Comprehensive metabolic panel    Hemoglobin A1C    Lipid Panel with Direct LDL reflex    TSH, 3rd generation with Free T4 reflex    UA (URINE) with reflex to Scope    Vitamin D 25 hydroxy       Genitourinary    Stage 3a chronic kidney disease (HCC)     Lab Results   Component Value Date    EGFR 56 04/25/2022    EGFR 55 10/14/2021    EGFR 68 04/19/2021    CREATININE 0 96 04/25/2022    CREATININE 0 98 10/14/2021    CREATININE 0 83 04/19/2021   Age-appropriate continue Arb            Other    Vitamin D deficiency Stable continue present therapy         Relevant Orders    CBC    Comprehensive metabolic panel    Hemoglobin A1C    Lipid Panel with Direct LDL reflex    TSH, 3rd generation with Free T4 reflex    UA (URINE) with reflex to Scope    Vitamin D 25 hydroxy    Hypercholesterolemia     Patient has refused medication low-fat low-cholesterol diet recommended         Relevant Orders    CBC    Comprehensive metabolic panel    Hemoglobin A1C    Lipid Panel with Direct LDL reflex    TSH, 3rd generation with Free T4 reflex    UA (URINE) with reflex to Scope    Vitamin D 25 hydroxy    Hyperglycemia     Improving hemoglobin A1c has dropped from 6 4-6 3  Discussed with patient 6 5 she would be diabetic so continue low sugar low-carbohydrate diet         Relevant Orders    CBC    Comprehensive metabolic panel    Hemoglobin A1C    Lipid Panel with Direct LDL reflex    TSH, 3rd generation with Free T4 reflex    UA (URINE) with reflex to Scope    Vitamin D 25 hydroxy    Personal history of breast cancer - Primary     Patient has been released from follow-up from Oncology as her breast cancer was 0 7  Mammography was ordered         Relevant Orders    Mammo screening bilateral w cad    CBC    Comprehensive metabolic panel    Hemoglobin A1C    Lipid Panel with Direct LDL reflex    TSH, 3rd generation with Free T4 reflex    UA (URINE) with reflex to Scope    Vitamin D 25 hydroxy    Health care maintenance     [place] complete         Dyspnea on exertion     In seen by Cardiology now seeing pulmonology, no ablate Cristine positive testing is noted    Question deconditioning patient to continue follow-up with pulmonology         Relevant Orders    CBC    Comprehensive metabolic panel    Hemoglobin A1C    Lipid Panel with Direct LDL reflex    TSH, 3rd generation with Free T4 reflex    UA (URINE) with reflex to Scope    Vitamin D 25 hydroxy      Other Visit Diagnoses     Screening mammogram for high-risk patient        Relevant Orders Mammo screening bilateral w cad    CBC    Comprehensive metabolic panel    Hemoglobin A1C    Lipid Panel with Direct LDL reflex    TSH, 3rd generation with Free T4 reflex    UA (URINE) with reflex to Scope    Vitamin D 25 hydroxy           Preventive health issues were discussed with patient, and age appropriate screening tests were ordered as noted in patient's After Visit Summary  Personalized health advice and appropriate referrals for health education or preventive services given if needed, as noted in patient's After Visit Summary       History of Present Illness:     Patient presents for Medicare Annual Wellness visit    Patient Care Team:  Paul Cuellar DO as PCP - Kyleigh Sol MD     Problem List:     Patient Active Problem List   Diagnosis    Vitamin D deficiency    Transient ischemic attack    Osteoarthritis    Moderate osteopenia    Essential hypertension    Hypercholesterolemia    Hyperglycemia    Complex partial seizure ([facility] [address] [facility]    Midline cystocele    Benign neoplasm of large intestine    Anxiety    Personal history of breast cancer    Personal history of radiation therapy    Hiatal hernia    Allergic rhinitis    Health care maintenance    Dyspnea on exertion    Abnormal EKG    Trigeminy    Cigarette nicotine dependence in remission    Stage 3a chronic kidney disease ([facility] [address] )      Past Medical and Surgical History:     Past Medical History:   Diagnosis Date    Cancer ([facility] [address] )     left breast    Elevated serum alkaline phosphatase level     GERD (gastroesophageal reflux disease)     Glaucoma     Hiatal hernia     Hypercalcemia     Hyperglycemia     diet controlled    Hypertension     Rheumatic fever     Seizures ([address]     TIA (transient ischemic attack)     Vitamin D deficiency      Past Surgical History:   Procedure Laterality Date    APPENDECTOMY      BREAST LUMPECTOMY Left     COLONOSCOPY      FOOT SURGERY Left     ORIF ANKLE FRACTURE Left     TONSILLECTOMY      TUBAL LIGATION        Family History:     Family History   Problem Relation Age of Onset   Ethelyn Bennett Breast cancer Mother     Coronary artery disease Father     Bone cancer Brother     Lung cancer Brother       Social History:     Social History     Socioeconomic History    Marital status:      Spouse name: None    Number of children: None    Years of education: None    Highest education level: None   Occupational History    Occupation: retired   Tobacco Use    Smoking status: Former Smoker     Packs/day: 1 00     Years: 53 00     Pack years: 53 00     Types: Cigarettes     Start date: 38     Quit date:      Years since quittin 3    Smokeless tobacco: Never Used   Vaping Use    Vaping Use: Never used   Substance and Sexual Activity    Alcohol use: Yes     Comment: social    Drug use: No    Sexual activity: Not Currently   Other Topics Concern    None   Social History Narrative    Activities - gardening    Daily coffee consumption- 1 cup per day    Daily tea consumption    Hearing loss     Social Determinants of Health     Financial Resource Strain: Not on file   Food Insecurity: Not on file   Transportation Needs: Not on file   Physical Activity: Not on file   Stress: Not on file   Social Connections: Not on file   Intimate Partner Violence: Not on file   Housing Stability: Not on file      Medications and Allergies:     Current Outpatient Medications   Medication Sig Dispense Refill    albuterol (PROVENTIL HFA,VENTOLIN HFA) 90 mcg/act inhaler Inhale 2 puffs every 6 (six) hours as needed for wheezing (Patient taking differently: Inhale 2 puffs every 6 (six) hours as needed for wheezing PRN ) 1 Inhaler 5    Alpha-Lipoic Acid 100 MG CAPS Take by mouth       ALPRAZolam (XANAX) 0 25 mg tablet TAKE ONE TABLET BY MOUTH EVERY DAY AS NEEDED FOR ANXIETY 30 tablet 1    amoxicillin (AMOXIL) 500 mg capsule TAKE 4 CAPSULES BY MOUTH ONE HOUR PRIOR TO OFFICE VISIT      B Complex Vitamins (B COMPLEX 1 PO) Take 2 tablets by mouth daily      bimatoprost (LUMIGAN) 0 03 % ophthalmic drops Administer 1 drop to both eyes daily at bedtime        Calcium Carbonate (CALCIUM 600 PO) Take by mouth      Cholecalciferol (VITAMIN D) 2000 units CAPS Take 2 capsules by mouth daily      Coenzyme Q10 (COQ10) 100 MG CAPS Take by mouth      gabapentin (NEURONTIN) 300 mg capsule TAKE ONE CAPSULE BY MOUTH AT BEDTIME AS NEEDED FOR PAIN 30 capsule 11    GLUCOSAMINE-MSM-HYALURONIC ACD PO Take 1 capsule by mouth 3 (three) times a day      GRAPE SEED EXTRACT PO Take by mouth      levETIRAcetam (KEPPRA) 500 mg tablet TAKE 1 TABLET BY MOUTH TWICE A DAY 60 tablet 5    losartan (COZAAR) 25 mg tablet TAKE 1 TABLET BY MOUTH EVERY DAY 90 tablet 3    Lumigan 0 01 % ophthalmic drops INSTILL 1 DROP INTO BOTH EYES AT BEDTIME      metoprolol succinate (TOPROL-XL) 200 MG 24 hr tablet TAKE 1 TABLET BY MOUTH EVERY DAY 90 tablet 2    milk thistle 175 MG tablet Take 175 mg by mouth daily      mometasone (ELOCON) 0 1 % cream Apply to bilateral external ear once weekly at bedtime 45 g 3    Multiple Vitamins-Minerals (MULTIVITAMIN ADULT PO) Take 1 capsule by mouth daily        Omega-3 Fatty Acids (FISH OIL PO) Take 1,000 mg by mouth daily        Turmeric 500 MG CAPS Take 1 capsule by mouth daily        VOLTAREN 1 % APPLY 2G TOPICALLY TO AFFECTED AREA FOUR TIMES A DAY AS DIRECTED 100 g 5    aspirin 81 MG tablet Take 162 mg by mouth      Magnesium 400 MG TABS Take by mouth (Patient not taking: Reported on 5/6/2022 )       No current facility-administered medications for this visit       Allergies   Allergen Reactions    Carbamazepine Myalgia     flu-like symptoms    Epinephrine Other (See Comments)     "weakness"    Iodides Itching    Morphine And Related     Sulfamethoxazole-Trimethoprim     Latex Rash    Lisinopril Cough    Topiramate Rash      Immunizations:     Immunization History   Administered Date(s) Administered    COVID-19 MODERNA VACC 0 5 ML IM 01/30/2021, 02/27/2021, 12/27/2021    INFLUENZA 12/08/2020    Influenza, high dose seasonal 0 7 mL 10/13/2021    Pneumococcal Conjugate 13-Valent 03/29/2018    Pneumococcal Polysaccharide PPV23 03/29/2018    Td (adult), adsorbed 03/29/2018    Tdap 07/23/2020    Zoster 03/29/2018      Health Maintenance:         Topic Date Due    Lung Cancer Screening  Never done    Colorectal Cancer Screening  05/08/2015    Breast Cancer Screening: Mammogram  08/26/2021    Hepatitis C Screening  Completed     There are no preventive care reminders to display for this patient  Medicare Health Risk Assessment:     /84   Pulse 62   Ht 5' 4" (1 626 m)   Wt 89 8 kg (198 lb)   LMP  (LMP Unknown)   BMI 33 99 kg/m²      Vivien Yates is here for her Subsequent Wellness visit  Health Risk Assessment:   Patient rates overall health as poor  Patient feels that their physical health rating is slightly worse  Patient is satisfied with their life  Eyesight was rated as same  Hearing was rated as slightly worse  Patient feels that their emotional and mental health rating is same  Patients states they are never, rarely angry  Patient states they are often unusually tired/fatigued  Pain experienced in the last 7 days has been some  Patient's pain rating has been 3/10  Patient states that she has experienced no weight loss or gain in last 6 months  Depression Screening:   PHQ-2 Score: 3  PHQ-9 Score: 8      Fall Risk Screening: In the past year, patient has experienced: no history of falling in past year      Urinary Incontinence Screening:   Patient has not leaked urine accidently in the last six months  Home Safety:  Patient does not have trouble with stairs inside or outside of their home  Patient has working smoke alarms and has no working carbon monoxide detector  Home safety hazards include: loose rugs on the floor   Loose rug in the kitchen    Nutrition:   Current diet is Regular  Medications:   Patient is currently taking over-the-counter supplements  OTC medications include: see medication list  Patient is able to manage medications  Activities of Daily Living (ADLs)/Instrumental Activities of Daily Living (IADLs):   Walk and transfer into and out of bed and chair?: Yes  Dress and groom yourself?: Yes    Bathe or shower yourself?: Yes    Feed yourself? Yes  Do your laundry/housekeeping?: Yes  Manage your money, pay your bills and track your expenses?: Yes  Make your own meals?: Yes    Do your own shopping?: Yes    Previous Hospitalizations:   Any hospitalizations or ED visits within the last 12 months?: No      Advance Care Planning:   Living will: Yes    Durable POA for healthcare: Yes    Advanced directive: Yes    Advanced directive counseling given: Yes    Five wishes given: No    Patient declined ACP directive: Yes    End of Life Decisions reviewed with patient: Yes    Provider agrees with end of life decisions: Yes      Cognitive Screening:   Provider or family/friend/caregiver concerned regarding cognition?: No    PREVENTIVE SCREENINGS      Cardiovascular Screening:    General: Screening Not Indicated and History Lipid Disorder      Diabetes Screening:     General: Screening Current      Breast Cancer Screening:     General: History Breast Cancer and Risks and Benefits Discussed    Due for: Mammogram        Cervical Cancer Screening:    General: Screening Not Indicated      Abdominal Aortic Aneurysm (AAA) Screening:        General: Screening Not Indicated      Lung Cancer Screening:     General: Screening Not Indicated      Hepatitis C Screening:    General: Screening Current    Screening, Brief Intervention, and Referral to Treatment (SBIRT)    Screening  Typical number of drinks in a day: 1  Typical number of drinks in a week: 3  Interpretation: Low risk drinking behavior      Single Item Drug Screening:  How often have you used an illegal drug (including marijuana) or a prescription medication for non-medical reasons in the past year? never    Single Item Drug Screen Score: 0  Interpretation: Negative screen for possible drug use disorder      Mick Sanchez, DO

## 2022-05-06 NOTE — ASSESSMENT & PLAN NOTE
Patient has been released from follow-up from Oncology as her breast cancer was 0 7    Mammography was ordered

## 2022-05-06 NOTE — PROGRESS NOTES
Assessment and Plan:  1  Personal history of breast cancer, screening mammography was ordered patient has released from follow-up by her oncologist as her breast cancer was 2007  2  Hypertension, component white coat syndrome by end of office visit blood pressure normalized continue present therapy  3  TIA, stable patient follows with Neurology  4  Seizure disorder, stable follows with Neurology  5  Osteopenia, DEXA scan 4/22  6  DJD, currently asymptomatic   7  Dyspnea on exertion, extensive cardiopulmonary workup  Patient is following with Pulmonary Medicine at this time  All testing appears to be negative  I question with deconditioning  8  BMI 33 99 diet exercise weight loss recommended  9  Hypercholesterolemia patient has refused medication low-fat low-cholesterol diet recommended  10  Hyperglycemia, slowly improving hemoglobin A1c is down from 6 4 down to 6 3  Discussed with patient if she reaches 6 5 that would be diabetic  She needs a low sugar low-carbohydrate diet and weight loss to prevent diabetes  11  Vitamin-D deficiency, stable continue present therapy  12  CKD-3, is on Arb  Explained to patient this is normal for her age  13  Healthcare maintenance, Medicare a [address] completed  14  Patient to return in 6 months for office visit blood work sooner if needed  15  As patient was leaving patient asked needs any programs to help with transportation  Patient no longer drives  Patient does not have a phone that she can use liver or the lift ashlee    Will have our  contact her to see if we can offer any assistance     Problem List Items Addressed This Visit        Cardiovascular and Mediastinum    Transient ischemic attack     Stable to follow-up Neurology         Relevant Orders    CBC    Comprehensive metabolic panel    Hemoglobin A1C    Lipid Panel with Direct LDL reflex    TSH, 3rd generation with Free T4 reflex    UA (URINE) with reflex to Scope    Vitamin D 25 hydroxy    Essential hypertension     Component of white coat sooner by end of office visit blood pressure normalized this         Relevant Orders    CBC    Comprehensive metabolic panel    Hemoglobin A1C    Lipid Panel with Direct LDL reflex    TSH, 3rd generation with Free T4 reflex    UA (URINE) with reflex to Scope    Vitamin D 25 hydroxy       Nervous and Auditory    Complex partial seizure (HCC)     Stable follows with Neurology         Relevant Orders    CBC    Comprehensive metabolic panel    Hemoglobin A1C    Lipid Panel with Direct LDL reflex    TSH, 3rd generation with Free T4 reflex    UA (URINE) with reflex to Scope    Vitamin D 25 hydroxy       Musculoskeletal and Integument    Osteoarthritis     Asymptomatic at the present time         Relevant Orders    CBC    Comprehensive metabolic panel    Hemoglobin A1C    Lipid Panel with Direct LDL reflex    TSH, 3rd generation with Free T4 reflex    UA (URINE) with reflex to Scope    Vitamin D 25 hydroxy    Moderate osteopenia     DEXA 4/22         Relevant Orders    CBC    Comprehensive metabolic panel    Hemoglobin A1C    Lipid Panel with Direct LDL reflex    TSH, 3rd generation with Free T4 reflex    UA (URINE) with reflex to Scope    Vitamin D 25 hydroxy       Genitourinary    Stage 3a chronic kidney disease ([facility] [address] )     Lab Results   Component Value Date    EGFR 56 04/25/2022    EGFR 55 10/14/2021    EGFR 68 04/19/2021    CREATININE 0 96 04/25/2022    CREATININE 0 98 10/14/2021    CREATININE 0 83 04/19/2021   Age-appropriate continue Arb            Other    Vitamin D deficiency     Stable continue present therapy         Relevant Orders    CBC    Comprehensive metabolic panel    Hemoglobin A1C    Lipid Panel with Direct LDL reflex    TSH, 3rd generation with Free T4 reflex    UA (URINE) with reflex to Scope    Vitamin D 25 hydroxy    Hypercholesterolemia     Patient has refused medication low-fat low-cholesterol diet recommended         Relevant Orders    CBC    Comprehensive metabolic panel    Hemoglobin A1C    Lipid Panel with Direct LDL reflex    TSH, 3rd generation with Free T4 reflex    UA (URINE) with reflex to Scope    Vitamin D 25 hydroxy    Hyperglycemia     Improving hemoglobin A1c has dropped from 6 4-6 3  Discussed with patient 6 5 she would be diabetic so continue low sugar low-carbohydrate diet         Relevant Orders    CBC    Comprehensive metabolic panel    Hemoglobin A1C    Lipid Panel with Direct LDL reflex    TSH, 3rd generation with Free T4 reflex    UA (URINE) with reflex to Scope    Vitamin D 25 hydroxy    Personal history of breast cancer - Primary     Patient has been released from follow-up from Oncology as her breast cancer was 0 7  Mammography was ordered         Relevant Orders    Mammo screening bilateral w cad    CBC    Comprehensive metabolic panel    Hemoglobin A1C    Lipid Panel with Direct LDL reflex    TSH, 3rd generation with Free T4 reflex    UA (URINE) with reflex to Scope    Vitamin D 25 hydroxy    Health care maintenance     Medicare a [place] complete         Dyspnea on exertion     In seen by Cardiology now seeing pulmonology, no ablate Cristine positive testing is noted  Question deconditioning patient to continue follow-up with pulmonology         Relevant Orders    CBC    Comprehensive metabolic panel    Hemoglobin A1C    Lipid Panel with Direct LDL reflex    TSH, 3rd generation with Free T4 reflex    UA (URINE) with reflex to Scope    Vitamin D 25 hydroxy      Other Visit Diagnoses     Screening mammogram for high-risk patient        Relevant Orders    Mammo screening bilateral w cad    CBC    Comprehensive metabolic panel    Hemoglobin A1C    Lipid Panel with Direct LDL reflex    TSH, 3rd generation with Free T4 reflex    UA (URINE) with reflex to Scope    Vitamin D 25 hydroxy                 Diagnoses and all orders for this visit:    Personal history of breast cancer  -     Mammo screening bilateral w cad; Future  -     CBC;  Future  - Comprehensive metabolic panel; Future  -     Hemoglobin A1C; Future  -     Lipid Panel with Direct LDL reflex; Future  -     TSH, 3rd generation with Free T4 reflex; Future  -     UA (URINE) with reflex to Scope; Future  -     Vitamin D 25 hydroxy; Future    Screening mammogram for high-risk patient  -     Mammo screening bilateral w cad; Future  -     CBC; Future  -     Comprehensive metabolic panel; Future  -     Hemoglobin A1C; Future  -     Lipid Panel with Direct LDL reflex; Future  -     TSH, 3rd generation with Free T4 reflex; Future  -     UA (URINE) with reflex to Scope; Future  -     Vitamin D 25 hydroxy; Future    Essential hypertension  -     CBC; Future  -     Comprehensive metabolic panel; Future  -     Hemoglobin A1C; Future  -     Lipid Panel with Direct LDL reflex; Future  -     TSH, 3rd generation with Free T4 reflex; Future  -     UA (URINE) with reflex to Scope; Future  -     Vitamin D 25 hydroxy; Future    Transient ischemic attack  -     CBC; Future  -     Comprehensive metabolic panel; Future  -     Hemoglobin A1C; Future  -     Lipid Panel with Direct LDL reflex; Future  -     TSH, 3rd generation with Free T4 reflex; Future  -     UA (URINE) with reflex to Scope; Future  -     Vitamin D 25 hydroxy; Future    Partial symptomatic epilepsy with complex partial seizures, not intractable, without status epilepticus ([facility] [address] )  -     CBC; Future  -     Comprehensive metabolic panel; Future  -     Hemoglobin A1C; Future  -     Lipid Panel with Direct LDL reflex; Future  -     TSH, 3rd generation with Free T4 reflex; Future  -     UA (URINE) with reflex to Scope; Future  -     Vitamin D 25 hydroxy; Future    Moderate osteopenia  -     CBC; Future  -     Comprehensive metabolic panel; Future  -     Hemoglobin A1C; Future  -     Lipid Panel with Direct LDL reflex; Future  -     TSH, 3rd generation with Free T4 reflex; Future  -     UA (URINE) with reflex to Scope;  Future  -     Vitamin D 25 hydroxy; Future    Primary osteoarthritis involving multiple joints  -     CBC; Future  -     Comprehensive metabolic panel; Future  -     Hemoglobin A1C; Future  -     Lipid Panel with Direct LDL reflex; Future  -     TSH, 3rd generation with Free T4 reflex; Future  -     UA (URINE) with reflex to Scope; Future  -     Vitamin D 25 hydroxy; Future    Dyspnea on exertion  -     CBC; Future  -     Comprehensive metabolic panel; Future  -     Hemoglobin A1C; Future  -     Lipid Panel with Direct LDL reflex; Future  -     TSH, 3rd generation with Free T4 reflex; Future  -     UA (URINE) with reflex to Scope; Future  -     Vitamin D 25 hydroxy; Future    Hypercholesterolemia  -     CBC; Future  -     Comprehensive metabolic panel; Future  -     Hemoglobin A1C; Future  -     Lipid Panel with Direct LDL reflex; Future  -     TSH, 3rd generation with Free T4 reflex; Future  -     UA (URINE) with reflex to Scope; Future  -     Vitamin D 25 hydroxy; Future    Hyperglycemia  -     CBC; Future  -     Comprehensive metabolic panel; Future  -     Hemoglobin A1C; Future  -     Lipid Panel with Direct LDL reflex; Future  -     TSH, 3rd generation with Free T4 reflex; Future  -     UA (URINE) with reflex to Scope; Future  -     Vitamin D 25 hydroxy; Future    Vitamin D deficiency  -     CBC; Future  -     Comprehensive metabolic panel; Future  -     Hemoglobin A1C; Future  -     Lipid Panel with Direct LDL reflex; Future  -     TSH, 3rd generation with Free T4 reflex; Future  -     UA (URINE) with reflex to Scope; Future  -     Vitamin D 25 hydroxy; Future    Stage 3a chronic kidney disease [facility])    Health care maintenance            Subjective:      Patient ID: Mallory Villalobos is a 80 y o  female  CC:    Chief Complaint   Patient presents with    Medicare Wellness Visit     Pt is present today for AWV and chronic condition follow up       HPI:    Patient doing well    Patient still has some mild chronic dyspnea exertion was seen by Cardiology now seeing Pulmonary Medicine  All testing appears normal   Question of this is deconditioning  To follow-up Pulmonary Medicine  Blood work was discussed with the patient      The following portions of the patient's history were reviewed and updated as appropriate: allergies, current medications, past family history, past medical history, past social history, past surgical history and problem list       Review of Systems   Constitutional: Negative  HENT: Negative  Eyes: Negative  Respiratory:        HPI   Cardiovascular: Negative  Gastrointestinal: Negative  Endocrine: Negative  Genitourinary: Negative  Musculoskeletal: Negative  Skin: Negative  Allergic/Immunologic: Negative  Neurological: Negative  Hematological: Negative  Psychiatric/Behavioral: Negative  Data to review:       Objective:    Vitals:    05/06/22 1123 05/06/22 1155   BP: 158/100 138/84   BP Location: Right arm    Patient Position: Sitting    Cuff Size: Standard    Pulse: 62    Weight: 89 8 kg (198 lb)    Height: 5' 4" (1 626 m)         Physical Exam  Vitals and nursing note reviewed  Constitutional:       Appearance: Normal appearance  HENT:      Head: Normocephalic and atraumatic  Eyes:      General: No scleral icterus  Neck:      Vascular: No carotid bruit  Cardiovascular:      Rate and Rhythm: Normal rate and regular rhythm  Heart sounds: Normal heart sounds  Pulmonary:      Effort: Pulmonary effort is normal       Breath sounds: Normal breath sounds  Abdominal:      General: Bowel sounds are normal       Palpations: Abdomen is soft  Musculoskeletal:      Cervical back: Neck supple  Right lower leg: No edema  Left lower leg: No edema  Skin:     General: Skin is warm and dry  Neurological:      General: No focal deficit present  Mental Status: She is alert  Psychiatric:         Mood and Affect: Mood normal            BMI Counseling:  Body mass index is 33 99 kg/m²  The BMI is above normal  Nutrition recommendations include moderation in carbohydrate intake and reducing intake of cholesterol  Exercise recommendations include exercising 3-5 times per week  Rationale for BMI follow-up plan is due to patient being overweight or obese  Depression Screening and Follow-up Plan: Patient's depression screening was positive with a PHQ-2 score of 3  Their PHQ-9 score was 8

## 2022-05-09 ENCOUNTER — PATIENT OUTREACH (OUTPATIENT)
Dept: FAMILY MEDICINE CLINIC | Facility: CLINIC | Age: 82
End: 2022-05-09

## 2022-05-09 DIAGNOSIS — Z74.8 ASSISTANCE NEEDED WITH TRANSPORTATION: Primary | ICD-10-CM

## 2022-05-10 ENCOUNTER — PATIENT OUTREACH (OUTPATIENT)
Dept: FAMILY MEDICINE CLINIC | Facility: CLINIC | Age: 82
End: 2022-05-10

## 2022-05-10 NOTE — PROGRESS NOTES
CMOC-spoke with Linda Thomason and she is agreeable to this [address] application  Application complete # [id number]- Senior Share ride  #[id number]- Free bus pass  [address] [facility] $[id number] SSA  $[id number] 00 annuity  Has SNAP- Heat included in rent  Possible [employer] for electric? Angella Bee? CMOC will continue to follow  In 1 weeks time  And follow with OP CM MSW Virgie Loss and offer additional services

## 2022-05-11 ENCOUNTER — PATIENT OUTREACH (OUTPATIENT)
Dept: CASE MANAGEMENT | Facility: OTHER | Age: 82
End: 2022-05-11

## 2022-05-11 ENCOUNTER — TELEPHONE (OUTPATIENT)
Dept: NEUROLOGY | Facility: CLINIC | Age: 82
End: 2022-05-11

## 2022-05-11 NOTE — TELEPHONE ENCOUNTER
MSW received referral from Sofía Zuniga, medical assistant, regarding transportation for patient  After reviewing patient's chart, MSW learned that patient has been working with  Bandar Mendez regarding same  MSW reached out to Catrachita CHAIREZ for some insight on current transportation needs  At this time, Marcio Wang application is complete per note from 05/11    "CMOC-spoke with Ana Rosa Alexy and she is agreeable to this [address] [place] [address] Dr application  Application complete # [id number]- Senior Share ride #[id number]- Free bus pass      Sushma income $[id number] SSA  $107 00 annuity      Has SNAP- Heat included in rent      Possible [employer] for electric? [address] will continue to follow In 1 weeks time And follow with OP CM MSW Catrachita Arreguin and offer additional services "    MSW phoned patient at [phone] to inform her of above  MSW will remain available as needed if there are any future social needs

## 2022-05-11 NOTE — PROGRESS NOTES
CMOC : Spoke to [employer] yesterday, completed CHW assessment this day  ALEJANDRA / Leah Sandhu?    [facility]: will call and schedule a home visit to discuss above  CMOC: will follow up with in 1 weeks time

## 2022-05-13 ENCOUNTER — PATIENT OUTREACH (OUTPATIENT)
Dept: FAMILY MEDICINE CLINIC | Facility: CLINIC | Age: 82
End: 2022-05-13

## 2022-05-13 NOTE — PROGRESS NOTES
[facility]- received email from Karen Sosa 81 my ride- [place] approved for [employer] ride services- Welcome packet to be mailed to home  CMOC to follow up for ease or scheduling trips and setting up [employer] pay  CMOC to continue to follow with OP CM FABIANA Hutchison and also discuss [facility] application

## 2022-05-23 ENCOUNTER — PATIENT OUTREACH (OUTPATIENT)
Dept: FAMILY MEDICINE CLINIC | Facility: CLINIC | Age: 82
End: 2022-05-23

## 2022-05-23 NOTE — PROGRESS NOTES
[address] [place] [address]- spoke with Scar Payan to review [employer] for ease of scheduling trips and setting up [employer]	PecosStephanie Estes's car still not working so she will utilize [email or web address] for transportation  Discussed SNAP benefits and will apply for PACE [employer]ET to help with additional prescription cost  Right now Scar Payan getting 30 day supply  90 days may be less  Started PACE application, additional information is needed to complete and submit  Appointment set for this Weds 3/57 to continue application at 11 am     Coretta Rodriguez appreciative of the assistance and active listening as she feels her life is so busy at this moment  CMOC will continue to follow with OP CM FABIANA Aguirre

## 2022-05-25 ENCOUNTER — PATIENT OUTREACH (OUTPATIENT)
Dept: FAMILY MEDICINE CLINIC | Facility: CLINIC | Age: 82
End: 2022-05-25

## 2022-05-25 NOTE — PROGRESS NOTES
[facility]- called patient to complete PACE application as scheduled  While discussing application needed patient found her PACE card and will not need to complete application this time  [facility] suggested that patient check with pharmacy to be sure they have Pace card information on file  [facility] will follow up with patient in 2 weeks time for ease of use with scheduling and paying for [place] trips  Patient appreciative of the assistance and conversations with her  CMOC to follow continue to follow with OP CM FABIANA Ziegler

## 2022-06-21 ENCOUNTER — PATIENT OUTREACH (OUTPATIENT)
Dept: FAMILY MEDICINE CLINIC | Facility: CLINIC | Age: 82
End: 2022-06-21

## 2022-06-21 NOTE — PROGRESS NOTES
[facility]- Chart Review to check on next appointment that scheduling [place] would be needed for  August 2022  No need to keep referral open till then  Patient has [facility] number and referral can be reopened at that time  [facility] closing referral from OP CM MSW Cornelius Perez as transportation has be approved and patient is aware of the number to call and set up [employer] pay when patient is ready to do so  CMOC will be availabilities to assist patent at that time if needed  No additional needs at this time,  Note closing referral routed to Natasha Cabrera CM MSW

## 2022-07-10 ENCOUNTER — NURSE TRIAGE (OUTPATIENT)
Dept: OTHER | Facility: OTHER | Age: 82
End: 2022-07-10

## 2022-07-10 NOTE — TELEPHONE ENCOUNTER
Reason for Disposition   [1] COVID-19 infection suspected by caller or triager AND [2] mild symptoms (cough, fever, or others) AND [3] has not gotten tested yet    Protocols used: CORONAVIRUS (COVID-19) [facility]

## 2022-07-10 NOTE — TELEPHONE ENCOUNTER
"I had all my shots" I started feeling sick on  wedneday, started with fever on Thursday   I think I'm sick with COVID

## 2022-07-10 NOTE — TELEPHONE ENCOUNTER
Regarding: Fever / Malia Broad / Lonell Maxwell / William Simon  ----- Message from Jaya Mccoy sent at 7/10/2022 10:00 AM EDT -----  "I don't know for sure but I think I have COVID  I have a fever, I feel exhausted and at night I'm hot and cold and shake terribly   Is there anything I can take to help me with this?"

## 2022-07-14 ENCOUNTER — HOSPITAL ENCOUNTER (EMERGENCY)
Facility: HOSPITAL | Age: 82
Discharge: HOME | End: 2022-07-14
Attending: EMERGENCY MEDICINE | Admitting: EMERGENCY MEDICINE
Payer: MEDICARE

## 2022-07-14 ENCOUNTER — OFFICE VISIT (OUTPATIENT)
Dept: URGENT CARE | Age: 82
End: 2022-07-14
Payer: MEDICARE

## 2022-07-14 VITALS
OXYGEN SATURATION: 95 % | DIASTOLIC BLOOD PRESSURE: 103 MMHG | SYSTOLIC BLOOD PRESSURE: 181 MMHG | TEMPERATURE: 98.4 F | RESPIRATION RATE: 18 BRPM | WEIGHT: 194.45 LBS | BODY MASS INDEX: 33.38 KG/M2 | HEART RATE: 100 BPM

## 2022-07-14 VITALS
OXYGEN SATURATION: 97 % | RESPIRATION RATE: 16 BRPM | SYSTOLIC BLOOD PRESSURE: 130 MMHG | HEART RATE: 74 BPM | DIASTOLIC BLOOD PRESSURE: 88 MMHG | TEMPERATURE: 98.1 F

## 2022-07-14 DIAGNOSIS — R52 BODY ACHES: Primary | ICD-10-CM

## 2022-07-14 DIAGNOSIS — R21 RASH: ICD-10-CM

## 2022-07-14 DIAGNOSIS — R53.83 FATIGUE, UNSPECIFIED TYPE: ICD-10-CM

## 2022-07-14 DIAGNOSIS — M79.10 MYALGIA: ICD-10-CM

## 2022-07-14 DIAGNOSIS — R53.83 FATIGUE: Primary | ICD-10-CM

## 2022-07-14 LAB
ALBUMIN SERPL BCP-MCNC: 3 G/DL (ref 3.5–5)
ALP SERPL-CCNC: 79 U/L (ref 46–116)
ALT SERPL W P-5'-P-CCNC: 35 U/L (ref 12–78)
ANION GAP SERPL CALCULATED.3IONS-SCNC: 8 MMOL/L (ref 4–13)
AST SERPL W P-5'-P-CCNC: 23 U/L (ref 5–45)
ATRIAL RATE: 90 BPM
BASOPHILS # BLD AUTO: 0.04 THOUSANDS/ΜL (ref 0–0.1)
BASOPHILS NFR BLD AUTO: 1 % (ref 0–1)
BILIRUB SERPL-MCNC: 0.57 MG/DL (ref 0.2–1)
BUN SERPL-MCNC: 13 MG/DL (ref 5–25)
CALCIUM ALBUM COR SERPL-MCNC: 10.8 MG/DL (ref 8.3–10.1)
CALCIUM SERPL-MCNC: 10 MG/DL (ref 8.3–10.1)
CHLORIDE SERPL-SCNC: 106 MMOL/L (ref 100–108)
CO2 SERPL-SCNC: 28 MMOL/L (ref 21–32)
CREAT SERPL-MCNC: 0.81 MG/DL (ref 0.6–1.3)
EOSINOPHIL # BLD AUTO: 0.14 THOUSAND/ΜL (ref 0–0.61)
EOSINOPHIL NFR BLD AUTO: 2 % (ref 0–6)
ERYTHROCYTE [DISTWIDTH] IN BLOOD BY AUTOMATED COUNT: 13.3 % (ref 11.6–15.1)
GFR SERPL CREATININE-BSD FRML MDRD: 69 ML/MIN/1.73SQ M
GLUCOSE SERPL-MCNC: 106 MG/DL (ref 65–140)
HCT VFR BLD AUTO: 40.3 % (ref 34.8–46.1)
HGB BLD-MCNC: 13.1 G/DL (ref 11.5–15.4)
IMM GRANULOCYTES # BLD AUTO: 0.03 THOUSAND/UL (ref 0–0.2)
IMM GRANULOCYTES NFR BLD AUTO: 0 % (ref 0–2)
LYMPHOCYTES # BLD AUTO: 1.15 THOUSANDS/ΜL (ref 0.6–4.47)
LYMPHOCYTES NFR BLD AUTO: 13 % (ref 14–44)
MCH RBC QN AUTO: 30.5 PG (ref 26.8–34.3)
MCHC RBC AUTO-ENTMCNC: 32.5 G/DL (ref 31.4–37.4)
MCV RBC AUTO: 94 FL (ref 82–98)
MONOCYTES # BLD AUTO: 0.46 THOUSAND/ΜL (ref 0.17–1.22)
MONOCYTES NFR BLD AUTO: 5 % (ref 4–12)
NEUTROPHILS # BLD AUTO: 6.98 THOUSANDS/ΜL (ref 1.85–7.62)
NEUTS SEG NFR BLD AUTO: 79 % (ref 43–75)
NRBC BLD AUTO-RTO: 0 /100 WBCS
P AXIS: 81 DEGREES
PLATELET # BLD AUTO: 288 THOUSANDS/UL (ref 149–390)
PMV BLD AUTO: 10 FL (ref 8.9–12.7)
POTASSIUM SERPL-SCNC: 3.9 MMOL/L (ref 3.5–5.3)
PR INTERVAL: 200 MS
PROT SERPL-MCNC: 7.3 G/DL (ref 6.4–8.2)
QRS AXIS: 15 DEGREES
QRSD INTERVAL: 88 MS
QT INTERVAL: 348 MS
QTC INTERVAL: 428 MS
RBC # BLD AUTO: 4.29 MILLION/UL (ref 3.81–5.12)
SARS-COV-2 AG UPPER RESP QL IA: NEGATIVE
SODIUM SERPL-SCNC: 142 MMOL/L (ref 136–145)
T WAVE AXIS: 42 DEGREES
VALID CONTROL: NORMAL
VENTRICULAR RATE: 91 BPM
WBC # BLD AUTO: 8.8 THOUSAND/UL (ref 4.31–10.16)

## 2022-07-14 PROCEDURE — 80053 COMPREHEN METABOLIC PANEL: CPT | Performed by: EMERGENCY MEDICINE

## 2022-07-14 PROCEDURE — 86618 LYME DISEASE ANTIBODY: CPT | Performed by: EMERGENCY MEDICINE

## 2022-07-14 PROCEDURE — 86617 LYME DISEASE ANTIBODY: CPT | Performed by: EMERGENCY MEDICINE

## 2022-07-14 PROCEDURE — 36415 COLL VENOUS BLD VENIPUNCTURE: CPT | Performed by: EMERGENCY MEDICINE

## 2022-07-14 PROCEDURE — 85025 COMPLETE CBC W/AUTO DIFF WBC: CPT | Performed by: EMERGENCY MEDICINE

## 2022-07-14 PROCEDURE — 93010 ELECTROCARDIOGRAM REPORT: CPT

## 2022-07-14 PROCEDURE — 99213 OFFICE O/P EST LOW 20 MIN: CPT | Performed by: NURSE PRACTITIONER

## 2022-07-14 PROCEDURE — 99284 EMERGENCY DEPT VISIT MOD MDM: CPT | Performed by: EMERGENCY MEDICINE

## 2022-07-14 PROCEDURE — G0463 HOSPITAL OUTPT CLINIC VISIT: HCPCS | Performed by: NURSE PRACTITIONER

## 2022-07-14 PROCEDURE — 93005 ELECTROCARDIOGRAM TRACING: CPT

## 2022-07-14 PROCEDURE — 87811 SARS-COV-2 COVID19 W/OPTIC: CPT | Performed by: NURSE PRACTITIONER

## 2022-07-14 PROCEDURE — 99285 EMERGENCY DEPT VISIT HI MDM: CPT

## 2022-07-14 RX ORDER — DOXYCYCLINE HYCLATE 100 MG/1
100 CAPSULE ORAL 2 TIMES DAILY
Qty: 20 CAPSULE | Refills: 0 | Status: SHIPPED | OUTPATIENT
Start: 2022-07-14 | End: 2022-07-14 | Stop reason: SDUPTHER

## 2022-07-14 RX ORDER — DOXYCYCLINE HYCLATE 100 MG/1
100 CAPSULE ORAL 2 TIMES DAILY
Qty: 20 CAPSULE | Refills: 0 | Status: SHIPPED | OUTPATIENT
Start: 2022-07-14 | End: 2022-07-24

## 2022-07-14 RX ORDER — DOXYCYCLINE HYCLATE 100 MG/1
100 CAPSULE ORAL ONCE
Status: COMPLETED | OUTPATIENT
Start: 2022-07-14 | End: 2022-07-14

## 2022-07-14 RX ORDER — ACETAMINOPHEN 325 MG/1
650 TABLET ORAL ONCE
Status: COMPLETED | OUTPATIENT
Start: 2022-07-14 | End: 2022-07-14

## 2022-07-14 RX ADMIN — DOXYCYCLINE 100 MG: 100 CAPSULE ORAL at 15:43

## 2022-07-14 RX ADMIN — ACETAMINOPHEN 650 MG: 325 TABLET, FILM COATED ORAL at 15:43

## 2022-07-14 NOTE — DISCHARGE INSTRUCTIONS
Take Doxycycline as prescribed- avoid sun and if you need to go outside apply sunscreen    Follow up with family doctor    Return to the ED if you develop any new or worsening symptoms including but not limited to change in mental status, numbness, weakness, chest pain, difficulty breathing, etc

## 2022-07-14 NOTE — ED PROVIDER NOTES
History  Chief Complaint   Patient presents with    Weakness - Generalized     Pt reports feeling fatigued for 1 week  Pt reports also having fevers  Pt reports she has not had fevers x 2 days  Pt reports having generalized warm rash  Pt was sent over here from urgent care for evaluation of rash  81 yo F presenting for evaluation of 1 week of fatigue, body aches, transient fever and now rash  Fever/chills for 1 day last week that has resolved/not recurred  Reports body aches and feeling tired  Diffuse rash, unsure exactly when it started, possibly yesterday  She does not know if all lesions started at same time- rash is to chest/abdomen/back and new lesions starting on legs  No facial/oral/palm/sole rash  Decreased appetite, but tolerating po  Denies sore throat, oral lesions, CP, SOB, cough/URI sx, urinary complaints  No known tick bites  No history of lyme  Was outside cutting down bush last week  Has cat that goes outdoors  MDM: 81 yo F with transient fever and now body aches/rash- will treat with doxy for questionable lyme vs  cellulitis, labs to r/o metabolic derangement          Prior to Admission Medications   Prescriptions Last Dose Informant Patient Reported? Taking?    ALPRAZolam (XANAX) 0 25 mg tablet  Self No No   Sig: TAKE ONE TABLET BY MOUTH EVERY DAY AS NEEDED FOR ANXIETY   Alpha-Lipoic Acid 100 MG CAPS  Self Yes No   Sig: Take by mouth    B Complex Vitamins (B COMPLEX 1 PO)  Self Yes No   Sig: Take 2 tablets by mouth daily   Calcium Carbonate (CALCIUM 600 PO)  Self Yes No   Sig: Take by mouth   Cholecalciferol (VITAMIN D) 2000 units CAPS  Self Yes No   Sig: Take 2 capsules by mouth daily   Coenzyme Q10 (COQ10) 100 MG CAPS  Self Yes No   Sig: Take by mouth   GLUCOSAMINE-MSM-HYALURONIC ACD PO  Self Yes No   Sig: Take 1 capsule by mouth 3 (three) times a day   GRAPE SEED EXTRACT PO  Self Yes No   Sig: Take by mouth   Lumigan 0 01 % ophthalmic drops  Self Yes No   Sig: INSTILL 1 DROP INTO BOTH EYES AT BEDTIME   Magnesium 400 MG TABS  Self Yes No   Sig: Take by mouth   Patient not taking: Reported on 7/14/2022   Multiple Vitamins-Minerals (MULTIVITAMIN ADULT PO)  Self Yes No   Sig: Take 1 capsule by mouth daily     Omega-3 Fatty Acids (FISH OIL PO)  Self Yes No   Sig: Take 1,000 mg by mouth daily     Turmeric 500 MG CAPS  Self Yes No   Sig: Take 1 capsule by mouth daily     VOLTAREN 1 %  Self No No   Sig: APPLY 2G TOPICALLY TO AFFECTED AREA FOUR TIMES A DAY AS DIRECTED   albuterol (PROVENTIL HFA,VENTOLIN HFA) 90 mcg/act inhaler  Self No No   Sig: Inhale 2 puffs every 6 (six) hours as needed for wheezing   Patient taking differently: Inhale 2 puffs every 6 (six) hours as needed for wheezing PRN    amoxicillin (AMOXIL) 500 mg capsule  Self Yes No   Sig: TAKE 4 CAPSULES BY MOUTH ONE HOUR PRIOR TO OFFICE VISIT   aspirin 81 MG tablet  Self Yes No   Sig: Take 162 mg by mouth   bimatoprost (LUMIGAN) 0 03 % ophthalmic drops  Self Yes No   Sig: Administer 1 drop to both eyes daily at bedtime     gabapentin (NEURONTIN) 300 mg capsule  Self No No   Sig: TAKE ONE CAPSULE BY MOUTH AT BEDTIME AS NEEDED FOR PAIN   levETIRAcetam (KEPPRA) 500 mg tablet  Self No No   Sig: TAKE 1 TABLET BY MOUTH TWICE A DAY   losartan (COZAAR) 25 mg tablet  Self No No   Sig: TAKE 1 TABLET BY MOUTH EVERY DAY   metoprolol succinate (TOPROL-XL) 200 MG 24 hr tablet  Self No No   Sig: TAKE 1 TABLET BY MOUTH EVERY DAY   milk thistle 175 MG tablet  Self Yes No   Sig: Take 175 mg by mouth daily   mometasone (ELOCON) 0 1 % cream  Self No No   Sig: Apply to bilateral external ear once weekly at bedtime      Facility-Administered Medications: None       Past Medical History:   Diagnosis Date    [facility]     [facility]    Elevated serum alkaline phosphatase level     GERD (gastroesophageal reflux disease)     Glaucoma     Hiatal hernia     Hypercalcemia     Hyperglycemia     diet controlled    Hypertension     Rheumatic fever     [facility]     TIA (transient ischemic attack)     Vitamin D deficiency        Past Surgical History:   Procedure Laterality Date    APPENDECTOMY      BREAST LUMPECTOMY Left     COLONOSCOPY      FOOT SURGERY Left     ORIF ANKLE FRACTURE Left     TONSILLECTOMY      TUBAL LIGATION         Family History   Problem Relation Age of Onset    Breast cancer Mother     Coronary artery disease Father     Bone cancer Brother     Lung cancer Brother      I have reviewed and agree with the history as documented  E-Cigarette/Vaping    E-Cigarette Use Never User      E-Cigarette/Vaping Substances    Nicotine No     THC No     CBD No     Flavoring No     Other No     Unknown No      Social History     Tobacco Use    Smoking status: Former Smoker     Packs/day: 1 00     Years: 53 00     Pack years: 53 00     Types: Cigarettes     Start date: 38     Quit date:      Years since quittin 5    Smokeless tobacco: Never Used   Vaping Use    Vaping Use: Never used   Substance Use Topics    Alcohol use: Yes     Comment: social    Drug use: No       Review of Systems   Constitutional: Positive for fatigue  Negative for chills, fever and unexpected weight change  HENT: Negative for ear pain, rhinorrhea and sore throat  Eyes: Negative for pain and visual disturbance  Respiratory: Negative for cough and shortness of breath  Cardiovascular: Negative for chest pain and leg swelling  Gastrointestinal: Negative for abdominal pain, constipation, diarrhea, nausea and vomiting  Endocrine: Negative for polydipsia, polyphagia and polyuria  Genitourinary: Negative for dysuria, frequency, hematuria and urgency  Musculoskeletal: Positive for myalgias  Negative for back pain and neck pain  Skin: Positive for rash  Negative for color change  Allergic/Immunologic: Negative for environmental allergies and immunocompromised state     Neurological: Negative for dizziness, weakness, light-headedness, numbness and headaches  Hematological: Negative for adenopathy  Does not bruise/bleed easily  Psychiatric/Behavioral: Negative for agitation and confusion  All other systems reviewed and are negative  Physical Exam  Physical Exam  Vitals and nursing note reviewed  Constitutional:       General: She is not in acute distress  Appearance: She is well-developed  HENT:      Head: Normocephalic and atraumatic  Nose: Nose normal    Eyes:      Conjunctiva/sclera: Conjunctivae normal    Cardiovascular:      Rate and Rhythm: Normal rate and regular rhythm  Heart sounds: Normal heart sounds  Pulmonary:      Effort: Pulmonary effort is normal  No respiratory distress  Breath sounds: Normal breath sounds  No stridor  No wheezing or rales  Chest:      Chest wall: No tenderness  Abdominal:      General: There is no distension  Palpations: Abdomen is soft  Tenderness: There is no abdominal tenderness  There is no guarding or rebound  Musculoskeletal:         General: No deformity  Cervical back: Normal range of motion and neck supple  Skin:     General: Skin is warm and dry  Findings: Rash present  Comments: See pictures below: multiple well circumscribed round lesions of various sizes over R shoulder, chest, abdomen, back and small areas to b/l shins  Nontender  Blanchable  R shoulder lesion has area of central clearing  No lesions to palms/soles  No oral lesions  Neurological:      Mental Status: She is alert and oriented to person, place, and time  Motor: No abnormal muscle tone  Coordination: Coordination normal    Psychiatric:         Thought Content:  Thought content normal          Judgment: Judgment normal                          Vital Signs  ED Triage Vitals   Temperature Pulse Respirations Blood Pressure SpO2   07/14/22 1351 07/14/22 1349 07/14/22 1349 07/14/22 1349 07/14/22 1349   98 4 °F (36 9 °C) 91 16 156/89 94 %      Temp Source Heart Rate Source Patient Position - Orthostatic VS BP Location FiO2 (%)   07/14/22 1351 07/14/22 1349 07/14/22 1349 07/14/22 1349 --   Oral Monitor Sitting Right arm       Pain Score       07/14/22 1543       3           Vitals:    07/14/22 1349 07/14/22 1614   BP: 156/89 (!) 181/103   Pulse: 91 100   Patient Position - Orthostatic VS: Sitting Lying         Visual Acuity      ED Medications  Medications   doxycycline hyclate (VIBRAMYCIN) capsule 100 mg (100 mg Oral Given 7/14/22 1543)   acetaminophen (TYLENOL) tablet 650 mg (650 mg Oral Given 7/14/22 1543)       Diagnostic Studies  Results Reviewed     Procedure Component Value Units Date/Time    Comprehensive metabolic panel [[id number]]  (Abnormal) Collected: 07/14/22 1539    Lab Status: Final result Specimen: Blood from Arm, Right Updated: 07/14/22 1601     Sodium 142 mmol/L      Potassium 3 9 mmol/L      Chloride 106 mmol/L      CO2 28 mmol/L      ANION GAP 8 mmol/L      BUN 13 mg/dL      Creatinine 0 81 mg/dL      Glucose 106 mg/dL      Calcium 10 0 mg/dL      Corrected Calcium 10 8 mg/dL      AST 23 U/L      ALT 35 U/L      Alkaline Phosphatase 79 U/L      Total Protein 7 3 g/dL      Albumin 3 0 g/dL      Total Bilirubin 0 57 mg/dL      eGFR 69 ml/min/1 73sq m     Narrative:      Bertha guidelines for Chronic Kidney Disease (CKD):     Stage 1 with normal or high GFR (GFR > 90 mL/min/1 73 square meters)    Stage 2 Mild CKD (GFR = 60-89 mL/min/1 73 square meters)    Stage 3A Moderate CKD (GFR = 45-59 mL/min/1 73 square meters)    Stage 3B Moderate CKD (GFR = 30-44 mL/min/1 73 square meters)    Stage 4 Severe CKD (GFR = 15-29 mL/min/1 73 square meters)    Stage 5 End Stage CKD (GFR <15 mL/min/1 73 square meters)  Note: GFR calculation is accurate only with a steady state creatinine    CBC and differential [[id number]]  (Abnormal) Collected: 07/14/22 1539    Lab Status: Final result Specimen: Blood from Arm, Right Updated: 07/14/22 5699 WBC 8 80 Thousand/uL      RBC 4 29 Million/uL      Hemoglobin 13 1 g/dL      Hematocrit 40 3 %      MCV 94 fL      MCH 30 5 pg      MCHC 32 5 g/dL      RDW 13 3 %      MPV 10 0 fL      Platelets 240 Thousands/uL      nRBC 0 /100 WBCs      Neutrophils Relative 79 %      Immat GRANS % 0 %      Lymphocytes Relative 13 %      Monocytes Relative 5 %      Eosinophils Relative 2 %      Basophils Relative 1 %      Neutrophils Absolute 6 98 Thousands/µL      Immature Grans Absolute 0 03 Thousand/uL      Lymphocytes Absolute 1 15 Thousands/µL      Monocytes Absolute 0 46 Thousand/µL      Eosinophils Absolute 0 14 Thousand/µL      Basophils Absolute 0 04 Thousands/µL     Lyme Total Antibody Profile with reflex to [facility] [[id number]] Collected: 07/14/22 1539    Lab Status: In process Specimen: Blood from Arm, Right Updated: 07/14/22 1542                 No orders to display              Procedures  Procedures         ED Course  ED Course as of 07/14/22 2220   Thu Jul 14, 2022   1555 EKG: sinus with occasional PVC, 91 bpm, LAD, qtc 428, nonspecific st changes                               SBIRT 22yo+    Flowsheet Row Most Recent Value   SBIRT (23 yo +)    In order to provide better care to our patients, we are screening all of our patients for alcohol and drug use  Would it be okay to ask you these screening questions? No Filed at: 07/14/2022 1616                    MDM  Number of Diagnoses or Management Options  Fatigue  Myalgia  Rash  Diagnosis management comments: 79 yo F with transient fever and now myalgias and rash, discussed possibility of lyme vs viral illness or cellulitis- started on Doxycycline  Instructed to f/u with PCP   Close return precautions reviewed and pt expressed understanding with plan       Amount and/or Complexity of Data Reviewed  Clinical lab tests: ordered and reviewed  Review and summarize past medical records: yes        Disposition  Final diagnoses:   Fatigue   Myalgia   Rash - possible erythema migrans/lyme     Time reflects when diagnosis was documented in both MDM as applicable and the Disposition within this note     Time User Action Codes Description Comment    7/14/2022  4:04 PM Eugenia Covington Add [R53 83] Fatigue     7/14/2022  4:04 PM Eugenia Covington Add [M79 10] Myalgia     7/14/2022  4:04 PM Latrelle Fu A Add [R21] Rash     7/14/2022  4:04 PM Eugenia Covington Modify [R21] Rash possible erythema migrans/lyme      ED Disposition     ED Disposition   Discharge    Condition   Stable    Date/Time   u Jul 14, 2022  4:04 PM    Comment   Dylan Quintanilla discharge to home/self care                 Follow-up Information     Follow up With Specialties Details Why [address] [facility], [facility]   [facility]  [address] [place] [id number] [phone]            Discharge Medication List as of 7/14/2022  4:07 PM      START taking these medications    Details   doxycycline hyclate (VIBRAMYCIN) 100 mg capsule Take 1 capsule (100 mg total) by mouth 2 (two) times a day for 10 days, Starting Thu 7/14/2022, Until Sun 7/24/2022, Print         CONTINUE these medications which have NOT CHANGED    Details   albuterol (PROVENTIL HFA,VENTOLIN HFA) 90 mcg/act inhaler Inhale 2 puffs every 6 (six) hours as needed for wheezing, Starting Thu 4/22/2021, Normal      Alpha-Lipoic Acid 100 MG CAPS Take by mouth , Historical Med      ALPRAZolam (XANAX) 0 25 mg tablet TAKE ONE TABLET BY MOUTH EVERY DAY AS NEEDED FOR ANXIETY, Normal      amoxicillin (AMOXIL) 500 mg capsule TAKE 4 CAPSULES BY MOUTH ONE HOUR PRIOR TO OFFICE VISIT, Historical Med      aspirin 81 MG tablet Take 162 mg by mouth, Historical Med      B Complex Vitamins (B COMPLEX 1 PO) Take 2 tablets by mouth daily, Historical Med      bimatoprost (LUMIGAN) 0 03 % ophthalmic drops Administer 1 drop to both eyes daily at bedtime  , Historical Med      Calcium Carbonate (CALCIUM 600 PO) Take by mouth, Historical Med      Cholecalciferol (VITAMIN D) 2000 units CAPS Take 2 capsules by mouth daily, Historical Med      Coenzyme Q10 (COQ10) 100 MG CAPS Take by mouth, Historical Med      gabapentin (NEURONTIN) 300 mg capsule TAKE ONE CAPSULE BY MOUTH AT BEDTIME AS NEEDED FOR PAIN, Normal      GLUCOSAMINE-MSM-HYALURONIC ACD PO Take 1 capsule by mouth 3 (three) times a day, Historical Med      GRAPE SEED EXTRACT PO Take by mouth, Historical Med      levETIRAcetam (KEPPRA) 500 mg tablet TAKE 1 TABLET BY MOUTH TWICE A DAY, Normal      losartan (COZAAR) 25 mg tablet TAKE 1 TABLET BY MOUTH EVERY DAY, Normal      Lumigan 0 01 % ophthalmic drops INSTILL 1 DROP INTO BOTH EYES AT BEDTIME, Historical Med      Magnesium 400 MG TABS Take by mouth, Historical Med      metoprolol succinate (TOPROL-XL) 200 MG 24 hr tablet TAKE 1 TABLET BY MOUTH EVERY DAY, Normal      milk thistle 175 MG tablet Take 175 mg by mouth daily, Historical Med      mometasone (ELOCON) 0 1 % cream Apply to bilateral external ear once weekly at bedtime, Normal      Multiple Vitamins-Minerals (MULTIVITAMIN ADULT PO) Take 1 capsule by mouth daily  , Historical Med      Omega-3 Fatty Acids (FISH OIL PO) Take 1,000 mg by mouth daily  , Historical Med      Turmeric 500 MG CAPS Take 1 capsule by mouth daily  , Historical Med      VOLTAREN 1 % APPLY 2G TOPICALLY TO AFFECTED AREA FOUR TIMES A DAY AS DIRECTED, Normal             No discharge procedures on file      PDMP Review     None          ED Provider  Electronically Signed by           Cheyanne Cesar DO  07/14/22 5305

## 2022-07-14 NOTE — PROGRESS NOTES
NAME: Sarina Bradford is a 80 y o  female  : 1940    MRN: [id number]    /88   Pulse 74   Temp 98 1 °F (36 7 °C)   Resp 16   LMP  (LMP Unknown)   SpO2 97%     Assessment and Plan   Body aches [R52]  1  Body aches  Poct Covid 19 Rapid Antigen Test   2  Rash  Transfer to other facility   3  Fatigue, unspecified type  Transfer to other facility       Willian Aguirre was seen today for rash  Diagnoses and all orders for this visit:    Body aches  -     Poct Covid 19 Rapid Antigen Test    Rash  -     Transfer to other facility    Fatigue, unspecified type  -     Transfer to other facility        Patient Instructions   There are no Patient Instructions on file for this visit  Proceed to the nearest ER if symptoms worsen, Follow up with your PCP  Continue to social distance, wash your hands, and wear your masks  Please continue to follow the CDC  gov guidelines daily for they are subject to change on COVID-19    Chief Complaint     Chief Complaint   Patient presents with    Rash     Round rash patches on arms, breasts, and back  Denies pain/itching with rash Also relates fever, aches, fatigue, starting last Wednesday  Home covid negative done yesterday  History of Present Illness     81 yo female here today with a rash on the right upper arm and they are in multiple areas  Present for past week and wasn't feeling well, he had a fever of 102 for two days, chills, fatigue and "feels like the flu"  Home covid-19 test was negative yesterday  She has been more fatigue, weak and not feeling herself since the rash appeared  Discussed with patient about the ongoing symptoms and the rash has worsened  I discussed with her initially pitamy Garner, however doing a more thorough exam I noticed that the areas were large, warm and sore  She has been more fatigue and weak per her sister next to her  Denies any congestion, sore throat or URI symptoms   Discussed with patient that the areas may represent lyme disease but needs further evaluation       Review of Systems   Review of Systems   Constitutional: Positive for fatigue and fever  HENT: Negative  Respiratory: Negative  Cardiovascular: Negative  Gastrointestinal: Negative for diarrhea, nausea and vomiting  Endocrine: Negative  Genitourinary: Negative  Musculoskeletal: Positive for arthralgias, gait problem (weak) and myalgias  Skin: Positive for rash (on the anterior and posterior aspects of the torso and on the left breast into the axilla victoriano)  Neurological: Positive for headaches  Psychiatric/Behavioral: Negative            Current Medications       Current Outpatient Medications:     Alpha-Lipoic Acid 100 MG CAPS, Take by mouth , Disp: , Rfl:     ALPRAZolam (XANAX) 0 25 mg tablet, TAKE ONE TABLET BY MOUTH EVERY DAY AS NEEDED FOR ANXIETY, Disp: 30 tablet, Rfl: 1    amoxicillin (AMOXIL) 500 mg capsule, TAKE 4 CAPSULES BY MOUTH ONE HOUR PRIOR TO OFFICE VISIT, Disp: , Rfl:     aspirin 81 MG tablet, Take 162 mg by mouth, Disp: , Rfl:     B Complex Vitamins (B COMPLEX 1 PO), Take 2 tablets by mouth daily, Disp: , Rfl:     bimatoprost (LUMIGAN) 0 03 % ophthalmic drops, Administer 1 drop to both eyes daily at bedtime  , Disp: , Rfl:     Calcium Carbonate (CALCIUM 600 PO), Take by mouth, Disp: , Rfl:     Cholecalciferol (VITAMIN D) 2000 units CAPS, Take 2 capsules by mouth daily, Disp: , Rfl:     Coenzyme Q10 (COQ10) 100 MG CAPS, Take by mouth, Disp: , Rfl:     gabapentin (NEURONTIN) 300 mg capsule, TAKE ONE CAPSULE BY MOUTH AT BEDTIME AS NEEDED FOR PAIN, Disp: 30 capsule, Rfl: 11    GLUCOSAMINE-MSM-HYALURONIC ACD PO, Take 1 capsule by mouth 3 (three) times a day, Disp: , Rfl:     GRAPE SEED EXTRACT PO, Take by mouth, Disp: , Rfl:     levETIRAcetam (KEPPRA) 500 mg tablet, TAKE 1 TABLET BY MOUTH TWICE A DAY, Disp: 60 tablet, Rfl: 5    losartan (COZAAR) 25 mg tablet, TAKE 1 TABLET BY MOUTH EVERY DAY, Disp: 90 tablet, Rfl: 3    Lumigan 0 01 % ophthalmic drops, INSTILL 1 DROP INTO BOTH EYES AT BEDTIME, Disp: , Rfl:     metoprolol succinate (TOPROL-XL) 200 MG 24 hr tablet, TAKE 1 TABLET BY MOUTH EVERY DAY, Disp: 90 tablet, Rfl: 2    milk thistle 175 MG tablet, Take 175 mg by mouth daily, Disp: , Rfl:     mometasone (ELOCON) 0 1 % cream, Apply to bilateral external ear once weekly at bedtime, Disp: 45 g, Rfl: 3    Multiple Vitamins-Minerals (MULTIVITAMIN ADULT PO), Take 1 capsule by mouth daily  , Disp: , Rfl:     Omega-3 Fatty Acids (FISH OIL PO), Take 1,000 mg by mouth daily  , Disp: , Rfl:     Turmeric 500 MG CAPS, Take 1 capsule by mouth daily  , Disp: , Rfl:     VOLTAREN 1 %, APPLY 2G TOPICALLY TO AFFECTED AREA FOUR TIMES A DAY AS DIRECTED, Disp: 100 g, Rfl: 5    albuterol (PROVENTIL HFA,VENTOLIN HFA) 90 mcg/act inhaler, Inhale 2 puffs every 6 (six) hours as needed for wheezing (Patient taking differently: Inhale 2 puffs every 6 (six) hours as needed for wheezing PRN ), Disp: 1 Inhaler, Rfl: 5    doxycycline hyclate (VIBRAMYCIN) 100 mg capsule, Take 1 capsule (100 mg total) by mouth 2 (two) times a day for 10 days, Disp: 20 capsule, Rfl: 0    Magnesium 400 MG TABS, Take by mouth (Patient not taking: Reported on 7/14/2022), Disp: , Rfl:   No current facility-administered medications for this visit      Current Allergies     Allergies as of 07/14/2022 - Reviewed 07/14/2022   Allergen Reaction Noted    Carbamazepine Myalgia 03/07/2007    Epinephrine Other (See Comments) 03/07/2007    Iodides Itching 12/10/2011    Morphine and related  05/04/2012    Sulfamethoxazole-trimethoprim  05/04/2012    Latex Rash 05/04/2012    Lisinopril Cough 01/23/2020    Topiramate Rash 03/07/2007              Past Medical History:   Diagnosis Date    [facility] )     left breast    Elevated serum alkaline phosphatase level     GERD (gastroesophageal reflux disease)     Glaucoma     Hiatal hernia     Hypercalcemia     Hyperglycemia     diet controlled    Hypertension     Rheumatic fever     Seizures (HCC)     TIA (transient ischemic attack)     Vitamin D deficiency        Past Surgical History:   Procedure Laterality Date    APPENDECTOMY      BREAST LUMPECTOMY Left     COLONOSCOPY      FOOT SURGERY Left     ORIF ANKLE FRACTURE Left     TONSILLECTOMY      TUBAL LIGATION         Family History   Problem Relation Age of Onset    Breast cancer Mother     Coronary artery disease Father     Bone cancer Brother     Lung cancer Brother          Medications have been verified  The following portions of the patient's history were reviewed and updated as appropriate: allergies, current medications, past family history, past medical history, past social history, past surgical history and problem list     Objective   /88   Pulse 74   Temp 98 1 °F (36 7 °C)   Resp 16   LMP  (LMP Unknown)   SpO2 97%      Physical Exam     Physical Exam  Constitutional:       Appearance: Normal appearance  HENT:      Head: Normocephalic  Right Ear: Hearing and tympanic membrane normal       Left Ear: Hearing and tympanic membrane normal       Nose: Nose normal       Mouth/Throat:      Lips: Pink  Mouth: Mucous membranes are moist    Neck:      Comments: Rash noted on the left side and posterior aspect of the neck  Cardiovascular:      Rate and Rhythm: Normal rate and regular rhythm  Heart sounds: Normal heart sounds  Pulmonary:      Effort: Pulmonary effort is normal       Breath sounds: Normal breath sounds and air entry  No decreased breath sounds  Musculoskeletal:      Cervical back: Full passive range of motion without pain and normal range of motion  Comments: Overall bodyaches   Skin:     General: Skin is warm  Findings: Rash present  Comments: Areas are large and warm to touch, circular and note raised, not pustule   Neurological:      General: No focal deficit present        Mental Status: She is alert and oriented to person, place, and time  Comments: Fatigue and weak   Psychiatric:         Attention and Perception: Attention normal          Mood and Affect: Mood normal                Note: Portions of this record may have been created with voice recognition software  Occasional wrong word or "sound a like" substitutions may have occurred due to the inherent limitations of voice recognition software  Please read the chart carefully and recognize, using context, where substitutions have occurred  MARIE Little

## 2022-07-15 LAB — B BURGDOR IGG+IGM SER-ACNC: >8 AI

## 2022-07-16 LAB
B BURGDOR IGG PATRN SER IB-IMP: NEGATIVE
B BURGDOR IGM PATRN SER IB-IMP: NEGATIVE
B BURGDOR18KD IGG SER QL IB: ABNORMAL
B BURGDOR23KD IGG SER QL IB: ABNORMAL
B BURGDOR23KD IGM SER QL IB: PRESENT
B BURGDOR28KD IGG SER QL IB: ABNORMAL
B BURGDOR30KD IGG SER QL IB: ABNORMAL
B BURGDOR39KD IGG SER QL IB: ABNORMAL
B BURGDOR39KD IGM SER QL IB: ABNORMAL
B BURGDOR41KD IGG SER QL IB: ABNORMAL
B BURGDOR41KD IGM SER QL IB: ABNORMAL
B BURGDOR45KD IGG SER QL IB: ABNORMAL
B BURGDOR58KD IGG SER QL IB: ABNORMAL
B BURGDOR66KD IGG SER QL IB: ABNORMAL
B BURGDOR93KD IGG SER QL IB: ABNORMAL

## 2022-07-26 ENCOUNTER — OFFICE VISIT (OUTPATIENT)
Dept: FAMILY MEDICINE CLINIC | Facility: CLINIC | Age: 82
End: 2022-07-26
Payer: MEDICARE

## 2022-07-26 VITALS
TEMPERATURE: 99.2 F | DIASTOLIC BLOOD PRESSURE: 80 MMHG | WEIGHT: 188 LBS | HEIGHT: 64 IN | SYSTOLIC BLOOD PRESSURE: 140 MMHG | HEART RATE: 84 BPM | BODY MASS INDEX: 32.1 KG/M2

## 2022-07-26 DIAGNOSIS — R50.9 FEVER, UNSPECIFIED FEVER CAUSE: Primary | ICD-10-CM

## 2022-07-26 DIAGNOSIS — R53.1 WEAKNESS: ICD-10-CM

## 2022-07-26 DIAGNOSIS — M79.10 MYALGIA: ICD-10-CM

## 2022-07-26 DIAGNOSIS — I10 ESSENTIAL HYPERTENSION: ICD-10-CM

## 2022-07-26 DIAGNOSIS — R76.8 BORDERLINE LYME SEROLOGY: ICD-10-CM

## 2022-07-26 DIAGNOSIS — R21 RASH: ICD-10-CM

## 2022-07-26 PROCEDURE — 99214 OFFICE O/P EST MOD 30 MIN: CPT | Performed by: FAMILY MEDICINE

## 2022-07-26 RX ORDER — DOXYCYCLINE HYCLATE 100 MG/1
CAPSULE ORAL
Qty: 22 CAPSULE | Refills: 0 | Status: SHIPPED | OUTPATIENT
Start: 2022-07-26 | End: 2022-08-03 | Stop reason: SDUPTHER

## 2022-07-26 NOTE — PROGRESS NOTES
Assessment and Plan:    1  Fever  2  Per weakness  3  Per myalgia  4  Rash  5  Borderline Lyme serology  Workup at ER 07/14/2022   Lyme screen positive, Western blot with 1 IgM   I favor very early Lyme disease  I am like to treat doxycycline 100 mg b i d  for 11 more days for total of 21 days total of doxycycline   Repeat line with Western blot 1 week  6  Hypertension, stable continue present therapy  7  Return at scheduled appointment sooner if symptoms reoccur       Problem List Items Addressed This Visit        Cardiovascular and Mediastinum    Essential hypertension     Stable continue present therapy           Other Visit Diagnoses     Fever, unspecified fever cause    -  Primary    Relevant Medications    doxycycline hyclate (VIBRAMYCIN) 100 mg capsule    Other Relevant Orders    Lyme Total Antibody Profile with reflex to WB    Weakness        Relevant Medications    doxycycline hyclate (VIBRAMYCIN) 100 mg capsule    Other Relevant Orders    Lyme Total Antibody Profile with reflex to WB    Myalgia        Relevant Medications    doxycycline hyclate (VIBRAMYCIN) 100 mg capsule    Other Relevant Orders    Lyme Total Antibody Profile with reflex to WB    Rash        Relevant Medications    doxycycline hyclate (VIBRAMYCIN) 100 mg capsule    Other Relevant Orders    Lyme Total Antibody Profile with reflex to WB    Borderline Lyme serology        Relevant Medications    doxycycline hyclate (VIBRAMYCIN) 100 mg capsule    Other Relevant Orders    Lyme Total Antibody Profile with reflex to WB                 Diagnoses and all orders for this visit:    Fever, unspecified fever cause  -     doxycycline hyclate (VIBRAMYCIN) 100 mg capsule; Take 1 capsule twice daily for 11 days  -     Lyme Total Antibody Profile with reflex to WB; Future    Weakness  -     doxycycline hyclate (VIBRAMYCIN) 100 mg capsule; Take 1 capsule twice daily for 11 days  -     Lyme Total Antibody Profile with reflex to WB;  Future    Myalgia  - doxycycline hyclate (VIBRAMYCIN) 100 mg capsule; Take 1 capsule twice daily for 11 days  -     Lyme Total Antibody Profile with reflex to WB; Future    Rash  -     doxycycline hyclate (VIBRAMYCIN) 100 mg capsule; Take 1 capsule twice daily for 11 days  -     Lyme Total Antibody Profile with reflex to WB; Future    Borderline Lyme serology  -     doxycycline hyclate (VIBRAMYCIN) 100 mg capsule; Take 1 capsule twice daily for 11 days  -     Lyme Total Antibody Profile with reflex to WB; Future    Essential hypertension            Subjective:      Patient ID: Marley Covarrubias is a 80 y o  female  CC:    Chief Complaint   Patient presents with    Lyme Disease     Follow up from ER  Dx with Lyme Disease  Was told she had it for a ling time  Was taking Doxycycline for 10 days  Mjs         HPI:    Patient was seen 07/14/2020 ER for myalgias fever weakness and rash  Patient's screening test was positive for Lyme  Western blot did show IgM patient was treated with doxycycline 100 mg b i d  for 10 days  The following portions of the patient's history were reviewed and updated as appropriate: allergies, current medications, past family history, past medical history, past social history, past surgical history and problem list       Review of Systems   Constitutional:        No further fever   HENT: Negative  Eyes: Negative  Respiratory: Negative  Cardiovascular: Negative  Gastrointestinal: Negative  Endocrine: Negative  Genitourinary: Negative  Musculoskeletal:        Myalgias resolved   Skin:        Rash resolved   Allergic/Immunologic: Negative  Neurological: Negative  Hematological: Negative  Psychiatric/Behavioral: Negative  Data to review:       Objective:    Vitals:    07/26/22 1245   BP: 140/80   Pulse: 84   Temp: 99 2 °F (37 3 °C)   Weight: 85 3 kg (188 lb)   Height: 5' 4" (1 626 m)        Physical Exam  Vitals and nursing note reviewed     Constitutional: Appearance: Normal appearance  HENT:      Head: Normocephalic and atraumatic  Eyes:      General: No scleral icterus  Neck:      Vascular: No carotid bruit  Cardiovascular:      Rate and Rhythm: Normal rate and regular rhythm  Heart sounds: Normal heart sounds  Pulmonary:      Effort: Pulmonary effort is normal       Breath sounds: Normal breath sounds  Musculoskeletal:         General: No deformity  Cervical back: Neck supple  Right lower leg: No edema  Left lower leg: No edema  Skin:     General: Skin is warm and dry  Findings: No rash  Neurological:      General: No focal deficit present  Mental Status: She is alert     Psychiatric:         Mood and Affect: Mood normal

## 2022-07-26 NOTE — PATIENT INSTRUCTIONS
Take doxycycline 100 mg twice daily for 11 more days    This will be a total 21 days which is total treatment for Lyme disease   Repeat Lyme blood work in 1 week   Return at scheduled appointment, call if symptoms reoccur

## 2022-07-28 DIAGNOSIS — G40.909 SEIZURE DISORDER (HCC): ICD-10-CM

## 2022-07-28 RX ORDER — LEVETIRACETAM 500 MG/1
TABLET ORAL
Qty: 180 TABLET | Refills: 1 | Status: SHIPPED | OUTPATIENT
Start: 2022-07-28

## 2022-08-01 ENCOUNTER — APPOINTMENT (OUTPATIENT)
Dept: LAB | Age: 82
End: 2022-08-01
Payer: MEDICARE

## 2022-08-01 DIAGNOSIS — R21 RASH: ICD-10-CM

## 2022-08-01 DIAGNOSIS — R76.8 BORDERLINE LYME SEROLOGY: ICD-10-CM

## 2022-08-01 DIAGNOSIS — M79.10 MYALGIA: ICD-10-CM

## 2022-08-01 DIAGNOSIS — R50.9 FEVER, UNSPECIFIED FEVER CAUSE: ICD-10-CM

## 2022-08-01 DIAGNOSIS — R53.1 WEAKNESS: ICD-10-CM

## 2022-08-01 PROCEDURE — 86618 LYME DISEASE ANTIBODY: CPT

## 2022-08-01 PROCEDURE — 86617 LYME DISEASE ANTIBODY: CPT

## 2022-08-01 PROCEDURE — 36415 COLL VENOUS BLD VENIPUNCTURE: CPT

## 2022-08-02 LAB — B BURGDOR IGG+IGM SER-ACNC: >8 AI

## 2022-08-03 ENCOUNTER — TELEPHONE (OUTPATIENT)
Dept: FAMILY MEDICINE CLINIC | Facility: CLINIC | Age: 82
End: 2022-08-03

## 2022-08-03 DIAGNOSIS — R76.8 BORDERLINE LYME SEROLOGY: ICD-10-CM

## 2022-08-03 DIAGNOSIS — R50.9 FEVER, UNSPECIFIED FEVER CAUSE: ICD-10-CM

## 2022-08-03 DIAGNOSIS — R21 RASH: ICD-10-CM

## 2022-08-03 DIAGNOSIS — R53.1 WEAKNESS: ICD-10-CM

## 2022-08-03 DIAGNOSIS — M79.10 MYALGIA: ICD-10-CM

## 2022-08-03 PROBLEM — A69.20 LYME DISEASE: Status: ACTIVE | Noted: 2022-08-03

## 2022-08-03 LAB
B BURGDOR IGG PATRN SER IB-IMP: NEGATIVE
B BURGDOR IGM PATRN SER IB-IMP: POSITIVE
B BURGDOR18KD IGG SER QL IB: ABNORMAL
B BURGDOR23KD IGG SER QL IB: PRESENT
B BURGDOR23KD IGM SER QL IB: PRESENT
B BURGDOR28KD IGG SER QL IB: ABNORMAL
B BURGDOR30KD IGG SER QL IB: ABNORMAL
B BURGDOR39KD IGG SER QL IB: ABNORMAL
B BURGDOR39KD IGM SER QL IB: PRESENT
B BURGDOR41KD IGG SER QL IB: PRESENT
B BURGDOR41KD IGM SER QL IB: ABNORMAL
B BURGDOR45KD IGG SER QL IB: ABNORMAL
B BURGDOR58KD IGG SER QL IB: ABNORMAL
B BURGDOR66KD IGG SER QL IB: ABNORMAL
B BURGDOR93KD IGG SER QL IB: ABNORMAL

## 2022-08-03 RX ORDER — DOXYCYCLINE HYCLATE 100 MG/1
CAPSULE ORAL
Qty: 22 CAPSULE | Refills: 0 | Status: SHIPPED | OUTPATIENT
Start: 2022-08-03 | End: 2022-08-18

## 2022-09-19 DIAGNOSIS — I10 ESSENTIAL HYPERTENSION: ICD-10-CM

## 2022-09-19 RX ORDER — LOSARTAN POTASSIUM 25 MG/1
TABLET ORAL
Qty: 90 TABLET | Refills: 3 | Status: SHIPPED | OUTPATIENT
Start: 2022-09-19

## 2022-10-10 DIAGNOSIS — Z85.3 PERSONAL HISTORY OF BREAST CANCER: ICD-10-CM

## 2022-10-10 DIAGNOSIS — Z12.31 SCREENING MAMMOGRAM FOR HIGH-RISK PATIENT: ICD-10-CM

## 2022-11-10 ENCOUNTER — RA CDI HCC (OUTPATIENT)
Dept: OTHER | Facility: HOSPITAL | Age: 82
End: 2022-11-10

## 2022-11-25 LAB
25(OH)D3 SERPL-MCNC: 63 NG/ML (ref 30–100)
ALBUMIN SERPL-MCNC: 4.2 G/DL (ref 3.6–5.1)
ALBUMIN/GLOB SERPL: 1.6 (CALC) (ref 1–2.5)
ALP SERPL-CCNC: 68 U/L (ref 37–153)
ALT SERPL-CCNC: 17 U/L (ref 6–29)
APPEARANCE UR: ABNORMAL
AST SERPL-CCNC: 21 U/L (ref 10–35)
BACTERIA UR QL AUTO: ABNORMAL /HPF
BASOPHILS # BLD AUTO: 43 CELLS/UL (ref 0–200)
BASOPHILS NFR BLD AUTO: 0.5 %
BILIRUB SERPL-MCNC: 0.9 MG/DL (ref 0.2–1.2)
BILIRUB UR QL STRIP: NEGATIVE
BUN SERPL-MCNC: 20 MG/DL (ref 7–25)
BUN/CREAT SERPL: ABNORMAL (CALC) (ref 6–22)
CALCIUM SERPL-MCNC: 10.5 MG/DL (ref 8.6–10.4)
CAOX CRY #/AREA URNS HPF: ABNORMAL /HPF
CHLORIDE SERPL-SCNC: 104 MMOL/L (ref 98–110)
CHOLEST SERPL-MCNC: 244 MG/DL
CHOLEST/HDLC SERPL: 3.5 (CALC)
CO2 SERPL-SCNC: 29 MMOL/L (ref 20–32)
COLOR UR: ABNORMAL
CREAT SERPL-MCNC: 0.82 MG/DL (ref 0.6–0.95)
EOSINOPHIL # BLD AUTO: 69 CELLS/UL (ref 15–500)
EOSINOPHIL NFR BLD AUTO: 0.8 %
ERYTHROCYTE [DISTWIDTH] IN BLOOD BY AUTOMATED COUNT: 12.6 % (ref 11–15)
GFR/BSA.PRED SERPLBLD CYS-BASED-ARV: 72 ML/MIN/1.73M2
GLOBULIN SER CALC-MCNC: 2.6 G/DL (CALC) (ref 1.9–3.7)
GLUCOSE SERPL-MCNC: 119 MG/DL (ref 65–99)
GLUCOSE UR QL STRIP: NEGATIVE
HBA1C MFR BLD: 5.9 % OF TOTAL HGB
HCT VFR BLD AUTO: 42 % (ref 35–45)
HDLC SERPL-MCNC: 70 MG/DL
HGB BLD-MCNC: 13.9 G/DL (ref 11.7–15.5)
HGB UR QL STRIP: NEGATIVE
HYALINE CASTS #/AREA URNS LPF: ABNORMAL /LPF
KETONES UR QL STRIP: NEGATIVE
LDLC SERPL CALC-MCNC: 158 MG/DL (CALC)
LEUKOCYTE ESTERASE UR QL STRIP: ABNORMAL
LYMPHOCYTES # BLD AUTO: 1393 CELLS/UL (ref 850–3900)
LYMPHOCYTES NFR BLD AUTO: 16.2 %
MCH RBC QN AUTO: 30.3 PG (ref 27–33)
MCHC RBC AUTO-ENTMCNC: 33.1 G/DL (ref 32–36)
MCV RBC AUTO: 91.7 FL (ref 80–100)
MONOCYTES # BLD AUTO: 439 CELLS/UL (ref 200–950)
MONOCYTES NFR BLD AUTO: 5.1 %
NEUTROPHILS # BLD AUTO: 6656 CELLS/UL (ref 1500–7800)
NEUTROPHILS NFR BLD AUTO: 77.4 %
NITRITE UR QL STRIP: NEGATIVE
NONHDLC SERPL-MCNC: 174 MG/DL (CALC)
PH UR STRIP: 5.5 [PH] (ref 5–8)
PLATELET # BLD AUTO: 208 THOUSAND/UL (ref 140–400)
PMV BLD REES-ECKER: 11.4 FL (ref 7.5–12.5)
POTASSIUM SERPL-SCNC: 4.5 MMOL/L (ref 3.5–5.3)
PROT SERPL-MCNC: 6.8 G/DL (ref 6.1–8.1)
PROT UR QL STRIP: NEGATIVE
RBC # BLD AUTO: 4.58 MILLION/UL (ref 3.8–5.1)
RBC #/AREA URNS HPF: ABNORMAL /HPF
SODIUM SERPL-SCNC: 142 MMOL/L (ref 135–146)
SP GR UR STRIP: 1.02 (ref 1–1.03)
SPECIMEN SOURCE CVX/VAG CYTO: NORMAL
SQUAMOUS #/AREA URNS HPF: ABNORMAL /HPF
TRANSFUSION STATUS PATIENT QL: NORMAL
TRIGL SERPL-MCNC: 67 MG/DL
TSH SERPL-ACNC: 1.55 MIU/L (ref 0.4–4.5)
VZV AG SPEC QL IF: NORMAL
WBC # BLD AUTO: 8.6 THOUSAND/UL (ref 3.8–10.8)
WBC #/AREA URNS HPF: ABNORMAL /HPF

## 2022-11-30 ENCOUNTER — OFFICE VISIT (OUTPATIENT)
Dept: FAMILY MEDICINE CLINIC | Facility: CLINIC | Age: 82
End: 2022-11-30

## 2022-11-30 VITALS
HEART RATE: 74 BPM | BODY MASS INDEX: 32.1 KG/M2 | HEIGHT: 64 IN | SYSTOLIC BLOOD PRESSURE: 120 MMHG | WEIGHT: 188 LBS | DIASTOLIC BLOOD PRESSURE: 88 MMHG | OXYGEN SATURATION: 96 %

## 2022-11-30 DIAGNOSIS — R60.9 PERIPHERAL EDEMA: ICD-10-CM

## 2022-11-30 DIAGNOSIS — E83.52 HYPERCALCEMIA: ICD-10-CM

## 2022-11-30 DIAGNOSIS — M85.80 MODERATE OSTEOPENIA: ICD-10-CM

## 2022-11-30 DIAGNOSIS — E55.9 VITAMIN D DEFICIENCY: ICD-10-CM

## 2022-11-30 DIAGNOSIS — R73.9 HYPERGLYCEMIA: ICD-10-CM

## 2022-11-30 DIAGNOSIS — E78.00 HYPERCHOLESTEROLEMIA: ICD-10-CM

## 2022-11-30 DIAGNOSIS — R00.8 TRIGEMINY: ICD-10-CM

## 2022-11-30 DIAGNOSIS — R06.09 DYSPNEA ON EXERTION: ICD-10-CM

## 2022-11-30 DIAGNOSIS — F41.9 ANXIETY: ICD-10-CM

## 2022-11-30 DIAGNOSIS — N18.31 STAGE 3A CHRONIC KIDNEY DISEASE (HCC): ICD-10-CM

## 2022-11-30 DIAGNOSIS — D12.6 BENIGN NEOPLASM OF LARGE INTESTINE, UNSPECIFIED PART OF LARGE INTESTINE: ICD-10-CM

## 2022-11-30 DIAGNOSIS — I10 ESSENTIAL HYPERTENSION: Primary | ICD-10-CM

## 2022-11-30 DIAGNOSIS — G40.209 PARTIAL SYMPTOMATIC EPILEPSY WITH COMPLEX PARTIAL SEIZURES, NOT INTRACTABLE, WITHOUT STATUS EPILEPTICUS (HCC): ICD-10-CM

## 2022-11-30 DIAGNOSIS — G45.9 TRANSIENT ISCHEMIC ATTACK: ICD-10-CM

## 2022-11-30 PROBLEM — R60.0 PERIPHERAL EDEMA: Status: ACTIVE | Noted: 2022-11-30

## 2022-11-30 PROBLEM — A69.20 LYME DISEASE: Status: RESOLVED | Noted: 2022-08-03 | Resolved: 2022-11-30

## 2022-11-30 RX ORDER — FUROSEMIDE 20 MG/1
20 TABLET ORAL DAILY
Qty: 30 TABLET | Refills: 5 | Status: SHIPPED | OUTPATIENT
Start: 2022-11-30

## 2022-11-30 RX ORDER — HYDROXYZINE HYDROCHLORIDE 10 MG/1
TABLET, FILM COATED ORAL
Qty: 30 TABLET | Refills: 0 | Status: SHIPPED | OUTPATIENT
Start: 2022-11-30

## 2022-11-30 NOTE — ASSESSMENT & PLAN NOTE
Lab Results   Component Value Date    EGFR 72 11/23/2022    EGFR 69 07/14/2022    EGFR 56 04/25/2022    CREATININE 0 82 11/23/2022    CREATININE 0 81 07/14/2022    CREATININE 0 96 04/25/2022   Age-appropriate continue to monitor

## 2022-11-30 NOTE — ASSESSMENT & PLAN NOTE
Patient states Xanax 0 25 “does nothing” will discontinue and use hydroxyzine    Risk and benefit drowsiness discussed

## 2022-11-30 NOTE — PROGRESS NOTES
Name: Sumi Garcia      : 1940      MRN: [id number]  Encounter Provider: Sigrid Ballard DO  Encounter Date: 2022   Encounter department: [facility] PRIMARY CARE    Assessment & Plan     1  Hypertension, stable continue present therapy 2  TIA/seizure, both stable follows with Neurology  3  Osteopenia DEXA scan    4  CKD-3, age-appropriate continue monitor  5  Hypercholesterolemia, total has gone from 205 up to 244  LDL has gone from  2 up to 158  Patient refuses medication  Risk and benefit discussed  6  Hyperglycemia diet-controlled   7  Vitamin-D deficiency, stable continue present therapy   8  Trigeminy, asymptomatic, NSR by auscultation, evaluated by Cardiology   9  Colon polyp patient has refused further colonoscopies  Patient did verbalize understanding increased risk colon cancer  10  Anxiety, patient states that her Xanax “does nothing” will change to hydroxyzine risk benefit drowsiness discussed  11  Dyspnea exertion, chronic, follow-up Pulmonary Medicine  12  Peripheral edema, will start furosemide 20 mg daily return in 1-2 weeks if still symptoms  13  Hypercalcemia, patient today's continue calcium supplementation repeat calcium, PTH, calcitonin in 4 weeks  14  Patient to return in 6 months for office visit blood work sooner if needed      1  Essential hypertension  Assessment & Plan:  Stable continue present therapy    Orders:  -     CBC; Future; Expected date: 2023  -     Comprehensive metabolic panel; Future; Expected date: 2023  -     Hemoglobin A1C; Future; Expected date: 2023  -     Lipid Panel with Direct LDL reflex; Future; Expected date: 2023  -     TSH, 3rd generation with Free T4 reflex; Future; Expected date: 2023  -     UA (URINE) with reflex to Scope; Future; Expected date: 2023  -     Vitamin D 25 hydroxy; Future; Expected date: 2023    2   Transient ischemic attack  Assessment & Plan:  Seen by Neurology, asymptomatic    Orders:  -     CBC; Future; Expected date: 05/30/2023  -     Comprehensive metabolic panel; Future; Expected date: 05/30/2023  -     Hemoglobin A1C; Future; Expected date: 05/30/2023  -     Lipid Panel with Direct LDL reflex; Future; Expected date: 05/30/2023  -     TSH, 3rd generation with Free T4 reflex; Future; Expected date: 05/30/2023  -     UA (URINE) with reflex to Scope; Future; Expected date: 05/30/2023  -     Vitamin D 25 hydroxy; Future; Expected date: 05/30/2023    3  Partial symptomatic epilepsy with complex partial seizures, not intractable, without status epilepticus ([facility] [address] )  Assessment & Plan:  Stable to follow-up Neurology    Orders:  -     CBC; Future; Expected date: 05/30/2023  -     Comprehensive metabolic panel; Future; Expected date: 05/30/2023  -     Hemoglobin A1C; Future; Expected date: 05/30/2023  -     Lipid Panel with Direct LDL reflex; Future; Expected date: 05/30/2023  -     TSH, 3rd generation with Free T4 reflex; Future; Expected date: 05/30/2023  -     UA (URINE) with reflex to Scope; Future; Expected date: 05/30/2023  -     Vitamin D 25 hydroxy; Future; Expected date: 05/30/2023    4  Moderate osteopenia  Assessment & Plan:  DEXA 4/22    Orders:  -     CBC; Future; Expected date: 05/30/2023  -     Comprehensive metabolic panel; Future; Expected date: 05/30/2023  -     Hemoglobin A1C; Future; Expected date: 05/30/2023  -     Lipid Panel with Direct LDL reflex; Future; Expected date: 05/30/2023  -     TSH, 3rd generation with Free T4 reflex; Future; Expected date: 05/30/2023  -     UA (URINE) with reflex to Scope; Future; Expected date: 05/30/2023  -     Vitamin D 25 hydroxy; Future; Expected date: 05/30/2023    5   Stage 3a chronic kidney disease [facility])  Assessment & Plan:  Lab Results   Component Value Date    EGFR 72 11/23/2022    EGFR 69 07/14/2022    EGFR 56 04/25/2022    CREATININE 0 82 11/23/2022    CREATININE 0 81 07/14/2022    CREATININE 0 96 04/25/2022 Age-appropriate continue to monitor    Orders:  -     CBC; Future; Expected date: 05/30/2023  -     Comprehensive metabolic panel; Future; Expected date: 05/30/2023  -     Hemoglobin A1C; Future; Expected date: 05/30/2023  -     Lipid Panel with Direct LDL reflex; Future; Expected date: 05/30/2023  -     TSH, 3rd generation with Free T4 reflex; Future; Expected date: 05/30/2023  -     UA (URINE) with reflex to Scope; Future; Expected date: 05/30/2023  -     Vitamin D 25 hydroxy; Future; Expected date: 05/30/2023    6  Hypercholesterolemia  Assessment & Plan:  Medication has been refused risk and benefit discussed    Orders:  -     CBC; Future; Expected date: 05/30/2023  -     Comprehensive metabolic panel; Future; Expected date: 05/30/2023  -     Hemoglobin A1C; Future; Expected date: 05/30/2023  -     Lipid Panel with Direct LDL reflex; Future; Expected date: 05/30/2023  -     TSH, 3rd generation with Free T4 reflex; Future; Expected date: 05/30/2023  -     UA (URINE) with reflex to Scope; Future; Expected date: 05/30/2023  -     Vitamin D 25 hydroxy; Future; Expected date: 05/30/2023    7  Hyperglycemia  Assessment & Plan:  Diet controlled    Orders:  -     CBC; Future; Expected date: 05/30/2023  -     Comprehensive metabolic panel; Future; Expected date: 05/30/2023  -     Hemoglobin A1C; Future; Expected date: 05/30/2023  -     Lipid Panel with Direct LDL reflex; Future; Expected date: 05/30/2023  -     TSH, 3rd generation with Free T4 reflex; Future; Expected date: 05/30/2023  -     UA (URINE) with reflex to Scope; Future; Expected date: 05/30/2023  -     Vitamin D 25 hydroxy; Future; Expected date: 05/30/2023    8  Vitamin D deficiency  Assessment & Plan:  Continue to monitor    Orders:  -     CBC; Future; Expected date: 05/30/2023  -     Comprehensive metabolic panel; Future; Expected date: 05/30/2023  -     Hemoglobin A1C; Future; Expected date: 05/30/2023  -     Lipid Panel with Direct LDL reflex;  Future; Expected date: 05/30/2023  -     TSH, 3rd generation with Free T4 reflex; Future; Expected date: 05/30/2023  -     UA (URINE) with reflex to Scope; Future; Expected date: 05/30/2023  -     Vitamin D 25 hydroxy; Future; Expected date: 05/30/2023    9  Trigeminy  Assessment & Plan:  Was evaluated by Cardiology, asymptomatic    Orders:  -     CBC; Future; Expected date: 05/30/2023  -     Comprehensive metabolic panel; Future; Expected date: 05/30/2023  -     Hemoglobin A1C; Future; Expected date: 05/30/2023  -     Lipid Panel with Direct LDL reflex; Future; Expected date: 05/30/2023  -     TSH, 3rd generation with Free T4 reflex; Future; Expected date: 05/30/2023  -     UA (URINE) with reflex to Scope; Future; Expected date: 05/30/2023  -     Vitamin D 25 hydroxy; Future; Expected date: 05/30/2023    10  Benign neoplasm of large intestine, unspecified part of large intestine  Assessment & Plan:  Patient declines any further colonoscopies  Verbalized understanding increased risk of colon cancer    Orders:  -     CBC; Future; Expected date: 05/30/2023  -     Comprehensive metabolic panel; Future; Expected date: 05/30/2023  -     Hemoglobin A1C; Future; Expected date: 05/30/2023  -     Lipid Panel with Direct LDL reflex; Future; Expected date: 05/30/2023  -     TSH, 3rd generation with Free T4 reflex; Future; Expected date: 05/30/2023  -     UA (URINE) with reflex to Scope; Future; Expected date: 05/30/2023  -     Vitamin D 25 hydroxy; Future; Expected date: 05/30/2023    11  Anxiety  Assessment & Plan:  Patient states Xanax 0 25 “does nothing” will discontinue and use hydroxyzine  Risk and benefit drowsiness discussed    Orders:  -     CBC; Future; Expected date: 05/30/2023  -     Comprehensive metabolic panel; Future; Expected date: 05/30/2023  -     Hemoglobin A1C; Future; Expected date: 05/30/2023  -     Lipid Panel with Direct LDL reflex;  Future; Expected date: 05/30/2023  -     TSH, 3rd generation with Free T4 reflex; Future; Expected date: 05/30/2023  -     UA (URINE) with reflex to Scope; Future; Expected date: 05/30/2023  -     Vitamin D 25 hydroxy; Future; Expected date: 05/30/2023  -     hydrOXYzine HCL (ATARAX) 10 mg tablet; Take 1 tablet daily as needed for anxiety    12  Dyspnea on exertion  Assessment & Plan: To follow-up Pulmonary Medicine    Orders:  -     CBC; Future; Expected date: 05/30/2023  -     Comprehensive metabolic panel; Future; Expected date: 05/30/2023  -     Hemoglobin A1C; Future; Expected date: 05/30/2023  -     Lipid Panel with Direct LDL reflex; Future; Expected date: 05/30/2023  -     TSH, 3rd generation with Free T4 reflex; Future; Expected date: 05/30/2023  -     UA (URINE) with reflex to Scope; Future; Expected date: 05/30/2023  -     Vitamin D 25 hydroxy; Future; Expected date: 05/30/2023    13  Peripheral edema  Assessment & Plan:  Furosemide started risk and benefit discussed    Orders:  -     CBC; Future; Expected date: 05/30/2023  -     Comprehensive metabolic panel; Future; Expected date: 05/30/2023  -     Hemoglobin A1C; Future; Expected date: 05/30/2023  -     Lipid Panel with Direct LDL reflex; Future; Expected date: 05/30/2023  -     TSH, 3rd generation with Free T4 reflex; Future; Expected date: 05/30/2023  -     UA (URINE) with reflex to Scope; Future; Expected date: 05/30/2023  -     Vitamin D 25 hydroxy; Future; Expected date: 05/30/2023  -     furosemide (LASIX) 20 mg tablet; Take 1 tablet (20 mg total) by mouth daily    14  Hypercalcemia  -     Calcitonin; Future; Expected date: 12/26/2022  -     Calcium; Future; Expected date: 12/26/2022  -     PTH, intact; Future; Expected date: 12/26/2022  -     CBC; Future; Expected date: 05/30/2023  -     Comprehensive metabolic panel; Future; Expected date: 05/30/2023  -     Hemoglobin A1C; Future; Expected date: 05/30/2023  -     Lipid Panel with Direct LDL reflex;  Future; Expected date: 05/30/2023  -     TSH, 3rd generation with Free T4 reflex; Future; Expected date: 05/30/2023  -     UA (URINE) with reflex to Scope; Future; Expected date: 05/30/2023  -     Vitamin D 25 hydroxy; Future; Expected date: 05/30/2023        Depression Screening and Follow-up Plan: Patient was screened for depression during today's encounter  They screened negative with a PHQ-2 score of 0  Subjective      Patient with only complaint of some very minimal leg edema  Chronic dyspnea on exertion, following with Pulmonary Medicine  Blood work was discussed  Review of Systems   Constitutional: Negative  HENT: Negative  Eyes: Negative  Respiratory:        HPI   Cardiovascular:        HPI   Gastrointestinal: Negative  Endocrine: Negative  Genitourinary: Negative  Musculoskeletal: Negative  Skin: Negative  Allergic/Immunologic: Negative  Neurological: Negative  Hematological: Negative  Psychiatric/Behavioral: Negative          Current Outpatient Medications on File Prior to Visit   Medication Sig   • albuterol (PROVENTIL HFA,VENTOLIN HFA) 90 mcg/act inhaler Inhale 2 puffs every 6 (six) hours as needed for wheezing (Patient taking differently: Inhale 2 puffs every 6 (six) hours as needed for wheezing PRN)   • Alpha-Lipoic Acid 100 MG CAPS Take by mouth    • amoxicillin (AMOXIL) 500 mg capsule TAKE 4 CAPSULES BY MOUTH ONE HOUR PRIOR TO OFFICE VISIT   • aspirin 81 MG tablet Take 162 mg by mouth   • B Complex Vitamins (B COMPLEX 1 PO) Take 2 tablets by mouth daily   • bimatoprost (LUMIGAN) 0 03 % ophthalmic drops Administer 1 drop to both eyes daily at bedtime     • Cholecalciferol (VITAMIN D) 2000 units CAPS Take 2 capsules by mouth daily   • Coenzyme Q10 (COQ10) 100 MG CAPS Take by mouth   • gabapentin (NEURONTIN) 300 mg capsule TAKE 1 CAPSULE BY MOUTH AT BEDTIME AS NEEDED FOR PAIN   • GLUCOSAMINE-MSM-HYALURONIC ACD PO Take 1 capsule by mouth 3 (three) times a day   • GRAPE SEED EXTRACT PO Take by mouth   • levETIRAcetam (KEPPRA) 500 mg tablet TAKE 1 TABLET BY MOUTH TWICE A DAY   • losartan (COZAAR) 25 mg tablet TAKE 1 TABLET BY MOUTH EVERY DAY   • Lumigan 0 01 % ophthalmic drops INSTILL 1 DROP INTO BOTH EYES AT BEDTIME   • metoprolol succinate (TOPROL-XL) 200 MG 24 hr tablet TAKE 1 TABLET BY MOUTH EVERY DAY   • milk thistle 175 MG tablet Take 175 mg by mouth daily   • mometasone (ELOCON) 0 1 % cream Apply to bilateral external ear once weekly at bedtime   • Multiple Vitamins-Minerals (MULTIVITAMIN ADULT PO) Take 1 capsule by mouth daily     • Omega-3 Fatty Acids (FISH OIL PO) Take 1,000 mg by mouth daily     • Turmeric 500 MG CAPS Take 1 capsule by mouth daily     • VOLTAREN 1 % APPLY 2G TOPICALLY TO AFFECTED AREA FOUR TIMES A DAY AS DIRECTED   • [DISCONTINUED] ALPRAZolam (XANAX) 0 25 mg tablet TAKE ONE TABLET BY MOUTH EVERY DAY AS NEEDED FOR ANXIETY   • [DISCONTINUED] Calcium Carbonate (CALCIUM 600 PO) Take by mouth   • [DISCONTINUED] Magnesium 400 MG TABS Take by mouth (Patient not taking: Reported on 11/30/2022)       Objective     /88 (BP Location: Right arm, Patient Position: Sitting, Cuff Size: Standard)   Pulse 74   Ht 5' 4" (1 626 m)   Wt 85 3 kg (188 lb)   LMP  (LMP Unknown)   SpO2 96%   BMI 32 27 kg/m²     Physical Exam  Vitals and nursing note reviewed  Constitutional:       Appearance: Normal appearance  HENT:      Head: Normocephalic and atraumatic  Eyes:      General: No scleral icterus  Neck:      Comments: Negative JVD  Cardiovascular:      Rate and Rhythm: Normal rate and regular rhythm  Heart sounds: Normal heart sounds  Pulmonary:      Effort: Pulmonary effort is normal       Breath sounds: Normal breath sounds  Abdominal:      General: Bowel sounds are normal       Palpations: Abdomen is soft  Tenderness: There is no abdominal tenderness  Musculoskeletal:         General: No tenderness  Cervical back: No tenderness  Right lower leg: Edema present        Left lower leg: Edema present  Comments: Trace pedal edema negative calf tenderness   Skin:     General: Skin is warm and dry  Neurological:      General: No focal deficit present  Mental Status: She is alert     Psychiatric:         Mood and Affect: Mood normal        Mick Sanchez DO

## 2022-11-30 NOTE — ASSESSMENT & PLAN NOTE
Patient declines any further colonoscopies    Verbalized understanding increased risk of colon cancer

## 2022-12-23 DIAGNOSIS — R60.9 PERIPHERAL EDEMA: ICD-10-CM

## 2022-12-23 DIAGNOSIS — F41.9 ANXIETY: ICD-10-CM

## 2022-12-23 RX ORDER — HYDROXYZINE HYDROCHLORIDE 10 MG/1
TABLET, FILM COATED ORAL
Qty: 90 TABLET | Refills: 1 | Status: SHIPPED | OUTPATIENT
Start: 2022-12-23

## 2022-12-23 RX ORDER — FUROSEMIDE 20 MG/1
TABLET ORAL
Qty: 90 TABLET | Refills: 2 | Status: SHIPPED | OUTPATIENT
Start: 2022-12-23

## 2023-02-09 ENCOUNTER — TELEPHONE (OUTPATIENT)
Dept: GYNECOLOGY | Facility: CLINIC | Age: 83
End: 2023-02-09

## 2023-02-16 ENCOUNTER — TELEPHONE (OUTPATIENT)
Dept: GYNECOLOGY | Facility: CLINIC | Age: 83
End: 2023-02-16

## 2023-02-16 NOTE — TELEPHONE ENCOUNTER
Patient called with concerns about foul vaginal odor from her pessary  She has an appointment on 2/24/23  She questions what to use to clean herself before the appointment  Advised her to continue her regular routine for bathing and not to put anything in the vagina  Patient states she is embarrassed  Assured her that there is no judgement here

## 2023-02-23 ENCOUNTER — TELEPHONE (OUTPATIENT)
Dept: GYNECOLOGY | Facility: CLINIC | Age: 83
End: 2023-02-23

## 2023-02-23 NOTE — TELEPHONE ENCOUNTER
Patient called again c/o foul vaginal odor  Questions if she should douche before her appointment  Advised again not to put anything in the vagina  Advised do not douche  Advised continue regular bathing routine

## 2023-02-24 ENCOUNTER — OFFICE VISIT (OUTPATIENT)
Dept: GYNECOLOGY | Facility: CLINIC | Age: 83
End: 2023-02-24

## 2023-02-24 VITALS
WEIGHT: 191 LBS | HEIGHT: 60 IN | BODY MASS INDEX: 37.5 KG/M2 | SYSTOLIC BLOOD PRESSURE: 138 MMHG | DIASTOLIC BLOOD PRESSURE: 80 MMHG

## 2023-02-24 DIAGNOSIS — N81.11 MIDLINE CYSTOCELE: ICD-10-CM

## 2023-02-24 DIAGNOSIS — N81.4 UTERINE PROLAPSE: ICD-10-CM

## 2023-02-24 DIAGNOSIS — N93.9 VAGINAL BLEEDING: ICD-10-CM

## 2023-02-24 DIAGNOSIS — N89.8 VAGINAL DISCHARGE: Primary | ICD-10-CM

## 2023-02-24 NOTE — PROGRESS NOTES
Assessment/Plan:     Vaginal discharge-this is most likely from her pessary  Her vaginal tissue only shows very mild excoriation  I recommended leaving the pessary out and we can reinserted in 2 weeks  The discharge is most likely from prolonged use of the pessary, no sign of infection noted  She is agreeable with this and will come back in 2 weeks  She will also have an ultrasound at that time to look at her endometrium  I am unsure if the blood-tinged discharge is from the pessary or from her uterus  Her pessary was cleaned and given to her and she will bring it back with her at her next visit     There are no diagnoses linked to this encounter  Subjective:     Patient ID: Fanny Zheng is a [de-identified] y o  female  Patient here for evaluation of brown discharge and spotting  This has occurred almost every day for the past few months  She had an endometrial biopsy in 2021 with insufficient tissue although with they could see appeared benign  Ultrasound showed a small amount of fluid in the endometrium with 2 tiny filling defects, most likely polyps  She refused a repeat endometrial biopsy and a D&C  She has a ring with support pessary that is working very well for her  She has not been maintaining it and she states that she was not aware she had a come back for pessary checks  Review of Systems   Constitutional: Negative  Gastrointestinal: Negative  Genitourinary: Positive for vaginal bleeding and vaginal discharge  Negative for pelvic pain  Objective:     Physical Exam  Genitourinary:     Vagina: Vaginal discharge present  Cervix: Normal       Uterus: With uterine prolapse  Adnexa: Right adnexa normal and left adnexa normal       Comments: Ring with support removed without difficulty, large amount of light brown discharge, no active bleeding  Mild excoriation at the 5-6 position of the vagina next to the cervix

## 2023-03-09 ENCOUNTER — ULTRASOUND (OUTPATIENT)
Dept: GYNECOLOGY | Facility: CLINIC | Age: 83
End: 2023-03-09

## 2023-03-09 ENCOUNTER — OFFICE VISIT (OUTPATIENT)
Dept: GYNECOLOGY | Facility: CLINIC | Age: 83
End: 2023-03-09

## 2023-03-09 DIAGNOSIS — N81.4 UTERINE PROLAPSE: ICD-10-CM

## 2023-03-09 DIAGNOSIS — N93.9 VAGINAL BLEEDING: Primary | ICD-10-CM

## 2023-03-09 DIAGNOSIS — N81.11 MIDLINE CYSTOCELE: Primary | ICD-10-CM

## 2023-03-09 NOTE — PROGRESS NOTES
AMB US Pelvic Non OB    Date/Time: 3/9/2023 6:41 AM  Performed by: Villa Mac  Authorized by: Vivian You DO   [facility] Protocol:  Patient identity confirmed: verbally with patient      Procedure details:     Technique:  Transvaginal US, Non-OB    Position: lithotomy exam    Uterine findings:     Length (cm): 7 77    Height (cm):  3 5    Width (cm):  4 37    Endometrial stripe: identified      Endometrium thickness (mm):  1 6  Left ovary findings:     Left ovary:  Visualized    Length (cm): 2 15    Height (cm): 1 29    Width (cm): 1 34  Right ovary findings:     Right ovary:  Visualized    Length (cm): 2 45    Height (cm): 1 91    Width (cm): 2 08  Other findings:     Free pelvic fluid: not identified      Free peritoneal fluid: not identified    Post-Procedure Details:     Impression:  Anteverted uterus is inhomogeneous throughout without fibroids noted  The endometrium contains fluid and also demonstrates two small echogenic masses which most likely represent endometrial polyps  The left ovary appears within normal limits  The right ovary demonstrats a 1 5cm simple cyst  No free fluid  Tolerance: Tolerated well, no immediate complications    Complications: no complications    Additional Procedure Comments:      Swift Identity F8 E8C-[id number] transvaginal transducer Serial # [id number] was used to perform the examination today and subsequently followed with high level disinfection utilizing Trophon EPR procedure  Ultrasound performed at:     [id number] [address]  Phone:  [phone]  Fax:  [phone]

## 2023-03-10 NOTE — PROGRESS NOTES
Assessment/Plan:     Prolapse-vaginal excoriation was completely healed, pessary reinserted without difficulty  Patient is happy with this    Vaginal bleeding-this has resolved and some of this was most likely from having the pessary in for too long  She does appear to have 2 tiny polyps on ultrasound  Her endometrium is thin  We were unable to get adequate tissue on endometrial biopsy in 2021  We have discussed this many times  She refuses a repeat biopsy and she does not want a D&C to remove the polyps  She will call with any change or if she has bleeding again    She will return in 3 months for a pessary check     There are no diagnoses linked to this encounter  Subjective:     Patient ID: Payal Byers is a [de-identified] y o  female  Patient here for ultrasound and pessary reinsertion  She was here 2 weeks ago for evaluation of discharge and bleeding  Her pessary had been in since 2021 but she had many medical issues and did not realize she had to return for a pessary check  The discharge has resolved  I recommended that she keep the pessary out for 2 weeks as there was some mild excoriation and I wanted this to heal   She has no concerns and she has had no further bleeding  Ultrasound today shows a thin endometrium, 1 6 mm and 2 tiny filling defects that appear to be polyps  There is a small simple right ovarian cyst, 1 5 cm, normal left ovary  Uterus is otherwise normal      Review of Systems   Constitutional: Negative  Gastrointestinal: Negative  Genitourinary: Negative  Objective:     Physical Exam  Genitourinary:     Comments: Vagina is normal, no sign of previous excoriation    Pessary reinserted without difficulty

## 2023-05-30 ENCOUNTER — TELEPHONE (OUTPATIENT)
Dept: FAMILY MEDICINE CLINIC | Facility: CLINIC | Age: 83
End: 2023-05-30

## 2023-05-30 ENCOUNTER — APPOINTMENT (OUTPATIENT)
Dept: LAB | Age: 83
End: 2023-05-30

## 2023-05-30 DIAGNOSIS — R73.9 HYPERGLYCEMIA: ICD-10-CM

## 2023-05-30 DIAGNOSIS — E78.00 HYPERCHOLESTEROLEMIA: ICD-10-CM

## 2023-05-30 DIAGNOSIS — R00.8 TRIGEMINY: ICD-10-CM

## 2023-05-30 DIAGNOSIS — M85.80 MODERATE OSTEOPENIA: ICD-10-CM

## 2023-05-30 DIAGNOSIS — R60.9 PERIPHERAL EDEMA: ICD-10-CM

## 2023-05-30 DIAGNOSIS — E55.9 VITAMIN D DEFICIENCY: ICD-10-CM

## 2023-05-30 DIAGNOSIS — F41.9 ANXIETY: ICD-10-CM

## 2023-05-30 DIAGNOSIS — I10 ESSENTIAL HYPERTENSION: ICD-10-CM

## 2023-05-30 DIAGNOSIS — G40.209 PARTIAL SYMPTOMATIC EPILEPSY WITH COMPLEX PARTIAL SEIZURES, NOT INTRACTABLE, WITHOUT STATUS EPILEPTICUS (HCC): ICD-10-CM

## 2023-05-30 DIAGNOSIS — D12.6 BENIGN NEOPLASM OF LARGE INTESTINE, UNSPECIFIED PART OF LARGE INTESTINE: ICD-10-CM

## 2023-05-30 DIAGNOSIS — E83.52 HYPERCALCEMIA: ICD-10-CM

## 2023-05-30 DIAGNOSIS — R06.09 DYSPNEA ON EXERTION: ICD-10-CM

## 2023-05-30 DIAGNOSIS — G45.9 TRANSIENT ISCHEMIC ATTACK: ICD-10-CM

## 2023-05-30 DIAGNOSIS — N18.31 STAGE 3A CHRONIC KIDNEY DISEASE (HCC): ICD-10-CM

## 2023-05-30 LAB
25(OH)D3 SERPL-MCNC: 59.3 NG/ML (ref 30–100)
ALBUMIN SERPL BCP-MCNC: 3.8 G/DL (ref 3.5–5)
ALP SERPL-CCNC: 83 U/L (ref 46–116)
ALT SERPL W P-5'-P-CCNC: 24 U/L (ref 12–78)
ANION GAP SERPL CALCULATED.3IONS-SCNC: 0 MMOL/L (ref 4–13)
AST SERPL W P-5'-P-CCNC: 23 U/L (ref 5–45)
BILIRUB SERPL-MCNC: 0.76 MG/DL (ref 0.2–1)
BUN SERPL-MCNC: 16 MG/DL (ref 5–25)
CALCIUM SERPL-MCNC: 10.4 MG/DL (ref 8.3–10.1)
CHLORIDE SERPL-SCNC: 109 MMOL/L (ref 96–108)
CHOLEST SERPL-MCNC: 242 MG/DL
CO2 SERPL-SCNC: 28 MMOL/L (ref 21–32)
CREAT SERPL-MCNC: 0.9 MG/DL (ref 0.6–1.3)
ERYTHROCYTE [DISTWIDTH] IN BLOOD BY AUTOMATED COUNT: 12.7 % (ref 11.6–15.1)
EST. AVERAGE GLUCOSE BLD GHB EST-MCNC: 126 MG/DL
GFR SERPL CREATININE-BSD FRML MDRD: 60 ML/MIN/1.73SQ M
GLUCOSE P FAST SERPL-MCNC: 111 MG/DL (ref 65–99)
HBA1C MFR BLD: 6 %
HCT VFR BLD AUTO: 45.1 % (ref 34.8–46.1)
HDLC SERPL-MCNC: 68 MG/DL
HGB BLD-MCNC: 14.9 G/DL (ref 11.5–15.4)
LDLC SERPL CALC-MCNC: 160 MG/DL (ref 0–100)
MCH RBC QN AUTO: 31 PG (ref 26.8–34.3)
MCHC RBC AUTO-ENTMCNC: 33 G/DL (ref 31.4–37.4)
MCV RBC AUTO: 94 FL (ref 82–98)
PLATELET # BLD AUTO: 211 THOUSANDS/UL (ref 149–390)
PMV BLD AUTO: 11.3 FL (ref 8.9–12.7)
POTASSIUM SERPL-SCNC: 4.6 MMOL/L (ref 3.5–5.3)
PROT SERPL-MCNC: 7.5 G/DL (ref 6.4–8.4)
RBC # BLD AUTO: 4.81 MILLION/UL (ref 3.81–5.12)
SODIUM SERPL-SCNC: 137 MMOL/L (ref 135–147)
TRIGL SERPL-MCNC: 68 MG/DL
TSH SERPL DL<=0.05 MIU/L-ACNC: 2.12 UIU/ML (ref 0.45–4.5)
WBC # BLD AUTO: 7.78 THOUSAND/UL (ref 4.31–10.16)

## 2023-05-30 NOTE — TELEPHONE ENCOUNTER
Pt called back, stating she had essentially answered her own question  Stated she had been having bladder issues while at the lab and could not produce a urine sample  Stated she was told from the lab she could bring a sample at a later date  Pt stated she would just get one while at her gynecologist visit and take it to the lab

## 2023-05-30 NOTE — TELEPHONE ENCOUNTER
Pt called because she went in to do the blood tests she was asked to do and she said she can't due to something personal that she can't share with me  She is requesting a call back from an MA or PCP to answer some of her questions  Addison lisa

## 2023-06-05 ENCOUNTER — RA CDI HCC (OUTPATIENT)
Dept: OTHER | Facility: HOSPITAL | Age: 83
End: 2023-06-05

## 2023-06-05 ENCOUNTER — APPOINTMENT (OUTPATIENT)
Dept: LAB | Age: 83
End: 2023-06-05
Payer: MEDICARE

## 2023-06-05 LAB
BACTERIA UR QL AUTO: ABNORMAL /HPF
BILIRUB UR QL STRIP: NEGATIVE
BUDDING YEAST: PRESENT
CLARITY UR: ABNORMAL
COLOR UR: YELLOW
GLUCOSE UR STRIP-MCNC: NEGATIVE MG/DL
HGB UR QL STRIP.AUTO: NEGATIVE
HYALINE CASTS #/AREA URNS LPF: ABNORMAL /LPF
KETONES UR STRIP-MCNC: NEGATIVE MG/DL
LEUKOCYTE ESTERASE UR QL STRIP: ABNORMAL
MUCOUS THREADS UR QL AUTO: ABNORMAL
NITRITE UR QL STRIP: POSITIVE
NON-SQ EPI CELLS URNS QL MICRO: ABNORMAL /HPF
PH UR STRIP.AUTO: 5.5 [PH]
PROT UR STRIP-MCNC: NEGATIVE MG/DL
RBC #/AREA URNS AUTO: ABNORMAL /HPF
SP GR UR STRIP.AUTO: 1.01 (ref 1–1.03)
UROBILINOGEN UR STRIP-ACNC: <2 MG/DL
WBC #/AREA URNS AUTO: ABNORMAL /HPF

## 2023-06-05 PROCEDURE — 81001 URINALYSIS AUTO W/SCOPE: CPT

## 2023-06-08 ENCOUNTER — OFFICE VISIT (OUTPATIENT)
Dept: GYNECOLOGY | Facility: CLINIC | Age: 83
End: 2023-06-08
Payer: MEDICARE

## 2023-06-08 VITALS
DIASTOLIC BLOOD PRESSURE: 94 MMHG | SYSTOLIC BLOOD PRESSURE: 162 MMHG | BODY MASS INDEX: 33.12 KG/M2 | HEIGHT: 64 IN | WEIGHT: 194 LBS

## 2023-06-08 DIAGNOSIS — N81.11 MIDLINE CYSTOCELE: Primary | ICD-10-CM

## 2023-06-08 DIAGNOSIS — N81.4 UTERINE PROLAPSE: ICD-10-CM

## 2023-06-08 PROCEDURE — A4562 PESSARY, NON RUBBER,ANY TYPE: HCPCS | Performed by: OBSTETRICS & GYNECOLOGY

## 2023-06-08 PROCEDURE — 99213 OFFICE O/P EST LOW 20 MIN: CPT | Performed by: OBSTETRICS & GYNECOLOGY

## 2023-06-08 NOTE — PROGRESS NOTES
Assessment/Plan:     Cystocele and uterine prolapse-she was fitted with a new Miltex #7 ring with support pessary  She was given the old one to take home with her  She will return in 3 months for pessary check but is aware to call sooner if she has any concerns  She is agreeable  I offered her a 2-week follow-up but she prefers to wait  There are no diagnoses linked to this encounter  Subjective:     Patient ID: Anneliese Mathis is a [de-identified] y o  female  Patient here for pessary check  She has a ring with support pessary but she feels that her bladder is slipping past and she notices the cystocele  It is not bothersome to her  We did discuss trying a larger pessary  Review of Systems   Constitutional: Negative  Gastrointestinal: Negative  Genitourinary: Negative  Cystocele         Objective:     Physical Exam  Genitourinary:     Comments: Ring with support removed and cleaned, no vaginal excoriation  Prior to removing the pessary, there was a small to moderate cystocele noted  We had a ring with support pessary marked #7 from a different manufacture and this was slightly larger than the one she had which was also supposed to be a 7  This seemed to work well for her

## 2023-06-09 ENCOUNTER — OFFICE VISIT (OUTPATIENT)
Dept: FAMILY MEDICINE CLINIC | Facility: CLINIC | Age: 83
End: 2023-06-09
Payer: MEDICARE

## 2023-06-09 VITALS
HEIGHT: 64 IN | DIASTOLIC BLOOD PRESSURE: 82 MMHG | TEMPERATURE: 97.8 F | BODY MASS INDEX: 33.12 KG/M2 | HEART RATE: 64 BPM | WEIGHT: 194 LBS | SYSTOLIC BLOOD PRESSURE: 140 MMHG

## 2023-06-09 DIAGNOSIS — E78.00 HYPERCHOLESTEROLEMIA: ICD-10-CM

## 2023-06-09 DIAGNOSIS — R73.9 HYPERGLYCEMIA: ICD-10-CM

## 2023-06-09 DIAGNOSIS — Z00.00 HEALTH CARE MAINTENANCE: ICD-10-CM

## 2023-06-09 DIAGNOSIS — M15.9 PRIMARY OSTEOARTHRITIS INVOLVING MULTIPLE JOINTS: ICD-10-CM

## 2023-06-09 DIAGNOSIS — M85.80 MODERATE OSTEOPENIA: ICD-10-CM

## 2023-06-09 DIAGNOSIS — G40.209 PARTIAL SYMPTOMATIC EPILEPSY WITH COMPLEX PARTIAL SEIZURES, NOT INTRACTABLE, WITHOUT STATUS EPILEPTICUS (HCC): Primary | ICD-10-CM

## 2023-06-09 DIAGNOSIS — R60.9 PERIPHERAL EDEMA: ICD-10-CM

## 2023-06-09 DIAGNOSIS — G45.9 TRANSIENT ISCHEMIC ATTACK: ICD-10-CM

## 2023-06-09 DIAGNOSIS — E55.9 VITAMIN D DEFICIENCY: ICD-10-CM

## 2023-06-09 DIAGNOSIS — N18.31 STAGE 3A CHRONIC KIDNEY DISEASE (HCC): ICD-10-CM

## 2023-06-09 DIAGNOSIS — R06.09 DYSPNEA ON EXERTION: ICD-10-CM

## 2023-06-09 DIAGNOSIS — D12.6 BENIGN NEOPLASM OF LARGE INTESTINE, UNSPECIFIED PART OF LARGE INTESTINE: ICD-10-CM

## 2023-06-09 DIAGNOSIS — E83.52 HYPERCALCEMIA: ICD-10-CM

## 2023-06-09 DIAGNOSIS — F17.211 CIGARETTE NICOTINE DEPENDENCE IN REMISSION: ICD-10-CM

## 2023-06-09 DIAGNOSIS — I10 ESSENTIAL HYPERTENSION: ICD-10-CM

## 2023-06-09 PROCEDURE — G0439 PPPS, SUBSEQ VISIT: HCPCS | Performed by: FAMILY MEDICINE

## 2023-06-09 PROCEDURE — 99214 OFFICE O/P EST MOD 30 MIN: CPT | Performed by: FAMILY MEDICINE

## 2023-06-09 NOTE — ASSESSMENT & PLAN NOTE
Component of whitecoat syndrome blood pressure normalized by end of office visit continue present therapy and monitor

## 2023-06-09 NOTE — PATIENT INSTRUCTIONS
Complete chest x-ray for history of shortness of breath and smoking  Complete skeletal survey x-ray for elevated calcium  Repeat calcium level in 2 weeks, nonfasting    Weight loss recommended  Follow with all specialist further instructions  Return in 6 months for office visit and blood work sooner if needed

## 2023-06-09 NOTE — PROGRESS NOTES
Assessment and Plan:     1  Seizure disorder/TIA, stable follows with neurology  2  CKD-3 GFR 60 we will monitor  3  MAYEN/tobacco abuse in remission  Patient no longer follows with pulmonary medicine or cardiology  Asymptomatic  Patient would now accept screening CT for smoking however at 82 she does not qualify  We will check chest x-ray  4  Hypercholesterolemia patient refuses medication and verbalizes risks  5  Vitamin D deficiency, stable continue present therapy  6  Pure hypertension, component of whitecoat syndrome blood pressure normalized by end of office visit continue present therapy  7  Colon polyp patient has refused any further colonoscopies  8  Osteopenia up-to-date with DEXA scan  Benign  Edema, stable continue present therapy  10  Hyperglycemia diet controlled  11  Healthcare maintenance, Medicare AWV completed  12  Hypercalcemia, recurrent  Repeat calcium, calcitonin, PTH in 2 weeks  Check skeletal survey  13  BMI 33 3 diet exercise weight loss recommended  14    Formal follow-up is scheduled for 6 months for office visit blood work sooner if needed        Problem List Items Addressed This Visit        Digestive    RESOLVED: Benign neoplasm of large intestine     Patient declines any further colonoscopies            Cardiovascular and Mediastinum    Transient ischemic attack     Stable follows with neurology         Essential hypertension     Component of whitecoat syndrome blood pressure normalized by end of office visit continue present therapy and monitor            Nervous and Auditory    Complex partial seizure ([address] ) - Primary       Musculoskeletal and Integument    Osteoarthritis    Moderate osteopenia     DEXA 4/22            Genitourinary    Stage 3a chronic kidney disease ([address] )     Lab Results   Component Value Date    CREATININE 0 90 05/30/2023    CREATININE 0 82 11/23/2022    CREATININE 0 81 07/14/2022    EGFR 60 05/30/2023    EGFR 72 11/23/2022    EGFR 69 07/14/2022   GFR stable at 76 continue to monitor            Other    Vitamin D deficiency     Able continue present therapy         Hypercholesterolemia     Occasion refused patient verbalizes risk of elevated cholesterol can be heart attack stroke and death         Hyperglycemia     Diet controlled         Health care maintenance     Medicare AWV completed         Dyspnea on exertion     No longer sees pulmonary medicine or cardiology will check chest x-ray         Cigarette nicotine dependence in remission     CT screen ordered, this will be patient's last CT screen as she is [de-identified]years old         Peripheral edema     Stable continue present therapy         Hypercalcemia     Mike with PTH and calcitonin, check skeletal survey as this is a second time         Relevant Orders    Calcitonin    Calcium    PTH, intact    XR bone survey complete >12 mos    XR chest pa & lateral     BMI Counseling: Body mass index is 33 3 kg/m²  The BMI is above normal  Nutrition recommendations include moderation in carbohydrate intake and reducing intake of cholesterol  Exercise recommendations include exercising 3-5 times per week  Rationale for BMI follow-up plan is due to patient being overweight or obese  Depression Screening and Follow-up Plan: Patient was screened for depression during today's encounter  They screened negative with a PHQ-2 score of 0  Preventive health issues were discussed with patient, and age appropriate screening tests were ordered as noted in patient's After Visit Summary  Personalized health advice and appropriate referrals for health education or preventive services given if needed, as noted in patient's After Visit Summary  History of Present Illness:     Patient presents for a Medicare Wellness Visit    Patient doing well without medical complaints or concerns at the present time    Blood work was reviewed     Patient Care Team:  Frederick Price DO as PCP - Harmon Meigs, MD     Review of Systems:     Review of Systems   Constitutional: Negative  HENT: Negative  Eyes: Negative  Respiratory: Negative  Cardiovascular: Negative  Gastrointestinal: Negative  Endocrine: Negative  Genitourinary: Negative  Musculoskeletal: Negative  Skin: Negative  Allergic/Immunologic: Negative  Neurological: Negative  Hematological: Negative  Psychiatric/Behavioral: Negative           Problem List:     Patient Active Problem List   Diagnosis   • Vitamin D deficiency   • Transient ischemic attack   • Osteoarthritis   • Moderate osteopenia   • Essential hypertension   • Hypercholesterolemia   • Hyperglycemia   • Complex partial seizure (HCC)   • Midline cystocele   • Anxiety   • Personal history of breast cancer   • Personal history of radiation therapy   • Hiatal hernia   • Allergic rhinitis   • Health care maintenance   • Dyspnea on exertion   • Abnormal EKG   • Trigeminy   • Cigarette nicotine dependence in remission   • Stage 3a chronic kidney disease (HCC)   • Peripheral edema   • Hypercalcemia      Past Medical and Surgical History:     Past Medical History:   Diagnosis Date   • Cancer ([facility] [address] )     left breast   • Elevated serum alkaline phosphatase level    • GERD (gastroesophageal reflux disease)    • Glaucoma    • Hiatal hernia    • Hypercalcemia    • Hyperglycemia     diet controlled   • Hypertension    • Rheumatic fever    • Seizures (HCC)    • TIA (transient ischemic attack)    • Vitamin D deficiency      Past Surgical History:   Procedure Laterality Date   • APPENDECTOMY     • BREAST LUMPECTOMY Left    • COLONOSCOPY     • FOOT SURGERY Left    • ORIF ANKLE FRACTURE Left    • TONSILLECTOMY     • TUBAL LIGATION        Family History:     Family History   Problem Relation Age of Onset   • Breast cancer Mother    • Coronary artery disease Father    • Bone cancer Brother    • Lung cancer Brother       Social History:     Social History     Socioeconomic History   • Marital status:      Spouse name: None   • Number of children: None   • Years of education: None   • Highest education level: None   Occupational History   • Occupation: retired   Tobacco Use   • Smoking status: Former     Packs/day: 1 00     Years: 53 00     Total pack years: 53 00     Types: Cigarettes     Start date: 38     Quit date:      Years since quittin 4   • Smokeless tobacco: Never   Vaping Use   • Vaping Use: Never used   Substance and Sexual Activity   • Alcohol use: Yes     Comment: social   • Drug use: No   • Sexual activity: Not Currently   Other Topics Concern   • None   Social History Narrative    Activities - gardening    Daily coffee consumption- 1 cup per day    Daily tea consumption    Hearing loss     Social Determinants of Health     Financial Resource Strain: Low Risk  (2023)    Overall Financial Resource Strain (CARDIA)    • Difficulty of Paying Living Expenses: Not hard at all   Food Insecurity: Not on file   Transportation Needs: No Transportation Needs (2023)    PRAPARE - Transportation    • Lack of Transportation (Medical): No    • Lack of Transportation (Non-Medical):  No   Physical Activity: Not on file   Stress: Not on file   Social Connections: Not on file   Intimate Partner Violence: Not on file   Housing Stability: Not on file      Medications and Allergies:     Current Outpatient Medications   Medication Sig Dispense Refill   • albuterol (PROVENTIL HFA,VENTOLIN HFA) 90 mcg/act inhaler Inhale 2 puffs every 6 (six) hours as needed for wheezing (Patient taking differently: Inhale 2 puffs every 6 (six) hours as needed for wheezing PRN) 1 Inhaler 5   • Alpha-Lipoic Acid 100 MG CAPS Take by mouth      • amoxicillin (AMOXIL) 500 mg capsule TAKE 4 CAPSULES BY MOUTH ONE HOUR PRIOR TO OFFICE VISIT     • aspirin 81 MG tablet Take 162 mg by mouth     • B Complex Vitamins (B COMPLEX 1 PO) Take 2 tablets by mouth daily     • bimatoprost (LUMIGAN) 0 03 % ophthalmic drops Administer 1 "drop to both eyes daily at bedtime       • Cholecalciferol (VITAMIN D) 2000 units CAPS Take 2 capsules by mouth daily     • Coenzyme Q10 (COQ10) 100 MG CAPS Take by mouth     • furosemide (LASIX) 20 mg tablet TAKE 1 TABLET BY MOUTH EVERY DAY 90 tablet 2   • gabapentin (NEURONTIN) 300 mg capsule TAKE 1 CAPSULE BY MOUTH AT BEDTIME AS NEEDED FOR PAIN 90 capsule 3   • GLUCOSAMINE-MSM-HYALURONIC ACD PO Take 1 capsule by mouth 3 (three) times a day     • GRAPE SEED EXTRACT PO Take by mouth     • hydrOXYzine HCL (ATARAX) 10 mg tablet TAKE 1 TABLET BY MOUTH DAILY AS NEEDED FOR ANXIETY 90 tablet 1   • levETIRAcetam (KEPPRA) 500 mg tablet TAKE 1 TABLET BY MOUTH TWICE A  tablet 1   • losartan (COZAAR) 25 mg tablet TAKE 1 TABLET BY MOUTH EVERY DAY 90 tablet 3   • Lumigan 0 01 % ophthalmic drops INSTILL 1 DROP INTO BOTH EYES AT BEDTIME     • metoprolol succinate (TOPROL-XL) 200 MG 24 hr tablet TAKE 1 TABLET BY MOUTH EVERY DAY 90 tablet 3   • milk thistle 175 MG tablet Take 175 mg by mouth daily     • mometasone (ELOCON) 0 1 % cream Apply to bilateral external ear once weekly at bedtime 45 g 3   • Multiple Vitamins-Minerals (MULTIVITAMIN ADULT PO) Take 1 capsule by mouth daily       • Omega-3 Fatty Acids (FISH OIL PO) Take 1,000 mg by mouth daily       • Turmeric 500 MG CAPS Take 1 capsule by mouth daily       • VOLTAREN 1 % APPLY 2G TOPICALLY TO AFFECTED AREA FOUR TIMES A DAY AS DIRECTED 100 g 5     No current facility-administered medications for this visit       Allergies   Allergen Reactions   • Carbamazepine Myalgia     flu-like symptoms   • Epinephrine Other (See Comments)     \"weakness\"   • Iodides Itching   • Morphine And Related    • Sulfamethoxazole-Trimethoprim    • Latex Rash   • Lisinopril Cough   • Topiramate Rash      Immunizations:     Immunization History   Administered Date(s) Administered   • COVID-19 MODERNA VACC 0 5 ML IM 01/30/2021, 02/27/2021, 12/27/2021   • INFLUENZA 12/08/2020   • Influenza, high " dose seasonal 0 7 mL 10/13/2021   • Pneumococcal Conjugate 13-Valent 03/29/2018   • Pneumococcal Polysaccharide PPV23 03/29/2018   • Td (adult), adsorbed 03/29/2018   • Tdap 07/23/2020   • Zoster 03/29/2018      Health Maintenance:         Topic Date Due   • Lung Cancer Screening  Never done   • Colorectal Cancer Screening  05/08/2015   • Breast Cancer Screening: Mammogram  09/29/2023   • Hepatitis C Screening  Completed         Topic Date Due   • Hepatitis A Vaccine (1 of 2 - Risk 2-dose series) Never done   • Hepatitis B Vaccine (1 of 3 - Risk 3-dose series) Never done   • COVID-19 Vaccine (4 - Moderna series) 02/21/2022   • Influenza Vaccine (Season Ended) 09/01/2023      Medicare Screening Tests and Risk Assessments:     Momo Kraus is here for her Subsequent Wellness visit  Health Risk Assessment:   Patient rates overall health as fair  Patient feels that their physical health rating is same  Patient is satisfied with their life  Eyesight was rated as same  Hearing was rated as same  Patient feels that their emotional and mental health rating is same  Patients states they are never, rarely angry  Patient states they are always unusually tired/fatigued  Pain experienced in the last 7 days has been none  Patient states that she has experienced no weight loss or gain in last 6 months  Depression Screening:   PHQ-2 Score: 0      Fall Risk Screening: In the past year, patient has experienced: no history of falling in past year      Home Safety:  Patient has trouble with stairs inside or outside of their home  Patient has working smoke alarms and has no working carbon monoxide detector  Home safety hazards include: none  Nutrition:   Current diet is Regular  Medications:   Patient is currently taking over-the-counter supplements  OTC medications include: see medication list  Patient is able to manage medications       Activities of Daily Living (ADLs)/Instrumental Activities of Daily Living (IADLs): Walk and transfer into and out of bed and chair?: Yes  Dress and groom yourself?: Yes    Bathe or shower yourself?: Yes    Feed yourself? Yes  Do your laundry/housekeeping?: Yes  Manage your money, pay your bills and track your expenses?: Yes  Make your own meals?: Yes    Do your own shopping?: Yes    Previous Hospitalizations:   Any hospitalizations or ED visits within the last 12 months?: No      Advance Care Planning:     Durable POA for healthcare: Yes    Advanced directive: Yes    Advanced directive counseling given: Yes    Five wishes given: No    Patient declined ACP directive: Yes    End of Life Decisions reviewed with patient: Yes    Provider agrees with end of life decisions: Yes      Cognitive Screening:   Provider or family/friend/caregiver concerned regarding cognition?: No    PREVENTIVE SCREENINGS      Cardiovascular Screening:    General: Screening Not Indicated and History Lipid Disorder      Diabetes Screening:     General: Screening Current      Breast Cancer Screening:     General: History Breast Cancer      Cervical Cancer Screening:    General: Screening Not Indicated      Osteoporosis Screening:    General: Screening Current      Abdominal Aortic Aneurysm (AAA) Screening:        General: Screening Not Indicated      Lung Cancer Screening:     General: Screening Not Indicated      Hepatitis C Screening:    General: Screening Current    Screening, Brief Intervention, and Referral to Treatment (SBIRT)    Screening  Typical number of drinks in a day: 0  Typical number of drinks in a week: 5  Interpretation: Low risk drinking behavior  Single Item Drug Screening:  How often have you used an illegal drug (including marijuana) or a prescription medication for non-medical reasons in the past year? never    Single Item Drug Screen Score: 0  Interpretation: Negative screen for possible drug use disorder    No results found       Physical Exam:     /82   Pulse 64   Temp 97 8 °F (36 6 °C) "(Temporal)   Ht 5' 4\" (1 626 m)   Wt 88 kg (194 lb)   LMP  (LMP Unknown)   BMI 33 30 kg/m²     Physical Exam  Vitals and nursing note reviewed  Constitutional:       Appearance: Normal appearance  HENT:      Head: Normocephalic and atraumatic  Mouth/Throat:      Mouth: Mucous membranes are moist    Eyes:      General: No scleral icterus  Neck:      Vascular: No carotid bruit  Cardiovascular:      Rate and Rhythm: Normal rate and regular rhythm  Heart sounds: Normal heart sounds  Pulmonary:      Effort: Pulmonary effort is normal       Breath sounds: Normal breath sounds  Abdominal:      General: Bowel sounds are normal       Palpations: Abdomen is soft  Tenderness: There is no abdominal tenderness  Musculoskeletal:      Cervical back: Neck supple  Right lower leg: No edema  Left lower leg: No edema  Skin:     General: Skin is warm and dry  Neurological:      General: No focal deficit present  Mental Status: She is alert     Psychiatric:         Mood and Affect: Mood normal           Mick Sanchez DO"

## 2023-06-09 NOTE — ASSESSMENT & PLAN NOTE
Lab Results   Component Value Date    CREATININE 0 90 05/30/2023    CREATININE 0 82 11/23/2022    CREATININE 0 81 07/14/2022    EGFR 60 05/30/2023    EGFR 72 11/23/2022    EGFR 69 07/14/2022   GFR stable at 68 continue to monitor

## 2023-06-09 NOTE — ASSESSMENT & PLAN NOTE
Occasion refused patient verbalizes risk of elevated cholesterol can be heart attack stroke and death

## 2023-06-12 ENCOUNTER — TELEPHONE (OUTPATIENT)
Dept: GYNECOLOGY | Facility: CLINIC | Age: 83
End: 2023-06-12

## 2023-06-12 NOTE — TELEPHONE ENCOUNTER
Patient is doing great with her new pessary  ----- Message from Trice Dc DO sent at 6/8/2023  5:09 PM EDT -----  I fitted Jarvis Cranker with a #7 ring with support pessary today  It is slightly larger than what she had before  I offered her a 2 week follow up but she preferred to return in 3 months  She is no longer driving and it is more difficult to come in  She is aware to call with any concerns  Can you please give her a call sometime next week or the following week to see how she is doing?   Thank you    Nickolas Gonzalez

## 2023-07-03 DIAGNOSIS — I10 ESSENTIAL HYPERTENSION: ICD-10-CM

## 2023-07-03 RX ORDER — LOSARTAN POTASSIUM 25 MG/1
TABLET ORAL
Qty: 90 TABLET | Refills: 3 | Status: SHIPPED | OUTPATIENT
Start: 2023-07-03

## 2023-07-28 DIAGNOSIS — G40.909 SEIZURE DISORDER (HCC): ICD-10-CM

## 2023-07-28 RX ORDER — LEVETIRACETAM 500 MG/1
TABLET ORAL
Qty: 180 TABLET | Refills: 1 | OUTPATIENT
Start: 2023-07-28

## 2023-07-28 NOTE — TELEPHONE ENCOUNTER
Pt has not been seen since  2020,     She needs a f/u appointment with chiquis or george  . Once she has a f/u we can fill the script .  She can also obtain it from her pcp if she not longer is following up in our office

## 2023-08-28 DIAGNOSIS — G40.909 SEIZURE DISORDER (HCC): ICD-10-CM

## 2023-08-28 RX ORDER — LEVETIRACETAM 500 MG/1
TABLET ORAL
Qty: 180 TABLET | Refills: 1 | OUTPATIENT
Start: 2023-08-28

## 2023-08-28 NOTE — TELEPHONE ENCOUNTER
Pt has not been seen in the office since sept of 2020 .  She will need to make a f/u appointment before any scripts are refilled

## 2023-08-31 ENCOUNTER — OFFICE VISIT (OUTPATIENT)
Dept: GYNECOLOGY | Facility: CLINIC | Age: 83
End: 2023-08-31
Payer: MEDICARE

## 2023-08-31 VITALS — BODY MASS INDEX: 33.13 KG/M2 | SYSTOLIC BLOOD PRESSURE: 150 MMHG | DIASTOLIC BLOOD PRESSURE: 96 MMHG | WEIGHT: 193 LBS

## 2023-08-31 DIAGNOSIS — N81.11 MIDLINE CYSTOCELE: ICD-10-CM

## 2023-08-31 DIAGNOSIS — N81.4 UTERINE PROLAPSE: Primary | ICD-10-CM

## 2023-08-31 PROCEDURE — 99213 OFFICE O/P EST LOW 20 MIN: CPT | Performed by: OBSTETRICS & GYNECOLOGY

## 2023-08-31 NOTE — PROGRESS NOTES
Assessment/Plan:     Uterine prolapse, cystocele-she will continue with her pessary and return in 3 months for pessary check    Vaginal burning-this may be from the pessary and vaginal atrophy. Recommended a vaginal moisturizer. We also discussed rinsing the vulva after using the bathroom and patting the area dry with toilet tissue. She was using wipes which sometimes are harsh to the vulvar skin. She will call if no relief. There are no diagnoses linked to this encounter. Subjective:     Patient ID: Pricila Harvey is a 82 y.o. female. Patient here for a pessary check. At her last visit, she was fitted with a #7 ring with support Militex pessary because her old one was not working well enough and she felt that her bladder was slipping down. She had a #7 ring with support but it was from a different manufacture and the one from [employer] was slightly larger so this was inserted. This is working well for her. She notices less protrusion of her cystocele. She does have some vaginal burning at times. She had Lyme disease      Review of Systems   Constitutional: Negative. Gastrointestinal: Negative. Genitourinary: Negative. Vaginal burning         Objective:     Physical Exam  Genitourinary:     General: Normal vulva. Vagina: Normal.      Cervix: Normal.      Uterus: Normal. With uterine prolapse. Adnexa: Right adnexa normal and left adnexa normal.      Comments: No irritation or sign of yeast  Ring with support pessary removed, cleaned and reinserted without difficulty. There was a small amount of discharge.   No vaginal excoriation noted

## 2023-09-01 ENCOUNTER — TELEPHONE (OUTPATIENT)
Dept: NEUROLOGY | Facility: CLINIC | Age: 83
End: 2023-09-01

## 2023-09-01 DIAGNOSIS — G40.909 SEIZURE DISORDER (HCC): ICD-10-CM

## 2023-09-01 NOTE — TELEPHONE ENCOUNTER
Patient called in and stated that she was not given full script for levetiracetam at the pharmacy, she was only given a few a pills.   scheduled patient F/U appt with Dr Michele Jain 9/06/23 @ 2pm

## 2023-09-01 NOTE — TELEPHONE ENCOUNTER
Called patient's pharmacy they dispensed a few tabs as patient was concerned she would be running out over the weekend. Last script was written 7/2022 but the pharmacy says they can only dispense to her in 30 day quantities and said she has only filled this a few times. They state the patient may not be taking the medication correctly     Called patient, she says for years she has been taking half a pill in the AM and half at night. Has not taken the prescribed amount. Patient says she has been fine on half the tab in AM and half at night     Patient has follow up on 9/6. She states the pharmacy gave her 5 pills and she had 3 from her own supply at home. Total of 8 days of medication.  Patient will wait until follow up for refill

## 2023-09-06 ENCOUNTER — OFFICE VISIT (OUTPATIENT)
Dept: NEUROLOGY | Facility: CLINIC | Age: 83
End: 2023-09-06
Payer: MEDICARE

## 2023-09-06 VITALS
SYSTOLIC BLOOD PRESSURE: 163 MMHG | BODY MASS INDEX: 32.95 KG/M2 | WEIGHT: 193 LBS | HEIGHT: 64 IN | HEART RATE: 83 BPM | TEMPERATURE: 97.1 F | RESPIRATION RATE: 16 BRPM | OXYGEN SATURATION: 94 % | DIASTOLIC BLOOD PRESSURE: 97 MMHG

## 2023-09-06 DIAGNOSIS — G40.909 SEIZURE DISORDER (HCC): ICD-10-CM

## 2023-09-06 DIAGNOSIS — G40.209 COMPLEX PARTIAL SEIZURE (HCC): Primary | ICD-10-CM

## 2023-09-06 PROCEDURE — 99213 OFFICE O/P EST LOW 20 MIN: CPT | Performed by: PSYCHIATRY & NEUROLOGY

## 2023-09-06 RX ORDER — LEVETIRACETAM 500 MG/1
250 TABLET ORAL 2 TIMES DAILY
Qty: 32 TABLET | Refills: 11 | Status: SHIPPED | OUTPATIENT
Start: 2023-09-06

## 2023-09-06 NOTE — ASSESSMENT & PLAN NOTE
Nishant Vega is a 53-year-old patient with a long history of epilepsy. She comes today for follow-up visit she had not been seen for 3 years. But she denies any recurrent seizures and has been utilizing Keppra 250 mg twice a day higher doses did result in side effects. She did question the need to be on antiepileptics. I have informed her that previous EEGs were abnormal.  If she is seriously contemplating then request to be off of these medications she would require a EEG and would have to stop driving for 3 months after the Keppra would be discontinued. At this point she would hold off on plan. She is agreeable to continue on the current dose of Keppra at 250 twice a day. I did review her recent CBC and CMP that were normal and a Keppra level has been ordered. She is to return to our offices in 6 several months. I have asked her to be seen on a regular basis or her prescriptions are filled as well as her neurological care is maintained.

## 2023-09-06 NOTE — PROGRESS NOTES
Patient ID: Smooth Garcia is a 82 y.o. female. Assessment/Plan:    Complex partial seizure ([address])  Rob Anthony is a 30-year-old patient with a long history of epilepsy. She comes today for follow-up visit she had not been seen for 3 years. But she denies any recurrent seizures and has been utilizing Keppra 250 mg twice a day higher doses did result in side effects. She did question the need to be on antiepileptics. I have informed her that previous EEGs were abnormal.  If she is seriously contemplating then request to be off of these medications she would require a EEG and would have to stop driving for 3 months after the Keppra would be discontinued. At this point she would hold off on plan. She is agreeable to continue on the current dose of Keppra at 250 twice a day. I did review her recent CBC and CMP that were normal and a Keppra level has been ordered. She is to return to our offices in 6 several months. I have asked her to be seen on a regular basis or her prescriptions are filled as well as her neurological care is maintained. Diagnoses and all orders for this visit:    Complex partial seizure ([address])    Seizure disorder ([address])  -     Levetiracetam level; Future  -     levETIRAcetam (KEPPRA) 500 mg tablet; Take 0.5 tablets (250 mg total) by mouth 2 (two) times a day    Other orders  -     potassium bicarbonate (K-LYTE) 25 MEQ disintegrating tablet; Take 25 mEq by mouth 2 (two) times a day         Subjective:    this a 83 Y/o  right handed female who presents as a f/u regarding partial complex seizures. She was last evaluated in 2020. Since that time she has had no further seizures. She does utilize Keppra 250 mg twice a day. In 2005 she was noted to have 2 episodes characterized by a brief alteration of consciousness. the workup did reveal left temporal sharp waves. she was placed on Tegretol. she developed rash and was placed on Topamax. it was discontinued due to side effects.  she was started on [facility] gpo bid. she admits to using 250 mg po bid on occasion  .  higher dose did also result in behavioral changes.        She and she has had no seizures since 2000 fine she did ask about the need to be on medication.            Last EEG was in 2008. it showed b/L sharp activity ( right greater than left)      She has been treated for breast CA in 2007/2008 with lumpectomy and XRT.         Jan 2018, appendectomy      She does take melatonin as needed for insomnia.               The following portions of the patient's history were reviewed and updated as appropriate:   She  has a past medical history of Cancer ([address]), Elevated serum alkaline phosphatase level, GERD (gastroesophageal reflux disease), Glaucoma, Hiatal hernia, Hypercalcemia, Hyperglycemia, Hypertension, Lyme disease, Rheumatic fever, Seizures ([address]), TIA (transient ischemic attack), and Vitamin D deficiency. She  has a past surgical history that includes Breast lumpectomy (Left); Tubal ligation; Appendectomy; Colonoscopy; ORIF ankle fracture (Left); Tonsillectomy; and Foot surgery (Left). Her family history includes Bone cancer in her brother; Breast cancer in her mother; Coronary artery disease in her father; Lung cancer in her brother. She  reports that she quit smoking about 13 years ago. Her smoking use included cigarettes. She started smoking about 66 years ago. She has a 53.00 pack-year smoking history. She has never used smokeless tobacco. She reports current alcohol use. She reports that she does not use drugs.   Current Outpatient Medications   Medication Sig Dispense Refill   • albuterol (PROVENTIL HFA,VENTOLIN HFA) 90 mcg/act inhaler Inhale 2 puffs every 6 (six) hours as needed for wheezing (Patient taking differently: Inhale 2 puffs every 6 (six) hours as needed for wheezing PRN) 1 Inhaler 5   • Alpha-Lipoic Acid 100 MG CAPS Take by mouth      • amoxicillin (AMOXIL) 500 mg capsule TAKE 4 CAPSULES BY MOUTH ONE HOUR PRIOR TO OFFICE VISIT     • aspirin 81 MG tablet Take 162 mg by mouth     • B Complex Vitamins (B COMPLEX 1 PO) Take 2 tablets by mouth daily     • bimatoprost (LUMIGAN) 0.03 % ophthalmic drops Administer 1 drop to both eyes daily at bedtime       • Cholecalciferol (VITAMIN D) 2000 units CAPS Take 2 capsules by mouth daily     • Coenzyme Q10 (COQ10) 100 MG CAPS Take by mouth     • furosemide (LASIX) 20 mg tablet TAKE 1 TABLET BY MOUTH EVERY DAY 90 tablet 2   • gabapentin (NEURONTIN) 300 mg capsule TAKE 1 CAPSULE BY MOUTH AT BEDTIME AS NEEDED FOR PAIN 90 capsule 3   • GLUCOSAMINE-MSM-HYALURONIC ACD PO Take 1 capsule by mouth 3 (three) times a day     • GRAPE SEED EXTRACT PO Take by mouth     • hydrOXYzine HCL (ATARAX) 10 mg tablet TAKE 1 TABLET BY MOUTH DAILY AS NEEDED FOR ANXIETY 90 tablet 1   • levETIRAcetam (KEPPRA) 500 mg tablet Take 0.5 tablets (250 mg total) by mouth 2 (two) times a day 32 tablet 11   • Lumigan 0.01 % ophthalmic drops INSTILL 1 DROP INTO BOTH EYES AT BEDTIME     • metoprolol succinate (TOPROL-XL) 200 MG 24 hr tablet TAKE 1 TABLET BY MOUTH EVERY DAY 90 tablet 3   • milk thistle 175 MG tablet Take 175 mg by mouth daily     • mometasone (ELOCON) 0.1 % cream Apply to bilateral external ear once weekly at bedtime 45 g 3   • Multiple Vitamins-Minerals (MULTIVITAMIN ADULT PO) Take 1 capsule by mouth daily       • Omega-3 Fatty Acids (FISH OIL PO) Take 1,000 mg by mouth daily       • potassium bicarbonate (K-LYTE) 25 MEQ disintegrating tablet Take 25 mEq by mouth 2 (two) times a day     • Turmeric 500 MG CAPS Take 1 capsule by mouth daily       • VOLTAREN 1 % APPLY 2G TOPICALLY TO AFFECTED AREA FOUR TIMES A DAY AS DIRECTED 100 g 5   • losartan (COZAAR) 25 mg tablet TAKE 1 TABLET BY MOUTH EVERY DAY (Patient not taking: Reported on 9/6/2023) 90 tablet 3     No current facility-administered medications for this visit.      She is allergic to carbamazepine, epinephrine, iodides, morphine and related, sulfamethoxazole-trimethoprim, latex, lisinopril, and topiramate. .         Objective:    Blood pressure 163/97, pulse 83, temperature (!) 97.1 °F (36.2 °C), temperature source Temporal, resp. rate 16, height 5' 4" (1.626 m), weight 87.5 kg (193 lb), SpO2 94 %, not currently breastfeeding. Physical Exam  Eyes:      General: Lids are normal.      Extraocular Movements: Extraocular movements intact. Pupils: Pupils are equal, round, and reactive to light. Neurological:      Motor: Motor strength is normal.     Deep Tendon Reflexes:      Reflex Scores:       Tricep reflexes are 1+ on the right side and 1+ on the left side. Bicep reflexes are 2+ on the right side and 2+ on the left side. Patellar reflexes are 2+ on the right side and 2+ on the left side. Achilles reflexes are 1+ on the right side and 1+ on the left side. Neurological Exam  Mental Status  Awake, alert and oriented to person, place and time. Oriented to person, place and time. Language is fluent with no aphasia. Cranial Nerves  CN II: Visual acuity is normal. Visual fields full to confrontation. CN III, IV, VI: Extraocular movements intact bilaterally. Normal lids and orbits bilaterally. Pupils equal round and reactive to light bilaterally. CN V: Facial sensation is normal.  CN VII: Full and symmetric facial movement. CN VIII: Hearing is normal.  CN IX, X: Palate elevates symmetrically. Normal gag reflex. CN XI: Shoulder shrug strength is normal.  CN XII: Tongue midline without atrophy or fasciculations. Motor  Normal muscle bulk throughout. No fasciculations present. Strength is 5/5 throughout all four extremities. Sensory  Light touch is normal in upper and lower extremities. Temperature is normal in upper and lower extremities.      Reflexes                                            Right                      Left  Biceps                                 2+                         2+  Triceps 1+                         1+  Patellar                                2+                         2+  Achilles                                1+                         1+  Right Plantar: downgoing  Left Plantar: downgoing    Coordination  Right: Finger-to-nose normal.Left: Finger-to-nose normal.    Gait  Normal casual, toe, heel and tandem gait. Review of systems obtained from the medical assistant as below was reviewed with the patient at today's visit  ROS:    Review of Systems   Constitutional: Negative for chills and fever. HENT: Negative for ear pain and sore throat. Eyes: Negative for pain and visual disturbance. Respiratory: Negative for cough and shortness of breath. Cardiovascular: Negative for chest pain and palpitations. Gastrointestinal: Positive for diarrhea. Negative for abdominal pain and vomiting. Genitourinary: Positive for frequency and urgency. Negative for dysuria and hematuria. Musculoskeletal: Positive for gait problem. Negative for arthralgias and back pain. Skin: Negative for color change and rash. Neurological: Negative for seizures and syncope. Psychiatric/Behavioral: Positive for sleep disturbance. All other systems reviewed and are negative.

## 2023-09-26 ENCOUNTER — TELEPHONE (OUTPATIENT)
Dept: NEUROLOGY | Facility: CLINIC | Age: 83
End: 2023-09-26

## 2023-09-27 NOTE — TELEPHONE ENCOUNTER
09-, 3:12:06 pm EDT  Taken off 9/26/2023, 9:32pm    Patient left voicemail stating she got new insurance and will need a new prescription for labs as old prescription has old insurance information on it. New insurance is through [facility] [place] [facility]  ID: [id number]    Patient added if labs are not urgent she can wait until November visit for new script.     [phone]

## 2023-09-29 DIAGNOSIS — G40.209 COMPLEX PARTIAL SEIZURE (HCC): Primary | ICD-10-CM

## 2023-10-12 NOTE — TELEPHONE ENCOUNTER
Patient lab script (levetiracetam level 9/29) printed and placed in mail today to patient address on file in chart.

## 2023-10-21 DIAGNOSIS — I10 ESSENTIAL HYPERTENSION: ICD-10-CM

## 2023-10-23 RX ORDER — METOPROLOL SUCCINATE 200 MG/1
TABLET, EXTENDED RELEASE ORAL
Qty: 90 TABLET | Refills: 3 | Status: SHIPPED | OUTPATIENT
Start: 2023-10-23

## 2023-11-20 ENCOUNTER — APPOINTMENT (OUTPATIENT)
Dept: LAB | Age: 83
End: 2023-11-20
Payer: MEDICARE

## 2023-11-20 ENCOUNTER — APPOINTMENT (OUTPATIENT)
Dept: RADIOLOGY | Age: 83
End: 2023-11-20
Payer: MEDICARE

## 2023-11-20 DIAGNOSIS — E83.52 HYPERCALCEMIA: ICD-10-CM

## 2023-11-20 DIAGNOSIS — G40.209 COMPLEX PARTIAL SEIZURE (HCC): ICD-10-CM

## 2023-11-20 DIAGNOSIS — G40.909 SEIZURE DISORDER (HCC): ICD-10-CM

## 2023-11-20 DIAGNOSIS — E83.52 HYPERCALCEMIA: Primary | ICD-10-CM

## 2023-11-20 LAB
CALCIUM SERPL-MCNC: 10.6 MG/DL (ref 8.4–10.2)
PTH-INTACT SERPL-MCNC: 94 PG/ML (ref 12–88)

## 2023-11-20 PROCEDURE — 80177 DRUG SCRN QUAN LEVETIRACETAM: CPT

## 2023-11-20 PROCEDURE — 77075 RADEX OSSEOUS SURVEY COMPL: CPT

## 2023-11-20 PROCEDURE — 83970 ASSAY OF PARATHORMONE: CPT

## 2023-11-20 PROCEDURE — 82308 ASSAY OF CALCITONIN: CPT

## 2023-11-20 PROCEDURE — 71046 X-RAY EXAM CHEST 2 VIEWS: CPT

## 2023-11-20 PROCEDURE — 82310 ASSAY OF CALCIUM: CPT

## 2023-11-20 PROCEDURE — 36415 COLL VENOUS BLD VENIPUNCTURE: CPT

## 2023-11-21 DIAGNOSIS — E21.3 HYPERPARATHYROIDISM (HCC): Primary | ICD-10-CM

## 2023-11-22 LAB
CALCIT SERPL-MCNC: <2 PG/ML (ref 0–5)
LEVETIRACETAM SERPL-MCNC: 17.2 UG/ML (ref 10–40)

## 2023-11-30 ENCOUNTER — TELEPHONE (OUTPATIENT)
Dept: FAMILY MEDICINE CLINIC | Facility: CLINIC | Age: 83
End: 2023-11-30

## 2023-11-30 DIAGNOSIS — E21.3 HYPERPARATHYROIDISM (HCC): Primary | ICD-10-CM

## 2023-11-30 RX ORDER — LEVETIRACETAM 500 MG/1
TABLET ORAL
Qty: 180 TABLET | Refills: 1 | OUTPATIENT
Start: 2023-11-30

## 2023-11-30 NOTE — TELEPHONE ENCOUNTER
Neil Barraza from [address] [place] [address] Radiology called regarding pt's CT scan on 12/9. States they need updated BUN and creatinine labs done before the test. Could these please be entered and pt notified? If there are any questions, Neil Barraza can be called at [phone].

## 2023-12-01 ENCOUNTER — ANNUAL EXAM (OUTPATIENT)
Dept: GYNECOLOGY | Facility: CLINIC | Age: 83
End: 2023-12-01
Payer: MEDICARE

## 2023-12-01 VITALS — WEIGHT: 191 LBS | DIASTOLIC BLOOD PRESSURE: 80 MMHG | BODY MASS INDEX: 32.79 KG/M2 | SYSTOLIC BLOOD PRESSURE: 142 MMHG

## 2023-12-01 DIAGNOSIS — N81.4 UTERINE PROLAPSE: ICD-10-CM

## 2023-12-01 DIAGNOSIS — Z12.31 ENCOUNTER FOR SCREENING MAMMOGRAM FOR BREAST CANCER: ICD-10-CM

## 2023-12-01 DIAGNOSIS — M85.852 OSTEOPENIA OF NECK OF LEFT FEMUR: ICD-10-CM

## 2023-12-01 DIAGNOSIS — N89.8 VAGINAL DISCHARGE: ICD-10-CM

## 2023-12-01 DIAGNOSIS — Z01.419 ENCOUNTER FOR ANNUAL ROUTINE GYNECOLOGICAL EXAMINATION: Primary | ICD-10-CM

## 2023-12-01 DIAGNOSIS — N81.11 MIDLINE CYSTOCELE: ICD-10-CM

## 2023-12-01 PROCEDURE — G0101 CA SCREEN;PELVIC/BREAST EXAM: HCPCS | Performed by: OBSTETRICS & GYNECOLOGY

## 2023-12-01 PROCEDURE — 87210 SMEAR WET MOUNT SALINE/INK: CPT | Performed by: OBSTETRICS & GYNECOLOGY

## 2023-12-01 NOTE — PROGRESS NOTES
Assessment/Plan:    She does not require a pap    Uterine prolapse/cystocele - continue with the ring with support pessary, it is working well for her    Vaginal discharge - wet mount is normal.  This is most like from the pessary. She does not want to leave the pessary out.    mammogram reviewed with her including breast density. RX given and she will schedule  Discussed self breast exams    colon cancer screening    osteopenia-DEXA ordered for April. She has an elevated fracture risk. We discussed treating this with prescription medication in addition to calcium and vitamin D    discussed preventive care, regular exercise and a healthy diet      No problem-specific Assessment & Plan notes found for this encounter. Diagnoses and all orders for this visit:    Encounter for annual routine gynecological examination    Encounter for screening mammogram for breast cancer  -     Mammo screening bilateral w 3d & cad; Future    Osteopenia of neck of left femur  -     DXA bone density spine hip and pelvis; Future    Vaginal discharge  -     POCT wet mount          Subjective:      Patient ID: Moreno Milan is a 82 y.o. female. Pt presents for yearly and pessary check. She has a #7 ring with support for uterine prolapse and cystocele. She is happy with this and it works well for her. She has vaginal odor and vaginal discharge. No bleeding    Normal 3D mammogram in 9-2022 with scattered fibroglandular densities. S/p left breast cancer treated with a lumpectomy  DEXA in April 2022 showed osteopenia in the femoral neck. she has an elevated hip fracture risk and risk of major osteoporotic fracture. The following portions of the patient's history were reviewed and updated as appropriate: allergies, current medications, past family history, past medical history, past social history, past surgical history, and problem list.    Review of Systems   Constitutional: Negative. Gastrointestinal: Negative. Genitourinary:  Positive for vaginal discharge. Objective:      LMP  (LMP Unknown)          Physical Exam  Vitals reviewed. Constitutional:       Appearance: Normal appearance. She is well-developed. Neck:      Thyroid: No thyromegaly. Trachea: No tracheal deviation. Cardiovascular:      Rate and Rhythm: Normal rate and regular rhythm. Pulmonary:      Effort: Pulmonary effort is normal.      Breath sounds: Normal breath sounds. Chest:   Breasts:     Breasts are symmetrical.      Right: No inverted nipple, mass, nipple discharge, skin change or tenderness. Left: No inverted nipple, mass, nipple discharge, skin change or tenderness. Abdominal:      General: There is no distension. Palpations: Abdomen is soft. There is no mass. Tenderness: There is no abdominal tenderness. Genitourinary:     Labia:         Right: No rash, tenderness, lesion or injury. Left: No rash, tenderness, lesion or injury. Vagina: Normal.      Cervix: No cervical motion tenderness, discharge or friability. Adnexa:         Right: No mass, tenderness or fullness. Left: No mass, tenderness or fullness. Rectum: Normal.      Comments: Ring with support removed, cleaned and reinserted, moderate amount of discharge noted  Neurological:      Mental Status: She is alert.

## 2023-12-03 LAB
BV WHIFF TEST VAG QL: NORMAL
CLUE CELLS SPEC QL WET PREP: NORMAL
SL AMB POCT WET MOUNT: NORMAL
T VAGINALIS VAG QL WET PREP: NORMAL
YEAST VAG QL WET PREP: NORMAL

## 2023-12-06 ENCOUNTER — APPOINTMENT (OUTPATIENT)
Dept: LAB | Age: 83
End: 2023-12-06
Payer: MEDICARE

## 2023-12-06 DIAGNOSIS — E21.3 HYPERPARATHYROIDISM (HCC): ICD-10-CM

## 2023-12-06 LAB
25(OH)D3 SERPL-MCNC: 49.2 NG/ML (ref 30–100)
BUN SERPL-MCNC: 13 MG/DL (ref 5–25)
CALCIUM SERPL-MCNC: 10 MG/DL (ref 8.4–10.2)
CREAT SERPL-MCNC: 0.82 MG/DL (ref 0.6–1.3)
GFR SERPL CREATININE-BSD FRML MDRD: 67 ML/MIN/1.73SQ M
PTH-INTACT SERPL-MCNC: 94.1 PG/ML (ref 12–88)

## 2023-12-06 PROCEDURE — 36415 COLL VENOUS BLD VENIPUNCTURE: CPT

## 2023-12-06 PROCEDURE — 82565 ASSAY OF CREATININE: CPT

## 2023-12-06 PROCEDURE — 82310 ASSAY OF CALCIUM: CPT

## 2023-12-06 PROCEDURE — 82308 ASSAY OF CALCITONIN: CPT

## 2023-12-06 PROCEDURE — 84520 ASSAY OF UREA NITROGEN: CPT

## 2023-12-06 PROCEDURE — 82306 VITAMIN D 25 HYDROXY: CPT

## 2023-12-06 PROCEDURE — 83970 ASSAY OF PARATHORMONE: CPT

## 2023-12-09 ENCOUNTER — TELEPHONE (OUTPATIENT)
Dept: CT IMAGING | Facility: HOSPITAL | Age: 83
End: 2023-12-09

## 2023-12-09 DIAGNOSIS — Z91.041 ALLERGY TO IODINATED CONTRAST MEDIA: Primary | ICD-10-CM

## 2023-12-09 LAB — CALCIT SERPL-MCNC: <2 PG/ML (ref 0–5)

## 2023-12-09 RX ORDER — PREDNISONE 50 MG/1
TABLET ORAL
Qty: 3 TABLET | Refills: 0 | Status: SHIPPED | OUTPATIENT
Start: 2023-12-09

## 2023-12-09 NOTE — TELEPHONE ENCOUNTER
Andi sent to on call doctor, Dr Tova Noble regarding rpemedication prep needing to be sent to pts pharmacy.

## 2023-12-09 NOTE — TELEPHONE ENCOUNTER
Called pt this morning regarding her allergy to Iodine. Pt confirmed allergy ( itiching) .  I informed patient I was canceling the appointment today and would send a message to Dr. Chavez Butcher regarding  a pre medication order needing to be sent to a pharmacy before we can do this test. Pt verbalized understanding of these instructions

## 2023-12-11 ENCOUNTER — RA CDI HCC (OUTPATIENT)
Dept: OTHER | Facility: HOSPITAL | Age: 83
End: 2023-12-11

## 2023-12-15 ENCOUNTER — OFFICE VISIT (OUTPATIENT)
Dept: FAMILY MEDICINE CLINIC | Facility: CLINIC | Age: 83
End: 2023-12-15
Payer: MEDICARE

## 2023-12-15 VITALS
WEIGHT: 190 LBS | HEART RATE: 76 BPM | BODY MASS INDEX: 32.44 KG/M2 | HEIGHT: 64 IN | SYSTOLIC BLOOD PRESSURE: 128 MMHG | DIASTOLIC BLOOD PRESSURE: 84 MMHG

## 2023-12-15 DIAGNOSIS — G45.9 TRANSIENT ISCHEMIC ATTACK: ICD-10-CM

## 2023-12-15 DIAGNOSIS — I10 ESSENTIAL HYPERTENSION: ICD-10-CM

## 2023-12-15 DIAGNOSIS — E83.52 HYPERCALCEMIA: ICD-10-CM

## 2023-12-15 DIAGNOSIS — E55.9 VITAMIN D DEFICIENCY: ICD-10-CM

## 2023-12-15 DIAGNOSIS — N18.31 STAGE 3A CHRONIC KIDNEY DISEASE (HCC): ICD-10-CM

## 2023-12-15 DIAGNOSIS — G40.209 COMPLEX PARTIAL SEIZURE (HCC): ICD-10-CM

## 2023-12-15 DIAGNOSIS — Z23 ENCOUNTER FOR IMMUNIZATION: ICD-10-CM

## 2023-12-15 DIAGNOSIS — R60.9 PERIPHERAL EDEMA: ICD-10-CM

## 2023-12-15 DIAGNOSIS — E78.00 HYPERCHOLESTEROLEMIA: ICD-10-CM

## 2023-12-15 DIAGNOSIS — Z23 NEED FOR COVID-19 VACCINE: ICD-10-CM

## 2023-12-15 DIAGNOSIS — R73.9 HYPERGLYCEMIA: ICD-10-CM

## 2023-12-15 DIAGNOSIS — Z00.00 HEALTH CARE MAINTENANCE: ICD-10-CM

## 2023-12-15 DIAGNOSIS — E21.3 HYPERPARATHYROIDISM (HCC): Primary | ICD-10-CM

## 2023-12-15 PROCEDURE — 90662 IIV NO PRSV INCREASED AG IM: CPT | Performed by: FAMILY MEDICINE

## 2023-12-15 PROCEDURE — 90480 ADMN SARSCOV2 VAC 1/ONLY CMP: CPT | Performed by: FAMILY MEDICINE

## 2023-12-15 PROCEDURE — 90471 IMMUNIZATION ADMIN: CPT | Performed by: FAMILY MEDICINE

## 2023-12-15 PROCEDURE — 99214 OFFICE O/P EST MOD 30 MIN: CPT | Performed by: FAMILY MEDICINE

## 2023-12-15 PROCEDURE — 91320 SARSCV2 VAC 30MCG TRS-SUC IM: CPT | Performed by: FAMILY MEDICINE

## 2023-12-15 PROCEDURE — G0008 ADMIN INFLUENZA VIRUS VAC: HCPCS | Performed by: FAMILY MEDICINE

## 2023-12-15 NOTE — ASSESSMENT & PLAN NOTE
Lab Results   Component Value Date    EGFR 67 12/06/2023    EGFR 60 05/30/2023    EGFR 72 11/23/2022    CREATININE 0.82 12/06/2023    CREATININE 0.90 05/30/2023    CREATININE 0.82 11/23/2022   Stable, continue to monitor

## 2023-12-15 NOTE — PROGRESS NOTES
Name: Pavel Bowers      : 1940      MRN: [id number]  Encounter Provider: Myriam Solo DO  Encounter Date: 12/15/2023   Encounter department: [address]   #1. Hyperparathyroidism/hypercalcemia  Scheduled CT of neck , endocrinology consultation   2. TIA/seizure disorder, stable follows with neurology #3. CKD-3, blood work ordered  4. Vitamin D deficiency blood work ordered  5. Hyperglycemia blood work ordered  6. Hypercholesterolemia blood work ordered  7. Edema, stable continue present therapy #8. Healthcare maintenance influenza and COVID vaccines given  9. Return in 3 months for office visit blood work sooner if needed        1. Hyperparathyroidism [facility])  Assessment & Plan:  PTH is still mildly elevated has CT neck scheduled  and appoint with endocrinology     Orders:  -     CBC; Future; Expected date: 03/15/2024  -     Comprehensive metabolic panel; Future; Expected date: 03/15/2024  -     Hemoglobin A1C; Future; Expected date: 03/15/2024  -     Lipid Panel with Direct LDL reflex; Future; Expected date: 03/15/2024  -     TSH, 3rd generation with Free T4 reflex; Future; Expected date: 03/15/2024  -     UA (URINE) with reflex to Scope; Future; Expected date: 03/15/2024  -     Vitamin D 25 hydroxy; Future; Expected date: 03/15/2024  -     PTH, intact; Future; Expected date: 03/15/2024    2. Transient ischemic attack  Assessment & Plan:  Stable follows with neurology    Orders:  -     CBC; Future; Expected date: 03/15/2024  -     Comprehensive metabolic panel; Future; Expected date: 03/15/2024  -     Hemoglobin A1C; Future; Expected date: 03/15/2024  -     Lipid Panel with Direct LDL reflex; Future; Expected date: 03/15/2024  -     TSH, 3rd generation with Free T4 reflex; Future; Expected date: 03/15/2024  -     UA (URINE) with reflex to Scope; Future; Expected date: 03/15/2024  -     Vitamin D 25 hydroxy;  Future; Expected date: 03/15/2024  -     PTH, intact; Future; Expected date: 03/15/2024    3. Essential hypertension  Assessment & Plan:  Stable continue present therapy    Orders:  -     CBC; Future; Expected date: 03/15/2024  -     Comprehensive metabolic panel; Future; Expected date: 03/15/2024  -     Hemoglobin A1C; Future; Expected date: 03/15/2024  -     Lipid Panel with Direct LDL reflex; Future; Expected date: 03/15/2024  -     TSH, 3rd generation with Free T4 reflex; Future; Expected date: 03/15/2024  -     UA (URINE) with reflex to Scope; Future; Expected date: 03/15/2024  -     Vitamin D 25 hydroxy; Future; Expected date: 03/15/2024  -     PTH, intact; Future; Expected date: 03/15/2024    4. Stage 3a chronic kidney disease [facility])  Assessment & Plan:  Lab Results   Component Value Date    EGFR 67 12/06/2023    EGFR 60 05/30/2023    EGFR 72 11/23/2022    CREATININE 0.82 12/06/2023    CREATININE 0.90 05/30/2023    CREATININE 0.82 11/23/2022   Stable, continue to monitor    Orders:  -     CBC; Future; Expected date: 03/15/2024  -     Comprehensive metabolic panel; Future; Expected date: 03/15/2024  -     Hemoglobin A1C; Future; Expected date: 03/15/2024  -     Lipid Panel with Direct LDL reflex; Future; Expected date: 03/15/2024  -     TSH, 3rd generation with Free T4 reflex; Future; Expected date: 03/15/2024  -     UA (URINE) with reflex to Scope; Future; Expected date: 03/15/2024  -     Vitamin D 25 hydroxy; Future; Expected date: 03/15/2024  -     PTH, intact; Future; Expected date: 03/15/2024    5. Vitamin D deficiency  Assessment & Plan:  Blood work ordered    Orders:  -     CBC; Future; Expected date: 03/15/2024  -     Comprehensive metabolic panel; Future; Expected date: 03/15/2024  -     Hemoglobin A1C; Future; Expected date: 03/15/2024  -     Lipid Panel with Direct LDL reflex; Future; Expected date: 03/15/2024  -     TSH, 3rd generation with Free T4 reflex;  Future; Expected date: 03/15/2024  -     UA (URINE) with reflex to Scope; Future; Expected date: 03/15/2024  -     Vitamin D 25 hydroxy; Future; Expected date: 03/15/2024  -     PTH, intact; Future; Expected date: 03/15/2024    6. Hyperglycemia  Assessment & Plan:  Blood work ordered    Orders:  -     CBC; Future; Expected date: 03/15/2024  -     Comprehensive metabolic panel; Future; Expected date: 03/15/2024  -     Hemoglobin A1C; Future; Expected date: 03/15/2024  -     Lipid Panel with Direct LDL reflex; Future; Expected date: 03/15/2024  -     TSH, 3rd generation with Free T4 reflex; Future; Expected date: 03/15/2024  -     UA (URINE) with reflex to Scope; Future; Expected date: 03/15/2024  -     Vitamin D 25 hydroxy; Future; Expected date: 03/15/2024  -     PTH, intact; Future; Expected date: 03/15/2024    7. Hypercholesterolemia  Assessment & Plan:  Blood work ordered    Orders:  -     CBC; Future; Expected date: 03/15/2024  -     Comprehensive metabolic panel; Future; Expected date: 03/15/2024  -     Hemoglobin A1C; Future; Expected date: 03/15/2024  -     Lipid Panel with Direct LDL reflex; Future; Expected date: 03/15/2024  -     TSH, 3rd generation with Free T4 reflex; Future; Expected date: 03/15/2024  -     UA (URINE) with reflex to Scope; Future; Expected date: 03/15/2024  -     Vitamin D 25 hydroxy; Future; Expected date: 03/15/2024  -     PTH, intact; Future; Expected date: 03/15/2024    8. Hypercalcemia  Assessment & Plan:  As above    Orders:  -     CBC; Future; Expected date: 03/15/2024  -     Comprehensive metabolic panel; Future; Expected date: 03/15/2024  -     Hemoglobin A1C; Future; Expected date: 03/15/2024  -     Lipid Panel with Direct LDL reflex; Future; Expected date: 03/15/2024  -     TSH, 3rd generation with Free T4 reflex; Future; Expected date: 03/15/2024  -     UA (URINE) with reflex to Scope; Future; Expected date: 03/15/2024  -     Vitamin D 25 hydroxy; Future; Expected date: 03/15/2024  -     PTH, intact; Future; Expected date: 03/15/2024    9. Complex partial seizure [facility])  Assessment & Plan:  Stable follows with neurology    Orders:  -     CBC; Future; Expected date: 03/15/2024  -     Comprehensive metabolic panel; Future; Expected date: 03/15/2024  -     Hemoglobin A1C; Future; Expected date: 03/15/2024  -     Lipid Panel with Direct LDL reflex; Future; Expected date: 03/15/2024  -     TSH, 3rd generation with Free T4 reflex; Future; Expected date: 03/15/2024  -     UA (URINE) with reflex to Scope; Future; Expected date: 03/15/2024  -     Vitamin D 25 hydroxy; Future; Expected date: 03/15/2024  -     PTH, intact; Future; Expected date: 03/15/2024    10. Peripheral edema  Assessment & Plan:  Stable continue present therapy    Orders:  -     CBC; Future; Expected date: 03/15/2024  -     Comprehensive metabolic panel; Future; Expected date: 03/15/2024  -     Hemoglobin A1C; Future; Expected date: 03/15/2024  -     Lipid Panel with Direct LDL reflex; Future; Expected date: 03/15/2024  -     TSH, 3rd generation with Free T4 reflex; Future; Expected date: 03/15/2024  -     UA (URINE) with reflex to Scope; Future; Expected date: 03/15/2024  -     Vitamin D 25 hydroxy; Future; Expected date: 03/15/2024  -     PTH, intact; Future; Expected date: 03/15/2024    11. Health care maintenance  Assessment & Plan:  Influenza and COVID vaccines given           Subjective      Patient without any current new medical complaints or concerns. Had CT scan of neck and endocrinology consultation scheduled in the near future      Review of Systems   Constitutional: Negative. HENT: Negative. Eyes: Negative. Respiratory: Negative. Cardiovascular: Negative. Gastrointestinal: Negative. Endocrine:        HPI   Genitourinary: Negative. Musculoskeletal: Negative. Skin: Negative. Allergic/Immunologic: Negative. Neurological: Negative. Hematological: Negative. Psychiatric/Behavioral: Negative.          Current Outpatient Medications on File Prior to Visit   Medication Sig    albuterol (PROVENTIL HFA,VENTOLIN HFA) 90 mcg/act inhaler Inhale 2 puffs every 6 (six) hours as needed for wheezing (Patient taking differently: Inhale 2 puffs every 6 (six) hours as needed for wheezing PRN)    Alpha-Lipoic Acid 100 MG CAPS Take by mouth     amoxicillin (AMOXIL) 500 mg capsule TAKE 4 CAPSULES BY MOUTH ONE HOUR PRIOR TO OFFICE VISIT    aspirin 81 MG tablet Take 162 mg by mouth    B Complex Vitamins (B COMPLEX 1 PO) Take 2 tablets by mouth daily    bimatoprost (LUMIGAN) 0.03 % ophthalmic drops Administer 1 drop to both eyes daily at bedtime      Cholecalciferol (VITAMIN D) 2000 units CAPS Take 2 capsules by mouth daily    Coenzyme Q10 (COQ10) 100 MG CAPS Take by mouth    diphenhydrAMINE (BENADRYL) 50 MG tablet Take one 50 mg tablet 1 hour prior to CT scan.     furosemide (LASIX) 20 mg tablet TAKE 1 TABLET BY MOUTH EVERY DAY    gabapentin (NEURONTIN) 300 mg capsule TAKE 1 CAPSULE BY MOUTH AT BEDTIME AS NEEDED FOR PAIN    GLUCOSAMINE-MSM-HYALURONIC ACD PO Take 1 capsule by mouth 3 (three) times a day    GRAPE SEED EXTRACT PO Take by mouth    hydrOXYzine HCL (ATARAX) 10 mg tablet TAKE 1 TABLET BY MOUTH DAILY AS NEEDED FOR ANXIETY    levETIRAcetam (KEPPRA) 500 mg tablet Take 0.5 tablets (250 mg total) by mouth 2 (two) times a day    losartan (COZAAR) 25 mg tablet TAKE 1 TABLET BY MOUTH EVERY DAY (Patient not taking: Reported on 9/6/2023)    Lumigan 0.01 % ophthalmic drops INSTILL 1 DROP INTO BOTH EYES AT BEDTIME    metoprolol succinate (TOPROL-XL) 200 MG 24 hr tablet TAKE 1 TABLET BY MOUTH EVERY DAY    milk thistle 175 MG tablet Take 175 mg by mouth daily    mometasone (ELOCON) 0.1 % cream Apply to bilateral external ear once weekly at bedtime    Multiple Vitamins-Minerals (MULTIVITAMIN ADULT PO) Take 1 capsule by mouth daily      Omega-3 Fatty Acids (FISH OIL PO) Take 1,000 mg by mouth daily      potassium bicarbonate (K-LYTE) 25 MEQ disintegrating tablet Take 25 mEq by mouth 2 (two) times a day    predniSONE 50 mg tablet Take one 50 mg tablet 13 hours prior to CT, 7 hours prior to CT, and 1 hour before CT scan. Turmeric 500 MG CAPS Take 1 capsule by mouth daily      VOLTAREN 1 % APPLY 2G TOPICALLY TO AFFECTED AREA FOUR TIMES A DAY AS DIRECTED       Objective     /84   Pulse 76   Ht 5' 4" (1.626 m)   Wt 86.2 kg (190 lb)   LMP  (LMP Unknown)   BMI 32.61 kg/m²     Physical Exam  Vitals and nursing note reviewed. Constitutional:       Appearance: Normal appearance. HENT:      Head: Normocephalic and atraumatic. Mouth/Throat:      Mouth: Mucous membranes are moist.   Eyes:      General: No scleral icterus. Neck:      Comments: Negative thyromegaly  Cardiovascular:      Rate and Rhythm: Normal rate and regular rhythm. Heart sounds: Normal heart sounds. Pulmonary:      Effort: Pulmonary effort is normal.      Breath sounds: Normal breath sounds. Abdominal:      General: Bowel sounds are normal.      Palpations: Abdomen is soft. Tenderness: There is no abdominal tenderness. Musculoskeletal:      Cervical back: No tenderness. Right lower leg: No edema. Skin:     General: Skin is warm and dry. Neurological:      General: No focal deficit present. Mental Status: She is alert.    Psychiatric:         Mood and Affect: Mood normal.       Mick Sanchez DO

## 2023-12-15 NOTE — PATIENT INSTRUCTIONS
Continue present therapy  Complete CT scan of neck as planned  Follow with endocrinology as planned  Return in 3 months for office visit blood work sooner if needed

## 2024-01-03 DIAGNOSIS — G40.909 SEIZURE DISORDER (HCC): ICD-10-CM

## 2024-01-04 RX ORDER — LEVETIRACETAM 500 MG/1
500 TABLET ORAL 2 TIMES DAILY
Qty: 180 TABLET | Refills: 1 | Status: SHIPPED | OUTPATIENT
Start: 2024-01-04

## 2024-01-09 ENCOUNTER — TELEPHONE (OUTPATIENT)
Age: 84
End: 2024-01-09

## 2024-01-09 NOTE — TELEPHONE ENCOUNTER
Patient called in reguards of needing assistance at least once a month for someone to come and stop by to make sure patient is taking medications correctly and to check up on her. Patient isn't sure if medicare covers any kind of assistance, Patient would like to know if PCP has any recommendations.

## 2024-01-18 ENCOUNTER — TELEPHONE (OUTPATIENT)
Age: 84
End: 2024-01-18

## 2024-01-18 DIAGNOSIS — F40.240 CLAUSTROPHOBIA: Primary | ICD-10-CM

## 2024-01-18 RX ORDER — LORAZEPAM 0.5 MG/1
TABLET ORAL
Qty: 1 TABLET | Refills: 0 | Status: SHIPPED | OUTPATIENT
Start: 2024-01-18

## 2024-01-18 NOTE — TELEPHONE ENCOUNTER
I have sent a prescription for lorazepam 0.5 mg to take 1 tablet 1 hour before MRI.  I have seen and ready that patient will get a ride to and from MRI so she is not driving

## 2024-01-18 NOTE — TELEPHONE ENCOUNTER
Really is not an alternative the CT scan is the best to look at the parathyroid.  I can send in medication for anxiety to take before this test.  However, you would need a  then to go back and forth from the CT

## 2024-01-18 NOTE — TELEPHONE ENCOUNTER
Patient called stating that she has been incontinence and patient states she has a CT scan at the end of the month. Patient stated while getting the CT scan she might have an incident while doing the CT scan and would like to know is there is an alternative to the CT scan

## 2024-01-18 NOTE — TELEPHONE ENCOUNTER
Patient states she is going to use a diaper and will like this med to help with her anxiety sent to her local pharmacy, she will be having someone to drive her that day. Pt just wanted Dr Sanchez to verify this med is not going to interfere with any of her other medications.

## 2024-01-22 NOTE — TELEPHONE ENCOUNTER
Pt called and stated that right now she does not want to do the CT study, but she is asking Dr. Sanchez if she still has to do the labs. Please advise [phone] thank you.

## 2024-02-01 ENCOUNTER — TELEPHONE (OUTPATIENT)
Age: 84
End: 2024-02-01

## 2024-02-01 NOTE — TELEPHONE ENCOUNTER
Patient called the RX Refill Line. Message is being forwarded to the office.     Patient is requesting Pt is calling because she got her metoprolol from [facility] and the label is telling her she should take it in the morning. Pt always as long as she can remember takes it in the evening around 5pm. She just saw this on the [facility] bottle and wants to know if she should be taking it in morning or is it ok to keep taking it @ 5pm.    Please contact patient at [phone]

## 2024-02-07 ENCOUNTER — TELEPHONE (OUTPATIENT)
Dept: HEMATOLOGY ONCOLOGY | Facility: CLINIC | Age: 84
End: 2024-02-07

## 2024-02-07 NOTE — TELEPHONE ENCOUNTER
I called Sushma in response to a referral that was received for patient to establish care with Surgical Oncology.     Outreach was made to schedule a consultation.    I left a voicemail explaining the reason for my call and advised patient to call [facility] at [phone].  Another attempt will be made to contact patient.

## 2024-02-14 ENCOUNTER — TELEPHONE (OUTPATIENT)
Age: 84
End: 2024-02-14

## 2024-02-14 ENCOUNTER — TELEPHONE (OUTPATIENT)
Dept: HEMATOLOGY ONCOLOGY | Facility: CLINIC | Age: 84
End: 2024-02-14

## 2024-02-14 NOTE — TELEPHONE ENCOUNTER
I called Sushma in response to a referral that was received for patient to establish care with Surgical Oncology.     Outreach was made to schedule a consultation.    Patient declined scheduling.Patient advise she will call  if she decides to schedule an appointment.  The referral has been closed.

## 2024-02-14 NOTE — TELEPHONE ENCOUNTER
Patient notified of recommendations, per pt she is going to have an open CT scan done and then she will be seeing the surgical oncologist.

## 2024-02-14 NOTE — TELEPHONE ENCOUNTER
Pt returned Surgical Oncology call and will yudi her PCP to find out why she needs to see Oncology. Gave pt number for her PCP.

## 2024-02-14 NOTE — TELEPHONE ENCOUNTER
Patient asked for a biopsy.  I again, that is why she was referred to surgical oncology however she was also told without completing a CT scan of her neck I do not think that this will occur.  In my opinion she needs to complete the CT scan of her neck because she may have a parathyroid adenoma and/or cancer

## 2024-02-14 NOTE — TELEPHONE ENCOUNTER
Patient called insurance a ct is covered can she have a script for it. Also patient would like to know where she should go for it. Please call. Questions about the pills

## 2024-02-14 NOTE — TELEPHONE ENCOUNTER
Attempted to contact the pt and the line was busy x 2.  Per Dr. Sanchez pt is the one that wanted to do a biopsy of the parathyroid thus being the reason she was sent to surgical oncology. They are the specialist and they are to evaluate pt for her issue.

## 2024-02-14 NOTE — TELEPHONE ENCOUNTER
Patient called about why is her PCP wanting for her to go visit with Dr Lemus, oncologist. Spoke to someone in the office and patient will be called back today. Patient stated she is an 83 yr old patient and to please give her time to get to the phone. It takes her a little longer. Thanks

## 2024-02-15 NOTE — TELEPHONE ENCOUNTER
Yes, I recommend she follow with Dr. Abraham, endocrinologist, and surgical oncologist as well as complete the CT of her neck.  She does have a growth on her parathyroid gland could be benign could be cancerous we will not tell until testing is done

## 2024-02-15 NOTE — TELEPHONE ENCOUNTER
Pt wants to move forward with the plan but she has so much going on right now that she has to put a hold on everything. Her car is impounded and she is very ill.     We reviewed the plan and when she feels up to it she is going to schedule the CT and then she will schedule with Dr. Palumbo Oncology. She has both numbers to schedule    Pt would like to know if she still needs to see keep her apt with Dr. Abraham?    Pt would like to pass on that she is sorry for having to put a hold on her plan.     She will resume asap.

## 2024-02-21 PROBLEM — Z00.00 HEALTH CARE MAINTENANCE: Status: RESOLVED | Noted: 2020-01-23 | Resolved: 2024-02-21

## 2024-02-22 ENCOUNTER — TELEPHONE (OUTPATIENT)
Age: 84
End: 2024-02-22

## 2024-02-29 ENCOUNTER — TELEPHONE (OUTPATIENT)
Age: 84
End: 2024-02-29

## 2024-02-29 ENCOUNTER — TELEPHONE (OUTPATIENT)
Dept: OTHER | Facility: OTHER | Age: 84
End: 2024-02-29

## 2024-02-29 ENCOUNTER — TELEPHONE (OUTPATIENT)
Dept: HEMATOLOGY ONCOLOGY | Facility: CLINIC | Age: 84
End: 2024-02-29

## 2024-02-29 NOTE — TELEPHONE ENCOUNTER
Appointment Change  Cancel, Reschedule, Change to Virtual      Who are you speaking with? Patient   If it is not the patient, is the caller listed on the communication consent form? N/A   Which provider is the appointment scheduled with? Dr. Keller   When was the original appointment scheduled?    Please list date and time 3/12/24 10:00am   At which location is the appointment scheduled to take place? [place]   Was the appointment rescheduled?     Was the appointment changed from an in person visit to a virtual visit?    If so, please list the details of the change. 3/27/24 3:00pm   What is the reason for the appointment change? Patient requested to be seen after her CT scan on 3/12/24.       Was [facility] transport scheduled? No   Does [facility] transport need to be scheduled for the new visit (if applicable) No   Does the patient need an infusion appointment rescheduled? No   Does the patient have an upcoming infusion appointment scheduled? If so, when? No   Is the patient undergoing chemotherapy? No   For appointments cancelled with less than 24 hours:  Was the no-show policy reviewed? Yes

## 2024-02-29 NOTE — TELEPHONE ENCOUNTER
Patient is calling regarding cancelling an appointment.    Date/Time: 2/29/24 at 920 am    Patient was rescheduled: YES [] NO [x]    Patient requesting call back to reschedule: YES [x] NO []

## 2024-02-29 NOTE — TELEPHONE ENCOUNTER
Patient called regarding 3/4/24 appointment.  She wanted to know how long the appointment was for.  I told her it is in the schedule for 15 minutes.  She is going to tell the van service to allow 60 minutes for her appointment.

## 2024-03-05 ENCOUNTER — OFFICE VISIT (OUTPATIENT)
Dept: GYNECOLOGY | Facility: CLINIC | Age: 84
End: 2024-03-05
Payer: MEDICARE

## 2024-03-05 VITALS — SYSTOLIC BLOOD PRESSURE: 142 MMHG | DIASTOLIC BLOOD PRESSURE: 80 MMHG | WEIGHT: 186 LBS | BODY MASS INDEX: 31.93 KG/M2

## 2024-03-05 DIAGNOSIS — N89.8 VAGINAL DISCHARGE: ICD-10-CM

## 2024-03-05 DIAGNOSIS — N81.11 MIDLINE CYSTOCELE: ICD-10-CM

## 2024-03-05 DIAGNOSIS — N81.4 UTERINE PROLAPSE: Primary | ICD-10-CM

## 2024-03-05 DIAGNOSIS — B96.89 BV (BACTERIAL VAGINOSIS): ICD-10-CM

## 2024-03-05 DIAGNOSIS — N93.9 VAGINAL BLEEDING: ICD-10-CM

## 2024-03-05 DIAGNOSIS — N76.0 BV (BACTERIAL VAGINOSIS): ICD-10-CM

## 2024-03-05 LAB
BV WHIFF TEST VAG QL: POSITIVE
CLUE CELLS SPEC QL WET PREP: POSITIVE
PH SMN: 5.5 [PH]
SL AMB POCT WET MOUNT: ABNORMAL
T VAGINALIS VAG QL WET PREP: NEGATIVE
YEAST VAG QL WET PREP: ABNORMAL

## 2024-03-05 PROCEDURE — 87070 CULTURE OTHR SPECIMN AEROBIC: CPT | Performed by: OBSTETRICS & GYNECOLOGY

## 2024-03-05 PROCEDURE — 87210 SMEAR WET MOUNT SALINE/INK: CPT | Performed by: OBSTETRICS & GYNECOLOGY

## 2024-03-05 PROCEDURE — 99213 OFFICE O/P EST LOW 20 MIN: CPT | Performed by: OBSTETRICS & GYNECOLOGY

## 2024-03-05 RX ORDER — METRONIDAZOLE 7.5 MG/G
1 GEL VAGINAL
Qty: 70 G | Refills: 0 | Status: SHIPPED | OUTPATIENT
Start: 2024-03-05

## 2024-03-05 NOTE — PROGRESS NOTES
Assessment/Plan:     Uterine prolapse-the pessary is working well for her and she is very happy with it.  She will return in 3 months for a pessary check.    BV - vaginal discharge most likely related to pessary but she also has BV.  I sent her a prescription for MetroGel and recommended that she avoid alcohol.  I did a comprehensive genital culture as well.    Spotting - US ordered.  On her last ultrasound which was last March, she had a thin endometrium but there was some fluid in the endometrium and she has 2 small filling defects consistent with polyps which have been there.  We have discussed this on many occasions and she refuses another endometrial biopsy or a D&C.  She had an endometrial biopsy in 2021 that showed benign appearing but rare glandular epithelial cells and the biopsy was an adequate for evaluation.  She will have another ultrasound and we can review.     There are no diagnoses linked to this encounter.      Subjective:     Patient ID: Sushma Ambriz is a 83 y.o. female.    Patient here for a pessary check.  She has a ring with support pessary that has been working well for her cystocele and uterine prolapse.  A few weeks ago, she started spotting daily.  She has had spotting in the past.  She had an EMB in 2021 that was inadequate for evaluation.  She has had ultrasounds and there is fluid in her endometrium and it appears that she has polyps.  I recommended a D&C but she does not want to have this done.  She had not been bleeding for some time.    She has discharge and vaginal odor which is bothersome to her.  She is otherwise happy with the pessary.            Review of Systems   Constitutional: Negative.    Gastrointestinal: Negative.    Genitourinary:  Positive for vaginal bleeding and vaginal discharge.        Vaginal odor         Objective:     Physical Exam  Genitourinary:     General: Normal vulva.      Vagina: Vaginal discharge and prolapsed vaginal walls present.      Cervix:  Discharge and cervical bleeding present.      Uterus: With uterine prolapse.       Adnexa: Right adnexa normal and left adnexa normal.      Comments: Large amount of off-white discharge, small amount of dark blood noted  No active bleeding  Ring with support pessary removed, cleaned and reinserted without difficulty  No vaginal excoriation noted

## 2024-03-06 ENCOUNTER — TELEPHONE (OUTPATIENT)
Dept: GYNECOLOGY | Facility: CLINIC | Age: 84
End: 2024-03-06

## 2024-03-06 NOTE — TELEPHONE ENCOUNTER
Patient called Rx Refill line to see what Dr Lopez called into her pharmacy yesterday, She wasn't sure if it was pills or vaginal cream. Advised vaginal cream to use at bedtime.

## 2024-03-09 LAB — BACTERIA GENITAL AEROBE CULT: NORMAL

## 2024-03-12 ENCOUNTER — HOSPITAL ENCOUNTER (OUTPATIENT)
Dept: CT IMAGING | Facility: HOSPITAL | Age: 84
Discharge: HOME | End: 2024-03-12
Payer: MEDICARE

## 2024-03-12 DIAGNOSIS — E21.3 HYPERPARATHYROIDISM (HCC): ICD-10-CM

## 2024-03-12 PROCEDURE — 70492 CT SFT TSUE NCK W/O & W/DYE: CPT

## 2024-03-12 PROCEDURE — G1004 CDSM NDSC: HCPCS

## 2024-03-12 RX ADMIN — IOHEXOL 85 ML: 350 INJECTION, SOLUTION INTRAVENOUS at 14:00

## 2024-03-13 PROBLEM — D35.1 PARATHYROID ADENOMA: Status: ACTIVE | Noted: 2024-03-13

## 2024-03-15 ENCOUNTER — APPOINTMENT (OUTPATIENT)
Dept: LAB | Age: 84
End: 2024-03-15
Payer: MEDICARE

## 2024-03-15 DIAGNOSIS — E55.9 VITAMIN D DEFICIENCY: ICD-10-CM

## 2024-03-15 DIAGNOSIS — E78.00 HYPERCHOLESTEROLEMIA: ICD-10-CM

## 2024-03-15 DIAGNOSIS — N18.31 STAGE 3A CHRONIC KIDNEY DISEASE (HCC): ICD-10-CM

## 2024-03-15 DIAGNOSIS — R60.0 PERIPHERAL EDEMA: ICD-10-CM

## 2024-03-15 DIAGNOSIS — R73.9 HYPERGLYCEMIA: ICD-10-CM

## 2024-03-15 DIAGNOSIS — G40.209 COMPLEX PARTIAL SEIZURE (HCC): ICD-10-CM

## 2024-03-15 DIAGNOSIS — E21.3 HYPERPARATHYROIDISM (HCC): ICD-10-CM

## 2024-03-15 DIAGNOSIS — I10 ESSENTIAL HYPERTENSION: ICD-10-CM

## 2024-03-15 DIAGNOSIS — G45.9 TRANSIENT ISCHEMIC ATTACK: ICD-10-CM

## 2024-03-15 DIAGNOSIS — E83.52 HYPERCALCEMIA: ICD-10-CM

## 2024-03-15 LAB
25(OH)D3 SERPL-MCNC: 49.7 NG/ML (ref 30–100)
ALBUMIN SERPL BCP-MCNC: 4.2 G/DL (ref 3.5–5)
ALP SERPL-CCNC: 76 U/L (ref 34–104)
ALT SERPL W P-5'-P-CCNC: 13 U/L (ref 7–52)
ANION GAP SERPL CALCULATED.3IONS-SCNC: 9 MMOL/L (ref 4–13)
AST SERPL W P-5'-P-CCNC: 20 U/L (ref 13–39)
BILIRUB SERPL-MCNC: 0.8 MG/DL (ref 0.2–1)
BUN SERPL-MCNC: 12 MG/DL (ref 5–25)
CALCIUM SERPL-MCNC: 10.1 MG/DL (ref 8.4–10.2)
CHLORIDE SERPL-SCNC: 104 MMOL/L (ref 96–108)
CHOLEST SERPL-MCNC: 204 MG/DL
CO2 SERPL-SCNC: 26 MMOL/L (ref 21–32)
CREAT SERPL-MCNC: 0.72 MG/DL (ref 0.6–1.3)
ERYTHROCYTE [DISTWIDTH] IN BLOOD BY AUTOMATED COUNT: 12.8 % (ref 11.6–15.1)
EST. AVERAGE GLUCOSE BLD GHB EST-MCNC: 140 MG/DL
GFR SERPL CREATININE-BSD FRML MDRD: 78 ML/MIN/1.73SQ M
GLUCOSE P FAST SERPL-MCNC: 116 MG/DL (ref 65–99)
HBA1C MFR BLD: 6.5 %
HCT VFR BLD AUTO: 42.6 % (ref 34.8–46.1)
HDLC SERPL-MCNC: 62 MG/DL
HGB BLD-MCNC: 13.8 G/DL (ref 11.5–15.4)
LDLC SERPL CALC-MCNC: 126 MG/DL (ref 0–100)
MCH RBC QN AUTO: 30 PG (ref 26.8–34.3)
MCHC RBC AUTO-ENTMCNC: 32.4 G/DL (ref 31.4–37.4)
MCV RBC AUTO: 93 FL (ref 82–98)
PLATELET # BLD AUTO: 246 THOUSANDS/UL (ref 149–390)
PMV BLD AUTO: 11.4 FL (ref 8.9–12.7)
POTASSIUM SERPL-SCNC: 4 MMOL/L (ref 3.5–5.3)
PROT SERPL-MCNC: 6.8 G/DL (ref 6.4–8.4)
PTH-INTACT SERPL-MCNC: 111.1 PG/ML (ref 12–88)
RBC # BLD AUTO: 4.6 MILLION/UL (ref 3.81–5.12)
SODIUM SERPL-SCNC: 139 MMOL/L (ref 135–147)
TRIGL SERPL-MCNC: 79 MG/DL
TSH SERPL DL<=0.05 MIU/L-ACNC: 1.78 UIU/ML (ref 0.45–4.5)
WBC # BLD AUTO: 8.11 THOUSAND/UL (ref 4.31–10.16)

## 2024-03-15 PROCEDURE — 80061 LIPID PANEL: CPT

## 2024-03-15 PROCEDURE — 83970 ASSAY OF PARATHORMONE: CPT

## 2024-03-15 PROCEDURE — 85027 COMPLETE CBC AUTOMATED: CPT

## 2024-03-15 PROCEDURE — 82306 VITAMIN D 25 HYDROXY: CPT

## 2024-03-15 PROCEDURE — 83036 HEMOGLOBIN GLYCOSYLATED A1C: CPT

## 2024-03-15 PROCEDURE — 80053 COMPREHEN METABOLIC PANEL: CPT

## 2024-03-15 PROCEDURE — 36415 COLL VENOUS BLD VENIPUNCTURE: CPT

## 2024-03-15 PROCEDURE — 84443 ASSAY THYROID STIM HORMONE: CPT

## 2024-03-20 ENCOUNTER — RA CDI HCC (OUTPATIENT)
Dept: OTHER | Facility: HOSPITAL | Age: 84
End: 2024-03-20

## 2024-03-25 ENCOUNTER — TELEPHONE (OUTPATIENT)
Age: 84
End: 2024-03-25

## 2024-03-25 NOTE — TELEPHONE ENCOUNTER
Received call from patient stating that when she went to have blood work drawn last week, she was not able to provide a urine specimen at that time.  She is having issues with transportation and has not been able to the back to the lab to submit specimen.  Instructed patient to collect urine at home in a clean container and bring it with her to OV tomorrow.

## 2024-03-26 ENCOUNTER — TELEPHONE (OUTPATIENT)
Dept: HEMATOLOGY ONCOLOGY | Facility: CLINIC | Age: 84
End: 2024-03-26

## 2024-03-26 ENCOUNTER — OFFICE VISIT (OUTPATIENT)
Dept: FAMILY MEDICINE CLINIC | Facility: CLINIC | Age: 84
End: 2024-03-26
Payer: MEDICARE

## 2024-03-26 VITALS
BODY MASS INDEX: 31.58 KG/M2 | HEART RATE: 67 BPM | DIASTOLIC BLOOD PRESSURE: 88 MMHG | SYSTOLIC BLOOD PRESSURE: 136 MMHG | RESPIRATION RATE: 18 BRPM | HEIGHT: 64 IN | WEIGHT: 185 LBS | OXYGEN SATURATION: 98 %

## 2024-03-26 DIAGNOSIS — E78.00 HYPERCHOLESTEROLEMIA: ICD-10-CM

## 2024-03-26 DIAGNOSIS — I10 ESSENTIAL HYPERTENSION: ICD-10-CM

## 2024-03-26 DIAGNOSIS — Z99.89 WALKER AS AMBULATION AID: ICD-10-CM

## 2024-03-26 DIAGNOSIS — F41.9 ANXIETY: ICD-10-CM

## 2024-03-26 DIAGNOSIS — D35.1 PARATHYROID ADENOMA: ICD-10-CM

## 2024-03-26 DIAGNOSIS — M15.9 PRIMARY OSTEOARTHRITIS INVOLVING MULTIPLE JOINTS: ICD-10-CM

## 2024-03-26 DIAGNOSIS — G45.9 TRANSIENT ISCHEMIC ATTACK: ICD-10-CM

## 2024-03-26 DIAGNOSIS — R60.0 PERIPHERAL EDEMA: ICD-10-CM

## 2024-03-26 DIAGNOSIS — N18.31 STAGE 3A CHRONIC KIDNEY DISEASE (HCC): Primary | ICD-10-CM

## 2024-03-26 DIAGNOSIS — R82.90 ABNORMAL FINDING ON URINALYSIS: ICD-10-CM

## 2024-03-26 DIAGNOSIS — E21.3 HYPERPARATHYROIDISM (HCC): ICD-10-CM

## 2024-03-26 DIAGNOSIS — E83.52 HYPERCALCEMIA: ICD-10-CM

## 2024-03-26 DIAGNOSIS — E55.9 VITAMIN D DEFICIENCY: ICD-10-CM

## 2024-03-26 DIAGNOSIS — R73.9 HYPERGLYCEMIA: ICD-10-CM

## 2024-03-26 DIAGNOSIS — G40.209 COMPLEX PARTIAL SEIZURE (HCC): ICD-10-CM

## 2024-03-26 LAB
SL AMB  POCT GLUCOSE, UA: NEGATIVE
SL AMB LEUKOCYTE ESTERASE,UA: ABNORMAL
SL AMB POCT BILIRUBIN,UA: NEGATIVE
SL AMB POCT BLOOD,UA: ABNORMAL
SL AMB POCT CLARITY,UA: ABNORMAL
SL AMB POCT COLOR,UA: YELLOW
SL AMB POCT KETONES,UA: ABNORMAL
SL AMB POCT NITRITE,UA: NEGATIVE
SL AMB POCT PH,UA: 6
SL AMB POCT SPECIFIC GRAVITY,UA: 1.02
SL AMB POCT URINE PROTEIN: NEGATIVE
SL AMB POCT UROBILINOGEN: 0.2

## 2024-03-26 PROCEDURE — 99214 OFFICE O/P EST MOD 30 MIN: CPT | Performed by: FAMILY MEDICINE

## 2024-03-26 PROCEDURE — 87086 URINE CULTURE/COLONY COUNT: CPT | Performed by: FAMILY MEDICINE

## 2024-03-26 PROCEDURE — 81002 URINALYSIS NONAUTO W/O SCOPE: CPT | Performed by: FAMILY MEDICINE

## 2024-03-26 RX ORDER — HYDROXYZINE HYDROCHLORIDE 10 MG/1
TABLET, FILM COATED ORAL
Qty: 90 TABLET | Refills: 1 | Status: SHIPPED | OUTPATIENT
Start: 2024-03-26

## 2024-03-26 NOTE — PATIENT INSTRUCTIONS
Follow-up with surgical oncology, Dr. Keller, tomorrow as ordered for evaluation of your parathyroid gland masses  Follow the neurology 5/15/2024 as planned for hyperparathyroidism  Follow a low sugar low carbohydrate diet.  If hemoglobin A1c is 6 5 or greater in 3 months patient will be diabetic  Continue present therapy  Return in 3's for office visit blood work, and AWV

## 2024-03-26 NOTE — ASSESSMENT & PLAN NOTE
Lab Results   Component Value Date    EGFR 78 03/15/2024    EGFR 67 12/06/2023    EGFR 60 05/30/2023    CREATININE 0.72 03/15/2024    CREATININE 0.82 12/06/2023    CREATININE 0.90 05/30/2023   Patient is not on ARB

## 2024-03-26 NOTE — TELEPHONE ENCOUNTER
Spoke to patient and let her know that she does not need to do the paperwork. We will see her tomorrow. She verbalized understanding.

## 2024-03-26 NOTE — PROGRESS NOTES
Name: Sushma Ambriz      : 1940      MRN: [id number]  Encounter Provider: Mick Sanchez DO  Encounter Date: 3/26/2024   Encounter department: [facility]    Assessment & Plan   #1.  CKD-3 patient has discontinued ARB as of  will monitor  2.  Seizure disorder, stable follows with neurology  3.  Anxiety, stable hydroxyzine reordered  4.  Stable continue present therapy #5.  TIA, stable follows with neurology  6.  Hyperparathyroidism/parathyroid adenoma/hypercalcemia.  Patient has upcoming appointment tomorrow with surgical oncology, Dr. Silveira, and endocrinology 5/15/2024  7.  Hypercholesterolemia patient has refused medication #8.  Edema, stable continue present therapy #9.  Hyperglycemia patient was follow a low sugar low carbohydrate diet patient's hemoglobin A1c is 6.5.  If repeat is 6.5 or greater she will be diabetic  9.  [place] D deficiency, stable continue present therapy  10.  DJD/decreased ambulation patient ambulates with walker 1.  Abnormal urine, culture was sent this to completely symptom-free  12.  Patient to return in 3 months for office visit, blood work, and AWV        1. Stage 3a chronic kidney disease (HCC)  Assessment & Plan:  Lab Results   Component Value Date    EGFR 78 03/15/2024    EGFR 67 2023    EGFR 60 2023    CREATININE 0.72 03/15/2024    CREATININE 0.82 2023    CREATININE 0.90 2023   Patient is not on ARB    Orders:  -     POCT urine dip  -     Urine culture; Future  -     Urine culture  -     CBC; Future; Expected date: 2024  -     Comprehensive metabolic panel; Future; Expected date: 2024  -     Lipid Panel with Direct LDL reflex; Future; Expected date: 2024  -     TSH, 3rd generation with Free T4 reflex; Future; Expected date: 2024  -     UA (URINE) with reflex to Scope; Future; Expected date: 2024  -     Vitamin D 25 hydroxy; Future; Expected date: 2024  -     Hemoglobin A1C;  Future; Expected date: 06/26/2024    2. Complex partial seizure (HCC)  Assessment & Plan:  Follows with neurology    Orders:  -     Urine culture; Future  -     Urine culture  -     CBC; Future; Expected date: 06/26/2024  -     Comprehensive metabolic panel; Future; Expected date: 06/26/2024  -     Lipid Panel with Direct LDL reflex; Future; Expected date: 06/26/2024  -     TSH, 3rd generation with Free T4 reflex; Future; Expected date: 06/26/2024  -     UA (URINE) with reflex to Scope; Future; Expected date: 06/26/2024  -     Vitamin D 25 hydroxy; Future; Expected date: 06/26/2024  -     Hemoglobin A1C; Future; Expected date: 06/26/2024    3. Anxiety  -     hydrOXYzine HCL (ATARAX) 10 mg tablet; TAKE 1 TABLET BY MOUTH DAILY AS NEEDED FOR ANXIETY  -     CBC; Future; Expected date: 06/26/2024  -     Comprehensive metabolic panel; Future; Expected date: 06/26/2024  -     Lipid Panel with Direct LDL reflex; Future; Expected date: 06/26/2024  -     TSH, 3rd generation with Free T4 reflex; Future; Expected date: 06/26/2024  -     UA (URINE) with reflex to Scope; Future; Expected date: 06/26/2024  -     Vitamin D 25 hydroxy; Future; Expected date: 06/26/2024  -     Hemoglobin A1C; Future; Expected date: 06/26/2024    4. Essential hypertension  Assessment & Plan:  Stable continue present therapy    Orders:  -     CBC; Future; Expected date: 06/26/2024  -     Comprehensive metabolic panel; Future; Expected date: 06/26/2024  -     Lipid Panel with Direct LDL reflex; Future; Expected date: 06/26/2024  -     TSH, 3rd generation with Free T4 reflex; Future; Expected date: 06/26/2024  -     UA (URINE) with reflex to Scope; Future; Expected date: 06/26/2024  -     Vitamin D 25 hydroxy; Future; Expected date: 06/26/2024  -     Hemoglobin A1C; Future; Expected date: 06/26/2024    5. Transient ischemic attack  Assessment & Plan:  Stable patient to follow with neurology    Orders:  -     CBC; Future; Expected date: 06/26/2024  -      Comprehensive metabolic panel; Future; Expected date: 06/26/2024  -     Lipid Panel with Direct LDL reflex; Future; Expected date: 06/26/2024  -     TSH, 3rd generation with Free T4 reflex; Future; Expected date: 06/26/2024  -     UA (URINE) with reflex to Scope; Future; Expected date: 06/26/2024  -     Vitamin D 25 hydroxy; Future; Expected date: 06/26/2024  -     Hemoglobin A1C; Future; Expected date: 06/26/2024    6. Hyperparathyroidism (HCC)  Assessment & Plan:  CT and blood work discussed patient has appointment with surgical oncology tomorrow and endocrinology 5/15/2024    Orders:  -     CBC; Future; Expected date: 06/26/2024  -     Comprehensive metabolic panel; Future; Expected date: 06/26/2024  -     Lipid Panel with Direct LDL reflex; Future; Expected date: 06/26/2024  -     TSH, 3rd generation with Free T4 reflex; Future; Expected date: 06/26/2024  -     UA (URINE) with reflex to Scope; Future; Expected date: 06/26/2024  -     Vitamin D 25 hydroxy; Future; Expected date: 06/26/2024  -     Hemoglobin A1C; Future; Expected date: 06/26/2024    7. Parathyroid adenoma  Assessment & Plan:  As above    Orders:  -     CBC; Future; Expected date: 06/26/2024  -     Comprehensive metabolic panel; Future; Expected date: 06/26/2024  -     Lipid Panel with Direct LDL reflex; Future; Expected date: 06/26/2024  -     TSH, 3rd generation with Free T4 reflex; Future; Expected date: 06/26/2024  -     UA (URINE) with reflex to Scope; Future; Expected date: 06/26/2024  -     Vitamin D 25 hydroxy; Future; Expected date: 06/26/2024  -     Hemoglobin A1C; Future; Expected date: 06/26/2024    8. Hypercalcemia  Assessment & Plan:  Has appoint with endocrinology 5/15/2025    Orders:  -     CBC; Future; Expected date: 06/26/2024  -     Comprehensive metabolic panel; Future; Expected date: 06/26/2024  -     Lipid Panel with Direct LDL reflex; Future; Expected date: 06/26/2024  -     TSH, 3rd generation with Free T4 reflex; Future;  Expected date: 06/26/2024  -     UA (URINE) with reflex to Scope; Future; Expected date: 06/26/2024  -     Vitamin D 25 hydroxy; Future; Expected date: 06/26/2024  -     Hemoglobin A1C; Future; Expected date: 06/26/2024    9. Hypercholesterolemia  Assessment & Plan:  Medication has been refused    Orders:  -     CBC; Future; Expected date: 06/26/2024  -     Comprehensive metabolic panel; Future; Expected date: 06/26/2024  -     Lipid Panel with Direct LDL reflex; Future; Expected date: 06/26/2024  -     TSH, 3rd generation with Free T4 reflex; Future; Expected date: 06/26/2024  -     UA (URINE) with reflex to Scope; Future; Expected date: 06/26/2024  -     Vitamin D 25 hydroxy; Future; Expected date: 06/26/2024  -     Hemoglobin A1C; Future; Expected date: 06/26/2024    10. Peripheral edema  Assessment & Plan:  Stable continue present therapy    Orders:  -     CBC; Future; Expected date: 06/26/2024  -     Comprehensive metabolic panel; Future; Expected date: 06/26/2024  -     Lipid Panel with Direct LDL reflex; Future; Expected date: 06/26/2024  -     TSH, 3rd generation with Free T4 reflex; Future; Expected date: 06/26/2024  -     UA (URINE) with reflex to Scope; Future; Expected date: 06/26/2024  -     Vitamin D 25 hydroxy; Future; Expected date: 06/26/2024  -     Hemoglobin A1C; Future; Expected date: 06/26/2024    11. Hyperglycemia  Assessment & Plan:  Hemoglobin is 6.5.  If repeat is 6.5 or greater patient will be diagnosed with diabetes    Orders:  -     CBC; Future; Expected date: 06/26/2024  -     Comprehensive metabolic panel; Future; Expected date: 06/26/2024  -     Lipid Panel with Direct LDL reflex; Future; Expected date: 06/26/2024  -     TSH, 3rd generation with Free T4 reflex; Future; Expected date: 06/26/2024  -     UA (URINE) with reflex to Scope; Future; Expected date: 06/26/2024  -     Vitamin D 25 hydroxy; Future; Expected date: 06/26/2024  -     Hemoglobin A1C; Future; Expected date:  06/26/2024    12. Vitamin D deficiency  Assessment & Plan:  Stable continue present therapy    Orders:  -     CBC; Future; Expected date: 06/26/2024  -     Comprehensive metabolic panel; Future; Expected date: 06/26/2024  -     Lipid Panel with Direct LDL reflex; Future; Expected date: 06/26/2024  -     TSH, 3rd generation with Free T4 reflex; Future; Expected date: 06/26/2024  -     UA (URINE) with reflex to Scope; Future; Expected date: 06/26/2024  -     Vitamin D 25 hydroxy; Future; Expected date: 06/26/2024  -     Hemoglobin A1C; Future; Expected date: 06/26/2024    13. Primary osteoarthritis involving multiple joints  Assessment & Plan:  Ambulates with walker    Orders:  -     CBC; Future; Expected date: 06/26/2024  -     Comprehensive metabolic panel; Future; Expected date: 06/26/2024  -     Lipid Panel with Direct LDL reflex; Future; Expected date: 06/26/2024  -     TSH, 3rd generation with Free T4 reflex; Future; Expected date: 06/26/2024  -     UA (URINE) with reflex to Scope; Future; Expected date: 06/26/2024  -     Vitamin D 25 hydroxy; Future; Expected date: 06/26/2024  -     Hemoglobin A1C; Future; Expected date: 06/26/2024    14. Walker as ambulation aid  -     CBC; Future; Expected date: 06/26/2024  -     Comprehensive metabolic panel; Future; Expected date: 06/26/2024  -     Lipid Panel with Direct LDL reflex; Future; Expected date: 06/26/2024  -     TSH, 3rd generation with Free T4 reflex; Future; Expected date: 06/26/2024  -     UA (URINE) with reflex to Scope; Future; Expected date: 06/26/2024  -     Vitamin D 25 hydroxy; Future; Expected date: 06/26/2024  -     Hemoglobin A1C; Future; Expected date: 06/26/2024    15. Abnormal finding on urinalysis  -     CBC; Future; Expected date: 06/26/2024  -     Comprehensive metabolic panel; Future; Expected date: 06/26/2024  -     Lipid Panel with Direct LDL reflex; Future; Expected date: 06/26/2024  -     TSH, 3rd generation with Free T4 reflex; Future;  Expected date: 06/26/2024  -     UA (URINE) with reflex to Scope; Future; Expected date: 06/26/2024  -     Vitamin D 25 hydroxy; Future; Expected date: 06/26/2024  -     Hemoglobin A1C; Future; Expected date: 06/26/2024        Depression Screening and Follow-up Plan: Patient was screened for depression during today's encounter. They screened negative with a PHQ-2 score of 0.        Subjective      Patient without any new medical complaints or concerns at the present time.  CT blood work was reviewed.  Patient has appoint with surgical oncology tomorrow and endocrinology 5/15/24.      Review of Systems   Constitutional: Negative.    HENT: Negative.     Eyes: Negative.    Respiratory: Negative.     Cardiovascular: Negative.    Gastrointestinal: Negative.    Endocrine:        HPI   Genitourinary:         Patient had abnormality on urine completely symptom-free culture was sent   Musculoskeletal: Negative.    Skin: Negative.    Allergic/Immunologic: Negative.    Neurological: Negative.    Hematological: Negative.    Psychiatric/Behavioral: Negative.         Current Outpatient Medications on File Prior to Visit   Medication Sig   • albuterol (PROVENTIL HFA,VENTOLIN HFA) 90 mcg/act inhaler Inhale 2 puffs every 6 (six) hours as needed for wheezing (Patient taking differently: Inhale 2 puffs every 6 (six) hours as needed for wheezing PRN)   • Alpha-Lipoic Acid 100 MG CAPS Take by mouth    • amoxicillin (AMOXIL) 500 mg capsule TAKE 4 CAPSULES BY MOUTH ONE HOUR PRIOR TO OFFICE VISIT   • aspirin 81 MG tablet Take 162 mg by mouth   • B Complex Vitamins (B COMPLEX 1 PO) Take 2 tablets by mouth daily   • bimatoprost (LUMIGAN) 0.03 % ophthalmic drops Administer 1 drop to both eyes daily at bedtime     • Cholecalciferol (VITAMIN D) 2000 units CAPS Take 2 capsules by mouth daily   • Coenzyme Q10 (COQ10) 100 MG CAPS Take by mouth   • diphenhydrAMINE (BENADRYL) 50 MG tablet Take one 50 mg tablet 1 hour prior to CT scan.   • furosemide  "(LASIX) 20 mg tablet TAKE 1 TABLET BY MOUTH EVERY DAY   • gabapentin (NEURONTIN) 300 mg capsule TAKE 1 CAPSULE BY MOUTH AT BEDTIME AS NEEDED FOR PAIN   • GLUCOSAMINE-MSM-HYALURONIC ACD PO Take 1 capsule by mouth 3 (three) times a day   • GRAPE SEED EXTRACT PO Take by mouth   • levETIRAcetam (KEPPRA) 500 mg tablet TAKE 1 TABLET BY MOUTH TWICE A DAY   • LORazepam (Ativan) 0.5 mg tablet Take 1 tablet 1 hour prior to MRI   • Lumigan 0.01 % ophthalmic drops INSTILL 1 DROP INTO BOTH EYES AT BEDTIME   • metoprolol succinate (TOPROL-XL) 200 MG 24 hr tablet TAKE 1 TABLET BY MOUTH EVERY DAY   • metroNIDAZOLE (METROGEL) 0.75 % vaginal gel Insert 1 Application into the vagina daily at bedtime Avoid alcohol   • milk thistle 175 MG tablet Take 175 mg by mouth daily   • mometasone (ELOCON) 0.1 % cream Apply to bilateral external ear once weekly at bedtime   • Multiple Vitamins-Minerals (MULTIVITAMIN ADULT PO) Take 1 capsule by mouth daily     • Omega-3 Fatty Acids (FISH OIL PO) Take 1,000 mg by mouth daily     • potassium bicarbonate (K-LYTE) 25 MEQ disintegrating tablet Take 25 mEq by mouth 2 (two) times a day   • Turmeric 500 MG CAPS Take 1 capsule by mouth daily     • VOLTAREN 1 % APPLY 2G TOPICALLY TO AFFECTED AREA FOUR TIMES A DAY AS DIRECTED   • [DISCONTINUED] hydrOXYzine HCL (ATARAX) 10 mg tablet TAKE 1 TABLET BY MOUTH DAILY AS NEEDED FOR ANXIETY   • [DISCONTINUED] losartan (COZAAR) 25 mg tablet TAKE 1 TABLET BY MOUTH EVERY DAY (Patient not taking: Reported on 9/6/2023)   • [DISCONTINUED] predniSONE 50 mg tablet Take one 50 mg tablet 13 hours prior to CT, 7 hours prior to CT, and 1 hour before CT scan.       Objective     /88 (BP Location: Right arm, Patient Position: Sitting, Cuff Size: Large)   Pulse 67   Resp 18   Ht 5' 4\" (1.626 m)   Wt 83.9 kg (185 lb)   LMP  (LMP Unknown)   SpO2 98%   BMI 31.76 kg/m²     Physical Exam  Vitals and nursing note reviewed.   Constitutional:       Appearance: Normal " appearance.   HENT:      Head: Normocephalic and atraumatic.      Mouth/Throat:      Mouth: Mucous membranes are moist.   Eyes:      General: No scleral icterus.  Neck:      Vascular: No carotid bruit.   Cardiovascular:      Rate and Rhythm: Normal rate and regular rhythm.      Heart sounds: Normal heart sounds.   Pulmonary:      Effort: Pulmonary effort is normal.      Breath sounds: Normal breath sounds.   Abdominal:      General: Bowel sounds are normal.      Palpations: Abdomen is soft.      Tenderness: There is no abdominal tenderness.   Musculoskeletal:      Cervical back: Neck supple.      Right lower leg: No edema.      Left lower leg: No edema.   Skin:     General: Skin is warm and dry.   Neurological:      General: No focal deficit present.      Mental Status: She is alert and oriented to person, place, and time.      Gait: Gait abnormal.      Comments: Ambulates with walker   Psychiatric:         Mood and Affect: Mood normal.       Mick Sanchez DO

## 2024-03-26 NOTE — TELEPHONE ENCOUNTER
Call Transfer   Who are you speaking with?  Patient   If it is not the patient, are they listed on an active communication consent form? Yes   Who is the patients HemOnc/SurgOnc provider? Dr. Keller   What is the reason for this call? Questions about forms for appmt with Dr Keller   Person/Department that the call was transferred to?    Time that call was transferred?   CHUCK Reinoso 1:25pm 3/26   Your call will be transferred now. If you receive a voicemail, please leave a detailed message and a member of the team will return your call as soon as possible.    Did you relay this information to the caller?  Yes

## 2024-03-26 NOTE — ASSESSMENT & PLAN NOTE
CT and blood work discussed patient has appointment with surgical oncology tomorrow and endocrinology 5/15/2024

## 2024-03-27 ENCOUNTER — CONSULT (OUTPATIENT)
Dept: SURGICAL ONCOLOGY | Facility: CLINIC | Age: 84
End: 2024-03-27
Payer: MEDICARE

## 2024-03-27 VITALS
DIASTOLIC BLOOD PRESSURE: 90 MMHG | BODY MASS INDEX: 31.24 KG/M2 | OXYGEN SATURATION: 92 % | WEIGHT: 183 LBS | HEIGHT: 64 IN | HEART RATE: 101 BPM | SYSTOLIC BLOOD PRESSURE: 160 MMHG | TEMPERATURE: 97.5 F

## 2024-03-27 DIAGNOSIS — D35.1 PARATHYROID ADENOMA: ICD-10-CM

## 2024-03-27 DIAGNOSIS — E21.3 HYPERPARATHYROIDISM (HCC): Primary | ICD-10-CM

## 2024-03-27 LAB — BACTERIA UR CULT: NORMAL

## 2024-03-27 PROCEDURE — 99204 OFFICE O/P NEW MOD 45 MIN: CPT | Performed by: SURGERY

## 2024-03-27 NOTE — PROGRESS NOTES
Surgical Oncology Consult       [facility] [place]  [address] [facility] [address]  [phone]    Sushma Ambriz  1940  [id number]  [facility] [place]  [address] [facility] [address]  [phone]    Chief Complaint   Patient presents with    Consult       Assessment/Plan:    No problem-specific Assessment & Plan notes found for this encounter.       Diagnoses and all orders for this visit:    Hyperparathyroidism (HCC)  -     Ambulatory Referral to Surgical Oncology    Parathyroid adenoma      Advance Care Planning/Advance Directives:  Discussed disease status, cancer treatment plans and/or cancer treatment goals with the patient.     Oncology History    No history exists.       History of Present Illness: Patient is a 83-year-old woman who was found to have elevated calcium levels on routine blood work.  This led to further workup given suspicion for hyperparathyroidism.  PTH levels were also found to be elevated.  She underwent imaging studies including CAT scan which were potential targets.  She has been referred for evaluation and treatment.    Primary complaint involves fatigue, achiness, and GERD symptoms.  No history of kidney stones.  She also feels somewhat anxious and has some brain fog.  No personal or family history of endocrine malignancies.    Review of Systems   Constitutional:  Positive for fatigue.   HENT: Negative.     Eyes: Negative.    Respiratory: Negative.     Cardiovascular: Negative.    Gastrointestinal:         Reflux/gerd   Endocrine: Negative.    Genitourinary: Negative.    Musculoskeletal:  Positive for arthralgias, back pain and myalgias.   Skin: Negative.    Allergic/Immunologic: Negative.    Neurological: Negative.    Hematological: Negative.    Psychiatric/Behavioral:  The patient is nervous/anxious.          Patient  Active Problem List   Diagnosis    Vitamin D deficiency    Transient ischemic attack    Osteoarthritis    Moderate osteopenia    Essential hypertension    Hypercholesterolemia    Hyperglycemia    Complex partial seizure (HCC)    Midline cystocele    Anxiety    Personal history of breast cancer    Personal history of radiation therapy    Hiatal hernia    Allergic rhinitis    Dyspnea on exertion    Abnormal EKG    Trigeminy    Cigarette nicotine dependence in remission    Stage 3a chronic kidney disease (HCC)    Peripheral edema    Hypercalcemia    Hyperparathyroidism (HCC)    Parathyroid adenoma    Walker as ambulation aid     Past Medical History:   Diagnosis Date    Cancer (HCC)     left breast    Elevated serum alkaline phosphatase level     GERD (gastroesophageal reflux disease)     Glaucoma     Hiatal hernia     Hypercalcemia     Hyperglycemia     diet controlled    Hypertension     Lyme disease     Rheumatic fever     Seizures (HCC)     TIA (transient ischemic attack)     Vitamin D deficiency      Past Surgical History:   Procedure Laterality Date    APPENDECTOMY      BREAST LUMPECTOMY Left     COLONOSCOPY      FOOT SURGERY Left     ORIF ANKLE FRACTURE Left     TONSILLECTOMY      TUBAL LIGATION       Family History   Problem Relation Age of Onset    Breast cancer Mother     Coronary artery disease Father     Bone cancer Brother     Lung cancer Brother      Social History     Socioeconomic History    Marital status:      Spouse name: Not on file    Number of children: Not on file    Years of education: Not on file    Highest education level: Not on file   Occupational History    Occupation: retired   Tobacco Use    Smoking status: Former     Current packs/day: 0.00     Average packs/day: 1 pack/day for 53.0 years (53.0 ttl pk-yrs)     Types: Cigarettes     Start date:      Quit date:      Years since quittin.2    Smokeless tobacco: Never   Vaping Use    Vaping status: Never Used   Substance  and Sexual Activity    Alcohol use: Yes     Comment: social    Drug use: No    Sexual activity: Not Currently   Other Topics Concern    Not on file   Social History Narrative    Activities - gardening    Daily coffee consumption- 1 cup per day    Daily tea consumption    Hearing loss     Social Determinants of Health     Financial Resource Strain: Low Risk  (6/9/2023)    Overall Financial Resource Strain (CARDIA)     Difficulty of Paying Living Expenses: Not hard at all   Food Insecurity: Not on file   Transportation Needs: No Transportation Needs (6/9/2023)    PRAPARE - Transportation     Lack of Transportation (Medical): No     Lack of Transportation (Non-Medical): No   Physical Activity: Not on file   Stress: Not on file   Social Connections: Not on file   Intimate Partner Violence: Not on file   Housing Stability: Not on file       Current Outpatient Medications:     albuterol (PROVENTIL HFA,VENTOLIN HFA) 90 mcg/act inhaler, Inhale 2 puffs every 6 (six) hours as needed for wheezing (Patient taking differently: Inhale 2 puffs every 6 (six) hours as needed for wheezing PRN), Disp: 1 Inhaler, Rfl: 5    Alpha-Lipoic Acid 100 MG CAPS, Take by mouth , Disp: , Rfl:     aspirin 81 MG tablet, Take 162 mg by mouth, Disp: , Rfl:     B Complex Vitamins (B COMPLEX 1 PO), Take 2 tablets by mouth daily, Disp: , Rfl:     bimatoprost (LUMIGAN) 0.03 % ophthalmic drops, Administer 1 drop to both eyes daily at bedtime  , Disp: , Rfl:     Cholecalciferol (VITAMIN D) 2000 units CAPS, Take 2 capsules by mouth daily, Disp: , Rfl:     Coenzyme Q10 (COQ10) 100 MG CAPS, Take by mouth, Disp: , Rfl:     diphenhydrAMINE (BENADRYL) 50 MG tablet, Take one 50 mg tablet 1 hour prior to CT scan., Disp: 1 tablet, Rfl: 0    furosemide (LASIX) 20 mg tablet, TAKE 1 TABLET BY MOUTH EVERY DAY, Disp: 90 tablet, Rfl: 2    gabapentin (NEURONTIN) 300 mg capsule, TAKE 1 CAPSULE BY MOUTH AT BEDTIME AS NEEDED FOR PAIN, Disp: 90 capsule, Rfl: 3     "GLUCOSAMINE-MSM-HYALURONIC ACD PO, Take 1 capsule by mouth 3 (three) times a day, Disp: , Rfl:     GRAPE SEED EXTRACT PO, Take by mouth, Disp: , Rfl:     hydrOXYzine HCL (ATARAX) 10 mg tablet, TAKE 1 TABLET BY MOUTH DAILY AS NEEDED FOR ANXIETY, Disp: 90 tablet, Rfl: 1    levETIRAcetam (KEPPRA) 500 mg tablet, TAKE 1 TABLET BY MOUTH TWICE A DAY, Disp: 180 tablet, Rfl: 1    LORazepam (Ativan) 0.5 mg tablet, Take 1 tablet 1 hour prior to MRI, Disp: 1 tablet, Rfl: 0    Lumigan 0.01 % ophthalmic drops, INSTILL 1 DROP INTO BOTH EYES AT BEDTIME, Disp: , Rfl:     metoprolol succinate (TOPROL-XL) 200 MG 24 hr tablet, TAKE 1 TABLET BY MOUTH EVERY DAY, Disp: 90 tablet, Rfl: 3    metroNIDAZOLE (METROGEL) 0.75 % vaginal gel, Insert 1 Application into the vagina daily at bedtime Avoid alcohol, Disp: 70 g, Rfl: 0    milk thistle 175 MG tablet, Take 175 mg by mouth daily, Disp: , Rfl:     Multiple Vitamins-Minerals (MULTIVITAMIN ADULT PO), Take 1 capsule by mouth daily  , Disp: , Rfl:     Omega-3 Fatty Acids (FISH OIL PO), Take 1,000 mg by mouth daily  , Disp: , Rfl:     potassium bicarbonate (K-LYTE) 25 MEQ disintegrating tablet, Take 25 mEq by mouth 2 (two) times a day, Disp: , Rfl:     Turmeric 500 MG CAPS, Take 1 capsule by mouth daily  , Disp: , Rfl:     VOLTAREN 1 %, APPLY 2G TOPICALLY TO AFFECTED AREA FOUR TIMES A DAY AS DIRECTED, Disp: 100 g, Rfl: 5    amoxicillin (AMOXIL) 500 mg capsule, TAKE 4 CAPSULES BY MOUTH ONE HOUR PRIOR TO OFFICE VISIT (Patient not taking: Reported on 3/27/2024), Disp: , Rfl:     mometasone (ELOCON) 0.1 % cream, Apply to bilateral external ear once weekly at bedtime (Patient not taking: Reported on 3/27/2024), Disp: 45 g, Rfl: 3  Allergies   Allergen Reactions    Carbamazepine Myalgia     flu-like symptoms    Epinephrine Other (See Comments)     \"weakness\"    Iodides Itching    Morphine And Related     Sulfamethoxazole-Trimethoprim     Latex Rash    Lisinopril Cough    Topiramate Rash     Vitals:    " 03/27/24 1534   BP: 160/90   Pulse: 101   Temp: 97.5 °F (36.4 °C)   SpO2: 92%       Physical Exam  Vitals reviewed.   Constitutional:       Appearance: Normal appearance.   HENT:      Head: Normocephalic and atraumatic.      Right Ear: External ear normal.      Left Ear: External ear normal.   Eyes:      Extraocular Movements: Extraocular movements intact.      Pupils: Pupils are equal, round, and reactive to light.   Cardiovascular:      Rate and Rhythm: Normal rate and regular rhythm.      Pulses: Normal pulses.      Heart sounds: Normal heart sounds.   Pulmonary:      Effort: Pulmonary effort is normal.      Breath sounds: Normal breath sounds.   Abdominal:      General: Abdomen is flat.      Palpations: Abdomen is soft.   Musculoskeletal:         General: Normal range of motion.      Cervical back: Normal range of motion and neck supple.   Skin:     General: Skin is warm and dry.   Neurological:      General: No focal deficit present.      Mental Status: She is alert and oriented to person, place, and time.   Psychiatric:         Mood and Affect: Mood normal.         Behavior: Behavior normal.         Thought Content: Thought content normal.         Judgment: Judgment normal.         Pathology:  none    Labs:  Lab Results   Component Value Date    .1 (H) 03/15/2024    CALCIUM 10.1 03/15/2024    PHOS 1.8 (LL) 01/14/2018         Imaging  CT parathyroid study w wo contrast    Result Date: 3/13/2024  Narrative: CT  NECK  WITHOUT AND WITH CONTRAST FROM LYNN TO SKULL BASE INDICATION: Hyperparathyroidism. There is no sestamibi scan for review. COMPARISON:  None. TECHNIQUE:This examination, like all CT scans performed in the [facility], was performed utilizing techniques to minimize radiation dose exposure, including the use of iterative reconstruction and automated exposure control. Both noncontrast, contrast, and post contrast delayed images were performed according to standard parathyroid  protocol (4D technique) Coronal and sagittal reconstructions were performed. 3D reconstructions were performed on an independent workstation, and are supplied for review. 85 mL of iohexol (OMNIPAQUE) was injected intravenously without immediate consequence. Radiation dose: 1662 mGy-cm FINDINGS: SERIAL NONCONTRAST, POST CONTRAST AND DELAYS: There is a 0.8 x 0.4 x 0.6 cm lesion identified in the right neck which meets the CT enhancement criteria typical for parathyroid adenoma. The lesion is located in the prevertebral space closely applied to the esophagus, about 3 cm above the sternal notch, just below the level of the inferior pole of the right thyroid lobe. Lesion is best seen on series 4 images 156 through 160, and also series 601 image 67. Second lesion is identified in the left neck measuring 0.3 x 1.2 x 1.3 cm. This lesion also meets the CT enhancement criteria typical for parathyroid adenoma. The lesion sits in the prevertebral space closely applied to the esophagus approximately 4.5 to  5 cm above the sternal notch, posterior to the superior aspect of the left thyroid lobe. No other suspicious lesions are identified. VASCULAR STRUCTURES: There is no carotid stenosis by NASCET criteria. SUPRAHYOID NECK: Normal oropharynx, oral cavity, parapharyngeal and retropharyngeal spaces. INFRAHYOID NECK: Normal oropharynx, oral cavity, parapharyngeal and retropharyngeal THYROID GLAND: there is an approximately 1 cm nodule noted in the mid right thyroid lobe. This was noted previously spaces. On an ultrasound of the thyroid performed April 10, 2018, and at least by size criteria does not appear to be changed. There are no new thyroid nodules. LYMPH NODES: No pathologic or enlarged adenopathy. MEDIASTINUM: Unremarkable. BONY STRUCTURES Diffusely lone bone density is noted. There are mild degenerative changes noted in the lower cervical spine. LUNG APICES: Centrilobular emphysema is present.     Impression: 1. 0.8 x 0.4 x  0.6 cm nodule identified in the right neck as described above meets the CT enhancement criteria typical of a parathyroid adenoma. 2. 0.3 x 1.2 x 1.3 cm nodule identified in the left neck as described above also meets the CT enhancement criteria typical for parathyroid adenoma. 3. Right thyroid nodule again noted. When compared with thyroid ultrasound appears to be stable. 4. Diffusely low bone density noted. Workstation performed: [id number]     I reviewed the above laboratory and imaging data.    Discussion/Summary: 83-year-old woman with primary hyperparathyroidism, with 2 targets noted on scan.  She is amenable to surgical intervention.  Will therefore give her a date for surgery and see her 1 month before for preoperative testing/clearance.

## 2024-03-28 ENCOUNTER — TELEPHONE (OUTPATIENT)
Age: 84
End: 2024-03-28

## 2024-03-28 ENCOUNTER — TELEPHONE (OUTPATIENT)
Dept: SURGICAL ONCOLOGY | Facility: CLINIC | Age: 84
End: 2024-03-28

## 2024-03-28 NOTE — TELEPHONE ENCOUNTER
Spoke to patient and arranged pre and post op appts for surgery we are holding for 8/27. Surgery scheduler made aware.

## 2024-03-28 NOTE — TELEPHONE ENCOUNTER
Pt called in and stated that she could not do the bone scan and mammo as of now, pt is aware that wanted to let Dr. Lopez know that she has a lot of doctor's appts for the time being, after that she could get it scheduled and she is aware of the importance of getting the mammo and bone scan.

## 2024-04-15 ENCOUNTER — OFFICE VISIT (OUTPATIENT)
Dept: FAMILY MEDICINE CLINIC | Facility: CLINIC | Age: 84
End: 2024-04-15
Payer: MEDICARE

## 2024-04-15 VITALS
DIASTOLIC BLOOD PRESSURE: 90 MMHG | HEIGHT: 64 IN | BODY MASS INDEX: 30.9 KG/M2 | RESPIRATION RATE: 20 BRPM | WEIGHT: 181 LBS | SYSTOLIC BLOOD PRESSURE: 138 MMHG

## 2024-04-15 DIAGNOSIS — S90.32XA CONTUSION OF LEFT FOOT, INITIAL ENCOUNTER: Primary | ICD-10-CM

## 2024-04-15 DIAGNOSIS — B35.1 ONYCHOMYCOSIS: ICD-10-CM

## 2024-04-15 DIAGNOSIS — M20.62 DEFORMITY OF TOE OF LEFT FOOT: ICD-10-CM

## 2024-04-15 PROCEDURE — G2211 COMPLEX E/M VISIT ADD ON: HCPCS | Performed by: FAMILY MEDICINE

## 2024-04-15 PROCEDURE — 99213 OFFICE O/P EST LOW 20 MIN: CPT | Performed by: FAMILY MEDICINE

## 2024-04-15 NOTE — PATIENT INSTRUCTIONS
"Complete left foot x-ray  Keep fourth and third toe \"rafy taped\" until fracture is ruled out  Follow with podiatry, Dr. Juan  "
Home

## 2024-04-15 NOTE — PROGRESS NOTES
"Name: Sushma Ambriz      : 1940      MRN: [id number]  Encounter Provider: Mick Sanchez DO  Encounter Date: 4/15/2024   Encounter department: [facility]    Assessment & Plan     1.  Contusion left foot/fourth toe  X-ray ordered  Area was buddy taped  2.  Deformity of left toes, hammertoe  3.  Onychomycosis  Referred to Dr. Juan, podiatry      1. Contusion of left foot, initial encounter  -     Ambulatory Referral to Podiatry; Future  -     XR foot 3+ vw left; Future; Expected date: 04/15/2024    2. Deformity of toe of left foot  -     Ambulatory Referral to Podiatry; Future    3. Onychomycosis  -     Ambulatory Referral to Podiatry; Future        Depression Screening and Follow-up Plan: Patient was screened for depression during today's encounter. They screened negative with a PHQ-2 score of 0.        Subjective      Few days ago patient hit her left fourth toe and is now \"black and blue\".  Patient has a history of mycotic thick toenails and toe deformity.  She would like to see podiatry to \"cut her toenails\".      Review of Systems   Constitutional: Negative.    HENT: Negative.     Eyes: Negative.    Respiratory: Negative.     Cardiovascular: Negative.    Gastrointestinal: Negative.    Endocrine: Negative.    Genitourinary: Negative.    Musculoskeletal:         HPI   Skin:         HPI   Allergic/Immunologic: Negative.    Neurological: Negative.    Hematological: Negative.    Psychiatric/Behavioral: Negative.         Current Outpatient Medications on File Prior to Visit   Medication Sig   • albuterol (PROVENTIL HFA,VENTOLIN HFA) 90 mcg/act inhaler Inhale 2 puffs every 6 (six) hours as needed for wheezing (Patient taking differently: Inhale 2 puffs every 6 (six) hours as needed for wheezing PRN)   • Alpha-Lipoic Acid 100 MG CAPS Take by mouth    • amoxicillin (AMOXIL) 500 mg capsule TAKE 4 CAPSULES BY MOUTH ONE HOUR PRIOR TO OFFICE VISIT (Patient not taking: Reported on " 3/27/2024)   • aspirin 81 MG tablet Take 162 mg by mouth   • B Complex Vitamins (B COMPLEX 1 PO) Take 2 tablets by mouth daily   • bimatoprost (LUMIGAN) 0.03 % ophthalmic drops Administer 1 drop to both eyes daily at bedtime     • Cholecalciferol (VITAMIN D) 2000 units CAPS Take 2 capsules by mouth daily   • Coenzyme Q10 (COQ10) 100 MG CAPS Take by mouth   • diphenhydrAMINE (BENADRYL) 50 MG tablet Take one 50 mg tablet 1 hour prior to CT scan.   • furosemide (LASIX) 20 mg tablet TAKE 1 TABLET BY MOUTH EVERY DAY   • gabapentin (NEURONTIN) 300 mg capsule TAKE 1 CAPSULE BY MOUTH AT BEDTIME AS NEEDED FOR PAIN   • GLUCOSAMINE-MSM-HYALURONIC ACD PO Take 1 capsule by mouth 3 (three) times a day   • GRAPE SEED EXTRACT PO Take by mouth   • hydrOXYzine HCL (ATARAX) 10 mg tablet TAKE 1 TABLET BY MOUTH DAILY AS NEEDED FOR ANXIETY   • levETIRAcetam (KEPPRA) 500 mg tablet TAKE 1 TABLET BY MOUTH TWICE A DAY   • LORazepam (Ativan) 0.5 mg tablet Take 1 tablet 1 hour prior to MRI   • Lumigan 0.01 % ophthalmic drops INSTILL 1 DROP INTO BOTH EYES AT BEDTIME   • metoprolol succinate (TOPROL-XL) 200 MG 24 hr tablet TAKE 1 TABLET BY MOUTH EVERY DAY   • metroNIDAZOLE (METROGEL) 0.75 % vaginal gel Insert 1 Application into the vagina daily at bedtime Avoid alcohol   • milk thistle 175 MG tablet Take 175 mg by mouth daily   • mometasone (ELOCON) 0.1 % cream Apply to bilateral external ear once weekly at bedtime (Patient not taking: Reported on 3/27/2024)   • Multiple Vitamins-Minerals (MULTIVITAMIN ADULT PO) Take 1 capsule by mouth daily     • Omega-3 Fatty Acids (FISH OIL PO) Take 1,000 mg by mouth daily     • potassium bicarbonate (K-LYTE) 25 MEQ disintegrating tablet Take 25 mEq by mouth 2 (two) times a day   • Turmeric 500 MG CAPS Take 1 capsule by mouth daily     • VOLTAREN 1 % APPLY 2G TOPICALLY TO AFFECTED AREA FOUR TIMES A DAY AS DIRECTED       Objective     /90 (BP Location: Right arm, Patient Position: Sitting, Cuff Size:  "Large)   Resp 20   Ht 5' 4\" (1.626 m)   Wt 82.1 kg (181 lb)   LMP  (LMP Unknown)   BMI 31.07 kg/m²     Physical Exam  Vitals and nursing note reviewed.   Cardiovascular:      Pulses: Normal pulses.   Musculoskeletal:         General: Deformity present.      Comments: Left foot with hammertoe deformity.  Patient's left fourth toe has ecchymosis negative open area mildly tender.   Skin:     Findings: Bruising present.      Comments: Positive mycotic toenails       Mick Sanchez DO    "

## 2024-04-18 ENCOUNTER — APPOINTMENT (OUTPATIENT)
Dept: RADIOLOGY | Age: 84
End: 2024-04-18
Payer: MEDICARE

## 2024-04-18 DIAGNOSIS — S90.32XA CONTUSION OF LEFT FOOT, INITIAL ENCOUNTER: ICD-10-CM

## 2024-04-18 PROCEDURE — 73630 X-RAY EXAM OF FOOT: CPT

## 2024-05-01 ENCOUNTER — TELEPHONE (OUTPATIENT)
Age: 84
End: 2024-05-01

## 2024-05-01 NOTE — TELEPHONE ENCOUNTER
Pt notified, pt is going to cancel the appointment since there is no fracture and will wait to see what happens.

## 2024-05-01 NOTE — TELEPHONE ENCOUNTER
Pt called in stating Dr. Mick Sanchez referred her to Dr. Cruzito Ferris Podiatry with whom she gotten an appt dated 06.06.2024 which she feels is too long to wait. So therefore she would want to ask Dr. Sanchez is it ok for her to cancel this appt. And go to [facility] instead. She needs doctor advise on this before she cancels the appt. Would want to speak to him kindly call her back she will be waiting for the call in order to cancel the appt. Thanks

## 2024-05-01 NOTE — TELEPHONE ENCOUNTER
Activity  Return to normal activity as tolerated.     Asses fetal movements daily.    Call provider if:  Contractions or cramps become more frequent than 8 in one hour or 4 in 20 minutes.     Regular painful contractions every 5 minutes or less for one hour.  Time your contractions from the beginning of one to the beginning of the next..     Pressure in your vagina or lower abdomen that may feel like the baby is pushing down.     Period-like cramps or low dull backache that may come and go.     Abdominal cramps that may be accompanied by diarrhea.     Gush of fluid or blood from your vagina (it is normal to have spotting after vaginal exam or intercourse).     Change in the type or amount of vaginal discharge.     Your baby is not moving as much as usual.     Temperature greater than 100.4(F) orally.     Severe headache which is not relieved in 30 minutes after taking Tylenol (Acetaminophen).     Blurry vision or spots before your eyes.     Severe heartburn or pain on the upper right side of your abdomen that is not relieved by an antacid.     Signs of Depression. Examples include: 1. Persistent sadness 2. Frequent crying 3. Sleep problems 4. Excessive worrying 5. Feeling unable to cope   There was no fracture noted on the x-ray that was done for this.  If she wishes to follow-up with a different podiatrist, that would certainly be her prerogative.  Juana does seem like quite a bit to wait for a contusion.

## 2024-05-22 NOTE — PROGRESS NOTES
Assessment/Plan:     Cystocele-she was fitted with a #6 shaatz pessary  This was very comfortable and she was happy with it  I explained that she could have some stress incontinence now that her bladder is no longer prolapsed  RTO 2 weeks, will call if any concerns prior to that  Inguinal yeast - prescription for Lotrisone sent to her pharmacy  There are no diagnoses linked to this encounter  Subjective:     Patient ID: Festus Cartagena is a 70 y o  female  Patient here to discuss prolapse  She has a cystocele and has had this for several years  She has seen other doctors regarding this  At 1 time she was considering surgery but then she decided gets this  Recently, she feels that the cystocele is larger and she is concerned that it is going to affect the function of her bladder  She does empty her bladder without difficulty  She has no issues with her bowels  She does attempt to avoid constipation  She is interested in a pessary as conservative management of her symptoms  Review of Systems   Constitutional: Negative  Gastrointestinal: Negative  Genitourinary: Negative for dysuria, frequency and vaginal bleeding  Objective:     Physical Exam  Genitourinary:     General: Normal vulva  Labia:         Right: No rash, tenderness, lesion or injury  Left: No rash, tenderness, lesion or injury  Urethra: Prolapse present  Vagina: Prolapsed vaginal walls present  Cervix: Normal       Uterus: Normal        Adnexa: Right adnexa normal and left adnexa normal       Comments: Large cystocele noted, this is visible at the introitus and becomes larger with Valsalva  Laxity of vaginal walls noted  Mild uterine prolapse 
Normal vision: sees adequately in most situations; can see medication labels, newsprint

## 2024-05-31 ENCOUNTER — OFFICE VISIT (OUTPATIENT)
Dept: GYNECOLOGY | Facility: CLINIC | Age: 84
End: 2024-05-31
Payer: MEDICARE

## 2024-05-31 VITALS — SYSTOLIC BLOOD PRESSURE: 150 MMHG | BODY MASS INDEX: 30.83 KG/M2 | DIASTOLIC BLOOD PRESSURE: 82 MMHG | WEIGHT: 179.6 LBS

## 2024-05-31 DIAGNOSIS — N81.4 UTERINE PROLAPSE: Primary | ICD-10-CM

## 2024-05-31 DIAGNOSIS — N93.9 VAGINAL BLEEDING: ICD-10-CM

## 2024-05-31 DIAGNOSIS — N81.11 MIDLINE CYSTOCELE: ICD-10-CM

## 2024-05-31 PROCEDURE — 99213 OFFICE O/P EST LOW 20 MIN: CPT | Performed by: OBSTETRICS & GYNECOLOGY

## 2024-05-31 NOTE — PROGRESS NOTES
Assessment/Plan:     Uterine prolapse-the pessary is working very well for her.  This was cleaned and reinserted and she will return in 3 months for pessary check.  It does not seem that she has any bleeding from the pessary.    spotting-we have discussed this at several visits.  I explained that for postmenopausal spotting or bleeding, she should have an ultrasound and a biopsy.  She had a biopsy in 2021 that was inadequate due to scant tissue although what was seen was benign.  On her ultrasound done at that time, she had 2  filling defects that were consistent with a polyp and her endometrium was 2.5 mm.  She had some fluid in her endometrium and this enabled visualization.    I explained that we should repeat the ultrasound to start with however she is very hesitant to do so.  She does not want another biopsy and she does not want a D&C.  I explained that polyps generally cause bleeding and they are removed with a D&C.  I also explained that polyps are generally benign however we need more tissue to be able to rule out any precancerous changes or endometrial cancer.    She has to have surgery on her thyroid and she does not want to do anything else at this time.  If her bleeding changes or increases, she states that she will call.     There are no diagnoses linked to this encounter.      Subjective:     Patient ID: Sushma Ambriz is a 83 y.o. female.    Pt here for pessary check.  She has a ring with support pessary that has been working very well for her.  At her last visit, she was complaining of discharge and odor.  She had BV on exam and this was treated.  We also did a general comprehensive culture that was normal.  She states that this is greatly improved.  She continues to have spotting.  We discussed this at her last visit as well.  She has had spotting for few years.  When it was first noted in 2021, she had an ultrasound that showed an endometrium of 2.5 mm, fluid in the endometrium and 2 filling  defects.  Endometrial biopsy was done but was scant and did not show enough tissue.  We discussed a D&C to remove the polyps but she declined.  She has not wanted any further follow-up for this.    She continues to have approximately the same amount of spotting.  It is not heavy.  She has no pain.  She notices a small amount of dark blood most days.  At her last visit, I ordered an ultrasound but she did not schedule.        Review of Systems   Constitutional: Negative.    Gastrointestinal: Negative.    Genitourinary:  Positive for vaginal bleeding.         Objective:     Physical Exam  Genitourinary:     General: Normal vulva.      Vagina: Normal.      Cervix: Normal.      Uterus: Normal. With uterine prolapse.       Adnexa: Right adnexa normal and left adnexa normal.      Comments: Ring with support pessary removed without difficulty.  There was some greenish discharge noted.  There is no blood on the pessary.  There was no vaginal excoriation or irritation.  There was a small amount of bleeding from manipulation of the pessary.

## 2024-06-03 ENCOUNTER — OFFICE VISIT (OUTPATIENT)
Dept: URGENT CARE | Age: 84
End: 2024-06-03
Payer: MEDICARE

## 2024-06-03 VITALS
TEMPERATURE: 97.5 F | SYSTOLIC BLOOD PRESSURE: 190 MMHG | OXYGEN SATURATION: 95 % | DIASTOLIC BLOOD PRESSURE: 83 MMHG | RESPIRATION RATE: 18 BRPM | HEART RATE: 63 BPM

## 2024-06-03 DIAGNOSIS — B35.1 ONYCHOMYCOSIS: ICD-10-CM

## 2024-06-03 DIAGNOSIS — B35.3 TINEA PEDIS OF BOTH FEET: ICD-10-CM

## 2024-06-03 DIAGNOSIS — L08.9 INFECTION OF LEFT FOOT: Primary | ICD-10-CM

## 2024-06-03 PROCEDURE — 99213 OFFICE O/P EST LOW 20 MIN: CPT

## 2024-06-03 PROCEDURE — G0463 HOSPITAL OUTPT CLINIC VISIT: HCPCS

## 2024-06-03 RX ORDER — CEPHALEXIN 500 MG/1
500 CAPSULE ORAL EVERY 6 HOURS SCHEDULED
Qty: 28 CAPSULE | Refills: 0 | Status: SHIPPED | OUTPATIENT
Start: 2024-06-03 | End: 2024-06-10

## 2024-06-03 RX ORDER — PRENATAL VIT 91/IRON/FOLIC/DHA 28-975-200
COMBINATION PACKAGE (EA) ORAL 2 TIMES DAILY
Qty: 42 G | Refills: 0 | Status: SHIPPED | OUTPATIENT
Start: 2024-06-03

## 2024-06-03 NOTE — PROGRESS NOTES
[facility]        NAME: Sushma Ambriz is a 83 y.o. female  : 1940    MRN: [id number]  DATE: Juana 3, 2024  TIME: 12:00 PM    Assessment and Plan   Infection of left foot [L08.9]  1. Infection of left foot  cephalexin (KEFLEX) 500 mg capsule      2. Tinea pedis of both feet  terbinafine (LamISIL) 1 % cream      3. Onychomycosis              Patient Instructions     Start antibiotics as prescribed  Start antifungal cream as prescribed.  Do not use longer than 3 weeks.  Keep area clean and dry  Watch for signs of further infection as discussed  Follow up with PCP in 3-5 days.  Proceed to  ER if symptoms worsen.    Eat yogurt with live and active cultures and/or take a probiotic at least 3 hours before or after antibiotic dose. Monitor stool for diarrhea and/or blood. If this occurs, contact primary care doctor ASAP.    If tests are performed, our office will contact you with results only if changes need to made to the care plan discussed with you at the visit. You can review your full results on [facility] Mychart.    Chief Complaint     Chief Complaint   Patient presents with    Foot Pain    Nail Problem     Had  a left foot xray,, a month ago. Called Dr. Ferris and advised to get seen at urgent care. Worried by infection of the toes/nails. Swollen is worse.Denies pain         History of Present Illness       Patient is an 83-year-old female with significant PMH of HTN presenting in the clinic today for left foot pain x 6 weeks.  Patient notes approximately 6 weeks ago she hit her left third and fourth toe off a chair leg and has been having pain since.  Patient was seen by her PCP approximately 1 month ago where x-rays were performed.  Left foot x-rays were negative for fractures and dislocations.  Patient continues to locate her pain between the left third and fourth toes.  Admits swelling of the dorsal left foot, tenderness between the left third and fourth toe, erythema of the left foot, and  warmth. Admits the use of Epsom salt soaks for symptom management.  Denies history of diabetes.  Denies fever, chills, chest pain, SOB, numbness, tingling, and bruising.        Review of Systems   Review of Systems   Constitutional:  Negative for chills and fever.   Respiratory:  Negative for shortness of breath.    Cardiovascular:  Negative for chest pain.   Musculoskeletal:  Positive for arthralgias and joint swelling.   Skin:  Negative for rash and wound.   Neurological:  Negative for numbness.         Current Medications       Current Outpatient Medications:     cephalexin (KEFLEX) 500 mg capsule, Take 1 capsule (500 mg total) by mouth every 6 (six) hours for 7 days, Disp: 28 capsule, Rfl: 0    terbinafine (LamISIL) 1 % cream, Apply topically 2 (two) times a day, Disp: 42 g, Rfl: 0    albuterol (PROVENTIL HFA,VENTOLIN HFA) 90 mcg/act inhaler, Inhale 2 puffs every 6 (six) hours as needed for wheezing (Patient taking differently: Inhale 2 puffs every 6 (six) hours as needed for wheezing PRN), Disp: 1 Inhaler, Rfl: 5    Alpha-Lipoic Acid 100 MG CAPS, Take by mouth , Disp: , Rfl:     aspirin 81 MG tablet, Take 162 mg by mouth, Disp: , Rfl:     B Complex Vitamins (B COMPLEX 1 PO), Take 2 tablets by mouth daily, Disp: , Rfl:     bimatoprost (LUMIGAN) 0.03 % ophthalmic drops, Administer 1 drop to both eyes daily at bedtime  , Disp: , Rfl:     Cholecalciferol (VITAMIN D) 2000 units CAPS, Take 2 capsules by mouth daily, Disp: , Rfl:     Coenzyme Q10 (COQ10) 100 MG CAPS, Take by mouth, Disp: , Rfl:     diphenhydrAMINE (BENADRYL) 50 MG tablet, Take one 50 mg tablet 1 hour prior to CT scan., Disp: 1 tablet, Rfl: 0    furosemide (LASIX) 20 mg tablet, TAKE 1 TABLET BY MOUTH EVERY DAY, Disp: 90 tablet, Rfl: 2    gabapentin (NEURONTIN) 300 mg capsule, TAKE 1 CAPSULE BY MOUTH AT BEDTIME AS NEEDED FOR PAIN, Disp: 90 capsule, Rfl: 3    GLUCOSAMINE-MSM-HYALURONIC ACD PO, Take 1 capsule by mouth 3 (three) times a day, Disp: , Rfl:      GRAPE SEED EXTRACT PO, Take by mouth, Disp: , Rfl:     hydrOXYzine HCL (ATARAX) 10 mg tablet, TAKE 1 TABLET BY MOUTH DAILY AS NEEDED FOR ANXIETY, Disp: 90 tablet, Rfl: 1    levETIRAcetam (KEPPRA) 500 mg tablet, TAKE 1 TABLET BY MOUTH TWICE A DAY, Disp: 180 tablet, Rfl: 1    LORazepam (Ativan) 0.5 mg tablet, Take 1 tablet 1 hour prior to MRI, Disp: 1 tablet, Rfl: 0    Lumigan 0.01 % ophthalmic drops, INSTILL 1 DROP INTO BOTH EYES AT BEDTIME, Disp: , Rfl:     metoprolol succinate (TOPROL-XL) 200 MG 24 hr tablet, TAKE 1 TABLET BY MOUTH EVERY DAY, Disp: 90 tablet, Rfl: 3    metroNIDAZOLE (METROGEL) 0.75 % vaginal gel, Insert 1 Application into the vagina daily at bedtime Avoid alcohol, Disp: 70 g, Rfl: 0    milk thistle 175 MG tablet, Take 175 mg by mouth daily, Disp: , Rfl:     Multiple Vitamins-Minerals (MULTIVITAMIN ADULT PO), Take 1 capsule by mouth daily  , Disp: , Rfl:     Omega-3 Fatty Acids (FISH OIL PO), Take 1,000 mg by mouth daily  , Disp: , Rfl:     potassium bicarbonate (K-LYTE) 25 MEQ disintegrating tablet, Take 25 mEq by mouth 2 (two) times a day, Disp: , Rfl:     Turmeric 500 MG CAPS, Take 1 capsule by mouth daily  , Disp: , Rfl:     VOLTAREN 1 %, APPLY 2G TOPICALLY TO AFFECTED AREA FOUR TIMES A DAY AS DIRECTED, Disp: 100 g, Rfl: 5    Current Allergies     Allergies as of 06/03/2024 - Reviewed 06/03/2024   Allergen Reaction Noted    Carbamazepine Myalgia 03/07/2007    Epinephrine Other (See Comments) 03/07/2007    Iodides Itching 12/10/2011    Morphine and codeine  05/04/2012    Sulfamethoxazole-trimethoprim  05/04/2012    Latex Rash 05/04/2012    Lisinopril Cough 01/23/2020    Topiramate Rash 03/07/2007            The following portions of the patient's history were reviewed and updated as appropriate: allergies, current medications, past family history, past medical history, past social history, past surgical history and problem list.     Past Medical History:   Diagnosis Date    Cancer (HCC)      left breast    Elevated serum alkaline phosphatase level     GERD (gastroesophageal reflux disease)     Glaucoma     Hiatal hernia     Hypercalcemia     Hyperglycemia     diet controlled    Hypertension     Lyme disease     Rheumatic fever     Seizures (HCC)     TIA (transient ischemic attack)     Vitamin D deficiency        Past Surgical History:   Procedure Laterality Date    APPENDECTOMY      BREAST LUMPECTOMY Left     COLONOSCOPY      FOOT SURGERY Left     ORIF ANKLE FRACTURE Left     TONSILLECTOMY      TUBAL LIGATION         Family History   Problem Relation Age of Onset    Breast cancer Mother     Coronary artery disease Father     Bone cancer Brother     Lung cancer Brother          Medications have been verified.        Objective   BP (!) 190/83   Pulse 63   Temp 97.5 °F (36.4 °C)   Resp 18   LMP  (LMP Unknown)   SpO2 95%        Physical Exam     Physical Exam  Vitals reviewed.   Constitutional:       General: She is not in acute distress.     Appearance: Normal appearance. She is normal weight. She is not ill-appearing.   HENT:      Head: Normocephalic.      Nose: Nose normal.      Mouth/Throat:      Mouth: Mucous membranes are moist.   Eyes:      Conjunctiva/sclera: Conjunctivae normal.   Cardiovascular:      Rate and Rhythm: Normal rate and regular rhythm.      Pulses: Normal pulses.      Heart sounds: Normal heart sounds. No murmur heard.     No friction rub. No gallop.   Pulmonary:      Effort: Pulmonary effort is normal. No respiratory distress.      Breath sounds: Normal breath sounds. No stridor. No wheezing, rhonchi or rales.   Musculoskeletal:      Cervical back: Normal range of motion and neck supple. No tenderness.      Right ankle: Normal.      Left ankle: Normal.      Right foot: Normal. Normal range of motion.      Left foot: Normal range of motion and normal capillary refill. Swelling and tenderness present. No bony tenderness. Normal pulse.   Feet:      Right foot:      Skin integrity:  Dry skin present. No ulcer, blister, erythema, warmth or fissure.      Toenail Condition: Right toenails are abnormally thick.      Left foot:      Skin integrity: Erythema, warmth and dry skin present. No ulcer, blister or fissure.      Toenail Condition: Left toenails are abnormally thick.      Comments: Edema noted along left ankle and dorsal left foot. Left hammer toe noted.  Lymphadenopathy:      Cervical: No cervical adenopathy.   Skin:     General: Skin is warm.      Findings: Erythema present. No rash.   Neurological:      Mental Status: She is alert.   Psychiatric:         Mood and Affect: Mood normal.         Behavior: Behavior normal.

## 2024-06-03 NOTE — PATIENT INSTRUCTIONS
Start antibiotics as prescribed  Start antifungal cream as prescribed.  Do not use longer than 3 weeks.  Keep area clean and dry  Watch for signs of further infection as discussed  Set up appointment and follow up with podiatry  Follow up with PCP in 3-5 days.  Proceed to  ER if symptoms worsen.    Eat yogurt with live and active cultures and/or take a probiotic at least 3 hours before or after antibiotic dose. Monitor stool for diarrhea and/or blood. If this occurs, contact primary care doctor ASAP.

## 2024-06-24 ENCOUNTER — TELEPHONE (OUTPATIENT)
Dept: SURGICAL ONCOLOGY | Facility: CLINIC | Age: 84
End: 2024-06-24

## 2024-06-24 NOTE — TELEPHONE ENCOUNTER
Medical clearance request form faxed to PCP office for surgery with Dr. Keller 8/27. Patient has pre op clearance appointment with PCP already scheduled on 8/5.

## 2024-06-28 ENCOUNTER — TELEPHONE (OUTPATIENT)
Age: 84
End: 2024-06-28

## 2024-06-28 NOTE — TELEPHONE ENCOUNTER
Patient called to ask about lab work. Patient has orders for labs to be drawn prior to her AWV on 7/9/24. Labs are fasting. I explained to the patient that she could have them completed 7/1 or 7/2. Patient verbalizes understanding.

## 2024-07-01 ENCOUNTER — APPOINTMENT (OUTPATIENT)
Dept: LAB | Age: 84
End: 2024-07-01
Payer: MEDICARE

## 2024-07-01 DIAGNOSIS — N18.31 STAGE 3A CHRONIC KIDNEY DISEASE (HCC): ICD-10-CM

## 2024-07-01 DIAGNOSIS — R82.90 ABNORMAL FINDING ON URINALYSIS: ICD-10-CM

## 2024-07-01 DIAGNOSIS — D35.1 PARATHYROID ADENOMA: ICD-10-CM

## 2024-07-01 DIAGNOSIS — E55.9 VITAMIN D DEFICIENCY: ICD-10-CM

## 2024-07-01 DIAGNOSIS — R60.0 PERIPHERAL EDEMA: ICD-10-CM

## 2024-07-01 DIAGNOSIS — E78.00 HYPERCHOLESTEROLEMIA: ICD-10-CM

## 2024-07-01 DIAGNOSIS — E83.52 HYPERCALCEMIA: ICD-10-CM

## 2024-07-01 DIAGNOSIS — F41.9 ANXIETY: ICD-10-CM

## 2024-07-01 DIAGNOSIS — M15.9 PRIMARY OSTEOARTHRITIS INVOLVING MULTIPLE JOINTS: ICD-10-CM

## 2024-07-01 DIAGNOSIS — G45.9 TRANSIENT ISCHEMIC ATTACK: ICD-10-CM

## 2024-07-01 DIAGNOSIS — R73.9 HYPERGLYCEMIA: ICD-10-CM

## 2024-07-01 DIAGNOSIS — E21.3 HYPERPARATHYROIDISM (HCC): ICD-10-CM

## 2024-07-01 DIAGNOSIS — I10 ESSENTIAL HYPERTENSION: ICD-10-CM

## 2024-07-01 DIAGNOSIS — Z99.89 WALKER AS AMBULATION AID: ICD-10-CM

## 2024-07-01 DIAGNOSIS — G40.209 COMPLEX PARTIAL SEIZURE (HCC): ICD-10-CM

## 2024-07-01 LAB
25(OH)D3 SERPL-MCNC: 47.9 NG/ML (ref 30–100)
ALBUMIN SERPL BCG-MCNC: 4.2 G/DL (ref 3.5–5)
ALP SERPL-CCNC: 84 U/L (ref 34–104)
ALT SERPL W P-5'-P-CCNC: 15 U/L (ref 7–52)
ANION GAP SERPL CALCULATED.3IONS-SCNC: 11 MMOL/L (ref 4–13)
AST SERPL W P-5'-P-CCNC: 23 U/L (ref 13–39)
BACTERIA UR QL AUTO: ABNORMAL /HPF
BILIRUB SERPL-MCNC: 0.94 MG/DL (ref 0.2–1)
BILIRUB UR QL STRIP: NEGATIVE
BUN SERPL-MCNC: 17 MG/DL (ref 5–25)
CALCIUM SERPL-MCNC: 10.6 MG/DL (ref 8.4–10.2)
CHLORIDE SERPL-SCNC: 105 MMOL/L (ref 96–108)
CHOLEST SERPL-MCNC: 215 MG/DL
CLARITY UR: CLEAR
CO2 SERPL-SCNC: 26 MMOL/L (ref 21–32)
COLOR UR: YELLOW
CREAT SERPL-MCNC: 0.75 MG/DL (ref 0.6–1.3)
ERYTHROCYTE [DISTWIDTH] IN BLOOD BY AUTOMATED COUNT: 13.2 % (ref 11.6–15.1)
EST. AVERAGE GLUCOSE BLD GHB EST-MCNC: 131 MG/DL
GFR SERPL CREATININE-BSD FRML MDRD: 74 ML/MIN/1.73SQ M
GLUCOSE P FAST SERPL-MCNC: 102 MG/DL (ref 65–99)
GLUCOSE UR STRIP-MCNC: NEGATIVE MG/DL
HBA1C MFR BLD: 6.2 %
HCT VFR BLD AUTO: 45.1 % (ref 34.8–46.1)
HDLC SERPL-MCNC: 63 MG/DL
HGB BLD-MCNC: 14.4 G/DL (ref 11.5–15.4)
HGB UR QL STRIP.AUTO: NEGATIVE
HYALINE CASTS #/AREA URNS LPF: ABNORMAL /LPF
KETONES UR STRIP-MCNC: NEGATIVE MG/DL
LDLC SERPL CALC-MCNC: 139 MG/DL (ref 0–100)
LEUKOCYTE ESTERASE UR QL STRIP: ABNORMAL
MCH RBC QN AUTO: 30.5 PG (ref 26.8–34.3)
MCHC RBC AUTO-ENTMCNC: 31.9 G/DL (ref 31.4–37.4)
MCV RBC AUTO: 96 FL (ref 82–98)
NITRITE UR QL STRIP: NEGATIVE
NON-SQ EPI CELLS URNS QL MICRO: ABNORMAL /HPF
PH UR STRIP.AUTO: 6 [PH]
PLATELET # BLD AUTO: 216 THOUSANDS/UL (ref 149–390)
PMV BLD AUTO: 11.5 FL (ref 8.9–12.7)
POTASSIUM SERPL-SCNC: 4.5 MMOL/L (ref 3.5–5.3)
PROT SERPL-MCNC: 7.1 G/DL (ref 6.4–8.4)
PROT UR STRIP-MCNC: NEGATIVE MG/DL
RBC # BLD AUTO: 4.72 MILLION/UL (ref 3.81–5.12)
RBC #/AREA URNS AUTO: ABNORMAL /HPF
SODIUM SERPL-SCNC: 142 MMOL/L (ref 135–147)
SP GR UR STRIP.AUTO: 1.02 (ref 1–1.03)
TRIGL SERPL-MCNC: 67 MG/DL
TSH SERPL DL<=0.05 MIU/L-ACNC: 1.85 UIU/ML (ref 0.45–4.5)
UROBILINOGEN UR STRIP-ACNC: <2 MG/DL
WBC # BLD AUTO: 8.37 THOUSAND/UL (ref 4.31–10.16)
WBC #/AREA URNS AUTO: ABNORMAL /HPF

## 2024-07-01 PROCEDURE — 36415 COLL VENOUS BLD VENIPUNCTURE: CPT

## 2024-07-01 PROCEDURE — 84443 ASSAY THYROID STIM HORMONE: CPT

## 2024-07-01 PROCEDURE — 85027 COMPLETE CBC AUTOMATED: CPT

## 2024-07-01 PROCEDURE — 81001 URINALYSIS AUTO W/SCOPE: CPT

## 2024-07-01 PROCEDURE — 80053 COMPREHEN METABOLIC PANEL: CPT

## 2024-07-01 PROCEDURE — 80061 LIPID PANEL: CPT

## 2024-07-01 PROCEDURE — 82306 VITAMIN D 25 HYDROXY: CPT

## 2024-07-01 PROCEDURE — 83036 HEMOGLOBIN GLYCOSYLATED A1C: CPT

## 2024-07-02 ENCOUNTER — RA CDI HCC (OUTPATIENT)
Dept: OTHER | Facility: HOSPITAL | Age: 84
End: 2024-07-02

## 2024-07-09 ENCOUNTER — OFFICE VISIT (OUTPATIENT)
Dept: FAMILY MEDICINE CLINIC | Facility: CLINIC | Age: 84
End: 2024-07-09
Payer: MEDICARE

## 2024-07-09 VITALS
OXYGEN SATURATION: 95 % | SYSTOLIC BLOOD PRESSURE: 124 MMHG | BODY MASS INDEX: 30.22 KG/M2 | HEART RATE: 80 BPM | HEIGHT: 64 IN | WEIGHT: 177 LBS | DIASTOLIC BLOOD PRESSURE: 86 MMHG

## 2024-07-09 DIAGNOSIS — I10 ESSENTIAL HYPERTENSION: ICD-10-CM

## 2024-07-09 DIAGNOSIS — Z78.0 POSTMENOPAUSAL ESTROGEN DEFICIENCY: ICD-10-CM

## 2024-07-09 DIAGNOSIS — N81.11 MIDLINE CYSTOCELE: ICD-10-CM

## 2024-07-09 DIAGNOSIS — N32.81 OAB (OVERACTIVE BLADDER): Primary | ICD-10-CM

## 2024-07-09 DIAGNOSIS — G40.209 COMPLEX PARTIAL SEIZURE (HCC): ICD-10-CM

## 2024-07-09 DIAGNOSIS — R60.0 PERIPHERAL EDEMA: ICD-10-CM

## 2024-07-09 DIAGNOSIS — E78.00 HYPERCHOLESTEROLEMIA: ICD-10-CM

## 2024-07-09 DIAGNOSIS — R73.9 HYPERGLYCEMIA: ICD-10-CM

## 2024-07-09 DIAGNOSIS — E21.3 HYPERPARATHYROIDISM (HCC): ICD-10-CM

## 2024-07-09 DIAGNOSIS — E55.9 VITAMIN D DEFICIENCY: ICD-10-CM

## 2024-07-09 DIAGNOSIS — N18.31 STAGE 3A CHRONIC KIDNEY DISEASE (HCC): ICD-10-CM

## 2024-07-09 DIAGNOSIS — E83.52 HYPERCALCEMIA: ICD-10-CM

## 2024-07-09 DIAGNOSIS — Z00.00 HEALTHCARE MAINTENANCE: ICD-10-CM

## 2024-07-09 PROCEDURE — 99214 OFFICE O/P EST MOD 30 MIN: CPT | Performed by: FAMILY MEDICINE

## 2024-07-09 PROCEDURE — G0439 PPPS, SUBSEQ VISIT: HCPCS | Performed by: FAMILY MEDICINE

## 2024-07-09 NOTE — ASSESSMENT & PLAN NOTE
Component of whitecoat syndrome blood pressure normalized by end of office visit continue present therapy, metoprolol  mg daily/furosemide 20 mg daily

## 2024-07-09 NOTE — PROGRESS NOTES
Ambulatory Visit  Name: Sushma Ambriz      : 1940      MRN: [id number]  Encounter Provider: Mick Sanchez DO  Encounter Date: 2024   Encounter department: [facility]    1.  OAB/cystocele patient to follow with your gynecologist, Dr. Hernadez  2.  Hypertension, stable continue present therapy, significant point of whitecoat syndrome  3.  Hyperparathyroidism/hypercalcemia, follows with endocrinology has an appointment upcoming on 7/15/2024.  Has parathyroidectomy scheduled end of next month with surgical oncology patient has appointment here 2024 for preoperative clearance  4.  CKD-3, resolved patient's GFR has been over 60 for the past 6 months  5.  Seizure disorder, stable follows with neurology  6.  Pure hypercholesterolemia low-fat diet recommended patient has refused medication  7.  Hyperglycemia diet controlled  8.  Edema, stable continue present therapy  9.  Vitamin D deficiency, stable continue present therapy  10.  Healthcare maintenance Medicare AWV completed  11.  Estrogen deficiency, DEXA scan is ordered  12.  Formal follow-up will be in 6 months for office visit and blood work sooner if needed.      Assessment & Plan   1. OAB (overactive bladder)  Assessment & Plan:  To follow with Dr. Hernadez  Orders:  -     CBC; Future; Expected date: 2025  -     Comprehensive metabolic panel; Future; Expected date: 2025  -     Hemoglobin A1C; Future; Expected date: 2025  -     Lipid Panel with Direct LDL reflex; Future; Expected date: 2025  -     TSH, 3rd generation with Free T4 reflex; Future; Expected date: 2025  -     UA (URINE) with reflex to Scope; Future; Expected date: 2025  -     Vitamin D 25 hydroxy; Future; Expected date: 2025  2. Essential hypertension  Assessment & Plan:  Component of whitecoat syndrome blood pressure normalized by end of office visit continue present therapy, metoprolol  mg daily/furosemide 20 mg  daily  Orders:  -     CBC; Future; Expected date: 01/09/2025  -     Comprehensive metabolic panel; Future; Expected date: 01/09/2025  -     Hemoglobin A1C; Future; Expected date: 01/09/2025  -     Lipid Panel with Direct LDL reflex; Future; Expected date: 01/09/2025  -     TSH, 3rd generation with Free T4 reflex; Future; Expected date: 01/09/2025  -     UA (URINE) with reflex to Scope; Future; Expected date: 01/09/2025  -     Vitamin D 25 hydroxy; Future; Expected date: 01/09/2025  3. Hyperparathyroidism (HCC)  Assessment & Plan:  Patient for parathyroidectomy end of next month has appointment 8/5/2024 in this office for preoperative clearance  Orders:  -     CBC; Future; Expected date: 01/09/2025  -     Comprehensive metabolic panel; Future; Expected date: 01/09/2025  -     Hemoglobin A1C; Future; Expected date: 01/09/2025  -     Lipid Panel with Direct LDL reflex; Future; Expected date: 01/09/2025  -     TSH, 3rd generation with Free T4 reflex; Future; Expected date: 01/09/2025  -     UA (URINE) with reflex to Scope; Future; Expected date: 01/09/2025  -     Vitamin D 25 hydroxy; Future; Expected date: 01/09/2025  4. Midline cystocele  Assessment & Plan:  Follow-up with urogynecology  Orders:  -     CBC; Future; Expected date: 01/09/2025  -     Comprehensive metabolic panel; Future; Expected date: 01/09/2025  -     Hemoglobin A1C; Future; Expected date: 01/09/2025  -     Lipid Panel with Direct LDL reflex; Future; Expected date: 01/09/2025  -     TSH, 3rd generation with Free T4 reflex; Future; Expected date: 01/09/2025  -     UA (URINE) with reflex to Scope; Future; Expected date: 01/09/2025  -     Vitamin D 25 hydroxy; Future; Expected date: 01/09/2025  5. Stage 3a chronic kidney disease (HCC)  Assessment & Plan:  Lab Results   Component Value Date    EGFR 74 07/01/2024    EGFR 78 03/15/2024    EGFR 67 12/06/2023    CREATININE 0.75 07/01/2024    CREATININE 0.72 03/15/2024    CREATININE 0.82 12/06/2023   GFR has  been over 60 for the past 6 months, this seems to be resolved  Orders:  -     CBC; Future; Expected date: 01/09/2025  -     Comprehensive metabolic panel; Future; Expected date: 01/09/2025  -     Hemoglobin A1C; Future; Expected date: 01/09/2025  -     Lipid Panel with Direct LDL reflex; Future; Expected date: 01/09/2025  -     TSH, 3rd generation with Free T4 reflex; Future; Expected date: 01/09/2025  -     UA (URINE) with reflex to Scope; Future; Expected date: 01/09/2025  -     Vitamin D 25 hydroxy; Future; Expected date: 01/09/2025  6. Complex partial seizure (HCC)  Assessment & Plan:  Stable follows with neurology  Orders:  -     CBC; Future; Expected date: 01/09/2025  -     Comprehensive metabolic panel; Future; Expected date: 01/09/2025  -     Hemoglobin A1C; Future; Expected date: 01/09/2025  -     Lipid Panel with Direct LDL reflex; Future; Expected date: 01/09/2025  -     TSH, 3rd generation with Free T4 reflex; Future; Expected date: 01/09/2025  -     UA (URINE) with reflex to Scope; Future; Expected date: 01/09/2025  -     Vitamin D 25 hydroxy; Future; Expected date: 01/09/2025  7. Hypercalcemia  Assessment & Plan:  Follows with endocrinology and surgical oncology for parathyroidectomy next month  Orders:  -     CBC; Future; Expected date: 01/09/2025  -     Comprehensive metabolic panel; Future; Expected date: 01/09/2025  -     Hemoglobin A1C; Future; Expected date: 01/09/2025  -     Lipid Panel with Direct LDL reflex; Future; Expected date: 01/09/2025  -     TSH, 3rd generation with Free T4 reflex; Future; Expected date: 01/09/2025  -     UA (URINE) with reflex to Scope; Future; Expected date: 01/09/2025  -     Vitamin D 25 hydroxy; Future; Expected date: 01/09/2025  8. Hypercholesterolemia  Assessment & Plan:  Medication has been refused  Orders:  -     CBC; Future; Expected date: 01/09/2025  -     Comprehensive metabolic panel; Future; Expected date: 01/09/2025  -     Hemoglobin A1C; Future; Expected  date: 01/09/2025  -     Lipid Panel with Direct LDL reflex; Future; Expected date: 01/09/2025  -     TSH, 3rd generation with Free T4 reflex; Future; Expected date: 01/09/2025  -     UA (URINE) with reflex to Scope; Future; Expected date: 01/09/2025  -     Vitamin D 25 hydroxy; Future; Expected date: 01/09/2025  9. Hyperglycemia  Assessment & Plan:  Diet controlled patient brought her hemoglobin A1c from 6.5 down to 6.2 will still classify as hyperglycemia as long as patient does not reach 6.5.  If she does she would be diagnosed with diabetes  Orders:  -     CBC; Future; Expected date: 01/09/2025  -     Comprehensive metabolic panel; Future; Expected date: 01/09/2025  -     Hemoglobin A1C; Future; Expected date: 01/09/2025  -     Lipid Panel with Direct LDL reflex; Future; Expected date: 01/09/2025  -     TSH, 3rd generation with Free T4 reflex; Future; Expected date: 01/09/2025  -     UA (URINE) with reflex to Scope; Future; Expected date: 01/09/2025  -     Vitamin D 25 hydroxy; Future; Expected date: 01/09/2025  10. Peripheral edema  Assessment & Plan:  Stable continue present therapy  Orders:  -     CBC; Future; Expected date: 01/09/2025  -     Comprehensive metabolic panel; Future; Expected date: 01/09/2025  -     Hemoglobin A1C; Future; Expected date: 01/09/2025  -     Lipid Panel with Direct LDL reflex; Future; Expected date: 01/09/2025  -     TSH, 3rd generation with Free T4 reflex; Future; Expected date: 01/09/2025  -     UA (URINE) with reflex to Scope; Future; Expected date: 01/09/2025  -     Vitamin D 25 hydroxy; Future; Expected date: 01/09/2025  11. Vitamin D deficiency  Assessment & Plan:  Stable continue present therapy  Orders:  -     CBC; Future; Expected date: 01/09/2025  -     Comprehensive metabolic panel; Future; Expected date: 01/09/2025  -     Hemoglobin A1C; Future; Expected date: 01/09/2025  -     Lipid Panel with Direct LDL reflex; Future; Expected date: 01/09/2025  -     TSH, 3rd  generation with Free T4 reflex; Future; Expected date: 01/09/2025  -     UA (URINE) with reflex to Scope; Future; Expected date: 01/09/2025  -     Vitamin D 25 hydroxy; Future; Expected date: 01/09/2025  12. Healthcare maintenance  -     DXA bone density spine hip and pelvis; Future; Expected date: 07/09/2024  13. Postmenopausal estrogen deficiency  -     DXA bone density spine hip and pelvis; Future; Expected date: 07/09/2024  -     CBC; Future; Expected date: 01/09/2025  -     Comprehensive metabolic panel; Future; Expected date: 01/09/2025  -     Hemoglobin A1C; Future; Expected date: 01/09/2025  -     Lipid Panel with Direct LDL reflex; Future; Expected date: 01/09/2025  -     TSH, 3rd generation with Free T4 reflex; Future; Expected date: 01/09/2025  -     UA (URINE) with reflex to Scope; Future; Expected date: 01/09/2025  -     Vitamin D 25 hydroxy; Future; Expected date: 01/09/2025    Depression Screening and Follow-up Plan: Patient was screened for depression during today's encounter. They screened negative with a PHQ-2 score of 0.    Urinary Incontinence Plan of Care: counseling topics discussed: use restroom every 2 hours.       Preventive health issues were discussed with patient, and age appropriate screening tests were ordered as noted in patient's After Visit Summary. Personalized health advice and appropriate referrals for health education or preventive services given if needed, as noted in patient's After Visit Summary.    History of Present Illness     Patient is here for routine follow-up and AWV.  Patient states that she will be coming in next month for preoperative clearance patient will be having a parathyroidectomy at the end of August with surgical oncology.  Blood work was discussed       Patient Care Team:  Mick Sanchez DO as PCP - General  Berenice Lopez DO (Obstetrics and Gynecology)  Giovanny Keller MD (Oncology)    Review of Systems   Constitutional: Negative.    HENT: Negative.      Eyes: Negative.    Respiratory: Negative.     Cardiovascular: Negative.    Gastrointestinal: Negative.    Endocrine:        HPI   Genitourinary: Negative.    Musculoskeletal: Negative.    Skin: Negative.    Allergic/Immunologic: Negative.    Neurological: Negative.    Hematological: Negative.    Psychiatric/Behavioral: Negative.       Medical History Reviewed by provider this encounter:  Tobacco  Allergies  Meds  Problems  Med Hx  Surg Hx  Fam Hx       Annual Wellness Visit Questionnaire   Sushma is here for her Subsequent Wellness visit.     Health Risk Assessment:   Patient rates overall health as good. Patient feels that their physical health rating is same. Patient is satisfied with their life. Eyesight was rated as same. Hearing was rated as slightly worse. Patient feels that their emotional and mental health rating is same. Patients states they are never, rarely angry. Patient states they are never, rarely unusually tired/fatigued. Pain experienced in the last 7 days has been none. Patient states that she has experienced no weight loss or gain in last 6 months.     Depression Screening:   PHQ-2 Score: 0      Fall Risk Screening:   In the past year, patient has experienced: no history of falling in past year      Urinary Incontinence Screening:   Patient has leaked urine accidently in the last six months.     Home Safety:  Patient does not have trouble with stairs inside or outside of their home. Patient has working smoke alarms and has no working carbon monoxide detector. Home safety hazards include: none.     Nutrition:   Current diet is Regular.     Medications:   Patient is currently taking over-the-counter supplements. OTC medications include: see medication list. Patient is able to manage medications.     Activities of Daily Living (ADLs)/Instrumental Activities of Daily Living (IADLs):   Walk and transfer into and out of bed and chair?: Yes  Dress and groom yourself?: Yes    Bathe or shower  yourself?: Yes    Feed yourself? Yes  Do your laundry/housekeeping?: Yes  Manage your money, pay your bills and track your expenses?: Yes  Make your own meals?: Yes    Do your own shopping?: Yes    Previous Hospitalizations:   Any hospitalizations or ED visits within the last 12 months?: No      Advance Care Planning:   Living will: Yes    Durable POA for healthcare: No    Advanced directive: Yes    Advanced directive counseling given: Yes    Five wishes given: No    Patient declined ACP directive: No    End of Life Decisions reviewed with patient: Yes    Provider agrees with end of life decisions: Yes      Cognitive Screening:   Provider or family/friend/caregiver concerned regarding cognition?: No    PREVENTIVE SCREENINGS      Cardiovascular Screening:    General: Screening Not Indicated and History Lipid Disorder      Diabetes Screening:     General: Screening Current      Breast Cancer Screening:     General: History Breast Cancer      Cervical Cancer Screening:    General: Screening Not Indicated      Osteoporosis Screening:    General: Risks and Benefits Discussed    Due for: DXA Axial      Abdominal Aortic Aneurysm (AAA) Screening:        General: Screening Not Indicated      Lung Cancer Screening:     General: Screening Not Indicated      Hepatitis C Screening:    General: Screening Current    Screening, Brief Intervention, and Referral to Treatment (SBIRT)    Screening  Typical number of drinks in a day: 0  Typical number of drinks in a week: 0  Interpretation: Low risk drinking behavior.    AUDIT-C Screenin) How often did you have a drink containing alcohol in the past year? monthly or less  2) How many drinks did you have on a typical day when you were drinking in the past year? 1 to 2  3) How often did you have 6 or more drinks on one occasion in the past year? never    AUDIT-C Score: 1  Interpretation: Score 0-2 (female): Negative screen for alcohol misuse    Single Item Drug Screening:  How often  "have you used an illegal drug (including marijuana) or a prescription medication for non-medical reasons in the past year? never    Single Item Drug Screen Score: 0  Interpretation: Negative screen for possible drug use disorder    Social Determinants of Health     Financial Resource Strain: Low Risk  (6/9/2023)    Overall Financial Resource Strain (CARDIA)     Difficulty of Paying Living Expenses: Not hard at all   Food Insecurity: No Food Insecurity (7/9/2024)    Hunger Vital Sign     Worried About Running Out of Food in the Last Year: Never true     Ran Out of Food in the Last Year: Never true   Transportation Needs: No Transportation Needs (7/9/2024)    PRAPARE - Transportation     Lack of Transportation (Medical): No     Lack of Transportation (Non-Medical): No   Housing Stability: Low Risk  (7/9/2024)    Housing Stability Vital Sign     Unable to Pay for Housing in the Last Year: No     Number of Times Moved in the Last Year: 0     Homeless in the Last Year: No   Utilities: Not At Risk (7/9/2024)    [facility] Utilities     Threatened with loss of utilities: No     No results found.    Objective     /86   Pulse 80   Ht 5' 4\" (1.626 m)   Wt 80.3 kg (177 lb)   LMP  (LMP Unknown)   SpO2 95%   BMI 30.38 kg/m²     Physical Exam  Vitals and nursing note reviewed.   Constitutional:       Appearance: Normal appearance.   HENT:      Head: Normocephalic and atraumatic.      Right Ear: Tympanic membrane normal.      Left Ear: Tympanic membrane normal.      Nose: Nose normal.      Mouth/Throat:      Mouth: Mucous membranes are moist.      Pharynx: Oropharynx is clear. No oropharyngeal exudate or posterior oropharyngeal erythema.   Eyes:      General: No scleral icterus.  Neck:      Vascular: No carotid bruit.   Cardiovascular:      Rate and Rhythm: Normal rate and regular rhythm.      Heart sounds: Normal heart sounds.   Pulmonary:      Effort: Pulmonary effort is normal.      Breath sounds: Normal breath sounds. "   Abdominal:      General: Bowel sounds are normal.      Palpations: Abdomen is soft.      Tenderness: There is no abdominal tenderness.   Musculoskeletal:      Right lower leg: No edema.      Left lower leg: No edema.   Skin:     General: Skin is warm and dry.   Neurological:      General: No focal deficit present.      Mental Status: She is alert.   Psychiatric:         Mood and Affect: Mood normal.

## 2024-07-09 NOTE — PATIENT INSTRUCTIONS
Continue present therapy  Patient will be returning 8/5/2024 for preoperative clearance  Formal follow-up is scheduled for 6 months for office visit and blood work sooner if needed  Follow with all specialist per their instructions

## 2024-07-09 NOTE — ASSESSMENT & PLAN NOTE
Lab Results   Component Value Date    EGFR 74 07/01/2024    EGFR 78 03/15/2024    EGFR 67 12/06/2023    CREATININE 0.75 07/01/2024    CREATININE 0.72 03/15/2024    CREATININE 0.82 12/06/2023   GFR has been over 60 for the past 6 months, this seems to be resolved

## 2024-07-09 NOTE — ASSESSMENT & PLAN NOTE
Patient for parathyroidectomy end of next month has appointment 8/5/2024 in this office for preoperative clearance

## 2024-07-09 NOTE — ASSESSMENT & PLAN NOTE
Diet controlled patient brought her hemoglobin A1c from 6.5 down to 6.2 will still classify as hyperglycemia as long as patient does not reach 6.5.  If she does she would be diagnosed with diabetes

## 2024-07-11 ENCOUNTER — TELEPHONE (OUTPATIENT)
Age: 84
End: 2024-07-11

## 2024-07-11 DIAGNOSIS — R41.0 CONFUSION: Primary | ICD-10-CM

## 2024-07-11 NOTE — TELEPHONE ENCOUNTER
Referred patient to complex care manager   Reviewed d/c instructions and BRAT diet with pt. .ambulating off floor to home.

## 2024-07-11 NOTE — TELEPHONE ENCOUNTER
FYI-     Pt called and stated she has too many appts and she is cancelling her Dexascan that is scheduled for 7/26 and wants to move it to January.  She is calling and r/s    Went over all the pt's appts and confirmed the dates and times of various appts.  Pt stated she was getting confused and stressed with all the appts.

## 2024-07-11 NOTE — TELEPHONE ENCOUNTER
Pt called to cancel appt. For 8/12/24 with Dr. Thacker at 10am . Pt is requesting for someone to call her back in sept to make an appt.       Thank you .

## 2024-07-15 ENCOUNTER — CONSULT (OUTPATIENT)
Dept: ENDOCRINOLOGY | Facility: CLINIC | Age: 84
End: 2024-07-15
Payer: MEDICARE

## 2024-07-15 ENCOUNTER — TELEPHONE (OUTPATIENT)
Age: 84
End: 2024-07-15

## 2024-07-15 VITALS
BODY MASS INDEX: 30.11 KG/M2 | DIASTOLIC BLOOD PRESSURE: 76 MMHG | SYSTOLIC BLOOD PRESSURE: 128 MMHG | HEIGHT: 64 IN | WEIGHT: 176.4 LBS

## 2024-07-15 DIAGNOSIS — D35.1 PARATHYROID ADENOMA: ICD-10-CM

## 2024-07-15 DIAGNOSIS — E21.3 HYPERPARATHYROIDISM (HCC): ICD-10-CM

## 2024-07-15 DIAGNOSIS — E83.52 HYPERCALCEMIA: Primary | ICD-10-CM

## 2024-07-15 PROCEDURE — 99204 OFFICE O/P NEW MOD 45 MIN: CPT | Performed by: INTERNAL MEDICINE

## 2024-07-15 NOTE — PROGRESS NOTES
Assessment and Plan:    1. Hypercalcemia  -     Ambulatory Referral to Endocrinology  -     Calcium, 24 Hour Urine (w/ Creatinine); Future  2. Parathyroid adenoma  3. Hyperparathyroidism (HCC)  Assessment & Plan:  History of primary hyperparathyroidism with possible parathyroid adenoma  CT of neck noted right lesion measuring 0.8 x 0.4 x 0.6 cm demonstrating has been of typical adenoma and a left lesion of 0.3 x 1.2 x 1.3 cm with enhancement as well.  Most recent .1, calcium 10.6, vitamin D 25 OH level 47.9, TSH 1.853  DEXA scan from 2022 demonstrated osteopenia with the lowest T-score at left femur neck of -1.9  Patient is scheduled for surgical intervention in August 2024 with surgical oncology Dr. Silveira    Evaluated with 24-hour urine calcium with creatinine-to assess fractional fractional excretion of calcium as familial hypocalciuric hypercalcemia is in differential diagnosis.  May weigh in on decision of surgery or not, if value is low.   Has been DEXA scan  Continue with vitamin D supplementation  Follow-up depending on lab result  Orders:  -     Calcium, 24 Hour Urine (w/ Creatinine); Future           Physical Activity Assessment and Intervention:    Activity journal reviewed         HPI:     Sushma Ambriz is  83 y.o. female with history of vitamin D insufficiency on supplementation, hyperparathyroidism, prior breast cancer s/p anastrozole, and well-controlled DM type II, that arrives to clinic for evaluation of hyperparathyroidism with hypercalcemia.     Patient reports she overall feels well except for feeling of fatigue daily and palpitations.  Denies episodes of confusion, chest pain, abdominal pain, constipation.  Patient reports her PCP is aware of her palpitations and plans on doing workup.    Patient has a history of left lower limb fracture requiring ORIF after a slip and fall.  Denies any history of kidney stones.  Is unsure if she has any family history of high calcium or high PTH.   Reports brother with bone cancer in mom with unknown cancer.  Patient denies any personal history of radiation to the neck or chest.  Patient has 2 adult children who are in good health.  Patient underwent menopause around 58 years and did not make use of any hormone therapy.    Patient referred to endocrinology for evaluation of hyperparathyroidism with hypercalcemia.  Patient was evaluated by surgical oncology and is scheduled for parathyroidectomy/surgical intervention in August 27, 2024. As per chart review noting some hypercalcemia since 2022.  In chart, first elevated PTH noted in 2023 and highest PTH of 111.1 in July 2024.  CT of neck noted right lesion measuring 0.8 x 0.4 x 0.6 cm demonstrating has been of typical adenoma and a left lesion of 0.3 x 1.2 x 1.3 cm with enhancement as well.    Patient's exercise regimen/physical activity -patient does not have an established physical activity regimen but she spends most of her day outdoors in the yard gardening.          Patient has no other complaints at this time.      Subjective:    Review of Systems   Constitutional:  Positive for fatigue. Negative for diaphoresis, fever and unexpected weight change.   Respiratory:  Negative for chest tightness and shortness of breath.    Cardiovascular:  Positive for palpitations. Negative for chest pain and leg swelling.   Gastrointestinal:  Negative for abdominal pain, constipation, diarrhea and vomiting.   Musculoskeletal:  Positive for arthralgias and gait problem (using walker). Negative for back pain and neck pain.   Neurological:  Negative for tremors, weakness, light-headedness and headaches.   Psychiatric/Behavioral:  Negative for confusion.         Patient Active Problem List   Diagnosis   • Vitamin D deficiency   • Transient ischemic attack   • Osteoarthritis   • Moderate osteopenia   • Essential hypertension   • Hypercholesterolemia   • Hyperglycemia   • Complex partial seizure (HCC)   • Midline cystocele   •  Anxiety   • Personal history of breast cancer   • Personal history of radiation therapy   • Hiatal hernia   • Allergic rhinitis   • Dyspnea on exertion   • Abnormal EKG   • Trigeminy   • Cigarette nicotine dependence in remission   • Peripheral edema   • Hypercalcemia   • Hyperparathyroidism (HCC)   • Parathyroid adenoma   • Walker as ambulation aid   • OAB (overactive bladder)        Current Outpatient Medications   Medication Sig Dispense Refill   • Alpha-Lipoic Acid 100 MG CAPS Take by mouth      • aspirin 81 MG tablet Take 162 mg by mouth     • B Complex Vitamins (B COMPLEX 1 PO) Take 2 tablets by mouth daily     • bimatoprost (LUMIGAN) 0.03 % ophthalmic drops Administer 1 drop to both eyes daily at bedtime       • Cholecalciferol (VITAMIN D) 2000 units CAPS Take 2 capsules by mouth daily     • Coenzyme Q10 (COQ10) 100 MG CAPS Take by mouth     • diphenhydrAMINE (BENADRYL) 50 MG tablet Take one 50 mg tablet 1 hour prior to CT scan. 1 tablet 0   • furosemide (LASIX) 20 mg tablet TAKE 1 TABLET BY MOUTH EVERY DAY 90 tablet 2   • gabapentin (NEURONTIN) 300 mg capsule TAKE 1 CAPSULE BY MOUTH AT BEDTIME AS NEEDED FOR PAIN 90 capsule 3   • GLUCOSAMINE-MSM-HYALURONIC ACD PO Take 1 capsule by mouth 3 (three) times a day     • GRAPE SEED EXTRACT PO Take by mouth     • hydrOXYzine HCL (ATARAX) 10 mg tablet TAKE 1 TABLET BY MOUTH DAILY AS NEEDED FOR ANXIETY 90 tablet 1   • levETIRAcetam (KEPPRA) 500 mg tablet TAKE 1 TABLET BY MOUTH TWICE A  tablet 1   • LORazepam (Ativan) 0.5 mg tablet Take 1 tablet 1 hour prior to MRI 1 tablet 0   • Lumigan 0.01 % ophthalmic drops INSTILL 1 DROP INTO BOTH EYES AT BEDTIME     • metoprolol succinate (TOPROL-XL) 200 MG 24 hr tablet TAKE 1 TABLET BY MOUTH EVERY DAY 90 tablet 3   • metroNIDAZOLE (METROGEL) 0.75 % vaginal gel Insert 1 Application into the vagina daily at bedtime Avoid alcohol 70 g 0   • milk thistle 175 MG tablet Take 175 mg by mouth daily     • Multiple Vitamins-Minerals  "(MULTIVITAMIN ADULT PO) Take 1 capsule by mouth daily       • Omega-3 Fatty Acids (FISH OIL PO) Take 1,000 mg by mouth daily       • potassium bicarbonate (K-LYTE) 25 MEQ disintegrating tablet Take 25 mEq by mouth 2 (two) times a day     • terbinafine (LamISIL) 1 % cream Apply topically 2 (two) times a day 42 g 0   • Turmeric 500 MG CAPS Take 1 capsule by mouth daily       • VOLTAREN 1 % APPLY 2G TOPICALLY TO AFFECTED AREA FOUR TIMES A DAY AS DIRECTED 100 g 5   • albuterol (PROVENTIL HFA,VENTOLIN HFA) 90 mcg/act inhaler Inhale 2 puffs every 6 (six) hours as needed for wheezing (Patient taking differently: Inhale 2 puffs every 6 (six) hours as needed for wheezing PRN) 1 Inhaler 5     No current facility-administered medications for this visit.        Allergies   Allergen Reactions   • Carbamazepine Myalgia     flu-like symptoms   • Epinephrine Other (See Comments)     \"weakness\"   • Iodides Itching   • Morphine And Codeine    • Sulfamethoxazole-Trimethoprim    • Latex Rash   • Lisinopril Cough   • Topiramate Rash        The following portions of the patient's history were reviewed and updated as appropriate: allergies, current medications, past family history, past medical history, past social history, past surgical history and problem list.             Objective:    /76 (BP Location: Left arm, Patient Position: Sitting, Cuff Size: Adult)   Ht 5' 4\" (1.626 m)   Wt 80 kg (176 lb 6.4 oz)   LMP  (LMP Unknown)   BMI 30.28 kg/m²      Body mass index is 30.28 kg/m².     Physical Exam  Vitals reviewed.   Constitutional:       General: She is not in acute distress.     Appearance: She is obese. She is not ill-appearing.   Eyes:      General: No scleral icterus.     Conjunctiva/sclera: Conjunctivae normal.   Neck:      Thyroid: No thyroid mass, thyromegaly or thyroid tenderness.   Cardiovascular:      Rate and Rhythm: Normal rate and regular rhythm.      Pulses: Normal pulses.      Heart sounds: Normal heart sounds. "   Pulmonary:      Effort: Pulmonary effort is normal.      Breath sounds: Normal breath sounds.   Abdominal:      General: Bowel sounds are normal. There is no distension.      Palpations: Abdomen is soft.      Tenderness: There is no abdominal tenderness.   Musculoskeletal:         General: Normal range of motion.      Right lower leg: Edema (trace) present.      Left lower leg: Edema (trace) present.   Lymphadenopathy:      Cervical: No cervical adenopathy.   Skin:     Coloration: Skin is not jaundiced or pale.   Neurological:      Mental Status: She is oriented to person, place, and time. Mental status is at baseline.   Psychiatric:         Mood and Affect: Mood normal.         Behavior: Behavior normal.            Davion Scherer MD  Endocrinology Fellow, PGY-4

## 2024-07-15 NOTE — TELEPHONE ENCOUNTER
Pt is asking when does she come back? She was told to call when she was home to get more info as to what is needed> does she need a f/u?

## 2024-07-15 NOTE — ASSESSMENT & PLAN NOTE
History of primary hyperparathyroidism with possible parathyroid adenoma  CT of neck noted right lesion measuring 0.8 x 0.4 x 0.6 cm demonstrating has been of typical adenoma and a left lesion of 0.3 x 1.2 x 1.3 cm with enhancement as well.  Most recent .1, calcium 10.6, vitamin D 25 OH level 47.9, TSH 1.853  DEXA scan from 2022 demonstrated osteopenia with the lowest T-score at left femur neck of -1.9  Patient is scheduled for surgical intervention in August 2024 with surgical oncology Dr. Silveira    Evaluated with 24-hour urine calcium with creatinine-to assess fractional fractional excretion of calcium as familial hypocalciuric hypercalcemia is in differential diagnosis.  May weigh in on decision of surgery or not, if value is low.   Has been DEXA scan  Continue with vitamin D supplementation  Follow-up depending on lab result

## 2024-07-16 ENCOUNTER — PATIENT OUTREACH (OUTPATIENT)
Dept: FAMILY MEDICINE CLINIC | Facility: CLINIC | Age: 84
End: 2024-07-16

## 2024-07-16 NOTE — PROGRESS NOTES
Pt referred for help with appts and care management related to various health conditions. Discussed upcoming appointments as well as a 24 hour urine to be done and collected for later this week, pt states.Call on speaker with pt shuffling various papers. Pt encouraged to look at AVS from recent appointments and also use a calendar for appts. Pt states that she cancelled her podiatry appt .Will discuss meds on next call. Will keep pt enrolled in care management. Pt very appreciative of the help.

## 2024-07-17 ENCOUNTER — PATIENT OUTREACH (OUTPATIENT)
Dept: FAMILY MEDICINE CLINIC | Facility: CLINIC | Age: 84
End: 2024-07-17

## 2024-07-17 NOTE — PROGRESS NOTES
Pt called inquiring how and how long to collect her 24 hour urine. We discussed in detail starting tomorrow whenever she awakens, discarding that urine but using that as start time. Then saving urine moving forward in a refrigerated container until Friday morning ending at the time she started on Thursday.

## 2024-07-18 ENCOUNTER — TELEPHONE (OUTPATIENT)
Age: 84
End: 2024-07-18

## 2024-07-18 NOTE — TELEPHONE ENCOUNTER
Patient called in and states that she is doing a 24 urine and wants to advise that there may be blood in her urine but its nothing to be worried about.

## 2024-07-19 ENCOUNTER — APPOINTMENT (OUTPATIENT)
Dept: LAB | Age: 84
End: 2024-07-19
Payer: MEDICARE

## 2024-07-19 DIAGNOSIS — E21.3 HYPERPARATHYROIDISM (HCC): ICD-10-CM

## 2024-07-19 DIAGNOSIS — E83.52 HYPERCALCEMIA: ICD-10-CM

## 2024-07-19 LAB
CALCIUM 24H UR-MCNC: 205.2 MG/24 HRS (ref 100–300)
CREAT 24H UR-MRATE: 0.8 G/24HR (ref 0.6–1.8)
SPECIMEN VOL UR: 1200 ML
SPECIMEN VOL UR: 1200 ML

## 2024-07-19 PROCEDURE — 82340 ASSAY OF CALCIUM IN URINE: CPT

## 2024-07-19 PROCEDURE — 82570 ASSAY OF URINE CREATININE: CPT

## 2024-07-25 ENCOUNTER — TELEPHONE (OUTPATIENT)
Age: 84
End: 2024-07-25

## 2024-07-26 ENCOUNTER — HOSPITAL ENCOUNTER (OUTPATIENT)
Dept: BONE DENSITY | Facility: MEDICAL CENTER | Age: 84
Discharge: HOME | End: 2024-07-26
Payer: MEDICARE

## 2024-07-26 DIAGNOSIS — Z78.0 POSTMENOPAUSAL ESTROGEN DEFICIENCY: ICD-10-CM

## 2024-07-26 DIAGNOSIS — Z00.00 HEALTHCARE MAINTENANCE: ICD-10-CM

## 2024-07-26 PROCEDURE — 77080 DXA BONE DENSITY AXIAL: CPT

## 2024-07-29 ENCOUNTER — TELEPHONE (OUTPATIENT)
Dept: ENDOCRINOLOGY | Facility: CLINIC | Age: 84
End: 2024-07-29

## 2024-07-29 NOTE — TELEPHONE ENCOUNTER
Called Sushma to discuss her recent urine studies.  These were done to assess for differential diagnosis of familial hypocalciuric hypercalcemia (FHH).    Patient's fractional excretion of calcium is 0.018 which is within normal limits, therefore suggesting her hypercalcemia is consistent with primary hyperparathyroidism and not FHH.  Questions were answered and Sushma confirmed understanding.  I discussed these with Sushma and recommended that she continue with plans of her scheduled surgery in Aug 2024.

## 2024-07-30 ENCOUNTER — RA CDI HCC (OUTPATIENT)
Dept: OTHER | Facility: HOSPITAL | Age: 84
End: 2024-07-30

## 2024-07-31 ENCOUNTER — PATIENT OUTREACH (OUTPATIENT)
Dept: CASE MANAGEMENT | Facility: OTHER | Age: 84
End: 2024-07-31

## 2024-07-31 DIAGNOSIS — M81.0 AGE-RELATED OSTEOPOROSIS WITHOUT CURRENT PATHOLOGICAL FRACTURE: Primary | ICD-10-CM

## 2024-07-31 PROBLEM — M85.80 MODERATE OSTEOPENIA: Status: RESOLVED | Noted: 2017-09-18 | Resolved: 2024-07-31

## 2024-07-31 NOTE — PROGRESS NOTES
Called to follow up with pt about any questions or concerns that she has. Pt only had the question of what she needed to take to her preop clearance appt, which everything needed should be in the system. Pt spoke about upcoming appts and pt has them on a calendar. She declines the need for care management phone outreach at this time and has this CM's contact for any future concerns if needed. .

## 2024-08-05 ENCOUNTER — CONSULT (OUTPATIENT)
Dept: FAMILY MEDICINE CLINIC | Facility: CLINIC | Age: 84
End: 2024-08-05
Payer: MEDICARE

## 2024-08-05 ENCOUNTER — APPOINTMENT (OUTPATIENT)
Dept: LAB | Facility: CLINIC | Age: 84
End: 2024-08-05
Payer: MEDICARE

## 2024-08-05 VITALS
SYSTOLIC BLOOD PRESSURE: 128 MMHG | BODY MASS INDEX: 29.91 KG/M2 | HEART RATE: 76 BPM | OXYGEN SATURATION: 94 % | HEIGHT: 64 IN | DIASTOLIC BLOOD PRESSURE: 90 MMHG | WEIGHT: 175.2 LBS | TEMPERATURE: 98.5 F

## 2024-08-05 DIAGNOSIS — I10 ESSENTIAL HYPERTENSION: ICD-10-CM

## 2024-08-05 DIAGNOSIS — D69.9 BLEEDING DISORDER (HCC): ICD-10-CM

## 2024-08-05 DIAGNOSIS — Z01.818 PREOPERATIVE CLEARANCE: ICD-10-CM

## 2024-08-05 DIAGNOSIS — E21.3 HYPERPARATHYROIDISM (HCC): ICD-10-CM

## 2024-08-05 DIAGNOSIS — D35.1 PARATHYROID ADENOMA: ICD-10-CM

## 2024-08-05 DIAGNOSIS — Z01.818 PREOPERATIVE CLEARANCE: Primary | ICD-10-CM

## 2024-08-05 DIAGNOSIS — M81.0 AGE-RELATED OSTEOPOROSIS WITHOUT CURRENT PATHOLOGICAL FRACTURE: ICD-10-CM

## 2024-08-05 DIAGNOSIS — E83.52 HYPERCALCEMIA: ICD-10-CM

## 2024-08-05 DIAGNOSIS — I45.10 RIGHT BUNDLE BRANCH BLOCK (RBBB): ICD-10-CM

## 2024-08-05 DIAGNOSIS — R94.31 ABNORMAL EKG: ICD-10-CM

## 2024-08-05 LAB
ANION GAP SERPL CALCULATED.3IONS-SCNC: 10 MMOL/L (ref 4–13)
BACTERIA UR QL AUTO: ABNORMAL /HPF
BILIRUB UR QL STRIP: NEGATIVE
BUN SERPL-MCNC: 15 MG/DL (ref 5–25)
CALCIUM SERPL-MCNC: 10.2 MG/DL (ref 8.4–10.2)
CHLORIDE SERPL-SCNC: 105 MMOL/L (ref 96–108)
CLARITY UR: ABNORMAL
CO2 SERPL-SCNC: 25 MMOL/L (ref 21–32)
COLOR UR: YELLOW
CREAT SERPL-MCNC: 0.72 MG/DL (ref 0.6–1.3)
ERYTHROCYTE [DISTWIDTH] IN BLOOD BY AUTOMATED COUNT: 13.3 % (ref 11.6–15.1)
GFR SERPL CREATININE-BSD FRML MDRD: 78 ML/MIN/1.73SQ M
GLUCOSE P FAST SERPL-MCNC: 86 MG/DL (ref 65–99)
GLUCOSE UR STRIP-MCNC: NEGATIVE MG/DL
HCT VFR BLD AUTO: 43.9 % (ref 34.8–46.1)
HGB BLD-MCNC: 14 G/DL (ref 11.5–15.4)
HGB UR QL STRIP.AUTO: ABNORMAL
KETONES UR STRIP-MCNC: NEGATIVE MG/DL
LEUKOCYTE ESTERASE UR QL STRIP: ABNORMAL
MCH RBC QN AUTO: 29.9 PG (ref 26.8–34.3)
MCHC RBC AUTO-ENTMCNC: 31.9 G/DL (ref 31.4–37.4)
MCV RBC AUTO: 94 FL (ref 82–98)
MUCOUS THREADS UR QL AUTO: ABNORMAL
NITRITE UR QL STRIP: NEGATIVE
NON-SQ EPI CELLS URNS QL MICRO: ABNORMAL /HPF
PH UR STRIP.AUTO: 6 [PH]
PLATELET # BLD AUTO: 203 THOUSANDS/UL (ref 149–390)
PMV BLD AUTO: 11.3 FL (ref 8.9–12.7)
POTASSIUM SERPL-SCNC: 4.2 MMOL/L (ref 3.5–5.3)
PROT UR STRIP-MCNC: ABNORMAL MG/DL
RBC # BLD AUTO: 4.69 MILLION/UL (ref 3.81–5.12)
RBC #/AREA URNS AUTO: ABNORMAL /HPF
SODIUM SERPL-SCNC: 140 MMOL/L (ref 135–147)
SP GR UR STRIP.AUTO: 1.02 (ref 1–1.03)
UROBILINOGEN UR STRIP-ACNC: <2 MG/DL
WBC # BLD AUTO: 8.33 THOUSAND/UL (ref 4.31–10.16)
WBC #/AREA URNS AUTO: ABNORMAL /HPF
WBC CLUMPS # UR AUTO: PRESENT /UL

## 2024-08-05 PROCEDURE — 85610 PROTHROMBIN TIME: CPT

## 2024-08-05 PROCEDURE — 99214 OFFICE O/P EST MOD 30 MIN: CPT | Performed by: FAMILY MEDICINE

## 2024-08-05 PROCEDURE — 36415 COLL VENOUS BLD VENIPUNCTURE: CPT

## 2024-08-05 PROCEDURE — 80048 BASIC METABOLIC PNL TOTAL CA: CPT

## 2024-08-05 PROCEDURE — 85730 THROMBOPLASTIN TIME PARTIAL: CPT

## 2024-08-05 PROCEDURE — 85027 COMPLETE CBC AUTOMATED: CPT

## 2024-08-05 PROCEDURE — 81001 URINALYSIS AUTO W/SCOPE: CPT

## 2024-08-05 PROCEDURE — 93000 ELECTROCARDIOGRAM COMPLETE: CPT | Performed by: FAMILY MEDICINE

## 2024-08-05 RX ORDER — ALENDRONATE SODIUM 70 MG/1
70 TABLET ORAL
Qty: 12 TABLET | Refills: 3 | Status: SHIPPED | OUTPATIENT
Start: 2024-08-05

## 2024-08-05 NOTE — ASSESSMENT & PLAN NOTE
DEXA discussed she is at fracture risk for femoral neck.  Risk and benefit discussed patient chose Fosamax 70 mg every week.

## 2024-08-05 NOTE — Clinical Note
Patient will need cardiac clearance.  She had an abnormal EKG with new onset right bundle branch block in office.  I have ordered this consultation ASAP to take this week or next week

## 2024-08-05 NOTE — PROGRESS NOTES
Ambulatory Visit  Name: Sushma Ambriz      : 1940      MRN: [id number]  Encounter Provider: Mick Sanchez DO  Encounter Date: 2024   Encounter department: [facility]    1.  Preoperative clearance  Patient seen examined chart reviewed EKG completed  Patient sent for preoperative blood work  2.  Abnormal EKG  3.  New right bundle branch block  Patient needs cardiac clearance before surgery  4.  Hyperparathyroidism  5.  Parathyroid adenoma  6.  Hypercalcemia  Patient scheduled for parathyroidectomy at [facility] on 2024 with surgical oncology, Dr. Keller  7.  Hypertension, stable continue present therapy  8..  Preoperative blood work ordered to include PT, PTT rule out coagulopathy  9.  Return at scheduled appointment sooner if needed    Assessment & Plan   1. Preoperative clearance  -     CBC; Future; Expected date: 2024  -     UA (URINE) with reflex to Scope; Future; Expected date: 2024  -     Protime-INR; Future; Expected date: 2024  -     Basic metabolic panel; Future; Expected date: 2024  -     APTT; Future; Expected date: 2024  -     POCT ECG  -     Ambulatory Referral to Cardiology; Future  2. Hyperparathyroidism (HCC)  -     CBC; Future; Expected date: 2024  -     UA (URINE) with reflex to Scope; Future; Expected date: 2024  -     Protime-INR; Future; Expected date: 2024  -     Basic metabolic panel; Future; Expected date: 2024  -     APTT; Future; Expected date: 2024  -     Ambulatory Referral to Cardiology; Future  3. Parathyroid adenoma  -     CBC; Future; Expected date: 2024  -     UA (URINE) with reflex to Scope; Future; Expected date: 2024  -     Protime-INR; Future; Expected date: 2024  -     Basic metabolic panel; Future; Expected date: 2024  -     APTT; Future; Expected date: 2024  -     Ambulatory Referral to Cardiology; Future  4. Hypercalcemia  -      CBC; Future; Expected date: 08/05/2024  -     UA (URINE) with reflex to Scope; Future; Expected date: 08/05/2024  -     Protime-INR; Future; Expected date: 08/05/2024  -     Basic metabolic panel; Future; Expected date: 08/05/2024  -     APTT; Future; Expected date: 08/05/2024  -     Ambulatory Referral to Cardiology; Future  5. Essential hypertension  -     CBC; Future; Expected date: 08/05/2024  -     UA (URINE) with reflex to Scope; Future; Expected date: 08/05/2024  -     Protime-INR; Future; Expected date: 08/05/2024  -     Basic metabolic panel; Future; Expected date: 08/05/2024  -     APTT; Future; Expected date: 08/05/2024  6. Bleeding disorder (HCC)  -     CBC; Future; Expected date: 08/05/2024  -     UA (URINE) with reflex to Scope; Future; Expected date: 08/05/2024  -     Protime-INR; Future; Expected date: 08/05/2024  -     Basic metabolic panel; Future; Expected date: 08/05/2024  -     APTT; Future; Expected date: 08/05/2024  7. Abnormal EKG  -     Ambulatory Referral to Cardiology; Future  8. Right bundle branch block (RBBB)  Assessment & Plan:  Consult cardiology because of change in EKG will need cardiac clearance  Orders:  -     Ambulatory Referral to Cardiology; Future  9. Age-related osteoporosis without current pathological fracture  Assessment & Plan:  DEXA discussed she is at fracture risk for femoral neck.  Risk and benefit discussed patient chose Fosamax 70 mg every week.  Orders:  -     alendronate (Fosamax) 70 mg tablet; Take 1 tablet (70 mg total) by mouth every 7 days .  Take on empty stomach do not eat drink or lie down for half hour       History of Present Illness     Patient is here for preoperative clearance.  She will be having a parathyroidectomy on 8/27/2024 at [facility] under general anesthesia with surgical oncology, Dr. Keller.  Today patient's had no preoperative testing.  Patient wishes to discuss DEXA scan        Review of Systems   Constitutional: Negative.   "  HENT: Negative.     Eyes: Negative.    Respiratory: Negative.     Cardiovascular: Negative.    Gastrointestinal: Negative.    Endocrine:        HPI   Genitourinary: Negative.    Musculoskeletal:         HPI   Skin: Negative.    Allergic/Immunologic: Negative.    Neurological: Negative.    Hematological: Negative.    Psychiatric/Behavioral: Negative.         Objective     /90 (BP Location: Right arm, Patient Position: Sitting, Cuff Size: Standard)   Pulse 76   Temp 98.5 °F (36.9 °C) (Tympanic)   Ht 5' 4\" (1.626 m)   Wt 79.5 kg (175 lb 3.2 oz)   LMP  (LMP Unknown)   SpO2 94%   BMI 30.07 kg/m²     Physical Exam  Vitals and nursing note reviewed.   Constitutional:       Appearance: Normal appearance.   HENT:      Head: Normocephalic and atraumatic.      Right Ear: Tympanic membrane normal.      Left Ear: Tympanic membrane normal.      Nose: Nose normal.      Mouth/Throat:      Mouth: Mucous membranes are moist.      Pharynx: Oropharynx is clear. No oropharyngeal exudate.   Eyes:      General: No scleral icterus.        Right eye: No discharge.         Left eye: No discharge.      Extraocular Movements: Extraocular movements intact.      Conjunctiva/sclera: Conjunctivae normal.      Pupils: Pupils are equal, round, and reactive to light.   Neck:      Vascular: No carotid bruit.   Cardiovascular:      Rate and Rhythm: Normal rate and regular rhythm.      Heart sounds: Normal heart sounds.   Pulmonary:      Effort: Pulmonary effort is normal.      Breath sounds: Normal breath sounds.   Abdominal:      General: Bowel sounds are normal.      Palpations: Abdomen is soft.      Tenderness: There is no abdominal tenderness.   Musculoskeletal:      Cervical back: Neck supple.      Right lower leg: No edema.      Left lower leg: No edema.   Skin:     General: Skin is warm and dry.   Neurological:      General: No focal deficit present.      Mental Status: She is alert.   Psychiatric:         Mood and Affect: Mood " normal.       Administrative Statements

## 2024-08-05 NOTE — PATIENT INSTRUCTIONS
Complete cardiac clearance for upcoming surgery  Start Fosamax every week, 1 tablet on empty stomach do not eat or drink for 1/2-hour after taking  Return at scheduled appointment

## 2024-08-05 NOTE — PROGRESS NOTES
Presurgical Evaluation    Subjective:      Patient ID: Sushma Ambriz is a 83 y.o. female.    Chief Complaint   Patient presents with   • Pre-op Exam       HPI    {Common ambulatory SmartLinks:22303}    Procedure date: {DATE:23014}    Surgeon:  ***  Planned procedure:  ***  Diagnosis for procedure:  ***    Prior anesthesia: {[facility] ANESTHSIA :4979639457}    [facility]   NOTE: Patient should see Cardiology if time available before surgery, and if appropriate.    Pulmonary History: { [facility] PULM HX:6590084059}    Renal history: {[facility] Renal Hx:4197280834}    Diabetes History:  {[facility] DIABETES TYPE:7708862390}     Neurological History: {[facility] neuro:1086367401}     On Immunosuppressant meds/biologics: {[facility] IMMUNOSUPRESSANTS:0054133391}      Review of Systems      Current Outpatient Medications   Medication Sig Dispense Refill   • albuterol (PROVENTIL HFA,VENTOLIN HFA) 90 mcg/act inhaler Inhale 2 puffs every 6 (six) hours as needed for wheezing (Patient taking differently: Inhale 2 puffs every 6 (six) hours as needed for wheezing PRN) 1 Inhaler 5   • Alpha-Lipoic Acid 100 MG CAPS Take by mouth      • aspirin 81 MG tablet Take 162 mg by mouth     • B Complex Vitamins (B COMPLEX 1 PO) Take 2 tablets by mouth daily     • bimatoprost (LUMIGAN) 0.03 % ophthalmic drops Administer 1 drop to both eyes daily at bedtime       • Cholecalciferol (VITAMIN D) 2000 units CAPS Take 2 capsules by mouth daily     • Coenzyme Q10 (COQ10) 100 MG CAPS Take by mouth     • diphenhydrAMINE (BENADRYL) 50 MG tablet Take one 50 mg tablet 1 hour prior to CT scan. 1 tablet 0   • furosemide (LASIX) 20 mg tablet TAKE 1 TABLET BY MOUTH EVERY DAY 90 tablet 2   • gabapentin (NEURONTIN) 300 mg capsule TAKE 1 CAPSULE BY MOUTH AT BEDTIME AS NEEDED FOR PAIN 90 capsule 3   • GLUCOSAMINE-MSM-HYALURONIC ACD PO Take 1 capsule by mouth 3 (three) times a day     • GRAPE SEED EXTRACT PO Take by mouth     • hydrOXYzine HCL (ATARAX) 10  mg tablet TAKE 1 TABLET BY MOUTH DAILY AS NEEDED FOR ANXIETY 90 tablet 1   • levETIRAcetam (KEPPRA) 500 mg tablet TAKE 1 TABLET BY MOUTH TWICE A  tablet 1   • LORazepam (Ativan) 0.5 mg tablet Take 1 tablet 1 hour prior to MRI 1 tablet 0   • Lumigan 0.01 % ophthalmic drops INSTILL 1 DROP INTO BOTH EYES AT BEDTIME     • metoprolol succinate (TOPROL-XL) 200 MG 24 hr tablet TAKE 1 TABLET BY MOUTH EVERY DAY 90 tablet 3   • metroNIDAZOLE (METROGEL) 0.75 % vaginal gel Insert 1 Application into the vagina daily at bedtime Avoid alcohol 70 g 0   • milk thistle 175 MG tablet Take 175 mg by mouth daily     • Multiple Vitamins-Minerals (MULTIVITAMIN ADULT PO) Take 1 capsule by mouth daily       • Omega-3 Fatty Acids (FISH OIL PO) Take 1,000 mg by mouth daily       • potassium bicarbonate (K-LYTE) 25 MEQ disintegrating tablet Take 25 mEq by mouth 2 (two) times a day     • terbinafine (LamISIL) 1 % cream Apply topically 2 (two) times a day 42 g 0   • Turmeric 500 MG CAPS Take 1 capsule by mouth daily       • VOLTAREN 1 % APPLY 2G TOPICALLY TO AFFECTED AREA FOUR TIMES A DAY AS DIRECTED 100 g 5     No current facility-administered medications for this visit.       Allergies on file:   Carbamazepine, Epinephrine, Iodides, Morphine and codeine, Sulfamethoxazole-trimethoprim, Latex, Lisinopril, and Topiramate    Patient Active Problem List   Diagnosis   • Vitamin D deficiency   • Transient ischemic attack   • Osteoarthritis   • Essential hypertension   • Hypercholesterolemia   • Hyperglycemia   • Complex partial seizure (HCC)   • Midline cystocele   • Anxiety   • Personal history of breast cancer   • Personal history of radiation therapy   • Hiatal hernia   • Allergic rhinitis   • Dyspnea on exertion   • Abnormal EKG   • Trigeminy   • Cigarette nicotine dependence in remission   • Peripheral edema   • Hypercalcemia   • Hyperparathyroidism (HCC)   • Parathyroid adenoma   • Walker as ambulation aid   • OAB (overactive bladder)  "  • Age-related osteoporosis without current pathological fracture        Past Medical History:   Diagnosis Date   • Cancer (HCC)     left breast   • Elevated serum alkaline phosphatase level    • GERD (gastroesophageal reflux disease)    • Glaucoma    • Hiatal hernia    • Hypercalcemia    • Hyperglycemia     diet controlled   • Hypertension    • Lyme disease    • Rheumatic fever    • Seizures (HCC)    • TIA (transient ischemic attack)    • Vitamin D deficiency        Past Surgical History:   Procedure Laterality Date   • APPENDECTOMY     • BREAST LUMPECTOMY Left    • COLONOSCOPY     • FOOT SURGERY Left    • ORIF ANKLE FRACTURE Left    • TONSILLECTOMY     • TUBAL LIGATION         Family History   Problem Relation Age of Onset   • Breast cancer Mother    • Coronary artery disease Father    • Bone cancer Brother    • Lung cancer Brother        Social History     Tobacco Use   • Smoking status: Former     Current packs/day: 0.00     Average packs/day: 1 pack/day for 53.0 years (53.0 ttl pk-yrs)     Types: Cigarettes     Start date:      Quit date:      Years since quittin.6   • Smokeless tobacco: Never   Vaping Use   • Vaping status: Never Used   Substance Use Topics   • Alcohol use: Yes     Comment: social   • Drug use: No       Objective:    Vitals:    24 1356   BP: 128/90   BP Location: Right arm   Patient Position: Sitting   Cuff Size: Standard   Pulse: 76   Temp: 98.5 °F (36.9 °C)   TempSrc: Tympanic   SpO2: 94%   Weight: 79.5 kg (175 lb 3.2 oz)   Height: 5' 4\" (1.626 m)        Physical Exam      Preop labs/testing available and reviewed: {YES/NO:}               EKG {YES/NO:}    Echo {YES/NO:}    Stress test/cath {YES/NO:}    PFT/Tony {YES/NO:}    Functional capacity: {sl amb metabolic equivalents:8607108462}   Pick the highest level patient can comfortably perform   4 mets or greater for surgery    RCRI  High Risk surgery?         1 Point  CAD History:         1 " Point   MI; Positive Stress Test; CP due to Mi;  Nitrate Usage to control Angina; Pathologic Q wave on EKG  CHF Active:         1 Point   Pulm Edema; Paroxysmal Nocturnal Dyspnea;  Bibasilar Rales (crackles);S3; CHF on CXR  Cerebrovascular Disease (TIA or CVA):     1 Point  DM on Insulin:        1 Point  Serum Creat >2.0 mg/dl:       1 Point          Total Points: {Numbers; 0-6:87088}     Scorin: Class I, Very Low Risk (0.4%)     1: Class II, Low risk (0.9%)     2: Class III Moderate (6.6%)     3: Class IV High (>11%)      TRINO Risk:  GFR:        For PCP:  If GFR>60, Hold ACE/ARB/Diuretic on the day of surgery, and NSAIDS 10 days before.    If GFR<45, Consider PRE and POST op Nephrology Consult.    If 46 <GFR> 59 : Has Patient had TRINO in last 6 Months? {YES/NO:}   If YES: Preop Nephrology consult   If No:  Post Op Nephrology consult.           Assessment/Plan:    Patient {is/is not:27318} medically optimized (cleared) for the planned procedure.    Further testing/evaluation {is/is not:41814} required.    Postop concerns: {YES/NO:}    Problem List Items Addressed This Visit        Cardiovascular and Mediastinum    Essential hypertension    Relevant Orders    CBC    UA (URINE) with reflex to Scope    Protime-INR    Basic metabolic panel    APTT       Endocrine    Hyperparathyroidism (HCC)    Relevant Orders    CBC    UA (URINE) with reflex to Scope    Protime-INR    Basic metabolic panel    APTT    Parathyroid adenoma    Relevant Orders    CBC    UA (URINE) with reflex to Scope    Protime-INR    Basic metabolic panel    APTT       Other    Hypercalcemia    Relevant Orders    CBC    UA (URINE) with reflex to Scope    Protime-INR    Basic metabolic panel    APTT   Other Visit Diagnoses     Preoperative clearance    -  Primary    Relevant Orders    CBC    UA (URINE) with reflex to Scope    Protime-INR    Basic metabolic panel    APTT    Bleeding disorder (HCC)        Relevant Orders    CBC    UA (URINE) with  reflex to Scope    Protime-INR    Basic metabolic panel    APTT           {Assess/PlanSmartLinks:52916}      Pre-Surgery Instructions:   Medication Instructions   • albuterol (PROVENTIL HFA,VENTOLIN HFA) 90 mcg/act inhaler {OR PAT MED INSTRUCTIONS:66996}   • Alpha-Lipoic Acid 100 MG CAPS {OR PAT MED INSTRUCTIONS:31399}   • aspirin 81 MG tablet {OR PAT MED INSTRUCTIONS:27205}   • B Complex Vitamins (B COMPLEX 1 PO) {OR PAT MED INSTRUCTIONS:47199}   • bimatoprost (LUMIGAN) 0.03 % ophthalmic drops {OR PAT MED INSTRUCTIONS:72886}   • Cholecalciferol (VITAMIN D) 2000 units CAPS {OR PAT MED INSTRUCTIONS:69456}   • Coenzyme Q10 (COQ10) 100 MG CAPS {OR PAT MED INSTRUCTIONS:49262}   • diphenhydrAMINE (BENADRYL) 50 MG tablet {OR PAT MED INSTRUCTIONS:81684}   • furosemide (LASIX) 20 mg tablet {OR PAT MED INSTRUCTIONS:52928}   • gabapentin (NEURONTIN) 300 mg capsule {OR PAT MED INSTRUCTIONS:19438}   • GLUCOSAMINE-MSM-HYALURONIC ACD PO {OR PAT MED INSTRUCTIONS:09105}   • GRAPE SEED EXTRACT PO {OR PAT MED INSTRUCTIONS:34007}   • hydrOXYzine HCL (ATARAX) 10 mg tablet {OR PAT MED INSTRUCTIONS:14658}   • levETIRAcetam (KEPPRA) 500 mg tablet {OR PAT MED INSTRUCTIONS:27115}   • LORazepam (Ativan) 0.5 mg tablet {OR PAT MED INSTRUCTIONS:73244}   • Lumigan 0.01 % ophthalmic drops {OR PAT MED INSTRUCTIONS:10761}   • metoprolol succinate (TOPROL-XL) 200 MG 24 hr tablet {OR PAT MED INSTRUCTIONS:78845}   • metroNIDAZOLE (METROGEL) 0.75 % vaginal gel {OR PAT MED INSTRUCTIONS:73941}   • milk thistle 175 MG tablet {OR PAT MED INSTRUCTIONS:82915}   • Multiple Vitamins-Minerals (MULTIVITAMIN ADULT PO) {OR PAT MED INSTRUCTIONS:80652}   • Omega-3 Fatty Acids (FISH OIL PO) {OR PAT MED INSTRUCTIONS:11071}   • potassium bicarbonate (K-LYTE) 25 MEQ disintegrating tablet {OR PAT MED INSTRUCTIONS:57466}   • terbinafine (LamISIL) 1 % cream {OR PAT MED INSTRUCTIONS:70069}   • Turmeric 500 MG CAPS {OR PAT MED INSTRUCTIONS:75391}   • VOLTAREN 1 % {OR PAT MED  "INSTRUCTIONS:54240}        NOTE: Please use the above to review important meds for your specialty, the remainder \"per anesthesia Guidelines.\"    NOTE: Please place an Inbasket message for \"SOC\" pool for complicated patients.    "

## 2024-08-06 ENCOUNTER — PREP FOR PROCEDURE (OUTPATIENT)
Dept: SURGICAL ONCOLOGY | Facility: CLINIC | Age: 84
End: 2024-08-06

## 2024-08-06 LAB
APTT PPP: 30 SECONDS (ref 23–34)
INR PPP: 0.97 (ref 0.85–1.19)
PROTHROMBIN TIME: 13.2 SECONDS (ref 12.3–15)

## 2024-08-07 ENCOUNTER — TELEPHONE (OUTPATIENT)
Dept: FAMILY MEDICINE CLINIC | Facility: CLINIC | Age: 84
End: 2024-08-07

## 2024-08-07 NOTE — TELEPHONE ENCOUNTER
Pt states that she has a pessary in and she feels that causes irritation and blood. Pt denies any burning or pressure just leaking urine at times.

## 2024-08-07 NOTE — TELEPHONE ENCOUNTER
----- Message from Hafsa Borjas MD sent at 8/6/2024  6:58 PM EDT -----  Urine  abnormal, is pt  having sx  of  UTI?

## 2024-08-08 ENCOUNTER — PATIENT OUTREACH (OUTPATIENT)
Dept: CASE MANAGEMENT | Facility: OTHER | Age: 84
End: 2024-08-08

## 2024-08-08 NOTE — PROGRESS NOTES
Received incoming call on pt questioning Fosamax prescription and why she should be taking this. Suggested that pt contact her PCP to inquire and explain reasoning for medication. Pt stated'I am confused about calcium in my blood and I'm reading up on all of this and confused. She will call office to inquire

## 2024-08-12 ENCOUNTER — TELEPHONE (OUTPATIENT)
Age: 84
End: 2024-08-12

## 2024-08-12 ENCOUNTER — OFFICE VISIT (OUTPATIENT)
Dept: SURGICAL ONCOLOGY | Facility: CLINIC | Age: 84
End: 2024-08-12
Payer: MEDICARE

## 2024-08-12 VITALS
HEART RATE: 61 BPM | BODY MASS INDEX: 29.19 KG/M2 | RESPIRATION RATE: 18 BRPM | WEIGHT: 171 LBS | TEMPERATURE: 97.7 F | SYSTOLIC BLOOD PRESSURE: 128 MMHG | OXYGEN SATURATION: 98 % | HEIGHT: 64 IN | DIASTOLIC BLOOD PRESSURE: 72 MMHG

## 2024-08-12 DIAGNOSIS — D35.1 PARATHYROID ADENOMA: ICD-10-CM

## 2024-08-12 DIAGNOSIS — E21.3 HYPERPARATHYROIDISM (HCC): Primary | ICD-10-CM

## 2024-08-12 PROCEDURE — 99214 OFFICE O/P EST MOD 30 MIN: CPT | Performed by: SURGERY

## 2024-08-12 NOTE — TELEPHONE ENCOUNTER
Patient called in regards to concerned with taking Alendronate 70 mg due to patient having a hiatal hernia in her esophagus. Patient stated she has been  taking the medication once a week has prescribed but would like providers advise.

## 2024-08-12 NOTE — H&P (VIEW-ONLY)
Surgical Oncology Follow Up       [facility] [place]  [address] [facility] [address]  [phone]    Sushma Ambriz  1940  [id number]  [facility] [place]  [address] [facility] [address]  [phone]    Chief Complaint   Patient presents with    Follow-up       Assessment/Plan:    No problem-specific Assessment & Plan notes found for this encounter.       Diagnoses and all orders for this visit:    Hyperparathyroidism (HCC)    Parathyroid adenoma      Advance Care Planning/Advance Directives:  Discussed disease status, cancer treatment plans and/or cancer treatment goals with the patient.     Oncology History    No history exists.       History of Present Illness: Patient is a 83-year-old woman who was found to have elevated calcium levels on routine blood work.  This led to further workup given suspicion for hyperparathyroidism.  PTH levels were also found to be elevated.  She underwent imaging studies including CAT scan which were potential targets.  She has been referred for evaluation and treatment.     Primary complaint involves fatigue, achiness, and GERD symptoms.  No history of kidney stones.  She also feels somewhat anxious and has some brain fog.  No personal or family history of endocrine malignancies.     -Interval History: She is here for preop.    Review of Systems:  Review of Systems   Constitutional:  Positive for fatigue.   HENT: Negative.     Eyes: Negative.    Respiratory: Negative.     Cardiovascular: Negative.    Gastrointestinal: Negative.    Endocrine: Negative.    Genitourinary: Negative.    Musculoskeletal:  Positive for arthralgias and myalgias.   Skin: Negative.    Allergic/Immunologic: Negative.    Neurological: Negative.    Hematological: Negative.    Psychiatric/Behavioral:  Positive for decreased concentration.    All other  systems reviewed and are negative.      Patient Active Problem List   Diagnosis    Vitamin D deficiency    Transient ischemic attack    Osteoarthritis    Essential hypertension    Hypercholesterolemia    Hyperglycemia    Complex partial seizure (HCC)    Midline cystocele    Anxiety    Personal history of breast cancer    Personal history of radiation therapy    Hiatal hernia    Allergic rhinitis    Dyspnea on exertion    Abnormal EKG    Trigeminy    Cigarette nicotine dependence in remission    Peripheral edema    Hypercalcemia    Hyperparathyroidism (HCC)    Parathyroid adenoma    Walker as ambulation aid    OAB (overactive bladder)    Age-related osteoporosis without current pathological fracture    Right bundle branch block (RBBB)     Past Medical History:   Diagnosis Date    Cancer (HCC)     left breast    Elevated serum alkaline phosphatase level     GERD (gastroesophageal reflux disease)     Glaucoma     Hiatal hernia     Hypercalcemia     Hyperglycemia     diet controlled    Hypertension     Lyme disease     Rheumatic fever     Seizures (HCC)     TIA (transient ischemic attack)     Vitamin D deficiency      Past Surgical History:   Procedure Laterality Date    APPENDECTOMY      BREAST LUMPECTOMY Left     COLONOSCOPY      FOOT SURGERY Left     ORIF ANKLE FRACTURE Left     TONSILLECTOMY      TUBAL LIGATION       Family History   Problem Relation Age of Onset    Breast cancer Mother     Coronary artery disease Father     Bone cancer Brother     Lung cancer Brother      Social History     Socioeconomic History    Marital status:      Spouse name: Not on file    Number of children: Not on file    Years of education: Not on file    Highest education level: Not on file   Occupational History    Occupation: retired   Tobacco Use    Smoking status: Former     Current packs/day: 0.00     Average packs/day: 1 pack/day for 53.0 years (53.0 ttl pk-yrs)     Types: Cigarettes     Start date: 1957     Quit date: 2010      Years since quittin.6    Smokeless tobacco: Never   Vaping Use    Vaping status: Never Used   Substance and Sexual Activity    Alcohol use: Yes     Comment: social    Drug use: No    Sexual activity: Not Currently   Other Topics Concern    Not on file   Social History Narrative    Activities - gardening    Daily coffee consumption- 1 cup per day    Daily tea consumption    Hearing loss     Social Determinants of Health     Financial Resource Strain: Low Risk  (2023)    Overall Financial Resource Strain (CARDIA)     Difficulty of Paying Living Expenses: Not hard at all   Food Insecurity: No Food Insecurity (2024)    Hunger Vital Sign     Worried About Running Out of Food in the Last Year: Never true     Ran Out of Food in the Last Year: Never true   Transportation Needs: No Transportation Needs (2024)    PRAPARE - Transportation     Lack of Transportation (Medical): No     Lack of Transportation (Non-Medical): No   Physical Activity: Not on file   Stress: Not on file   Social Connections: Not on file   Intimate Partner Violence: Not on file   Housing Stability: Low Risk  (2024)    Housing Stability Vital Sign     Unable to Pay for Housing in the Last Year: No     Number of Times Moved in the Last Year: 0     Homeless in the Last Year: No       Current Outpatient Medications:     albuterol (PROVENTIL HFA,VENTOLIN HFA) 90 mcg/act inhaler, Inhale 2 puffs every 6 (six) hours as needed for wheezing (Patient taking differently: Inhale 2 puffs every 6 (six) hours as needed for wheezing PRN), Disp: 1 Inhaler, Rfl: 5    alendronate (Fosamax) 70 mg tablet, Take 1 tablet (70 mg total) by mouth every 7 days .  Take on empty stomach do not eat drink or lie down for half hour, Disp: 12 tablet, Rfl: 3    Alpha-Lipoic Acid 100 MG CAPS, Take by mouth , Disp: , Rfl:     aspirin 81 MG tablet, Take 162 mg by mouth, Disp: , Rfl:     B Complex Vitamins (B COMPLEX 1 PO), Take 2 tablets by mouth daily, Disp: , Rfl:      bimatoprost (LUMIGAN) 0.03 % ophthalmic drops, Administer 1 drop to both eyes daily at bedtime  , Disp: , Rfl:     Cholecalciferol (VITAMIN D) 2000 units CAPS, Take 2 capsules by mouth daily, Disp: , Rfl:     Coenzyme Q10 (COQ10) 100 MG CAPS, Take by mouth, Disp: , Rfl:     diphenhydrAMINE (BENADRYL) 50 MG tablet, Take one 50 mg tablet 1 hour prior to CT scan., Disp: 1 tablet, Rfl: 0    furosemide (LASIX) 20 mg tablet, TAKE 1 TABLET BY MOUTH EVERY DAY, Disp: 90 tablet, Rfl: 2    gabapentin (NEURONTIN) 300 mg capsule, TAKE 1 CAPSULE BY MOUTH AT BEDTIME AS NEEDED FOR PAIN, Disp: 90 capsule, Rfl: 3    GLUCOSAMINE-MSM-HYALURONIC ACD PO, Take 1 capsule by mouth 3 (three) times a day, Disp: , Rfl:     GRAPE SEED EXTRACT PO, Take by mouth, Disp: , Rfl:     hydrOXYzine HCL (ATARAX) 10 mg tablet, TAKE 1 TABLET BY MOUTH DAILY AS NEEDED FOR ANXIETY, Disp: 90 tablet, Rfl: 1    levETIRAcetam (KEPPRA) 500 mg tablet, TAKE 1 TABLET BY MOUTH TWICE A DAY, Disp: 180 tablet, Rfl: 1    Lumigan 0.01 % ophthalmic drops, INSTILL 1 DROP INTO BOTH EYES AT BEDTIME, Disp: , Rfl:     metoprolol succinate (TOPROL-XL) 200 MG 24 hr tablet, TAKE 1 TABLET BY MOUTH EVERY DAY, Disp: 90 tablet, Rfl: 3    milk thistle 175 MG tablet, Take 175 mg by mouth daily, Disp: , Rfl:     Multiple Vitamins-Minerals (MULTIVITAMIN ADULT PO), Take 1 capsule by mouth daily  , Disp: , Rfl:     Omega-3 Fatty Acids (FISH OIL PO), Take 1,000 mg by mouth daily  , Disp: , Rfl:     Turmeric 500 MG CAPS, Take 1 capsule by mouth daily  , Disp: , Rfl:     VOLTAREN 1 %, APPLY 2G TOPICALLY TO AFFECTED AREA FOUR TIMES A DAY AS DIRECTED, Disp: 100 g, Rfl: 5    LORazepam (Ativan) 0.5 mg tablet, Take 1 tablet 1 hour prior to MRI (Patient not taking: Reported on 8/12/2024), Disp: 1 tablet, Rfl: 0    metroNIDAZOLE (METROGEL) 0.75 % vaginal gel, Insert 1 Application into the vagina daily at bedtime Avoid alcohol (Patient not taking: Reported on 8/12/2024), Disp: 70 g, Rfl: 0     "potassium bicarbonate (K-LYTE) 25 MEQ disintegrating tablet, Take 25 mEq by mouth 2 (two) times a day (Patient not taking: Reported on 8/12/2024), Disp: , Rfl:     terbinafine (LamISIL) 1 % cream, Apply topically 2 (two) times a day (Patient not taking: Reported on 8/12/2024), Disp: 42 g, Rfl: 0  Allergies   Allergen Reactions    Carbamazepine Myalgia     flu-like symptoms    Epinephrine Other (See Comments)     \"weakness\"    Iodides Itching    Morphine And Codeine     Sulfamethoxazole-Trimethoprim     Latex Rash    Lisinopril Cough    Topiramate Rash     Vitals:    08/12/24 0924   BP: 128/72   Pulse: 61   Resp: 18   Temp: 97.7 °F (36.5 °C)   SpO2: 98%       Physical Exam  Vitals reviewed.   Constitutional:       Appearance: Normal appearance.   HENT:      Head: Normocephalic and atraumatic.      Right Ear: External ear normal.      Left Ear: External ear normal.      Nose: Nose normal.   Eyes:      Extraocular Movements: Extraocular movements intact.      Pupils: Pupils are equal, round, and reactive to light.   Cardiovascular:      Pulses: Normal pulses.      Heart sounds: Normal heart sounds.   Pulmonary:      Breath sounds: Normal breath sounds.   Abdominal:      General: Abdomen is flat.      Palpations: Abdomen is soft.   Musculoskeletal:         General: Normal range of motion.      Cervical back: Normal range of motion and neck supple.   Skin:     General: Skin is warm and dry.   Neurological:      General: No focal deficit present.      Mental Status: She is alert and oriented to person, place, and time.   Psychiatric:         Mood and Affect: Mood normal.         Behavior: Behavior normal.           Results:  Labs:  Lab Results   Component Value Date    .1 (H) 03/15/2024    CALCIUM 10.2 08/05/2024    PHOS 1.8 (LL) 01/14/2018         Imaging    CT  NECK  WITHOUT AND WITH CONTRAST FROM LYNN TO SKULL BASE     INDICATION: Hyperparathyroidism. There is no sestamibi scan for review.     COMPARISON:  " None.     TECHNIQUE:This examination, like all CT scans performed in the [facility], was performed utilizing techniques to minimize radiation dose exposure, including the use of iterative reconstruction and automated exposure control.     Both noncontrast, contrast, and post contrast delayed images were performed according to standard parathyroid protocol (4D technique) Coronal and sagittal reconstructions were performed. 3D reconstructions were performed on an independent workstation,   and are supplied for review.     85 mL of iohexol (OMNIPAQUE) was injected intravenously without immediate consequence.     Radiation dose: 1662 mGy-cm     FINDINGS:     SERIAL NONCONTRAST, POST CONTRAST AND DELAYS: There is a 0.8 x 0.4 x 0.6 cm lesion identified in the right neck which meets the CT enhancement criteria typical for parathyroid adenoma. The lesion is located in the prevertebral space closely applied to   the esophagus, about 3 cm above the sternal notch, just below the level of the inferior pole of the right thyroid lobe. Lesion is best seen on series 4 images 156 through 160, and also series 601 image 67.     Second lesion is identified in the left neck measuring 0.3 x 1.2 x 1.3 cm. This lesion also meets the CT enhancement criteria typical for parathyroid adenoma. The lesion sits in the prevertebral space closely applied to the esophagus approximately 4.5 to   5 cm above the sternal notch, posterior to the superior aspect of the left thyroid lobe.     No other suspicious lesions are identified.     VASCULAR STRUCTURES:  There is no carotid stenosis by NASCET criteria.     SUPRAHYOID NECK:  Normal oropharynx, oral cavity, parapharyngeal and retropharyngeal spaces.     INFRAHYOID NECK:  Normal oropharynx, oral cavity, parapharyngeal and retropharyngeal     THYROID GLAND:  there is an approximately 1 cm nodule noted in the mid right thyroid lobe. This was noted previously spaces. On an ultrasound of  the thyroid performed April 10, 2018, and at least by size criteria does not appear to be changed. There are no new thyroid   nodules.     LYMPH NODES:  No pathologic or enlarged adenopathy.     MEDIASTINUM:  Unremarkable.     BONY STRUCTURES  Diffusely lone bone density is noted. There are mild degenerative changes noted in the lower cervical spine.     LUNG APICES:  Centrilobular emphysema is present.     IMPRESSION:     1. 0.8 x 0.4 x 0.6 cm nodule identified in the right neck as described above meets the CT enhancement criteria typical of a parathyroid adenoma.  2. 0.3 x 1.2 x 1.3 cm nodule identified in the left neck as described above also meets the CT enhancement criteria typical for parathyroid adenoma.  3. Right thyroid nodule again noted. When compared with thyroid ultrasound appears to be stable.  4. Diffusely low bone density noted.     DXA bone density spine hip and pelvis    Result Date: 7/31/2024  Narrative: CENTRAL  DXA SCAN CLINICAL HISTORY:   83 year old post-menopausal  female risk factors include the study noted low bone mass. Additional medical history: Breast cancer with treatment 2007. Hyperparathyroid condition. Seizure disorder with treatment. TECHNIQUE: Bone densitometry was performed using a [employer] Horizon W bone densitometer.  Regions of interest appear properly placed. There are no obvious fractures or other confounding variables which could limit the study. COMPARISON: Follow-up RESULTS: LUMBAR SPINE:  L1-L4: BMD 0.964 gm/cm2 T-score -0.8 and 4% less than 2022.5% higher than 2007. Statistically significant changes. Z-score 2.0 LEFT TOTAL HIP: BMD 0.800 gm/cm2 T-score -1.2 Z-score 1.0 LEFT FEMORAL NECK: BMD 0.565 gm/cm2 T-score -2.6 and 11% less than 2022 and 16% less than 2007, statistically significant change. Z-score 0.2 The left forearm BMD is 0.580 and the T score is -1.9 and 3% less than 2022 and unchanged from 2013. The Z score is 1.4. A 25 hydroxy vitamin D level, an  intact PTH, and a comprehensive metabolic panel are suggested in this patient. Treatment may be a consideration after excluding secondary causes for osteoporosis and might include Prolia for a period of time followed by intravenous Reclast.     Impression: 1. Based on the WHO classification, this study identifies a diagnosis of osteoporosis, notable at the femoral neck area and the patient is considered at  risk for fracture. 2. A daily intake of calcium of at least 1200 mg and vitamin D, 800-1000 IU, as well as weight bearing and muscle strengthening exercise, fall prevention and avoidance of tobacco and excessive alcohol intake as basic preventive measures are recommended. 3. Repeat DXA scan on the same equipment in 18-24 months as clinically indicated. The 10 year risk of hip fracture is 8.1%, with the 10 year risk of major osteoporotic fracture being 25%, as calculated by the WHO fracture risk assessment tool (FRAX).  The current NOF guidelines recommend treating patients with FRAX 10 year risk score of >3% for hip fracture and >20% for major osteoporotic fracture. Fracture risk exceeds treatment thresholds on the FRAX score. WHO CLASSIFICATION: Normal (a T-score of -1.0 or higher) Low bone mineral density (a T-score of less than -1.0 but higher than -2.5) Osteoporosis (a T-score of -2.5 or less) Severe osteoporosis (a T-score of -2.5 or less with a fragility fracture) Thank you for allowing us the opportunity to participate in your patient care. The expanded DEXA report will no longer be arriving in your mail. If you desire to view the full report please contact [facility] medical records or access the PACS system. Workstation performed: [id number]     I reviewed the above laboratory and imaging data.    Discussion/Summary: 83-year-old woman here for preop for parathyroid exploration and resection.  Consent signed for parathyroidectomy with intraoperative PTH monitoring.  Rationale for this on the risk benefits  of surgery include infection, bleeding, nerve injury, discussed but all questions answered and consent signed at this visit.

## 2024-08-12 NOTE — PROGRESS NOTES
Surgical Oncology Follow Up       [facility] [place]  [address] [facility] [address]  [phone]    Sushma Ambriz  1940  [id number]  [facility] [place]  [address] [facility] [address]  [phone]    Chief Complaint   Patient presents with    Follow-up       Assessment/Plan:    No problem-specific Assessment & Plan notes found for this encounter.       Diagnoses and all orders for this visit:    Hyperparathyroidism (HCC)    Parathyroid adenoma      Advance Care Planning/Advance Directives:  Discussed disease status, cancer treatment plans and/or cancer treatment goals with the patient.     Oncology History    No history exists.       History of Present Illness: Patient is a 83-year-old woman who was found to have elevated calcium levels on routine blood work.  This led to further workup given suspicion for hyperparathyroidism.  PTH levels were also found to be elevated.  She underwent imaging studies including CAT scan which were potential targets.  She has been referred for evaluation and treatment.     Primary complaint involves fatigue, achiness, and GERD symptoms.  No history of kidney stones.  She also feels somewhat anxious and has some brain fog.  No personal or family history of endocrine malignancies.     -Interval History: She is here for preop.    Review of Systems:  Review of Systems   Constitutional:  Positive for fatigue.   HENT: Negative.     Eyes: Negative.    Respiratory: Negative.     Cardiovascular: Negative.    Gastrointestinal: Negative.    Endocrine: Negative.    Genitourinary: Negative.    Musculoskeletal:  Positive for arthralgias and myalgias.   Skin: Negative.    Allergic/Immunologic: Negative.    Neurological: Negative.    Hematological: Negative.    Psychiatric/Behavioral:  Positive for decreased concentration.    All other  systems reviewed and are negative.      Patient Active Problem List   Diagnosis    Vitamin D deficiency    Transient ischemic attack    Osteoarthritis    Essential hypertension    Hypercholesterolemia    Hyperglycemia    Complex partial seizure (HCC)    Midline cystocele    Anxiety    Personal history of breast cancer    Personal history of radiation therapy    Hiatal hernia    Allergic rhinitis    Dyspnea on exertion    Abnormal EKG    Trigeminy    Cigarette nicotine dependence in remission    Peripheral edema    Hypercalcemia    Hyperparathyroidism (HCC)    Parathyroid adenoma    Walker as ambulation aid    OAB (overactive bladder)    Age-related osteoporosis without current pathological fracture    Right bundle branch block (RBBB)     Past Medical History:   Diagnosis Date    Cancer (HCC)     left breast    Elevated serum alkaline phosphatase level     GERD (gastroesophageal reflux disease)     Glaucoma     Hiatal hernia     Hypercalcemia     Hyperglycemia     diet controlled    Hypertension     Lyme disease     Rheumatic fever     Seizures (HCC)     TIA (transient ischemic attack)     Vitamin D deficiency      Past Surgical History:   Procedure Laterality Date    APPENDECTOMY      BREAST LUMPECTOMY Left     COLONOSCOPY      FOOT SURGERY Left     ORIF ANKLE FRACTURE Left     TONSILLECTOMY      TUBAL LIGATION       Family History   Problem Relation Age of Onset    Breast cancer Mother     Coronary artery disease Father     Bone cancer Brother     Lung cancer Brother      Social History     Socioeconomic History    Marital status:      Spouse name: Not on file    Number of children: Not on file    Years of education: Not on file    Highest education level: Not on file   Occupational History    Occupation: retired   Tobacco Use    Smoking status: Former     Current packs/day: 0.00     Average packs/day: 1 pack/day for 53.0 years (53.0 ttl pk-yrs)     Types: Cigarettes     Start date: 1957     Quit date: 2010      Years since quittin.6    Smokeless tobacco: Never   Vaping Use    Vaping status: Never Used   Substance and Sexual Activity    Alcohol use: Yes     Comment: social    Drug use: No    Sexual activity: Not Currently   Other Topics Concern    Not on file   Social History Narrative    Activities - gardening    Daily coffee consumption- 1 cup per day    Daily tea consumption    Hearing loss     Social Determinants of Health     Financial Resource Strain: Low Risk  (2023)    Overall Financial Resource Strain (CARDIA)     Difficulty of Paying Living Expenses: Not hard at all   Food Insecurity: No Food Insecurity (2024)    Hunger Vital Sign     Worried About Running Out of Food in the Last Year: Never true     Ran Out of Food in the Last Year: Never true   Transportation Needs: No Transportation Needs (2024)    PRAPARE - Transportation     Lack of Transportation (Medical): No     Lack of Transportation (Non-Medical): No   Physical Activity: Not on file   Stress: Not on file   Social Connections: Not on file   Intimate Partner Violence: Not on file   Housing Stability: Low Risk  (2024)    Housing Stability Vital Sign     Unable to Pay for Housing in the Last Year: No     Number of Times Moved in the Last Year: 0     Homeless in the Last Year: No       Current Outpatient Medications:     albuterol (PROVENTIL HFA,VENTOLIN HFA) 90 mcg/act inhaler, Inhale 2 puffs every 6 (six) hours as needed for wheezing (Patient taking differently: Inhale 2 puffs every 6 (six) hours as needed for wheezing PRN), Disp: 1 Inhaler, Rfl: 5    alendronate (Fosamax) 70 mg tablet, Take 1 tablet (70 mg total) by mouth every 7 days .  Take on empty stomach do not eat drink or lie down for half hour, Disp: 12 tablet, Rfl: 3    Alpha-Lipoic Acid 100 MG CAPS, Take by mouth , Disp: , Rfl:     aspirin 81 MG tablet, Take 162 mg by mouth, Disp: , Rfl:     B Complex Vitamins (B COMPLEX 1 PO), Take 2 tablets by mouth daily, Disp: , Rfl:      bimatoprost (LUMIGAN) 0.03 % ophthalmic drops, Administer 1 drop to both eyes daily at bedtime  , Disp: , Rfl:     Cholecalciferol (VITAMIN D) 2000 units CAPS, Take 2 capsules by mouth daily, Disp: , Rfl:     Coenzyme Q10 (COQ10) 100 MG CAPS, Take by mouth, Disp: , Rfl:     diphenhydrAMINE (BENADRYL) 50 MG tablet, Take one 50 mg tablet 1 hour prior to CT scan., Disp: 1 tablet, Rfl: 0    furosemide (LASIX) 20 mg tablet, TAKE 1 TABLET BY MOUTH EVERY DAY, Disp: 90 tablet, Rfl: 2    gabapentin (NEURONTIN) 300 mg capsule, TAKE 1 CAPSULE BY MOUTH AT BEDTIME AS NEEDED FOR PAIN, Disp: 90 capsule, Rfl: 3    GLUCOSAMINE-MSM-HYALURONIC ACD PO, Take 1 capsule by mouth 3 (three) times a day, Disp: , Rfl:     GRAPE SEED EXTRACT PO, Take by mouth, Disp: , Rfl:     hydrOXYzine HCL (ATARAX) 10 mg tablet, TAKE 1 TABLET BY MOUTH DAILY AS NEEDED FOR ANXIETY, Disp: 90 tablet, Rfl: 1    levETIRAcetam (KEPPRA) 500 mg tablet, TAKE 1 TABLET BY MOUTH TWICE A DAY, Disp: 180 tablet, Rfl: 1    Lumigan 0.01 % ophthalmic drops, INSTILL 1 DROP INTO BOTH EYES AT BEDTIME, Disp: , Rfl:     metoprolol succinate (TOPROL-XL) 200 MG 24 hr tablet, TAKE 1 TABLET BY MOUTH EVERY DAY, Disp: 90 tablet, Rfl: 3    milk thistle 175 MG tablet, Take 175 mg by mouth daily, Disp: , Rfl:     Multiple Vitamins-Minerals (MULTIVITAMIN ADULT PO), Take 1 capsule by mouth daily  , Disp: , Rfl:     Omega-3 Fatty Acids (FISH OIL PO), Take 1,000 mg by mouth daily  , Disp: , Rfl:     Turmeric 500 MG CAPS, Take 1 capsule by mouth daily  , Disp: , Rfl:     VOLTAREN 1 %, APPLY 2G TOPICALLY TO AFFECTED AREA FOUR TIMES A DAY AS DIRECTED, Disp: 100 g, Rfl: 5    LORazepam (Ativan) 0.5 mg tablet, Take 1 tablet 1 hour prior to MRI (Patient not taking: Reported on 8/12/2024), Disp: 1 tablet, Rfl: 0    metroNIDAZOLE (METROGEL) 0.75 % vaginal gel, Insert 1 Application into the vagina daily at bedtime Avoid alcohol (Patient not taking: Reported on 8/12/2024), Disp: 70 g, Rfl: 0     "potassium bicarbonate (K-LYTE) 25 MEQ disintegrating tablet, Take 25 mEq by mouth 2 (two) times a day (Patient not taking: Reported on 8/12/2024), Disp: , Rfl:     terbinafine (LamISIL) 1 % cream, Apply topically 2 (two) times a day (Patient not taking: Reported on 8/12/2024), Disp: 42 g, Rfl: 0  Allergies   Allergen Reactions    Carbamazepine Myalgia     flu-like symptoms    Epinephrine Other (See Comments)     \"weakness\"    Iodides Itching    Morphine And Codeine     Sulfamethoxazole-Trimethoprim     Latex Rash    Lisinopril Cough    Topiramate Rash     Vitals:    08/12/24 0924   BP: 128/72   Pulse: 61   Resp: 18   Temp: 97.7 °F (36.5 °C)   SpO2: 98%       Physical Exam  Vitals reviewed.   Constitutional:       Appearance: Normal appearance.   HENT:      Head: Normocephalic and atraumatic.      Right Ear: External ear normal.      Left Ear: External ear normal.      Nose: Nose normal.   Eyes:      Extraocular Movements: Extraocular movements intact.      Pupils: Pupils are equal, round, and reactive to light.   Cardiovascular:      Pulses: Normal pulses.      Heart sounds: Normal heart sounds.   Pulmonary:      Breath sounds: Normal breath sounds.   Abdominal:      General: Abdomen is flat.      Palpations: Abdomen is soft.   Musculoskeletal:         General: Normal range of motion.      Cervical back: Normal range of motion and neck supple.   Skin:     General: Skin is warm and dry.   Neurological:      General: No focal deficit present.      Mental Status: She is alert and oriented to person, place, and time.   Psychiatric:         Mood and Affect: Mood normal.         Behavior: Behavior normal.           Results:  Labs:  Lab Results   Component Value Date    .1 (H) 03/15/2024    CALCIUM 10.2 08/05/2024    PHOS 1.8 (LL) 01/14/2018         Imaging    CT  NECK  WITHOUT AND WITH CONTRAST FROM LYNN TO SKULL BASE     INDICATION: Hyperparathyroidism. There is no sestamibi scan for review.     COMPARISON:  " None.     TECHNIQUE:This examination, like all CT scans performed in the [facility], was performed utilizing techniques to minimize radiation dose exposure, including the use of iterative reconstruction and automated exposure control.     Both noncontrast, contrast, and post contrast delayed images were performed according to standard parathyroid protocol (4D technique) Coronal and sagittal reconstructions were performed. 3D reconstructions were performed on an independent workstation,   and are supplied for review.     85 mL of iohexol (OMNIPAQUE) was injected intravenously without immediate consequence.     Radiation dose: 1662 mGy-cm     FINDINGS:     SERIAL NONCONTRAST, POST CONTRAST AND DELAYS: There is a 0.8 x 0.4 x 0.6 cm lesion identified in the right neck which meets the CT enhancement criteria typical for parathyroid adenoma. The lesion is located in the prevertebral space closely applied to   the esophagus, about 3 cm above the sternal notch, just below the level of the inferior pole of the right thyroid lobe. Lesion is best seen on series 4 images 156 through 160, and also series 601 image 67.     Second lesion is identified in the left neck measuring 0.3 x 1.2 x 1.3 cm. This lesion also meets the CT enhancement criteria typical for parathyroid adenoma. The lesion sits in the prevertebral space closely applied to the esophagus approximately 4.5 to   5 cm above the sternal notch, posterior to the superior aspect of the left thyroid lobe.     No other suspicious lesions are identified.     VASCULAR STRUCTURES:  There is no carotid stenosis by NASCET criteria.     SUPRAHYOID NECK:  Normal oropharynx, oral cavity, parapharyngeal and retropharyngeal spaces.     INFRAHYOID NECK:  Normal oropharynx, oral cavity, parapharyngeal and retropharyngeal     THYROID GLAND:  there is an approximately 1 cm nodule noted in the mid right thyroid lobe. This was noted previously spaces. On an ultrasound of  the thyroid performed April 10, 2018, and at least by size criteria does not appear to be changed. There are no new thyroid   nodules.     LYMPH NODES:  No pathologic or enlarged adenopathy.     MEDIASTINUM:  Unremarkable.     BONY STRUCTURES  Diffusely lone bone density is noted. There are mild degenerative changes noted in the lower cervical spine.     LUNG APICES:  Centrilobular emphysema is present.     IMPRESSION:     1. 0.8 x 0.4 x 0.6 cm nodule identified in the right neck as described above meets the CT enhancement criteria typical of a parathyroid adenoma.  2. 0.3 x 1.2 x 1.3 cm nodule identified in the left neck as described above also meets the CT enhancement criteria typical for parathyroid adenoma.  3. Right thyroid nodule again noted. When compared with thyroid ultrasound appears to be stable.  4. Diffusely low bone density noted.     DXA bone density spine hip and pelvis    Result Date: 7/31/2024  Narrative: CENTRAL  DXA SCAN CLINICAL HISTORY:   83 year old post-menopausal  female risk factors include the study noted low bone mass. Additional medical history: Breast cancer with treatment 2007. Hyperparathyroid condition. Seizure disorder with treatment. TECHNIQUE: Bone densitometry was performed using a [employer] Horizon W bone densitometer.  Regions of interest appear properly placed. There are no obvious fractures or other confounding variables which could limit the study. COMPARISON: Follow-up RESULTS: LUMBAR SPINE:  L1-L4: BMD 0.964 gm/cm2 T-score -0.8 and 4% less than 2022.5% higher than 2007. Statistically significant changes. Z-score 2.0 LEFT TOTAL HIP: BMD 0.800 gm/cm2 T-score -1.2 Z-score 1.0 LEFT FEMORAL NECK: BMD 0.565 gm/cm2 T-score -2.6 and 11% less than 2022 and 16% less than 2007, statistically significant change. Z-score 0.2 The left forearm BMD is 0.580 and the T score is -1.9 and 3% less than 2022 and unchanged from 2013. The Z score is 1.4. A 25 hydroxy vitamin D level, an  intact PTH, and a comprehensive metabolic panel are suggested in this patient. Treatment may be a consideration after excluding secondary causes for osteoporosis and might include Prolia for a period of time followed by intravenous Reclast.     Impression: 1. Based on the WHO classification, this study identifies a diagnosis of osteoporosis, notable at the femoral neck area and the patient is considered at  risk for fracture. 2. A daily intake of calcium of at least 1200 mg and vitamin D, 800-1000 IU, as well as weight bearing and muscle strengthening exercise, fall prevention and avoidance of tobacco and excessive alcohol intake as basic preventive measures are recommended. 3. Repeat DXA scan on the same equipment in 18-24 months as clinically indicated. The 10 year risk of hip fracture is 8.1%, with the 10 year risk of major osteoporotic fracture being 25%, as calculated by the WHO fracture risk assessment tool (FRAX).  The current NOF guidelines recommend treating patients with FRAX 10 year risk score of >3% for hip fracture and >20% for major osteoporotic fracture. Fracture risk exceeds treatment thresholds on the FRAX score. WHO CLASSIFICATION: Normal (a T-score of -1.0 or higher) Low bone mineral density (a T-score of less than -1.0 but higher than -2.5) Osteoporosis (a T-score of -2.5 or less) Severe osteoporosis (a T-score of -2.5 or less with a fragility fracture) Thank you for allowing us the opportunity to participate in your patient care. The expanded DEXA report will no longer be arriving in your mail. If you desire to view the full report please contact [facility] medical records or access the PACS system. Workstation performed: [id number]     I reviewed the above laboratory and imaging data.    Discussion/Summary: 83-year-old woman here for preop for parathyroid exploration and resection.  Consent signed for parathyroidectomy with intraoperative PTH monitoring.  Rationale for this on the risk benefits  of surgery include infection, bleeding, nerve injury, discussed but all questions answered and consent signed at this visit.

## 2024-08-13 ENCOUNTER — TELEPHONE (OUTPATIENT)
Dept: SURGICAL ONCOLOGY | Facility: CLINIC | Age: 84
End: 2024-08-13

## 2024-08-13 ENCOUNTER — PATIENT OUTREACH (OUTPATIENT)
Dept: CASE MANAGEMENT | Facility: OTHER | Age: 84
End: 2024-08-13

## 2024-08-13 ENCOUNTER — PREP FOR PROCEDURE (OUTPATIENT)
Dept: SURGICAL ONCOLOGY | Facility: CLINIC | Age: 84
End: 2024-08-13

## 2024-08-13 NOTE — PROGRESS NOTES
Pt called inquiring what the name is of her surgeon who will be doing her surgery on 8/27. I reviewed chart and it is Dr Keller. Provided pt the contact number and she was appreciative as this was her question.

## 2024-08-13 NOTE — TELEPHONE ENCOUNTER
LILIANA for patient making her aware that she will be the second case for surgery on August 27, and that she should plan to arrive between 8:30-9am. I also made her aware that she needs a ride home after surgery with a friend. She can not leave by herself with a lyft or bus. [employer] number given if she has any questions.

## 2024-08-13 NOTE — TELEPHONE ENCOUNTER
Pt called back and confirmed she did received the VM. I reconfirmed information from [facility]IV goetz to pt which she acknowledges. Pt had no more questions at this time.

## 2024-08-15 ENCOUNTER — TELEPHONE (OUTPATIENT)
Dept: ANESTHESIOLOGY | Facility: CLINIC | Age: 84
End: 2024-08-15

## 2024-08-15 ENCOUNTER — TELEPHONE (OUTPATIENT)
Age: 84
End: 2024-08-15

## 2024-08-15 NOTE — TELEPHONE ENCOUNTER
Patient states the Fosamax is causing Diarrhea. She is asking what she can do to lessen the diarrhea. She is mainly concerned because she is going in for an xray and some other appts and does not want to have an episode while out.

## 2024-08-15 NOTE — TELEPHONE ENCOUNTER
Patient called back stating that the [facility] in [place] needs to have verification from Dr. Sanchez in order to deliver Imodium to patient's home.  Please advise.

## 2024-08-15 NOTE — TELEPHONE ENCOUNTER
Patient can use the brat diet, bananas rice applesauce toast crackers unsalted pretzels.  Patient should also use Imodium A-D over-the-counter.  If this is not effective patient should make appointment

## 2024-08-16 ENCOUNTER — APPOINTMENT (OUTPATIENT)
Dept: RADIOLOGY | Age: 84
End: 2024-08-16
Payer: MEDICARE

## 2024-08-16 DIAGNOSIS — E21.3 HYPERPARATHYROIDISM (HCC): ICD-10-CM

## 2024-08-16 PROCEDURE — 71046 X-RAY EXAM CHEST 2 VIEWS: CPT

## 2024-08-19 ENCOUNTER — PATIENT OUTREACH (OUTPATIENT)
Dept: CASE MANAGEMENT | Facility: OTHER | Age: 84
End: 2024-08-19

## 2024-08-19 ENCOUNTER — OFFICE VISIT (OUTPATIENT)
Dept: CARDIOLOGY CLINIC | Facility: CLINIC | Age: 84
End: 2024-08-19
Payer: MEDICARE

## 2024-08-19 VITALS
BODY MASS INDEX: 29.37 KG/M2 | HEIGHT: 64 IN | HEART RATE: 68 BPM | DIASTOLIC BLOOD PRESSURE: 82 MMHG | SYSTOLIC BLOOD PRESSURE: 135 MMHG | WEIGHT: 172 LBS

## 2024-08-19 DIAGNOSIS — R06.09 DYSPNEA ON EXERTION: Primary | ICD-10-CM

## 2024-08-19 DIAGNOSIS — Z01.818 PREOPERATIVE CLEARANCE: ICD-10-CM

## 2024-08-19 DIAGNOSIS — E21.3 HYPERPARATHYROIDISM (HCC): ICD-10-CM

## 2024-08-19 DIAGNOSIS — R94.31 ABNORMAL EKG: ICD-10-CM

## 2024-08-19 DIAGNOSIS — E83.52 HYPERCALCEMIA: ICD-10-CM

## 2024-08-19 DIAGNOSIS — D35.1 PARATHYROID ADENOMA: ICD-10-CM

## 2024-08-19 DIAGNOSIS — I45.10 RIGHT BUNDLE BRANCH BLOCK (RBBB): ICD-10-CM

## 2024-08-19 PROCEDURE — 99204 OFFICE O/P NEW MOD 45 MIN: CPT | Performed by: STUDENT IN AN ORGANIZED HEALTH CARE EDUCATION/TRAINING PROGRAM

## 2024-08-19 NOTE — PROGRESS NOTES
Received incoming call from pt requesting verification of appt today with Cardiology.Pt does not have My Chart and was confused on dates and wanted appt verified. Reminded pt to take AVS from her appt today. She has [employer] arranged for transport. Then discussed appt for tomorrow, which is virtual. Pt also had some questions about paperwork. Pt appreciative of the information.

## 2024-08-19 NOTE — PROGRESS NOTES
[facility] [place]  [address] [facility]  [place] [address]  Phone#  [phone]  Fax#  [phone]  [facility] Cardiology Office Consultation             NAME: Sushma Ambriz  AGE: 83 y.o. SEX: female   : 1940   MRN: [id number]    DATE: 2024  TIME: 4:26 PM      Assessment and recommendations    1. Dyspnea on exertion  Echo complete w/ contrast if indicated      2. Preoperative clearance  Ambulatory Referral to Cardiology    Echo complete w/ contrast if indicated      3. Hyperparathyroidism (HCC)  Ambulatory Referral to Cardiology      4. Parathyroid adenoma  Ambulatory Referral to Cardiology      5. Hypercalcemia  Ambulatory Referral to Cardiology      6. Abnormal EKG  Ambulatory Referral to Cardiology      7. Right bundle branch block (RBBB)  Ambulatory Referral to Cardiology           Pre-Op Evaluation Assessment-  Cardiac Risk Estimation:    RCRI risk factors: history of cerebrovascular disease  RCI RISK CLASS II (1 risk factor, risk of major cardiac compl. appr. 1.3%)    No contraindications to planned surgery  No cardiac contraindications to planned surgery    Low Risk for major adverse cardiac event (MACE).   Patient may proceed with surgery as planned without further workup.    Patient does have new right bundle branch block seen on EKG as well as worsening fatigue with dyspnea on exertion.  Unable to give me an exact timeframe for this, but would like to rule out right heart disease/RV dilation.  Will obtain an echocardiogram.  Ideally want this done before surgery, but should not delay surgery.    We discussed risks and benefits of starting a statin medication for elevated LDL cholesterol.  She is not interested in starting any medications at this time, but would potentially consider it after her surgery is complete.  Will readdress at next appointment.      Chief Complaint   Patient presents with    Advice Only     Pt here for pre op clearance    Shortness of Breath     Irregular Heart Beat    Foot Swelling       HPI:    Sushma Ambriz is a 83 y.o.-year-old female who presents to the cardiology clinic for initial consultation.    She presents for preoperative evaluation.  Her past medical history is significant for transient ischemic attack, seizures, rheumatic fever, vitamin D deficiency with hyperparathyroidism and hypercalcemia.    She denies any chest pain or chest discomfort.  Reports that over the past several months, she has noticed increased fatigue as well as dyspnea on exertion.  States that she was unable to walk from our waiting room to the exam room without becoming short of breath.  She was told that this was related to her hypercalcemia, and should improve after her parathyroid surgery.  During her preoperative evaluation, she had an EKG that showed new right bundle branch block compared to EKG obtained in 2021.    I reviewed her old cardiac imaging.  She had a dobutamine stress echocardiogram done in 2021.  She had normal cardiac function at baseline and with stress.  At that time, she also had frequent PVCs prior to the stress test.  PVCs were not exacerbated by dobutamine.  She also had normal PFTs obtained in 2021.  History of rheumatic fever.  I did not see evidence of rheumatic heart disease on her stress echocardiogram in 2021.    Exercise Capacity:  Able to walk 4 blocks without symptoms?: No, reports fatigue limiting ambulation  Able to walk 2 flights without symptoms?: No    Personal history of venous thromboembolic disease? No    Assessment of Cardiac Contraindications:  No history of severe angina or MI in the last 6 weeks. No recent PCI.  No recent decompensated heart failure   No recent serious arrhythmias   No known severe heart valve disease including severe aortic stenosis or symptomatic mitral stenosis      Past history, family history, social history, current medications, vital signs, recent lab and imaging studies and  prior cardiology  "studies reviewed independently on this visit.    Allergies   Allergen Reactions    Carbamazepine Myalgia     flu-like symptoms    Epinephrine Other (See Comments)     \"weakness\"    Iodides Itching    Morphine And Codeine     Sulfamethoxazole-Trimethoprim     Latex Rash    Lisinopril Cough    Topiramate Rash       Current Outpatient Medications:     albuterol (PROVENTIL HFA,VENTOLIN HFA) 90 mcg/act inhaler, Inhale 2 puffs every 6 (six) hours as needed for wheezing (Patient taking differently: Inhale 2 puffs every 6 (six) hours as needed for wheezing PRN), Disp: 1 Inhaler, Rfl: 5    alendronate (Fosamax) 70 mg tablet, Take 1 tablet (70 mg total) by mouth every 7 days .  Take on empty stomach do not eat drink or lie down for half hour, Disp: 12 tablet, Rfl: 3    Alpha-Lipoic Acid 100 MG CAPS, Take by mouth , Disp: , Rfl:     aspirin 81 MG tablet, Take 162 mg by mouth, Disp: , Rfl:     B Complex Vitamins (B COMPLEX 1 PO), Take 2 tablets by mouth daily, Disp: , Rfl:     bimatoprost (LUMIGAN) 0.03 % ophthalmic drops, Administer 1 drop to both eyes daily at bedtime  , Disp: , Rfl:     Cholecalciferol (VITAMIN D) 2000 units CAPS, Take 2 capsules by mouth daily, Disp: , Rfl:     Coenzyme Q10 (COQ10) 100 MG CAPS, Take by mouth, Disp: , Rfl:     diphenhydrAMINE (BENADRYL) 50 MG tablet, Take one 50 mg tablet 1 hour prior to CT scan., Disp: 1 tablet, Rfl: 0    furosemide (LASIX) 20 mg tablet, TAKE 1 TABLET BY MOUTH EVERY DAY, Disp: 90 tablet, Rfl: 2    gabapentin (NEURONTIN) 300 mg capsule, TAKE 1 CAPSULE BY MOUTH AT BEDTIME AS NEEDED FOR PAIN, Disp: 90 capsule, Rfl: 3    GLUCOSAMINE-MSM-HYALURONIC ACD PO, Take 1 capsule by mouth 3 (three) times a day, Disp: , Rfl:     GRAPE SEED EXTRACT PO, Take by mouth, Disp: , Rfl:     hydrOXYzine HCL (ATARAX) 10 mg tablet, TAKE 1 TABLET BY MOUTH DAILY AS NEEDED FOR ANXIETY, Disp: 90 tablet, Rfl: 1    levETIRAcetam (KEPPRA) 500 mg tablet, TAKE 1 TABLET BY MOUTH TWICE A DAY, Disp: 180 tablet, " Rfl: 1    Lumigan 0.01 % ophthalmic drops, INSTILL 1 DROP INTO BOTH EYES AT BEDTIME, Disp: , Rfl:     metoprolol succinate (TOPROL-XL) 200 MG 24 hr tablet, TAKE 1 TABLET BY MOUTH EVERY DAY, Disp: 90 tablet, Rfl: 3    metroNIDAZOLE (METROGEL) 0.75 % vaginal gel, Insert 1 Application into the vagina daily at bedtime Avoid alcohol, Disp: 70 g, Rfl: 0    milk thistle 175 MG tablet, Take 175 mg by mouth daily, Disp: , Rfl:     Multiple Vitamins-Minerals (MULTIVITAMIN ADULT PO), Take 1 capsule by mouth daily  , Disp: , Rfl:     Omega-3 Fatty Acids (FISH OIL PO), Take 1,000 mg by mouth daily  , Disp: , Rfl:     Turmeric 500 MG CAPS, Take 1 capsule by mouth daily  , Disp: , Rfl:     VOLTAREN 1 %, APPLY 2G TOPICALLY TO AFFECTED AREA FOUR TIMES A DAY AS DIRECTED, Disp: 100 g, Rfl: 5    LORazepam (Ativan) 0.5 mg tablet, Take 1 tablet 1 hour prior to MRI (Patient not taking: Reported on 8/12/2024), Disp: 1 tablet, Rfl: 0    potassium bicarbonate (K-LYTE) 25 MEQ disintegrating tablet, Take 25 mEq by mouth 2 (two) times a day (Patient not taking: Reported on 8/12/2024), Disp: , Rfl:     terbinafine (LamISIL) 1 % cream, Apply topically 2 (two) times a day (Patient not taking: Reported on 8/12/2024), Disp: 42 g, Rfl: 0    Past Medical History:   Diagnosis Date    Cancer (HCC)     left breast    Elevated serum alkaline phosphatase level     GERD (gastroesophageal reflux disease)     Glaucoma     Hiatal hernia     Hypercalcemia     Hyperglycemia     diet controlled    Hypertension     Lyme disease     Rheumatic fever     Seizures (HCC)     TIA (transient ischemic attack)     Vitamin D deficiency      Past Surgical History:   Procedure Laterality Date    APPENDECTOMY      BREAST LUMPECTOMY Left     COLONOSCOPY      FOOT SURGERY Left     ORIF ANKLE FRACTURE Left     TONSILLECTOMY      TUBAL LIGATION       Family History   Problem Relation Age of Onset    Breast cancer Mother     Coronary artery disease Father     Bone cancer Brother      Lung cancer Brother      Social History   reports that she quit smoking about 14 years ago. Her smoking use included cigarettes. She started smoking about 67 years ago. She has a 53 pack-year smoking history. She has never used smokeless tobacco. She reports current alcohol use. She reports that she does not use drugs.     Review of Systems   Constitutional:  Positive for fatigue.   HENT: Negative.     Eyes: Negative.    Respiratory:  Positive for shortness of breath.    Cardiovascular: Negative.    Genitourinary: Negative.    Musculoskeletal: Negative.    Neurological:  Positive for seizures.       Objective:     Vitals:    08/19/24 1610   BP: 135/82   Pulse:      Physical Exam  HENT:      Head: Normocephalic and atraumatic.   Cardiovascular:      Rate and Rhythm: Normal rate and regular rhythm.      Heart sounds: No murmur heard.     No friction rub. No gallop.   Pulmonary:      Effort: Pulmonary effort is normal.      Breath sounds: Normal breath sounds.   Chest:      Chest wall: No tenderness.   Abdominal:      Palpations: Abdomen is soft.   Musculoskeletal:      Cervical back: Neck supple.   Neurological:      Mental Status: She is alert.         Pertinent Laboratory/Diagnostic Studies:    Laboratory studies reviewed personally by Philly Beckett MD    BMP:   Lab Results   Component Value Date    SODIUM 140 08/05/2024    K 4.2 08/05/2024     08/05/2024    CO2 25 08/05/2024    BUN 15 08/05/2024    CREATININE 0.72 08/05/2024    GLUC 119 (H) 11/23/2022    CALCIUM 10.2 08/05/2024     CBC:  Lab Results   Component Value Date    WBC 8.33 08/05/2024    HGB 14.0 08/05/2024    HCT 43.9 08/05/2024    MCV 94 08/05/2024     08/05/2024     Lipid Profile:   Lab Results   Component Value Date    HDL 63 07/01/2024     Lab Results   Component Value Date    LDLCALC 139 (H) 07/01/2024     Lab Results   Component Value Date    TRIG 67 07/01/2024      Other labs:  Lab Results   Component Value Date    RRE3MSBXOKIN  "1.853 07/01/2024    TSH 1.47 04/26/2018    TSH 1.47 04/26/2018     Lab Results   Component Value Date    ALT 15 07/01/2024    AST 23 07/01/2024       Imaging Studies:     Pertinent cardiac studies and imaging studies were personally reviewed on this visit and results summarized.    Philly Beckett MD, PhD    Portions of the record may have been created with voice recognition software.  Occasional wrong word or \"sound alike\" substitutions may have occurred due to the inherent limitations of voice recognition software.  Read the chart carefully and recognize, using context, where substitutions have occurred. Please reach out to me directly for any clarifications.   "

## 2024-08-19 NOTE — PATIENT INSTRUCTIONS
Pre-Op Evaluation Assessment-  Cardiac Risk Estimation:    RCRI risk factors: history of cerebrovascular disease  RCI RISK CLASS II (1 risk factor, risk of major cardiac compl. appr. 1.3%)    No contraindications to planned surgery  No cardiac contraindications to planned surgery    Low Risk for major adverse cardiac event (MACE).   Patient may proceed with surgery as planned without further workup.

## 2024-08-20 ENCOUNTER — HOSPITAL ENCOUNTER (OUTPATIENT)
Dept: NON INVASIVE DIAGNOSTICS | Facility: CLINIC | Age: 84
Discharge: HOME | End: 2024-08-20
Payer: MEDICARE

## 2024-08-20 ENCOUNTER — TELEMEDICINE (OUTPATIENT)
Dept: ANESTHESIOLOGY | Facility: CLINIC | Age: 84
End: 2024-08-20
Payer: MEDICARE

## 2024-08-20 VITALS
BODY MASS INDEX: 29.36 KG/M2 | HEIGHT: 64 IN | SYSTOLIC BLOOD PRESSURE: 135 MMHG | WEIGHT: 171.96 LBS | DIASTOLIC BLOOD PRESSURE: 82 MMHG | HEART RATE: 70 BPM

## 2024-08-20 DIAGNOSIS — Z01.818 PREOPERATIVE CLEARANCE: ICD-10-CM

## 2024-08-20 DIAGNOSIS — F17.211 CIGARETTE NICOTINE DEPENDENCE IN REMISSION: ICD-10-CM

## 2024-08-20 DIAGNOSIS — R06.09 DYSPNEA ON EXERTION: ICD-10-CM

## 2024-08-20 DIAGNOSIS — C50.319 MALIGNANT NEOPLASM OF LOWER-INNER QUADRANT OF FEMALE BREAST, UNSPECIFIED ESTROGEN RECEPTOR STATUS, UNSPECIFIED LATERALITY (HCC): ICD-10-CM

## 2024-08-20 DIAGNOSIS — E21.3 HYPERPARATHYROIDISM (HCC): ICD-10-CM

## 2024-08-20 DIAGNOSIS — G40.209 COMPLEX PARTIAL SEIZURE (HCC): ICD-10-CM

## 2024-08-20 DIAGNOSIS — I10 ESSENTIAL HYPERTENSION: Primary | ICD-10-CM

## 2024-08-20 PROCEDURE — 93306 TTE W/DOPPLER COMPLETE: CPT | Performed by: INTERNAL MEDICINE

## 2024-08-20 PROCEDURE — 93306 TTE W/DOPPLER COMPLETE: CPT

## 2024-08-20 PROCEDURE — 99442 PR PHYS/QHP TELEPHONE EVALUATION 11-20 MIN: CPT | Performed by: NURSE PRACTITIONER

## 2024-08-20 NOTE — PROGRESS NOTES
[facility]  CONSULT: GERIATRIC SURGERY     Brief Visit    Name: Sushma Ambriz      : 1940      MRN: [id number]  Encounter Provider: MARIE Renner  Encounter Date: 2024   Encounter department: [facility] [place]    This Visit is being completed by telephone. The Patient is located at Home and in the following state in which I hold an active license [place]    The patient was identified by name and date of birth. Sushma Ambriz was informed that this is a telemedicine visit and that the visit is being conducted through Telephone.  My office door was closed. No one else was in the room.  She acknowledged consent and understanding of privacy and security of the platform. The patient has agreed to participate and understands they can discontinue the visit at any time.    Patient is aware this is a billable service.     Assessment & Plan   83  year old female referred to [facility] for pre-surgery geriatric screening 2.2 age   Other consult concerns include: surgical optimization & BEST program.  ADVANCED AGE   Advanced age, HTN, CKD, hx of breast ca- on lasix   She is scheduled on   Case: [id number] Date/Time: 24 1030   Procedures:      PARATHYROIDECTOMY, 4 GLAND EXPLORATION (Bilateral: Neck)      INTRAOPERATIVE PTH MONITORING (Neck)   Anesthesia type: General   Diagnosis:      Hyperparathyroidism (HCC) [E21.3]      Parathyroid adenoma [D35.1]   Pre-op diagnosis:      Hyperparathyroidism (HCC) [E21.3]      Parathyroid adenoma [D35.1]   Location: BE OR ROOM  / [facility]   Surgeons: YELITZA Ventura     LAST ANESTHESIA   No concerns    Surgery prep:  Final MC after CC & ECHO 8.20      1. Essential hypertension  STABLE   Unable to check BP at home   Will have final MC after final CC and echo today 8.20  METS 6.36  DENIES CP/ DENIES SOB   Pre-Op Evaluation Assessment-  Cardiac Risk Estimation:     RCRI risk  "factors: history of cerebrovascular disease  RCI RISK CLASS II (1 risk factor, risk of major cardiac compl. appr. 1.3%)     No contraindications to planned surgery  No cardiac contraindications to planned surgery     Low Risk for major adverse cardiac event (MACE).   Patient may proceed with surgery as planned without further workup.     Patient does have new right bundle branch block seen on EKG as well as worsening fatigue with dyspnea on exertion.  Unable to give me an exact timeframe for this, but would like to rule out right heart disease/RV dilation.  Will obtain an echocardiogram.  Ideally want this done before surgery, but should not delay surgery.      2. Complex partial seizure (HCC)  STABLE   Long time ago- \"like 20 years ago\"    On seizure meds and stable  levETIRAcetam (KEPPRA) 500 mg tablet [[id number]]    Order Details  Dose: 500 mg Route: Oral Frequency: 2 times daily   Dispense Quantity: 180 tablet Refills: 1            3. Cigarette nicotine dependence in remission  STABLE   Ex smoker   No longer smokes       4. Malignant neoplasm of lower-inner quadrant of female breast, unspecified estrogen receptor status, unspecified laterality (HCC)  STABLE   No concerns     History of Present Illness     83 year old female referred to [facility] for pre-surgery geriatric screening.  Other consult concerns include:Advanced age, HTN, CKD, hx of breast ca- on lasix     I spoke to patient on the phone today.  We did not connect via video.  Patient was pleasant and a pleasure to care for.  She was offered a live visit in the SOC and declined.  Does not have a ride.  And is also has an appointment at 3:00 today.  Prefers to telephone for convenience.    Lives alone with her cat.  She is independent with all ADLs.  I do recommend inpatient geriatric consult if admitted to the hospital after surgery.  At this time this is an outpatient procedure.  Has support from her sister.  Her sister is taking her to her echocardiogram " today.  The patient states that she can drive but finds it to expensive to drive and keep a car.  She takes a [employer] to [place] [employer]   To expensive to drive.    Sister will take to her ECHO today     Admits to being in well health today.  Denies any new developments in her health.  METS is 6.  Denies chest pain.  Denies shortness of breath.  Feels safe at home  As always we discussed having your BEST surgery, and BEST recovery.  Surgery goals reviewed today.      Breathing exercises   Patient was encouraged to begin lung exercises today.  This could be accomplished through deep breathing and cough exercises.     Eating/nutrition   Encouraged patient to increase oral protein intake prior to surgery.    This can be accomplished by consuming chicken, fish, tuna fish, cottage cheese, cheese, eggs, Greek yogurt, and protein shakes as needed.  I encouraged use of protein shakes such ENLIVE.  I also recommended making your own protein shakes with protein powder.   Sleep/Stress management  Patient was encouraged to rest their body prior to surgery.  Encouraged attempting to get 8 hours of sleep at night.  Avoid stress.  Avoid sick contacts.  Encouraged to find a relaxing hobby such as reading, meditation, listening to music.  Training exercises  Patient was encouraged to remain active as possible.  Today bilateral lower extremity generic exercises were taught for muscle strengthening and balance.  All exercises to be done sitting down.         Denies fevers and denies chills  Denies congestion and denies sore throat  Denies chest pain  Denies palpitations  Denies shortness of breath  Denies abdominal pain  Denies nausea, vomiting, and diarrhea  Denies any issues with their skin... example NO open wounds or sores  Denies rashes  Denies difficulty urinating  Denies any issues with their urine... example a dark color or an odor  Denies dizziness  Denies headaches  Denies confusion and denies hallucinations    Admits to  being in well health today       Physical Assessment   We did not connect via video  Patient was alert and orientated times 3  Mood appropriate  Thought appropriate    I heard no respiratory distress over the phone today        Visit Time  Total Visit Duration: 20 minutes

## 2024-08-20 NOTE — PROGRESS NOTES
[facility]  CONSULT       Patient has the ability to take their vital signs at home NO  Allergies reviewed today   PMH reviewed today   Medications reviewed today     See Geriatric Assessment below...  Cognitive Assessment:   CAM:   TUG <15 sec:  Falls (last 6 months): no  Hand  score:  -Attempt 1:  -Attempt 2:  -Attempt 3:  Raul Total Score: 18  PHQ- 9 Depression Scale: 0  Nutrition Assessment Score:14  METS: 6.36  Incentive Spirometry Level:   Health goals:  -What are your overall health goals?     -What brings you strength? family    -What activities are important to you?      As always we discussed having your BEST surgery, and BEST recovery.  Surgery goals reviewed today.      Breathing exercises   Patient was encouraged to begin lung exercises today.  This could be accomplished through deep breathing and cough exercises.    Eating/nutrition   Encouraged patient to increase oral protein intake prior to surgery.  This can be accomplished by consuming chicken, fish, tuna fish, cottage cheese, cheese, eggs, Greek yogurt, and protein shakes as needed.  I encouraged use of protein shakes such ENLIVE.  I also recommended making your own protein shakes with protein powder.   Sleep/Stress management  Patient was encouraged to rest their body prior to surgery.  Encouraged attempting to get 8 hours of sleep at night.  Avoid stress.  Avoid sick contacts.  Encouraged to find a relaxing hobby such as reading, meditation, listening to music.  Training exercises  Patient was encouraged to remain active as possible.  Today bilateral lower extremity generic exercises were taught for muscle strengthening and balance.  All exercises to be done sitting down.

## 2024-08-21 ENCOUNTER — PREP FOR PROCEDURE (OUTPATIENT)
Dept: SURGICAL ONCOLOGY | Facility: CLINIC | Age: 84
End: 2024-08-21

## 2024-08-21 ENCOUNTER — PATIENT OUTREACH (OUTPATIENT)
Dept: CASE MANAGEMENT | Facility: OTHER | Age: 84
End: 2024-08-21

## 2024-08-21 LAB
AORTIC ROOT: 3 CM
APICAL FOUR CHAMBER EJECTION FRACTION: 73 %
ASCENDING AORTA: 3.8 CM
AV LVOT MEAN GRADIENT: 1 MMHG
AV LVOT PEAK GRADIENT: 2 MMHG
AV REGURGITATION PRESSURE HALF TIME: 1183 MS
BSA FOR ECHO PROCEDURE: 1.83 M2
DOP CALC LVOT AREA: 3.46 CM2
DOP CALC LVOT CARDIAC INDEX: 1.95 L/MIN/M2
DOP CALC LVOT CARDIAC OUTPUT: 3.57 L/MIN
DOP CALC LVOT DIAMETER: 2.1 CM
DOP CALC LVOT PEAK VEL VTI: 15.84 CM
DOP CALC LVOT PEAK VEL: 0.72 M/S
DOP CALC LVOT STROKE INDEX: 29 ML/M2
DOP CALC LVOT STROKE VOLUME: 54.84
E WAVE DECELERATION TIME: 223 MS
E/A RATIO: 0.71
FRACTIONAL SHORTENING: 32 (ref 28–44)
GLOBAL LONGITUIDAL STRAIN: -16 %
INTERVENTRICULAR SEPTUM IN DIASTOLE (PARASTERNAL SHORT AXIS VIEW): 1.4 CM
INTERVENTRICULAR SEPTUM: 1.4 CM (ref 0.6–1.1)
LAAS-AP2: 20.1 CM2
LAAS-AP4: 27.9 CM2
LEFT ATRIUM AREA SYSTOLE SINGLE PLANE A4C: 34 CM2
LEFT ATRIUM SIZE: 4.9 CM
LEFT ATRIUM VOLUME (MOD BIPLANE): 94 ML
LEFT ATRIUM VOLUME INDEX (MOD BIPLANE): 51.4 ML/M2
LEFT INTERNAL DIMENSION IN SYSTOLE: 2.8 CM (ref 2.1–4)
LEFT VENTRICULAR INTERNAL DIMENSION IN DIASTOLE: 4.1 CM (ref 3.5–6)
LEFT VENTRICULAR POSTERIOR WALL IN END DIASTOLE: 1.4 CM
LEFT VENTRICULAR STROKE VOLUME: 44 ML
LVSV (TEICH): 44 ML
MV E'TISSUE VEL-SEP: 7 CM/S
MV PEAK A VEL: 0.92 M/S
MV PEAK E VEL: 65 CM/S
MV STENOSIS PRESSURE HALF TIME: 65 MS
MV VALVE AREA P 1/2 METHOD: 3.38
RIGHT ATRIUM AREA SYSTOLE A4C: 14 CM2
RIGHT VENTRICLE ID DIMENSION: 3.4 CM
SINOTUBULAR JUNCTION: 2.6 CM
SL CV AV DECELERATION TIME RETROGRADE: 4079 MS
SL CV AV PEAK GRADIENT RETROGRADE: 38 MMHG
SL CV LEFT ATRIUM LENGTH A2C: 4.8 CM
SL CV LV EF: 60
SL CV PED ECHO LEFT VENTRICLE DIASTOLIC VOLUME (MOD BIPLANE) 2D: 73 ML
SL CV PED ECHO LEFT VENTRICLE SYSTOLIC VOLUME (MOD BIPLANE) 2D: 29 ML
SL CV SINUS OF VALSALVA 2D: 3.1 CM
STJ: 2.6 CM
TR MAX PG: 31 MMHG
TR PEAK VELOCITY: 2.8 M/S
TRANSVERSE AORTIC ARCH: 3.95 CM
TRICUSPID ANNULAR PLANE SYSTOLIC EXCURSION: 2 CM
TRICUSPID VALVE PEAK REGURGITATION VELOCITY: 2.79 M/S

## 2024-08-21 NOTE — PROGRESS NOTES
Received message from pt as follows:  Yes, chaitanya, this is Sushma Ambriz calling. I'm calling in reference to my surgery on August the 27th. Now I have, I call it a worksheet here that tells, you know, the different appointments you have coming up. And the only thing it tells me is with Giovanny Garcia MD, and that is at [facility] [facility]. But it doesn't give me a time, there's no time on here because my sister is going to be picking me up afterwards, after the procedure. But I will be taking a van to the procedure anyway. Give me a call. If I'm not here, you can leave a message. I'll probably be here on my answering machine, but just checking to make sure we have our ducks in a row, whatever that means. Anyway, thank you, appreciate your help. Bye.    Call placed to pt and left a voicemail message for her to call back.

## 2024-08-22 NOTE — PROGRESS NOTES
Pt called again and inquired about when she would need to report for surgery due to planning for transportation. Reminded pt that she has an appt early in the Am for PAT telephone visit and she can inquire then about that process. Pt was appreciative.

## 2024-08-22 NOTE — PRE-PROCEDURE INSTRUCTIONS
Pre-Surgery Instructions:   Medication Instructions    alendronate (Fosamax) 70 mg tablet Weekly Q Friday    Alpha-Lipoic Acid 100 MG CAPS Stop taking 5 days prior to surgery.    aspirin 81 MG tablet Last dose 8-21-24 Per MD    B Complex Vitamins (B COMPLEX 1 PO) Stop taking 5 days prior to surgery.    bimatoprost (LUMIGAN) 0.03 % ophthalmic drops Take night before surgery    Cholecalciferol (VITAMIN D) 2000 units CAPS Stop taking 5 days prior to surgery.    Coenzyme Q10 (COQ10) 100 MG CAPS Stop taking 5 days prior to surgery.    furosemide (LASIX) 20 mg tablet Hold day of surgery.    gabapentin (NEURONTIN) 300 mg capsule Uses PRN- OK to take day of surgery    GLUCOSAMINE-MSM-HYALURONIC ACD PO Stop taking 5 days prior to surgery.    GRAPE SEED EXTRACT PO Stop taking 5 days prior to surgery.    hydrOXYzine HCL (ATARAX) 10 mg tablet Uses PRN- OK to take day of surgery    levETIRAcetam (KEPPRA) 500 mg tablet Take day of surgery.    Lumigan 0.01 % ophthalmic drops Take night before surgery    metoprolol succinate (TOPROL-XL) 200 MG 24 hr tablet Take day of surgery.    milk thistle 175 MG tablet Stop taking 5 days prior to surgery.    Multiple Vitamins-Minerals (MULTIVITAMIN ADULT PO) Stop taking 5 days prior to surgery.    Omega-3 Fatty Acids (FISH OIL PO) Stop taking 5 days prior to surgery.    Turmeric 500 MG CAPS Stop taking 5 days prior to surgery.    VOLTAREN 1 % Stop taking 5 days prior to surgery.   Medication instructions for day surgery reviewed. Please use only a sip of water to take your instructed medications. Avoid all over the counter vitamins, supplements and NSAIDS for one week prior to surgery per anesthesia guidelines. Tylenol is ok to take as needed.     You will receive a call one business day prior to surgery with an arrival time and hospital directions. If your surgery is scheduled on a Monday, the hospital will be calling you on the Friday prior to your surgery. If you have not heard from anyone  by 8pm, please call the hospital supervisor through the hospital  at [phone] or [place] [phone]).    Do not eat or drink anything after midnight the night before your surgery, including candy, mints, lifesavers, or chewing gum. Do not drink alcohol 24hrs before your surgery. Try not to smoke at least 24hrs before your surgery.       Follow the pre surgery showering instructions as listed in the “My Surgical Experience Booklet” or otherwise provided by your surgeon's office. Do not use a blade to shave the surgical area 1 week before surgery. It is okay to use a clean electric clippers up to 24 hours before surgery. Do not apply any lotions, creams, including makeup, cologne, deodorant, or perfumes after showering on the day of your surgery. Do not use dry shampoo, hair spray, hair gel, or any type of hair products.     No contact lenses, eye make-up, or artificial eyelashes. Remove nail polish, including gel polish, and any artificial, gel, or acrylic nails if possible. Remove all jewelry including rings and body piercing jewelry.     Wear causal clothing that is easy to take on and off. Consider your type of surgery.    Keep any valuables, jewelry, piercings at home. Please bring any specially ordered equipment (sling, braces) if indicated.    Arrange for a responsible person to drive you to and from the hospital on the day of your surgery. Please confirm the visitor policy for the day of your procedure when you receive your phone call with an arrival time.     Call the surgeon's office with any new illnesses, exposures, or additional questions prior to surgery.    Please reference your “My Surgical Experience Booklet” for additional information to prepare for your upcoming surgery.

## 2024-08-23 ENCOUNTER — TELEPHONE (OUTPATIENT)
Dept: CARDIOLOGY CLINIC | Facility: CLINIC | Age: 84
End: 2024-08-23

## 2024-08-23 ENCOUNTER — TELEPHONE (OUTPATIENT)
Dept: SURGICAL ONCOLOGY | Facility: CLINIC | Age: 84
End: 2024-08-23

## 2024-08-23 NOTE — TELEPHONE ENCOUNTER
Called patient to discussed echocardiogram. Went to voicemail on 2 separate appements. No identifying information on her voicemail recording. I asked her to call back clinic to discuss the results.     Overall, the structure and function of the right side of her heart looks normal.  She had grade 1 diastolic dysfunction and mildly decreased strain of the left ventricle, neither of which would be a contraindication to her having her planned surgery.

## 2024-08-23 NOTE — TELEPHONE ENCOUNTER
Patient calling wanting to know what time to arrive for her surgery with Dr. Keller on 8/27/24. Advised patient that she will be receiving a phone call the day before, 8/26/24, between the hours of 2pm-8pm with the time to arrive at [facility]. Patient has no further questions or concerns.

## 2024-08-26 ENCOUNTER — ANESTHESIA EVENT (OUTPATIENT)
Dept: PERIOP | Facility: HOSPITAL | Age: 84
End: 2024-08-26
Payer: MEDICARE

## 2024-08-27 ENCOUNTER — HOSPITAL ENCOUNTER (OUTPATIENT)
Facility: HOSPITAL | Age: 84
Setting detail: OUTPATIENT SURGERY
Discharge: HOME | End: 2024-08-27
Attending: SURGERY | Admitting: SURGERY
Payer: MEDICARE

## 2024-08-27 ENCOUNTER — ANESTHESIA (OUTPATIENT)
Dept: PERIOP | Facility: HOSPITAL | Age: 84
End: 2024-08-27
Payer: MEDICARE

## 2024-08-27 VITALS
SYSTOLIC BLOOD PRESSURE: 179 MMHG | RESPIRATION RATE: 16 BRPM | HEART RATE: 68 BPM | WEIGHT: 171.08 LBS | DIASTOLIC BLOOD PRESSURE: 91 MMHG | OXYGEN SATURATION: 97 % | TEMPERATURE: 97.6 F | BODY MASS INDEX: 29.21 KG/M2 | HEIGHT: 64 IN

## 2024-08-27 DIAGNOSIS — D35.1 PARATHYROID ADENOMA: ICD-10-CM

## 2024-08-27 DIAGNOSIS — E21.3 HYPERPARATHYROIDISM (HCC): ICD-10-CM

## 2024-08-27 LAB
CALCIUM SERPL-MCNC: 9.8 MG/DL (ref 8.4–10.2)
PTH-INTACT P EXCISION SERPL-MCNC: 114.3 PG/ML (ref 12–88)
PTH-INTACT P EXCISION SERPL-MCNC: 149.7 PG/ML (ref 12–88)
PTH-INTACT P EXCISION SERPL-MCNC: 166.1 PG/ML (ref 12–88)
PTH-INTACT P EXCISION SERPL-MCNC: 179.8 PG/ML (ref 12–88)
PTH-INTACT P EXCISION SERPL-MCNC: 191.9 PG/ML (ref 12–88)
PTH-INTACT P EXCISION SERPL-MCNC: 212.2 PG/ML (ref 12–88)
PTH-INTACT P EXCISION SERPL-MCNC: 375.5 PG/ML (ref 12–88)
PTH-INTACT P EXCISION SERPL-MCNC: 56.1 PG/ML (ref 12–88)
PTH-INTACT P EXCISION SERPL-MCNC: 71.8 PG/ML (ref 12–88)
PTH-INTACT P EXCISION SERPL-MCNC: 97.9 PG/ML (ref 12–88)

## 2024-08-27 PROCEDURE — 88305 TISSUE EXAM BY PATHOLOGIST: CPT | Performed by: PATHOLOGY

## 2024-08-27 PROCEDURE — 60500 EXPLORE PARATHYROID GLANDS: CPT | Performed by: SURGERY

## 2024-08-27 PROCEDURE — 83970 ASSAY OF PARATHORMONE: CPT | Performed by: SURGERY

## 2024-08-27 PROCEDURE — 88331 PATH CONSLTJ SURG 1 BLK 1SPC: CPT | Performed by: PATHOLOGY

## 2024-08-27 RX ORDER — ONDANSETRON 2 MG/ML
4 INJECTION INTRAMUSCULAR; INTRAVENOUS EVERY 4 HOURS PRN
Status: DISCONTINUED | OUTPATIENT
Start: 2024-08-27 | End: 2024-08-27 | Stop reason: HOSPADM

## 2024-08-27 RX ORDER — LANOLIN ALCOHOL/MO/W.PET/CERES
CREAM (GRAM) TOPICAL
COMMUNITY

## 2024-08-27 RX ORDER — BUPIVACAINE HYDROCHLORIDE 2.5 MG/ML
INJECTION, SOLUTION EPIDURAL; INFILTRATION; INTRACAUDAL AS NEEDED
Status: DISCONTINUED | OUTPATIENT
Start: 2024-08-27 | End: 2024-08-27 | Stop reason: HOSPADM

## 2024-08-27 RX ORDER — LABETALOL HYDROCHLORIDE 5 MG/ML
10 INJECTION, SOLUTION INTRAVENOUS ONCE AS NEEDED
Status: COMPLETED | OUTPATIENT
Start: 2024-08-27 | End: 2024-08-27

## 2024-08-27 RX ORDER — FENTANYL CITRATE/PF 50 MCG/ML
25 SYRINGE (ML) INJECTION
Status: DISCONTINUED | OUTPATIENT
Start: 2024-08-27 | End: 2024-08-27 | Stop reason: HOSPADM

## 2024-08-27 RX ORDER — ROCURONIUM BROMIDE 10 MG/ML
INJECTION, SOLUTION INTRAVENOUS AS NEEDED
Status: DISCONTINUED | OUTPATIENT
Start: 2024-08-27 | End: 2024-08-27

## 2024-08-27 RX ORDER — GLYCOPYRROLATE 0.2 MG/ML
INJECTION INTRAMUSCULAR; INTRAVENOUS AS NEEDED
Status: DISCONTINUED | OUTPATIENT
Start: 2024-08-27 | End: 2024-08-27

## 2024-08-27 RX ORDER — ONDANSETRON 2 MG/ML
4 INJECTION INTRAMUSCULAR; INTRAVENOUS ONCE AS NEEDED
Status: DISCONTINUED | OUTPATIENT
Start: 2024-08-27 | End: 2024-08-27 | Stop reason: HOSPADM

## 2024-08-27 RX ORDER — TRAMADOL HYDROCHLORIDE 50 MG/1
50 TABLET ORAL EVERY 6 HOURS PRN
Qty: 16 TABLET | Refills: 0 | Status: SHIPPED | OUTPATIENT
Start: 2024-08-27 | End: 2024-08-31

## 2024-08-27 RX ORDER — LABETALOL HYDROCHLORIDE 5 MG/ML
10 INJECTION, SOLUTION INTRAVENOUS ONCE
Status: DISCONTINUED | OUTPATIENT
Start: 2024-08-27 | End: 2024-08-27 | Stop reason: HOSPADM

## 2024-08-27 RX ORDER — PHENYLEPHRINE HCL IN 0.9% NACL 1 MG/10 ML
SYRINGE (ML) INTRAVENOUS AS NEEDED
Status: DISCONTINUED | OUTPATIENT
Start: 2024-08-27 | End: 2024-08-27

## 2024-08-27 RX ORDER — MIDAZOLAM HYDROCHLORIDE 2 MG/2ML
INJECTION, SOLUTION INTRAMUSCULAR; INTRAVENOUS AS NEEDED
Status: DISCONTINUED | OUTPATIENT
Start: 2024-08-27 | End: 2024-08-27

## 2024-08-27 RX ORDER — SODIUM CHLORIDE, SODIUM LACTATE, POTASSIUM CHLORIDE, CALCIUM CHLORIDE 600; 310; 30; 20 MG/100ML; MG/100ML; MG/100ML; MG/100ML
75 INJECTION, SOLUTION INTRAVENOUS CONTINUOUS
Status: DISCONTINUED | OUTPATIENT
Start: 2024-08-27 | End: 2024-08-27 | Stop reason: HOSPADM

## 2024-08-27 RX ORDER — OXYCODONE HYDROCHLORIDE 5 MG/1
2.5 TABLET ORAL EVERY 4 HOURS PRN
Status: DISCONTINUED | OUTPATIENT
Start: 2024-08-27 | End: 2024-08-27 | Stop reason: HOSPADM

## 2024-08-27 RX ORDER — OXYCODONE HYDROCHLORIDE 5 MG/1
5 TABLET ORAL EVERY 4 HOURS PRN
Status: DISCONTINUED | OUTPATIENT
Start: 2024-08-27 | End: 2024-08-27 | Stop reason: HOSPADM

## 2024-08-27 RX ORDER — ONDANSETRON 2 MG/ML
INJECTION INTRAMUSCULAR; INTRAVENOUS AS NEEDED
Status: DISCONTINUED | OUTPATIENT
Start: 2024-08-27 | End: 2024-08-27

## 2024-08-27 RX ORDER — SODIUM CHLORIDE, SODIUM LACTATE, POTASSIUM CHLORIDE, CALCIUM CHLORIDE 600; 310; 30; 20 MG/100ML; MG/100ML; MG/100ML; MG/100ML
INJECTION, SOLUTION INTRAVENOUS CONTINUOUS PRN
Status: DISCONTINUED | OUTPATIENT
Start: 2024-08-27 | End: 2024-08-27

## 2024-08-27 RX ORDER — DEXAMETHASONE SODIUM PHOSPHATE 10 MG/ML
INJECTION, SOLUTION INTRAMUSCULAR; INTRAVENOUS AS NEEDED
Status: DISCONTINUED | OUTPATIENT
Start: 2024-08-27 | End: 2024-08-27

## 2024-08-27 RX ORDER — NEOSTIGMINE METHYLSULFATE 1 MG/ML
INJECTION INTRAVENOUS AS NEEDED
Status: DISCONTINUED | OUTPATIENT
Start: 2024-08-27 | End: 2024-08-27

## 2024-08-27 RX ORDER — PROPOFOL 10 MG/ML
INJECTION, EMULSION INTRAVENOUS AS NEEDED
Status: DISCONTINUED | OUTPATIENT
Start: 2024-08-27 | End: 2024-08-27

## 2024-08-27 RX ORDER — HYDROMORPHONE HCL/PF 1 MG/ML
SYRINGE (ML) INJECTION AS NEEDED
Status: DISCONTINUED | OUTPATIENT
Start: 2024-08-27 | End: 2024-08-27

## 2024-08-27 RX ORDER — LIDOCAINE HYDROCHLORIDE 10 MG/ML
INJECTION, SOLUTION EPIDURAL; INFILTRATION; INTRACAUDAL; PERINEURAL AS NEEDED
Status: DISCONTINUED | OUTPATIENT
Start: 2024-08-27 | End: 2024-08-27

## 2024-08-27 RX ORDER — LABETALOL HYDROCHLORIDE 5 MG/ML
INJECTION, SOLUTION INTRAVENOUS AS NEEDED
Status: DISCONTINUED | OUTPATIENT
Start: 2024-08-27 | End: 2024-08-27

## 2024-08-27 RX ORDER — FENTANYL CITRATE 50 UG/ML
INJECTION, SOLUTION INTRAMUSCULAR; INTRAVENOUS AS NEEDED
Status: DISCONTINUED | OUTPATIENT
Start: 2024-08-27 | End: 2024-08-27

## 2024-08-27 RX ADMIN — SODIUM CHLORIDE, SODIUM LACTATE, POTASSIUM CHLORIDE, AND CALCIUM CHLORIDE: .6; .31; .03; .02 INJECTION, SOLUTION INTRAVENOUS at 10:39

## 2024-08-27 RX ADMIN — MIDAZOLAM 1 MG: 1 INJECTION INTRAMUSCULAR; INTRAVENOUS at 11:09

## 2024-08-27 RX ADMIN — Medication 150 MCG: at 11:47

## 2024-08-27 RX ADMIN — DEXAMETHASONE SODIUM PHOSPHATE 10 MG: 10 INJECTION, SOLUTION INTRAMUSCULAR; INTRAVENOUS at 11:39

## 2024-08-27 RX ADMIN — GLYCOPYRROLATE 0.4 MG: 0.2 INJECTION, SOLUTION INTRAMUSCULAR; INTRAVENOUS at 13:39

## 2024-08-27 RX ADMIN — NEOSTIGMINE METHYLSULFATE 3 MG: 1 INJECTION INTRAVENOUS at 13:39

## 2024-08-27 RX ADMIN — HYDROMORPHONE HYDROCHLORIDE 0.5 MG: 1 INJECTION, SOLUTION INTRAMUSCULAR; INTRAVENOUS; SUBCUTANEOUS at 11:38

## 2024-08-27 RX ADMIN — PHENYLEPHRINE HYDROCHLORIDE 30 MCG/MIN: 10 INJECTION INTRAVENOUS at 12:15

## 2024-08-27 RX ADMIN — LABETALOL HYDROCHLORIDE 5 MG: 5 INJECTION, SOLUTION INTRAVENOUS at 12:01

## 2024-08-27 RX ADMIN — LABETALOL HYDROCHLORIDE 10 MG: 5 INJECTION, SOLUTION INTRAVENOUS at 13:44

## 2024-08-27 RX ADMIN — PROPOFOL 120 MG: 10 INJECTION, EMULSION INTRAVENOUS at 11:17

## 2024-08-27 RX ADMIN — FENTANYL CITRATE 50 MCG: 50 INJECTION INTRAMUSCULAR; INTRAVENOUS at 11:32

## 2024-08-27 RX ADMIN — LABETALOL HYDROCHLORIDE 10 MG: 5 INJECTION, SOLUTION INTRAVENOUS at 14:44

## 2024-08-27 RX ADMIN — ROCURONIUM BROMIDE 50 MG: 10 INJECTION, SOLUTION INTRAVENOUS at 11:18

## 2024-08-27 RX ADMIN — LIDOCAINE HYDROCHLORIDE 50 MG: 10 INJECTION, SOLUTION EPIDURAL; INFILTRATION; INTRACAUDAL; PERINEURAL at 11:17

## 2024-08-27 RX ADMIN — FENTANYL CITRATE 50 MCG: 50 INJECTION INTRAMUSCULAR; INTRAVENOUS at 11:17

## 2024-08-27 RX ADMIN — PROPOFOL 50 MG: 10 INJECTION, EMULSION INTRAVENOUS at 12:41

## 2024-08-27 RX ADMIN — ONDANSETRON 4 MG: 2 INJECTION INTRAMUSCULAR; INTRAVENOUS at 11:39

## 2024-08-27 RX ADMIN — FENTANYL CITRATE 25 MCG: 50 INJECTION INTRAMUSCULAR; INTRAVENOUS at 14:15

## 2024-08-27 NOTE — OP NOTE
OPERATIVE REPORT  PATIENT NAME: Sushma Ambriz    :  1940  MRN: [id number]  Pt Location: [facility] OR ROOM 05    SURGERY DATE: 2024    Surgeons and Role:     * Giovanny Keller MD - Primary     * Korey Bright DMD - Assisting    Preop Diagnosis:  Hyperparathyroidism (HCC) [E21.3]  Parathyroid adenoma [D35.1]    Post-Op Diagnosis Codes:     * Hyperparathyroidism (HCC) [E21.3]     * Parathyroid adenoma [D35.1]    Procedure(s):  Bilateral - PARATHYROIDECTOMY. 4 GLAND EXPLORATION 2- GLAND PARATHYROIDECTOMY RIGHT AND LEFT SUPERIOR.    INTRAOPERATIVE PTH MONITORING    Specimen(s):  ID Type Source Tests Collected by Time Destination   1 : RIGHT Inferior Parathyroid Tissue Parathyroid TISSUE EXAM Giovanny Keller MD 2024 1212    2 : RIGHT Superior Parathyroid Tissue Parathyroid TISSUE EXAM Giovanny Keller MD 2024 1251    3 : RIGHT Superior Parathyroid Tissue Parathyroid TISSUE EXAM Giovanny Keller MD 2024 1257    4 : Left Superior Parathyroid Tissue Parathyroid TISSUE EXAM Giovanny Keller MD 2024 1324        Estimated Blood Loss:   Minimal    Drains:  * No LDAs found *    Anesthesia Type:   General    Operative Indications:  Hyperparathyroidism (HCC) [E21.3]  Parathyroid adenoma [D35.1]      Operative Findings:    RIGHT Superior Parathyroid  Hypercellular parathyroid tissue (220 mg)        Left Superior Parathyroid  Hypercellular parathyroid tissue (530 mg)    Complications:   None    Procedure and Technique:  The patient was brought to the OR and identified by proper time-out. Following this she was intubated by the anesthesia team, then was prepped and draped with the neck exposed in the extended position. Local anesthesia was given, then a 3 cm incision was made sharply 2 finger breaths above the sternal notch. Cautery was used to dissect through the dermis subcutaneous tissue. Wheatlanner retractor was placed. The platysma was then transected with cautery. Strap muscles were  then divided the midline. This exposed the surface of the thyroid. We dissected the strap muscles off the anterolateral aspect of the right thyroid lobe. This enabled us to dissect between the thyroid and the strap muscles. The thyroid gland was then retracted medially and anteriorly which exposed what appeared to be a plump parathyroid gland below the inferior pole of the right thyroid gland based on preoperative imaging.  This was dissected out from surrounding tissue in hemostatic fashion with cautery. Vascular pedicle was visualized. The pedicle was clipped. PTH levels were drawn at the start of the case, 0 min, 5 min, and 10 min after the target was resected.  However they did not come down to normal.  Additionally, frozen section control is to be lymph node.  Will continue to look on the right side and found what appeared to be both the right superior and inferior parathyroid glands.  The right superior 1 was clearly enlarged and we therefore took it out.  We left the right inferior gland alone.  PTH films were drawn at 0, 5, 10 minutes post removal of the right superior gland. Pathology confirmed this to be a 220 mg parathyroid gland, hyperplastic tissue.     While we were waiting, we opted to look for the left glands given preoperative imaging suggestive of left-sided targets as well..    We mobilized the strap muscles off the left thyroid lobe and retracted the thyroid gland anteriorly medially.  We found what appeared to be a large left superior gland and a normal-appearing left inferior gland.  We dissected out the left superior gland in hemostatic fashion.  Vascular pedicle was visualized and clipped.  PTH was were drawn at 0, 5, 10 minutes post removal of this gland.       At this point, PTH levels from the resection of the glands came back.  Levels had decreased with normal at this point.  Given that we have taken the right superior gland out and got a decrease in levels, and we have taken the left  inferior gland out which culminated in normalization of PTH levels, we opted to terminate the procedure.  The right and left inferior glands were left in situ as they both appeared normal.     We then irrigated the field and closed using 3-0 Vicryl to close the strap muscles the midline followed by 3-0 Vicryl to close the platysma, followed by 4-0 Vicryl close the dermis, followed by 5-0 Monocryl to close skin in subcuticular fashion. Benzoin and Steri-Strips were then applied in the usual fashion. The patient was awakened transferred to the recovery room in stable condition.    I was present for the entire procedure.      Patient Disposition:  PACU         SIGNATURE: Giovanny Keller MD  DATE: August 27, 2024  TIME: 1:46 PM

## 2024-08-27 NOTE — ANESTHESIA PROCEDURE NOTES
"Arterial Line Insertion    Performed by: Jeffry Norwood MD  Authorized by: Sally Serrano CRNA  Consent: Verbal consent obtained.  Risks and benefits: risks, benefits and alternatives were discussed  Consent given by: patient  Patient understanding: patient states understanding of the procedure being performed  Patient consent: the patient's understanding of the procedure matches consent given  Procedure consent: procedure consent matches procedure scheduled  Relevant documents: relevant documents present and verified  Test results: test results available and properly labeled  Site marked: the operative site was marked  Required items: required blood products, implants, devices, and special equipment available  Patient identity confirmed: verbally with patient, arm band and provided demographic data  Time out: Immediately prior to procedure a \"time out\" was called to verify the correct patient, procedure, equipment, support staff and site/side marked as required.  Preparation: Patient was prepped and draped in the usual sterile fashion.  Indications: hemodynamic monitoring  Orientation:  Right  Location: radial artery  Sedation:  Patient sedated: yes  Sedatives: see MAR for details  Vitals: Vital signs were monitored during sedation.    Procedure Details:  Needle gauge: 20  Number of attempts: 1    Post-procedure:  Post-procedure: dressing applied  Waveform: good waveform  Post-procedure CNS: normal  Patient tolerance: Patient tolerated the procedure well with no immediate complications and patient tolerated the procedure well with no immediate complications          "

## 2024-08-27 NOTE — DISCHARGE INSTR - AVS FIRST PAGE
Parathyroidectomy Care  -If you develop any numbness or tingling around the mouth, in the fingertips, or in the toes, TAKE 2 TUMS AND CALL THE OFFICE  -If you develop any significant swelling underneath the incision site (tennis ball, baseball, softball size), call the office or go directly to ED  -If you have any SOB, difficulty breathing or swallowing, call the office or go directly to the ED

## 2024-08-27 NOTE — ANESTHESIA POSTPROCEDURE EVALUATION
Post-Op Assessment Note    CV Status:  Stable    Pain management: adequate       Mental Status:  Alert and awake   Hydration Status:  Euvolemic   PONV Controlled:  Controlled   Airway Patency:  Patent     Post Op Vitals Reviewed: Yes    No anethesia notable event occurred.    Staff: CRNA               BP (!) 176/81 (08/27/24 1350)    Temp 98.5 °F (36.9 °C) (08/27/24 1350)    Pulse 96 (08/27/24 1350)   Resp 18 (08/27/24 1350)    SpO2 96 % (08/27/24 1350)      
Never

## 2024-08-27 NOTE — INTERVAL H&P NOTE
H&P reviewed. After examining the patient I find no changes in the patients condition since the H&P had been written.    Vitals:    08/27/24 1009   BP: 167/86   Pulse: 81   Resp: 18   Temp: 98.6 °F (37 °C)   SpO2: 97%

## 2024-08-27 NOTE — QUICK NOTE
Patient seen bedside in outpatient recovery unit for a neck check prior to discharge. Of note calcium within normal range at 9.8. Anterior neck without swelling or hematoma. Patient reports no numbness, tingling, or trouble swallowing. No facial muscle spasms noted. Following exam patient is stable and safe for discharge home. Discussed discharge instructions and addressed all questions. Recommended patient call office with any questions or concerns once home.     Jacinto Fuentes MD  General Surgery Resident

## 2024-08-27 NOTE — ANESTHESIA PREPROCEDURE EVALUATION
Procedure:  PARATHYROIDECTOMY, 4 GLAND EXPLORATION (Bilateral: Neck)  INTRAOPERATIVE PTH MONITORING (Neck)    Relevant Problems   ANESTHESIA (within normal limits)      CARDIO   (+) Dyspnea on exertion   (+) Essential hypertension   (+) Hypercholesterolemia   (+) Right bundle branch block (RBBB)      ENDO   (+) Hyperparathyroidism (HCC)   (+) Parathyroid adenoma      GI/HEPATIC   (+) Hiatal hernia      /RENAL (within normal limits)      GYN (within normal limits)      HEMATOLOGY (within normal limits)      MUSCULOSKELETAL   (+) Hiatal hernia   (+) Osteoarthritis      NEURO/PSYCH   (+) Anxiety   (+) Complex partial seizure (HCC)   (+) Transient ischemic attack      PULMONARY   (+) Dyspnea on exertion      Oncology   (+) Personal history of breast cancer      TTE 8/20/24    Left Ventricle: Left ventricular cavity size is normal. Wall thickness is moderately increased. The left ventricular ejection fraction is 60%. Systolic function is normal. Global longitudinal strain is borderline at -16%. Wall motion is normal. Diastolic function is mildly abnormal, consistent with grade I (abnormal) relaxation.    Left Atrium: The atrium is moderately dilated.    Aortic Valve: There is mild regurgitation.    Mitral Valve: There is mild regurgitation.    Tricuspid Valve: There is mild regurgitation.     Physical Exam    Airway       Dental   Comment: Poor Dentition, multiple missing and chipped teeth     Cardiovascular  Cardiovascular exam normal    Pulmonary  Pulmonary exam normal     Other Findings  post-pubertal.      Anesthesia Plan  ASA Score- 3     Anesthesia Type- general with ASA Monitors.         Additional Monitors: arterial line.    Airway Plan: ETT.           Plan Factors-Exercise tolerance (METS): <4 METS.    Chart reviewed. EKG reviewed. Imaging results reviewed. Existing labs reviewed. Patient summary reviewed.    Patient is not a current smoker.      Obstructive sleep apnea risk education given  perioperatively.        Induction- intravenous.    Postoperative Plan- Plan for postoperative opioid use.     Perioperative Resuscitation Plan - Level 1 - Full Code.       Informed Consent- Anesthetic plan and risks discussed with patient.  I personally reviewed this patient with the CRNA. Discussed and agreed on the Anesthesia Plan with the CRNA..    Discussed General Anesthesia with patient including but not limited to risk of cardiac insult, pulmonary complication, stroke, reaction to medications and death. All questions answered and consent was obtained.     Patient took Metop at 1700 on 8/26, less than 24 hours ago.    NPO appropriate.     No residual symptoms of TIA.     Cleared by cardiology on 8/19/24

## 2024-08-28 ENCOUNTER — PATIENT OUTREACH (OUTPATIENT)
Dept: CASE MANAGEMENT | Facility: OTHER | Age: 84
End: 2024-08-28

## 2024-08-28 NOTE — PROGRESS NOTES
"Call placed to pt to follow up on yesterday's procedure. Pt states that she had a good night and feels 'good'. She took 2 tylenol and that's all she needed for discomfort. She reports that it does feel different when she swallows but its tolerable. Reviewed AVS with pt who states that she hasn't gone over all the paperwork yet. \"Theres paperwork everywhere laying around\".Pt verbalized understanding and is aware of upcoming appts. She also is aware of waiting to shower until tomorrow evening.Pt appreciative of the call.  "

## 2024-08-30 ENCOUNTER — TELEPHONE (OUTPATIENT)
Dept: SURGICAL ONCOLOGY | Facility: CLINIC | Age: 84
End: 2024-08-30

## 2024-08-30 PROCEDURE — 88305 TISSUE EXAM BY PATHOLOGIST: CPT | Performed by: PATHOLOGY

## 2024-08-30 NOTE — TELEPHONE ENCOUNTER
Spoke with Sushma to move Post Op appointment on 9/11 to 2 PM, 1:45 PM arrival. She will arrange this with her van.

## 2024-09-03 DIAGNOSIS — I10 ESSENTIAL HYPERTENSION: ICD-10-CM

## 2024-09-03 RX ORDER — METOPROLOL SUCCINATE 200 MG/1
TABLET, EXTENDED RELEASE ORAL
Qty: 90 TABLET | Refills: 3 | Status: SHIPPED | OUTPATIENT
Start: 2024-09-03

## 2024-09-10 DIAGNOSIS — G40.909 SEIZURE DISORDER (HCC): ICD-10-CM

## 2024-09-11 ENCOUNTER — OFFICE VISIT (OUTPATIENT)
Dept: SURGICAL ONCOLOGY | Facility: CLINIC | Age: 84
End: 2024-09-11

## 2024-09-11 VITALS
OXYGEN SATURATION: 93 % | TEMPERATURE: 97.5 F | DIASTOLIC BLOOD PRESSURE: 100 MMHG | BODY MASS INDEX: 29.71 KG/M2 | RESPIRATION RATE: 16 BRPM | HEART RATE: 75 BPM | SYSTOLIC BLOOD PRESSURE: 140 MMHG | HEIGHT: 64 IN | WEIGHT: 174 LBS

## 2024-09-11 DIAGNOSIS — Z98.890 S/P PARATHYROIDECTOMY: ICD-10-CM

## 2024-09-11 DIAGNOSIS — Z90.89 S/P PARATHYROIDECTOMY: ICD-10-CM

## 2024-09-11 DIAGNOSIS — E83.52 HYPERCALCEMIA: ICD-10-CM

## 2024-09-11 DIAGNOSIS — E21.3 HYPERPARATHYROIDISM (HCC): Primary | ICD-10-CM

## 2024-09-11 PROCEDURE — 99024 POSTOP FOLLOW-UP VISIT: CPT | Performed by: SURGERY

## 2024-09-11 NOTE — PROGRESS NOTES
Surgical Oncology Follow Up       [facility] [place]  [address] [facility] [id number] [phone]    Sushma Ambriz  1940  [id number]  [facility] [place]  [address] [facility] [address] [id number] [phone]    Chief Complaint   Patient presents with    Post-op       Assessment/Plan:    No problem-specific Assessment & Plan notes found for this encounter.       Diagnoses and all orders for this visit:    Hyperparathyroidism (HCC)    Hypercalcemia    S/P parathyroidectomy      Advance Care Planning/Advance Directives:  Discussed disease status, cancer treatment plans and/or cancer treatment goals with the patient.     Oncology History    No history exists.       History of Present Illness: Patient is an 83-year-old woman here status post parathyroidectomy.  She is status post excision of right superior and left superior parathyroid glands.  -Interval History: No complaints since procedure was performed.    Review of Systems:  Review of Systems   HENT: Negative.  Negative for trouble swallowing and voice change.    All other systems reviewed and are negative.      Patient Active Problem List   Diagnosis    Vitamin D deficiency    Transient ischemic attack    Osteoarthritis    Essential hypertension    Hypercholesterolemia    Hyperglycemia    Complex partial seizure (HCC)    Midline cystocele    Anxiety    Personal history of breast cancer    Personal history of radiation therapy    Hiatal hernia    Allergic rhinitis    Dyspnea on exertion    Abnormal EKG    Trigeminy    Cigarette nicotine dependence in remission    Peripheral edema    Hypercalcemia    Hyperparathyroidism (HCC)    Parathyroid adenoma    Walker as ambulation aid    OAB (overactive bladder)    Age-related osteoporosis without current pathological fracture    Right bundle branch block (RBBB)    S/P  parathyroidectomy     Past Medical History:   Diagnosis Date    Cancer (HCC)     left breast    Chronic kidney disease     stage 3    Elevated serum alkaline phosphatase level     GERD (gastroesophageal reflux disease)     Glaucoma     Hiatal hernia     Hx of bleeding disorder     vadginal    Hypercalcemia     Hyperglycemia     diet controlled    Hypertension     Lyme disease     Presence of pessary     Rheumatic fever     Seizures (HCC)     Stroke (HCC)     TIA    TIA (transient ischemic attack)     Vitamin D deficiency      Past Surgical History:   Procedure Laterality Date    APPENDECTOMY      BREAST LUMPECTOMY Left     CHG ASSAY OF PARATHORMONE N/A 2024    Procedure: INTRAOPERATIVE PTH MONITORING;  Surgeon: Giovanny Keller MD;  Location: [facility];  Service: Surgical Oncology    COLONOSCOPY      FOOT SURGERY Left     ORIF ANKLE FRACTURE Left     OK PARATHYROIDECTOMY/EXPLORATION PARATHYROIDS Bilateral 2024    Procedure: PARATHYROIDECTOMY, 4 GLAND EXPLORATION;  Surgeon: Giovanny Keller MD;  Location: [facility];  Service: Surgical Oncology    TONSILLECTOMY      TUBAL LIGATION       Family History   Problem Relation Age of Onset    Breast cancer Mother     Coronary artery disease Father     Bone cancer Brother     Lung cancer Brother      Social History     Socioeconomic History    Marital status:      Spouse name: Not on file    Number of children: Not on file    Years of education: Not on file    Highest education level: Not on file   Occupational History    Occupation: retired   Tobacco Use    Smoking status: Former     Current packs/day: 0.00     Average packs/day: 1 pack/day for 53.0 years (53.0 ttl pk-yrs)     Types: Cigarettes     Start date:      Quit date:      Years since quittin.7    Smokeless tobacco: Never   Vaping Use    Vaping status: Never Used   Substance and Sexual Activity    Alcohol use: Yes     Comment: social    Drug use: No    Sexual activity: Not  Currently   Other Topics Concern    Not on file   Social History Narrative    Activities - gardening    Daily coffee consumption- 1 cup per day    Daily tea consumption    Hearing loss     Social Determinants of Health     Financial Resource Strain: Low Risk  (6/9/2023)    Overall Financial Resource Strain (CARDIA)     Difficulty of Paying Living Expenses: Not hard at all   Food Insecurity: No Food Insecurity (7/9/2024)    Hunger Vital Sign     Worried About Running Out of Food in the Last Year: Never true     Ran Out of Food in the Last Year: Never true   Transportation Needs: No Transportation Needs (7/9/2024)    PRAPARE - Transportation     Lack of Transportation (Medical): No     Lack of Transportation (Non-Medical): No   Physical Activity: Not on file   Stress: Not on file   Social Connections: Not on file   Intimate Partner Violence: Not on file   Housing Stability: Low Risk  (7/9/2024)    Housing Stability Vital Sign     Unable to Pay for Housing in the Last Year: No     Number of Times Moved in the Last Year: 0     Homeless in the Last Year: No       Current Outpatient Medications:     alendronate (Fosamax) 70 mg tablet, Take 1 tablet (70 mg total) by mouth every 7 days .  Take on empty stomach do not eat drink or lie down for half hour (Patient taking differently: Take 70 mg by mouth every 7 days .  Take on empty stomach do not eat drink or lie down for half hour), Disp: 12 tablet, Rfl: 3    Alpha-Lipoic Acid 100 MG CAPS, Take by mouth , Disp: , Rfl:     aspirin 81 MG tablet, Take 162 mg by mouth, Disp: , Rfl:     B Complex Vitamins (B COMPLEX 1 PO), Take 2 tablets by mouth daily, Disp: , Rfl:     bimatoprost (LUMIGAN) 0.03 % ophthalmic drops, Administer 1 drop to both eyes daily at bedtime  , Disp: , Rfl:     Cholecalciferol (VITAMIN D) 2000 units CAPS, Take 2 capsules by mouth daily, Disp: , Rfl:     Coenzyme Q10 (COQ10) 100 MG CAPS, Take by mouth, Disp: , Rfl:     furosemide (LASIX) 20 mg tablet, TAKE 1  TABLET BY MOUTH EVERY DAY (Patient taking differently: Take 20 mg by mouth as needed), Disp: 90 tablet, Rfl: 2    gabapentin (NEURONTIN) 300 mg capsule, TAKE 1 CAPSULE BY MOUTH AT BEDTIME AS NEEDED FOR PAIN, Disp: 90 capsule, Rfl: 3    GLUCOSAMINE-MSM-HYALURONIC ACD PO, Take 1 capsule by mouth 3 (three) times a day, Disp: , Rfl:     GRAPE SEED EXTRACT PO, Take by mouth, Disp: , Rfl:     hydrOXYzine HCL (ATARAX) 10 mg tablet, TAKE 1 TABLET BY MOUTH DAILY AS NEEDED FOR ANXIETY, Disp: 90 tablet, Rfl: 1    levETIRAcetam (KEPPRA) 500 mg tablet, TAKE 1 TABLET BY MOUTH TWICE A DAY, Disp: 180 tablet, Rfl: 1    Lumigan 0.01 % ophthalmic drops, INSTILL 1 DROP INTO BOTH EYES AT BEDTIME, Disp: , Rfl:     melatonin 3 mg, Take by mouth daily at bedtime, Disp: , Rfl:     metoprolol succinate (TOPROL-XL) 200 MG 24 hr tablet, TAKE 1 TABLET BY MOUTH EVERY DAY, Disp: 90 tablet, Rfl: 3    milk thistle 175 MG tablet, Take 175 mg by mouth daily, Disp: , Rfl:     Multiple Vitamins-Minerals (MULTIVITAMIN ADULT PO), Take 1 capsule by mouth daily  , Disp: , Rfl:     Omega-3 Fatty Acids (FISH OIL PO), Take 1,000 mg by mouth daily  , Disp: , Rfl:     Turmeric 500 MG CAPS, Take 1 capsule by mouth daily  , Disp: , Rfl:     VOLTAREN 1 %, APPLY 2G TOPICALLY TO AFFECTED AREA FOUR TIMES A DAY AS DIRECTED, Disp: 100 g, Rfl: 5    diphenhydrAMINE (BENADRYL) 50 MG tablet, Take one 50 mg tablet 1 hour prior to CT scan. (Patient not taking: Reported on 8/22/2024), Disp: 1 tablet, Rfl: 0    LORazepam (Ativan) 0.5 mg tablet, Take 1 tablet 1 hour prior to MRI (Patient not taking: Reported on 8/12/2024), Disp: 1 tablet, Rfl: 0    potassium bicarbonate (K-LYTE) 25 MEQ disintegrating tablet, Take 25 mEq by mouth 2 (two) times a day (Patient not taking: Reported on 8/12/2024), Disp: , Rfl:     terbinafine (LamISIL) 1 % cream, Apply topically 2 (two) times a day (Patient not taking: Reported on 8/12/2024), Disp: 42 g, Rfl: 0  Allergies   Allergen Reactions     "Carbamazepine Myalgia     flu-like symptoms    Epinephrine Other (See Comments)     \"weakness\"    Iodides Itching    Latex Rash    Lisinopril Cough    Morphine And Codeine Other (See Comments)     Caused burning during nerve block    Sulfamethoxazole-Trimethoprim Other (See Comments)     Vaginal bleeding    Topiramate Rash     Vitals:    09/11/24 1358   BP: 140/100   Pulse: 75   Resp: 16   Temp: 97.5 °F (36.4 °C)   SpO2: 93%       Physical Exam  Vitals reviewed.   Neck:      Comments: Incision c/d/i  Musculoskeletal:      Cervical back: Normal range of motion and neck supple.           Results:  Labs:  Lab Results   Component Value Date    PTH 71.8 08/27/2024    CALCIUM 9.8 08/27/2024    PHOS 1.8 (LL) 01/14/2018     Case Report   Surgical Pathology Report                         Case: [id number]                                   Authorizing Provider:  Giovanny Keller MD        Collected:           08/27/2024 1212               Ordering Location:     [facility]      Received:            08/27/2024 [facility]                                      Hospital Operating Room                                                       Pathologist:           Robert Avitia MD                                                             Intraop:               Robert Avitia MD                                                             Specimens:   A) - Parathyroid, RIGHT Inferior Parathyroid                                                        B) - Parathyroid, RIGHT Superior Parathyroid                                                        D) - Parathyroid, Left Superior Parathyroid                                                Final Diagnosis   A. Labeled Parathyroid, RIGHT Inferior Parathyroid:  Fragments of benign reactive lymph node(s)      B. Parathyroid, RIGHT Superior Parathyroid:  Hypercellular parathyroid tissue (220 mg)       D. Parathyroid, Left Superior Parathyroid:  Hypercellular parathyroid tissue (530 mg) " with oxyphilic nodule     See note               Comments:   This is an appended report. These results have been appended to a previously preliminary verified report.      Electronically signed by Robert Avitia MD on 8/30/2024 at  2:47 PM       Imaging  Echo complete w/ contrast if indicated    Result Date: 8/21/2024  Narrative:   Left Ventricle: Left ventricular cavity size is normal. Wall thickness is moderately increased. The left ventricular ejection fraction is 60%. Systolic function is normal. Global longitudinal strain is borderline at -16%. Wall motion is normal. Diastolic function is mildly abnormal, consistent with grade I (abnormal) relaxation.   Left Atrium: The atrium is moderately dilated.   Aortic Valve: There is mild regurgitation.   Mitral Valve: There is mild regurgitation.   Tricuspid Valve: There is mild regurgitation.     XR chest pa & lateral    Result Date: 8/17/2024  Narrative: CHEST INDICATION:   Hyperparathyroidism, unspecified. COMPARISON: 11/20/2023 EXAM PERFORMED/VIEWS:  XR CHEST PA & LATERAL Images: 2 FINDINGS: Stable tortuosity and calcification of the aorta. Cardiac silhouette is unremarkable. The lungs are clear.  No pneumothorax or pleural effusion. Osseous structures appear within normal limits for patient age.     Impression: No acute cardiopulmonary disease. Electronically signed: 08/17/2024 04:07 PM Louis Funez, MPH,MD    I reviewed the above laboratory and imaging data.    Discussion/Summary: 83-year-old woman status post 2-gland parathyroidectomy.  Doing well, no complaints to report.  Follow-up in 6 months with blood work to assess for durability of operation.

## 2024-09-11 NOTE — TELEPHONE ENCOUNTER
Pt called refill line I relayed message. Pt is calling to schedule an appointment and pt stated she is taking 500 mg of the Keppra 1/2 tablet twice a day

## 2024-09-11 NOTE — TELEPHONE ENCOUNTER
Pt called to schedule f/u appointment with Dr Chawla for medication refill.  Please call her back this afternoon when she will be home.

## 2024-09-12 RX ORDER — LEVETIRACETAM 500 MG/1
500 TABLET ORAL 2 TIMES DAILY
Qty: 180 TABLET | Refills: 0 | Status: SHIPPED | OUTPATIENT
Start: 2024-09-12

## 2024-09-18 ENCOUNTER — PATIENT OUTREACH (OUTPATIENT)
Dept: CASE MANAGEMENT | Facility: OTHER | Age: 84
End: 2024-09-18

## 2024-09-18 NOTE — PROGRESS NOTES
Follow up call to pt and she reports that she is doing well. Pt reviewed her upcoming appts. She has no questions or concerns at this time. Clarified that the 'diabetic dr' she is scheduled to see is pertaining to her thyroid surgery and not diabetes, which pt understands. Pt denies any other concerns or questions. She states that she is 'good ' at this time and will contact this CM if needed in future but declines regular outreach at this time.

## 2024-10-09 ENCOUNTER — OFFICE VISIT (OUTPATIENT)
Dept: NEUROLOGY | Facility: CLINIC | Age: 84
End: 2024-10-09
Payer: MEDICARE

## 2024-10-09 VITALS
RESPIRATION RATE: 14 BRPM | OXYGEN SATURATION: 97 % | WEIGHT: 171.6 LBS | HEIGHT: 64 IN | SYSTOLIC BLOOD PRESSURE: 128 MMHG | DIASTOLIC BLOOD PRESSURE: 72 MMHG | TEMPERATURE: 98 F | BODY MASS INDEX: 29.3 KG/M2 | HEART RATE: 76 BPM

## 2024-10-09 DIAGNOSIS — G40.209 COMPLEX PARTIAL SEIZURE (HCC): Primary | ICD-10-CM

## 2024-10-09 DIAGNOSIS — G40.909 SEIZURE DISORDER (HCC): ICD-10-CM

## 2024-10-09 PROCEDURE — 99213 OFFICE O/P EST LOW 20 MIN: CPT | Performed by: PSYCHIATRY & NEUROLOGY

## 2024-10-09 RX ORDER — LEVETIRACETAM 500 MG/1
250 TABLET ORAL 2 TIMES DAILY
Qty: 90 TABLET | Refills: 1 | Status: SHIPPED | OUTPATIENT
Start: 2024-10-09

## 2024-10-09 NOTE — PROGRESS NOTES
Patient ID: Sushma Ambriz is a 83 y.o. female.    Assessment/Plan:    Complex partial seizure (HCC)  Sushma is a 83-year-old patient with a history of complex partial seizures her last seizure was in 2000 in the past we have discussed alternative medications or changing her antiepileptics which she has refused.  She now utilizes 500 mg tablets half a tablet twice a day.  She has had no seizures and no auras.    Today she presents in a follow-up visit last time she was evaluated was in September 2023 she did have a prior CBC and CMP in the summer that was normal.    She will be continued on the current dose of Keppra and a new prescription was sent to the pharmacy.    Can return to our offices in 1 year.     Diagnoses and all orders for this visit:    Complex partial seizure (HCC)    Seizure disorder (HCC)  -     levETIRAcetam (KEPPRA) 500 mg tablet; Take 0.5 tablets (250 mg total) by mouth 2 (two) times a day       Subjective:               this a 82 Y/o  right handed female who presents as a f/u regarding partial complex seizures.  She was last evaluated in 2023 .  Since that time she has had no further seizures.  She does utilize Keppra 250 mg twice a day.  In the last year she denies any problems issues or seizures.  She denies any auras.         In 2005 she was noted to have 2 episodes characterized by a brief alteration of consciousness. the workup did reveal left temporal sharp waves. she was placed on Tegretol. she developed rash and was placed on Topamax. it was discontinued due to side effects. she was started on Keppra 500m gpo bid. she admits to using 250 mg po bid on occasion  .  higher dose did also result in behavioral changes.        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  She is currently on Fosamax.  She is hard of hearing.      Last EEG was in 2008. it showed b/L sharp activity ( right greater than left)       She has been treated for breast CA in 2007/2008 with  lumpectomy and XRT.            She does take melatonin as needed for insomnia.        In the summer 2024 she was diagnosed with Lyme disease and treated with antibiotics.              The following portions of the patient's history were reviewed and updated as appropriate: She  has a past medical history of Cancer (HCC), Chronic kidney disease, Elevated serum alkaline phosphatase level, GERD (gastroesophageal reflux disease), Glaucoma, Hiatal hernia, bleeding disorder, Hypercalcemia, Hyperglycemia, Hypertension, Lyme disease, Presence of pessary, Rheumatic fever, Seizures (HCC), Stroke (HCC) (2004), TIA (transient ischemic attack), and Vitamin D deficiency.  She  has a past surgical history that includes Breast lumpectomy (Left); Tubal ligation; Appendectomy; Colonoscopy; ORIF ankle fracture (Left); Tonsillectomy; Foot surgery (Left); pr parathyroidectomy/exploration parathyroids (Bilateral, 8/27/2024); and chg assay of parathormone (N/A, 8/27/2024).  Her family history includes Bone cancer in her brother; Breast cancer in her mother; Coronary artery disease in her father; Lung cancer in her brother.  She  reports that she quit smoking about 14 years ago. Her smoking use included cigarettes. She started smoking about 67 years ago. She has a 53 pack-year smoking history. She has never used smokeless tobacco. She reports current alcohol use. She reports that she does not use drugs.  Current Outpatient Medications   Medication Sig Dispense Refill    alendronate (Fosamax) 70 mg tablet Take 1 tablet (70 mg total) by mouth every 7 days .  Take on empty stomach do not eat drink or lie down for half hour (Patient taking differently: Take 70 mg by mouth every 7 days .  Take on empty stomach do not eat drink or lie down for half hour) 12 tablet 3    Alpha-Lipoic Acid 100 MG CAPS Take by mouth       aspirin 81 MG tablet Take 162 mg by mouth      B Complex Vitamins (B COMPLEX 1 PO) Take 2 tablets by mouth daily      bimatoprost  (LUMIGAN) 0.03 % ophthalmic drops Administer 1 drop to both eyes daily at bedtime        Cholecalciferol (VITAMIN D) 2000 units CAPS Take 2 capsules by mouth daily      Coenzyme Q10 (COQ10) 100 MG CAPS Take by mouth      furosemide (LASIX) 20 mg tablet TAKE 1 TABLET BY MOUTH EVERY DAY (Patient taking differently: Take 20 mg by mouth as needed) 90 tablet 2    GLUCOSAMINE-MSM-HYALURONIC ACD PO Take 1 capsule by mouth 3 (three) times a day      GRAPE SEED EXTRACT PO Take by mouth      hydrOXYzine HCL (ATARAX) 10 mg tablet TAKE 1 TABLET BY MOUTH DAILY AS NEEDED FOR ANXIETY 90 tablet 1    levETIRAcetam (KEPPRA) 500 mg tablet Take 0.5 tablets (250 mg total) by mouth 2 (two) times a day 90 tablet 1    Lumigan 0.01 % ophthalmic drops INSTILL 1 DROP INTO BOTH EYES AT BEDTIME      melatonin 3 mg Take by mouth daily at bedtime      metoprolol succinate (TOPROL-XL) 200 MG 24 hr tablet TAKE 1 TABLET BY MOUTH EVERY DAY 90 tablet 3    milk thistle 175 MG tablet Take 175 mg by mouth daily      Multiple Vitamins-Minerals (MULTIVITAMIN ADULT PO) Take 1 capsule by mouth daily        Omega-3 Fatty Acids (FISH OIL PO) Take 1,000 mg by mouth daily        Turmeric 500 MG CAPS Take 1 capsule by mouth daily        VOLTAREN 1 % APPLY 2G TOPICALLY TO AFFECTED AREA FOUR TIMES A DAY AS DIRECTED 100 g 5    diphenhydrAMINE (BENADRYL) 50 MG tablet Take one 50 mg tablet 1 hour prior to CT scan. (Patient not taking: Reported on 8/22/2024) 1 tablet 0    LORazepam (Ativan) 0.5 mg tablet Take 1 tablet 1 hour prior to MRI (Patient not taking: Reported on 8/12/2024) 1 tablet 0    potassium bicarbonate (K-LYTE) 25 MEQ disintegrating tablet Take 25 mEq by mouth 2 (two) times a day (Patient not taking: Reported on 8/12/2024)      terbinafine (LamISIL) 1 % cream Apply topically 2 (two) times a day (Patient not taking: Reported on 8/12/2024) 42 g 0     No current facility-administered medications for this visit.     She is allergic to carbamazepine,  "epinephrine, iodides, latex, lisinopril, morphine and codeine, sulfamethoxazole-trimethoprim, and topiramate..           Objective:    Blood pressure 128/72, pulse 76, temperature 98 °F (36.7 °C), temperature source Temporal, resp. rate 14, height 5' 4\" (1.626 m), weight 77.8 kg (171 lb 9.6 oz), SpO2 97%, not currently breastfeeding.    Physical Exam  Eyes:      General: Lids are normal.      Extraocular Movements: Extraocular movements intact.      Pupils: Pupils are equal, round, and reactive to light.   Neurological:      Motor: Motor strength is normal.     Deep Tendon Reflexes:      Reflex Scores:       Patellar reflexes are 2+ on the right side and 2+ on the left side.       Achilles reflexes are 1+ on the right side and 1+ on the left side.        Neurological Exam  Mental Status  Awake, alert and oriented to person, place and time. Oriented to person, place and time. Language is fluent with no aphasia.    Cranial Nerves  CN II: Visual acuity is normal. Visual fields full to confrontation.  CN III, IV, VI: Extraocular movements intact bilaterally. Normal lids and orbits bilaterally. Pupils equal round and reactive to light bilaterally.  CN V: Facial sensation is normal.  CN VII: Full and symmetric facial movement.  CN VIII: Hearing is normal.  CN IX, X: Palate elevates symmetrically. Normal gag reflex.  CN XI: Shoulder shrug strength is normal.  CN XII: Tongue midline without atrophy or fasciculations.    Motor  Normal muscle bulk throughout. No fasciculations present. Strength is 5/5 throughout all four extremities.    Sensory  Light touch is normal in upper and lower extremities. Pinprick is normal in upper and lower extremities.     Reflexes                                            Right                      Left  Patellar                                2+                         2+  Achilles                                1+                         1+    Right pathological reflexes: Lesley's " absent.  Left pathological reflexes: Lesley's absent.    Coordination  Right: Finger-to-nose normal.Left: Finger-to-nose normal.    Gait   Able to rise from chair without using arms.  She walks with a walker.  She has a wide-based gait.    Review of systems obtained from medical assistant as below was reviewed with the patient at today's visit    ROS:    Review of Systems   Constitutional:  Negative for appetite change, fatigue and fever.   HENT:  Positive for hearing loss and tinnitus. Negative for trouble swallowing and voice change.    Eyes: Negative.  Negative for photophobia, pain and visual disturbance.   Respiratory: Negative.  Negative for shortness of breath.    Cardiovascular: Negative.  Negative for palpitations.   Gastrointestinal: Negative.  Negative for nausea and vomiting.   Endocrine: Negative.  Negative for cold intolerance.   Genitourinary: Negative.  Negative for dysuria, frequency and urgency.   Musculoskeletal:  Positive for back pain, neck pain and neck stiffness. Negative for gait problem and myalgias.   Skin: Negative.  Negative for rash.   Allergic/Immunologic: Negative.    Neurological: Negative.  Negative for dizziness, tremors, seizures, syncope, facial asymmetry, speech difficulty, weakness, light-headedness, numbness and headaches.   Hematological: Negative.  Does not bruise/bleed easily.   Psychiatric/Behavioral: Negative.  Negative for confusion, hallucinations and sleep disturbance.    All other systems reviewed and are negative.      She can return to our offices in 1 year.    I have spent a total time of 21 minutes in caring for this patient on the day of the visit/encounter including Counseling / Coordination of care, Documenting in the medical record, Reviewing / ordering tests, medicine, procedures  , and Obtaining or reviewing history  .

## 2024-10-09 NOTE — ASSESSMENT & PLAN NOTE
Sushma is a 83-year-old patient with a history of complex partial seizures her last seizure was in 2000 in the past we have discussed alternative medications or changing her antiepileptics which she has refused.  She now utilizes 500 mg tablets half a tablet twice a day.  She has had no seizures and no auras.    Today she presents in a follow-up visit last time she was evaluated was in September 2023 she did have a prior CBC and CMP in the summer that was normal.    She will be continued on the current dose of Keppra and a new prescription was sent to the pharmacy.

## 2024-11-04 ENCOUNTER — NURSE TRIAGE (OUTPATIENT)
Age: 84
End: 2024-11-04

## 2024-11-04 NOTE — TELEPHONE ENCOUNTER
"Patient calling to report intermitting spotting/mild bleeding with pessary. Denies pain. Notes spotting intermittently and worse with straining. Patient is scheduled for pessary check and cleaning on 11/7. Message to Dr. Smith for review.    Reason for Disposition  • Age > 39 years with irregular or excessive bleeding    Answer Assessment - Initial Assessment Questions  1. BLEEDING SEVERITY: \"Describe the bleeding that you are having.\" \"How much bleeding is there?\"       Spotting to slightly more  2. ONSET: \"When did the bleeding begin?\" \"Is it continuing now?\"      intermittent  3. MENSTRUAL PERIOD: \"When was the last normal menstrual period?\" \"How is this different than your period?\"      N/A  4. REGULARITY: \"How regular are your periods?\"      N/A  5. ABDOMEN PAIN: \"Do you have any pain?\" \"How bad is the pain?\"  (e.g., Scale 0-10; none, mild, moderate, or severe)      Denies  6. PREGNANCY: \"Is there any chance you are pregnant?\" \"When was your last menstrual period?\"      N/A  7. BREASTFEEDING: \"Are you breastfeeding?\"      N/A  8. HORMONE MEDICINES: \"Are you taking any hormone medicines, prescription or over-the-counter?\" (e.g., birth control pills, estrogen)      No  9. BLOOD THINNER MEDICINES: \"Do you take any blood thinners?\" (e.g., Coumadin / warfarin, Pradaxa / dabigatran, aspirin)      No  10. CAUSE: \"What do you think is causing the bleeding?\" (e.g., recent gyn surgery, recent gyn procedure; known bleeding disorder, cervical cancer, polycystic ovarian disease, fibroids)          N/A  11. HEMODYNAMIC STATUS: \"Are you weak or feeling lightheaded?\" If Yes, ask: \"Can you stand and walk normally?\"         No  12. OTHER SYMPTOMS: \"What other symptoms are you having with the bleeding?\" (e.g., passed tissue, vaginal discharge, fever, menstrual-type cramps)        No    Protocols used: Vaginal Bleeding - Abnormal-Adult-[place]    "

## 2024-11-07 ENCOUNTER — OFFICE VISIT (OUTPATIENT)
Dept: GYNECOLOGY | Facility: CLINIC | Age: 84
End: 2024-11-07
Payer: MEDICARE

## 2024-11-07 VITALS — SYSTOLIC BLOOD PRESSURE: 146 MMHG | BODY MASS INDEX: 29.94 KG/M2 | DIASTOLIC BLOOD PRESSURE: 86 MMHG | WEIGHT: 174.4 LBS

## 2024-11-07 DIAGNOSIS — S37.69XA ABRASION OF CERVIX, INITIAL ENCOUNTER: ICD-10-CM

## 2024-11-07 DIAGNOSIS — N81.4 UTERINE PROLAPSE: ICD-10-CM

## 2024-11-07 DIAGNOSIS — N81.11 MIDLINE CYSTOCELE: Primary | ICD-10-CM

## 2024-11-07 DIAGNOSIS — N89.8 VAGINAL DISCHARGE: ICD-10-CM

## 2024-11-07 DIAGNOSIS — N93.9 VAGINAL BLEEDING: ICD-10-CM

## 2024-11-07 LAB
BV WHIFF TEST VAG QL: NEGATIVE
CLUE CELLS SPEC QL WET PREP: NEGATIVE
PH SMN: 5 [PH]
SL AMB POCT WET MOUNT: YES
T VAGINALIS VAG QL WET PREP: NEGATIVE
YEAST VAG QL WET PREP: NEGATIVE

## 2024-11-07 PROCEDURE — 87210 SMEAR WET MOUNT SALINE/INK: CPT | Performed by: OBSTETRICS & GYNECOLOGY

## 2024-11-07 PROCEDURE — 99214 OFFICE O/P EST MOD 30 MIN: CPT | Performed by: OBSTETRICS & GYNECOLOGY

## 2024-11-07 NOTE — PROGRESS NOTES
Assessment & Plan   Diagnoses and all orders for this visit:    Midline cystocele    Uterine prolapse    Abrasion of cervix, initial encounter    Vaginal discharge  -     POCT wet mount    Vaginal bleeding    1. vaginal bleeding-patient states that she has had bleeding since the last time she was seen, which was 5/31/2024.  On exam today, slight abrasion with possible granulation tissue was noted at the cervix at 7:00, which was bleeding.  Silver nitrate was applied with excellent hemostasis noted.  She will continue to monitor bleeding going forward.  2.  Uterovaginal prolapse-patient with cystocele and uterine prolapse.  Overall, she is doing well with a #7 ring pessary with support.  This was removed, cleansed with soap and water, and replaced back into the vagina without issues.  She will follow-up in 6 weeks time as scheduled with Dr. Lopez or as needed.  3.  Vaginal discharge with odor-wet mount was negative.  No treatment is indicated.  4.  Cystocele/uterine prolapse-pessary is noted  5.  History of postmenopausal bleeding/polyps-had endometrial biopsy 7/15/2021 with rare detached glandular epithelial cells, inadequate.  Ultrasound from 3/9/2023 with 1.6 mm endometrium with fluid, 2 echogenic areas noted likely suggestive of polyps.  Counseled in detail that the vaginal bleeding could be related to this as well.  Unclear much of the bleeding is related to this versus the abrasion on the cervix.  She was counseled about possible significant findings with polyps including polyps, hyperplasia, and even uterine malignancy.  Strongly would suggest she consider D&C.  She remains hesitant to proceed in that fashion.  Given the treatment of the cervix as noted above, would recommend the patient follow her bleeding pattern going forward.  If the bleeding is much improved, assume that this recent episode of bleeding is related to changes on the cervix.  If the bleeding persists, would strongly be worried about  intrauterine source for bleeding and she should strongly consider hysteroscopy with D&C with endometrial polypectomy.  She has follow-up in 6 weeks time with Dr. Lopez and she will reassess 10.    Subjective   Patient ID: Sushma Ambriz is a 84 y.o. female.    Vitals:    24 1520   BP: 146/86     Patient was seen today for follow-up visit.  Please see assessment plan for details.        The following portions of the patient's history were reviewed and updated as appropriate: allergies, current medications, past family history, past medical history, past social history, past surgical history, and problem list.  Past Medical History:   Diagnosis Date    Cancer (HCC)     left breast    Chronic kidney disease     stage 3    Elevated serum alkaline phosphatase level     GERD (gastroesophageal reflux disease)     Glaucoma     Hiatal hernia     Hx of bleeding disorder     vadginal    Hypercalcemia     Hyperglycemia     diet controlled    Hypertension     Lyme disease     Presence of pessary     Rheumatic fever     Seizures (HCC)     Stroke (HCC)     TIA    TIA (transient ischemic attack)     Vitamin D deficiency      Past Surgical History:   Procedure Laterality Date    APPENDECTOMY      BREAST LUMPECTOMY Left     CHG ASSAY OF PARATHORMONE N/A 2024    Procedure: INTRAOPERATIVE PTH MONITORING;  Surgeon: Giovanny Keller MD;  Location: [facility];  Service: Surgical Oncology    COLONOSCOPY      FOOT SURGERY Left     ORIF ANKLE FRACTURE Left     IL PARATHYROIDECTOMY/EXPLORATION PARATHYROIDS Bilateral 2024    Procedure: PARATHYROIDECTOMY, 4 GLAND EXPLORATION;  Surgeon: Giovanny Keller MD;  Location: [facility];  Service: Surgical Oncology    TONSILLECTOMY      TUBAL LIGATION       OB History    Para Term  AB Living   2 1 1 0 1 1   SAB IAB Ectopic Multiple Live Births         0 1      # Outcome Date GA Lbr Erich/2nd Weight Sex Type Anes PTL Lv   2 SAB            1 Term      Vag-Spont   PRABHAKAR        Current Outpatient Medications:     alendronate (Fosamax) 70 mg tablet, Take 1 tablet (70 mg total) by mouth every 7 days .  Take on empty stomach do not eat drink or lie down for half hour (Patient taking differently: Take 70 mg by mouth every 7 days .  Take on empty stomach do not eat drink or lie down for half hour), Disp: 12 tablet, Rfl: 3    Alpha-Lipoic Acid 100 MG CAPS, Take by mouth , Disp: , Rfl:     aspirin 81 MG tablet, Take 162 mg by mouth, Disp: , Rfl:     B Complex Vitamins (B COMPLEX 1 PO), Take 2 tablets by mouth daily, Disp: , Rfl:     bimatoprost (LUMIGAN) 0.03 % ophthalmic drops, Administer 1 drop to both eyes daily at bedtime  , Disp: , Rfl:     Cholecalciferol (VITAMIN D) 2000 units CAPS, Take 2 capsules by mouth daily, Disp: , Rfl:     Coenzyme Q10 (COQ10) 100 MG CAPS, Take by mouth, Disp: , Rfl:     diphenhydrAMINE (BENADRYL) 50 MG tablet, Take one 50 mg tablet 1 hour prior to CT scan. (Patient not taking: Reported on 8/22/2024), Disp: 1 tablet, Rfl: 0    furosemide (LASIX) 20 mg tablet, TAKE 1 TABLET BY MOUTH EVERY DAY (Patient taking differently: Take 20 mg by mouth as needed), Disp: 90 tablet, Rfl: 2    GLUCOSAMINE-MSM-HYALURONIC ACD PO, Take 1 capsule by mouth 3 (three) times a day, Disp: , Rfl:     GRAPE SEED EXTRACT PO, Take by mouth, Disp: , Rfl:     hydrOXYzine HCL (ATARAX) 10 mg tablet, TAKE 1 TABLET BY MOUTH DAILY AS NEEDED FOR ANXIETY, Disp: 90 tablet, Rfl: 1    levETIRAcetam (KEPPRA) 500 mg tablet, Take 0.5 tablets (250 mg total) by mouth 2 (two) times a day, Disp: 90 tablet, Rfl: 1    LORazepam (Ativan) 0.5 mg tablet, Take 1 tablet 1 hour prior to MRI (Patient not taking: Reported on 8/12/2024), Disp: 1 tablet, Rfl: 0    Lumigan 0.01 % ophthalmic drops, INSTILL 1 DROP INTO BOTH EYES AT BEDTIME, Disp: , Rfl:     melatonin 3 mg, Take by mouth daily at bedtime, Disp: , Rfl:     metoprolol succinate (TOPROL-XL) 200 MG 24 hr tablet, TAKE 1 TABLET BY MOUTH EVERY DAY, Disp: 90  "tablet, Rfl: 3    milk thistle 175 MG tablet, Take 175 mg by mouth daily, Disp: , Rfl:     Multiple Vitamins-Minerals (MULTIVITAMIN ADULT PO), Take 1 capsule by mouth daily  , Disp: , Rfl:     Omega-3 Fatty Acids (FISH OIL PO), Take 1,000 mg by mouth daily  , Disp: , Rfl:     potassium bicarbonate (K-LYTE) 25 MEQ disintegrating tablet, Take 25 mEq by mouth 2 (two) times a day (Patient not taking: Reported on 2024), Disp: , Rfl:     terbinafine (LamISIL) 1 % cream, Apply topically 2 (two) times a day (Patient not taking: Reported on 2024), Disp: 42 g, Rfl: 0    Turmeric 500 MG CAPS, Take 1 capsule by mouth daily  , Disp: , Rfl:     VOLTAREN 1 %, APPLY 2G TOPICALLY TO AFFECTED AREA FOUR TIMES A DAY AS DIRECTED, Disp: 100 g, Rfl: 5  Allergies   Allergen Reactions    Carbamazepine Myalgia     flu-like symptoms    Epinephrine Other (See Comments)     \"weakness\"    Iodides Itching    Latex Rash    Lisinopril Cough    Morphine And Codeine Other (See Comments)     Caused burning during nerve block    Sulfamethoxazole-Trimethoprim Other (See Comments)     Vaginal bleeding    Topiramate Rash     Social History     Socioeconomic History    Marital status:      Spouse name: Not on file    Number of children: Not on file    Years of education: Not on file    Highest education level: Not on file   Occupational History    Occupation: retired   Tobacco Use    Smoking status: Former     Current packs/day: 0.00     Average packs/day: 1 pack/day for 53.0 years (53.0 ttl pk-yrs)     Types: Cigarettes     Start date:      Quit date: 2010     Years since quittin.8    Smokeless tobacco: Never   Vaping Use    Vaping status: Never Used   Substance and Sexual Activity    Alcohol use: Yes     Comment: social    Drug use: No    Sexual activity: Not Currently   Other Topics Concern    Not on file   Social History Narrative    Activities - gardening    Daily coffee consumption- 1 cup per day    Daily tea consumption    " Hearing loss     Social Determinants of Health     Financial Resource Strain: Low Risk  (6/9/2023)    Overall Financial Resource Strain (CARDIA)     Difficulty of Paying Living Expenses: Not hard at all   Food Insecurity: No Food Insecurity (7/9/2024)    Nursing - Inadequate Food Risk Classification     Worried About Running Out of Food in the Last Year: Never true     Ran Out of Food in the Last Year: Never true     Ran Out of Food in the Last Year: Not on file   Transportation Needs: No Transportation Needs (7/9/2024)    PRAPARE - Transportation     Lack of Transportation (Medical): No     Lack of Transportation (Non-Medical): No   Physical Activity: Not on file   Stress: Not on file   Social Connections: Not on file   Intimate Partner Violence: Not on file   Housing Stability: Low Risk  (7/9/2024)    Housing Stability Vital Sign     Unable to Pay for Housing in the Last Year: No     Number of Times Moved in the Last Year: 0     Homeless in the Last Year: No     Family History   Problem Relation Age of Onset    Breast cancer Mother     Coronary artery disease Father     Bone cancer Brother     Lung cancer Brother        Review of Systems   Constitutional:  Negative for chills, diaphoresis, fatigue and fever.   Respiratory:  Negative for apnea, cough, chest tightness, shortness of breath and wheezing.    Cardiovascular:  Negative for chest pain, palpitations and leg swelling.   Gastrointestinal:  Negative for abdominal distention, abdominal pain, anal bleeding, constipation, diarrhea, nausea, rectal pain and vomiting.   Genitourinary:  Negative for difficulty urinating, dyspareunia, dysuria, frequency, hematuria, menstrual problem, pelvic pain, urgency, vaginal bleeding, vaginal discharge and vaginal pain.   Musculoskeletal:  Negative for arthralgias, back pain and myalgias.   Skin:  Negative for color change and rash.   Neurological:  Negative for dizziness, syncope, light-headedness, numbness and headaches.    Hematological:  Negative for adenopathy. Does not bruise/bleed easily.   Psychiatric/Behavioral:  Negative for dysphoric mood and sleep disturbance. The patient is not nervous/anxious.        Objective   Physical Exam  OBGyn Exam     Objective      /86 (BP Location: Right arm, Patient Position: Sitting, Cuff Size: Standard)   Wt 79.1 kg (174 lb 6.4 oz)   LMP  (LMP Unknown)   BMI 29.94 kg/m²     General:   alert and oriented, in no acute distress   Neck:    Breast:    Heart:    Lungs:    Abdomen: soft, non-tender, without masses or organomegaly   Vulva: normal   Vagina: Moderate amount of white-brown discharge, without erythema or lesions appreciated.  Grade 1-2 cystocele   Cervix: Abrasion with possible granulation tissue noted at about 7:00 on the cervix.  This area was bleeding.  Silver nitrate applied with hemostasis noted.  Cervix did prolapse to the lower one third of the vagina.   Uterus: top normal size, anteverted, non-tender   Adnexa: no mass, fullness, tenderness   Rectum: deferred    Psych:  Normal mood and affect   Skin:  Without obvious lesions   Eyes: symmetric, with normal movements and reactivity   Musculoskeletal:  Normal muscle tone and movements appreciated

## 2024-12-01 ENCOUNTER — OFFICE VISIT (OUTPATIENT)
Dept: URGENT CARE | Age: 84
End: 2024-12-01
Payer: MEDICARE

## 2024-12-01 VITALS
DIASTOLIC BLOOD PRESSURE: 81 MMHG | RESPIRATION RATE: 18 BRPM | SYSTOLIC BLOOD PRESSURE: 175 MMHG | TEMPERATURE: 97.7 F | HEART RATE: 64 BPM | OXYGEN SATURATION: 94 %

## 2024-12-01 DIAGNOSIS — L02.612 ABSCESS OR CELLULITIS OF TOE, LEFT: Primary | ICD-10-CM

## 2024-12-01 DIAGNOSIS — L03.032 ABSCESS OR CELLULITIS OF TOE, LEFT: Primary | ICD-10-CM

## 2024-12-01 PROCEDURE — 99213 OFFICE O/P EST LOW 20 MIN: CPT | Performed by: FAMILY MEDICINE

## 2024-12-01 PROCEDURE — G0463 HOSPITAL OUTPT CLINIC VISIT: HCPCS | Performed by: FAMILY MEDICINE

## 2024-12-01 RX ORDER — CEPHALEXIN 500 MG/1
500 CAPSULE ORAL EVERY 6 HOURS SCHEDULED
Qty: 28 CAPSULE | Refills: 0 | Status: SHIPPED | OUTPATIENT
Start: 2024-12-01 | End: 2024-12-08

## 2024-12-01 NOTE — PROGRESS NOTES
[facility]        NAME: Sushma Ambriz is a 84 y.o. female  : 1940    MRN: [id number]  DATE: 2024  TIME: 9:37 AM    Assessment and Plan   Abscess or cellulitis of toe, left [L03.032, L02.612]  1. Abscess or cellulitis of toe, left  cephalexin (KEFLEX) 500 mg capsule        Will start Keflex for 3rd left toe cellulitis  Keep area clean and dry, may keep covered with Band-Aid  Has appt with Podiatry on 2024      Patient Instructions     Proceed to  ER if symptoms worsen.    If tests have been performed at [facility], our office will contact you with results if changes need to be made to the care plan discussed with you at the visit.  [facility]    Chief Complaint     Chief Complaint   Patient presents with    Toe Pain     Pt stubbed left foot and came here and had xrays done. Suggested podiatry, can't get in until the . Thinks she needs an antibitoic, one toenail is white, foot is swollen, and skin is peeling off. Having pain. Pt states she may be prediabetic and is nervous.     History of Present Illness     HPI  Susmha Ambriz is a 84 y.o. female  who [facility] today with h/o of left foot toe contusion in 2024, visited PCP and Left foot Xray did not show fracture, but degeneratvie changes.  She visited Urgent Care a month later and was prescribed Kelfex for an infection as well as topical antifungal.  Pt states she only used the antifungal cream, and did not take the antiobutcs at the time.    She presents today because her foot looks worse, is swollen, is red, and painful.  She ha an appt with Podiatry next week.      Review of Systems   Review of Systems   Constitutional:  Negative for chills and fever.   HENT:  Negative for congestion, rhinorrhea and sore throat.    Respiratory:  Negative for cough and shortness of breath.    Cardiovascular:  Negative for chest pain.   Gastrointestinal:  Negative for  diarrhea, nausea and vomiting.   Skin:  Positive for color change and wound. Negative for rash.   Neurological:  Negative for dizziness and headaches.         Current Medications       Current Outpatient Medications:     cephalexin (KEFLEX) 500 mg capsule, Take 1 capsule (500 mg total) by mouth every 6 (six) hours for 7 days, Disp: 28 capsule, Rfl: 0    alendronate (Fosamax) 70 mg tablet, Take 1 tablet (70 mg total) by mouth every 7 days .  Take on empty stomach do not eat drink or lie down for half hour (Patient taking differently: Take 70 mg by mouth every 7 days .  Take on empty stomach do not eat drink or lie down for half hour), Disp: 12 tablet, Rfl: 3    Alpha-Lipoic Acid 100 MG CAPS, Take by mouth , Disp: , Rfl:     aspirin 81 MG tablet, Take 162 mg by mouth, Disp: , Rfl:     B Complex Vitamins (B COMPLEX 1 PO), Take 2 tablets by mouth daily, Disp: , Rfl:     bimatoprost (LUMIGAN) 0.03 % ophthalmic drops, Administer 1 drop to both eyes daily at bedtime  , Disp: , Rfl:     Cholecalciferol (VITAMIN D) 2000 units CAPS, Take 2 capsules by mouth daily, Disp: , Rfl:     Coenzyme Q10 (COQ10) 100 MG CAPS, Take by mouth, Disp: , Rfl:     diphenhydrAMINE (BENADRYL) 50 MG tablet, Take one 50 mg tablet 1 hour prior to CT scan. (Patient not taking: Reported on 8/22/2024), Disp: 1 tablet, Rfl: 0    furosemide (LASIX) 20 mg tablet, TAKE 1 TABLET BY MOUTH EVERY DAY (Patient taking differently: Take 20 mg by mouth as needed), Disp: 90 tablet, Rfl: 2    GLUCOSAMINE-MSM-HYALURONIC ACD PO, Take 1 capsule by mouth 3 (three) times a day, Disp: , Rfl:     GRAPE SEED EXTRACT PO, Take by mouth, Disp: , Rfl:     hydrOXYzine HCL (ATARAX) 10 mg tablet, TAKE 1 TABLET BY MOUTH DAILY AS NEEDED FOR ANXIETY, Disp: 90 tablet, Rfl: 1    levETIRAcetam (KEPPRA) 500 mg tablet, Take 0.5 tablets (250 mg total) by mouth 2 (two) times a day, Disp: 90 tablet, Rfl: 1    LORazepam (Ativan) 0.5 mg tablet, Take 1 tablet 1 hour prior to MRI (Patient not  taking: Reported on 8/12/2024), Disp: 1 tablet, Rfl: 0    Lumigan 0.01 % ophthalmic drops, INSTILL 1 DROP INTO BOTH EYES AT BEDTIME, Disp: , Rfl:     melatonin 3 mg, Take by mouth daily at bedtime, Disp: , Rfl:     metoprolol succinate (TOPROL-XL) 200 MG 24 hr tablet, TAKE 1 TABLET BY MOUTH EVERY DAY, Disp: 90 tablet, Rfl: 3    milk thistle 175 MG tablet, Take 175 mg by mouth daily, Disp: , Rfl:     Multiple Vitamins-Minerals (MULTIVITAMIN ADULT PO), Take 1 capsule by mouth daily  , Disp: , Rfl:     Omega-3 Fatty Acids (FISH OIL PO), Take 1,000 mg by mouth daily  , Disp: , Rfl:     potassium bicarbonate (K-LYTE) 25 MEQ disintegrating tablet, Take 25 mEq by mouth 2 (two) times a day (Patient not taking: Reported on 8/12/2024), Disp: , Rfl:     terbinafine (LamISIL) 1 % cream, Apply topically 2 (two) times a day (Patient not taking: Reported on 8/12/2024), Disp: 42 g, Rfl: 0    Turmeric 500 MG CAPS, Take 1 capsule by mouth daily  , Disp: , Rfl:     VOLTAREN 1 %, APPLY 2G TOPICALLY TO AFFECTED AREA FOUR TIMES A DAY AS DIRECTED, Disp: 100 g, Rfl: 5    Current Allergies     Allergies as of 12/01/2024 - Reviewed 12/01/2024   Allergen Reaction Noted    Carbamazepine Myalgia 03/07/2007    Epinephrine Other (See Comments) 03/07/2007    Iodides Itching 12/10/2011    Latex Rash 05/04/2012    Lisinopril Cough 01/23/2020    Morphine and codeine Other (See Comments) 05/04/2012    Sulfamethoxazole-trimethoprim Other (See Comments) 05/04/2012    Topiramate Rash 03/07/2007            The following portions of the patient's history were reviewed and updated as appropriate: allergies, current medications, past family history, past medical history, past social history, past surgical history and problem list.     Past Medical History:   Diagnosis Date    Cancer (HCC)     left breast    Chronic kidney disease     stage 3    Elevated serum alkaline phosphatase level     GERD (gastroesophageal reflux disease)     Glaucoma     Hiatal hernia      Hx of bleeding disorder     vadginal    Hypercalcemia     Hyperglycemia     diet controlled    Hypertension     Lyme disease     Presence of pessary     Rheumatic fever     Seizures (HCC)     Stroke (HCC) 2004    TIA    TIA (transient ischemic attack)     Vitamin D deficiency        Past Surgical History:   Procedure Laterality Date    APPENDECTOMY      BREAST LUMPECTOMY Left     CHG ASSAY OF PARATHORMONE N/A 8/27/2024    Procedure: INTRAOPERATIVE PTH MONITORING;  Surgeon: Giovanny Keller MD;  [facility];  Service: Surgical Oncology    COLONOSCOPY      FOOT SURGERY Left     ORIF ANKLE FRACTURE Left     MA PARATHYROIDECTOMY/EXPLORATION PARATHYROIDS Bilateral 8/27/2024    Procedure: PARATHYROIDECTOMY, 4 GLAND EXPLORATION;  Surgeon: Giovanny Keller MD;  [facility];  Service: Surgical Oncology    TONSILLECTOMY      TUBAL LIGATION         Family History   Problem Relation Age of Onset    Breast cancer Mother     Coronary artery disease Father     Bone cancer Brother     Lung cancer Brother          Medications have been verified.        Objective   BP (!) 175/81 (BP Location: Right arm, Patient Position: Sitting, Cuff Size: Standard)   Pulse 64   Temp 97.7 °F (36.5 °C) (Tympanic)   Resp 18   LMP  (LMP Unknown)   SpO2 94%   No LMP recorded (lmp unknown). Patient is postmenopausal.       Physical Exam      Media Information      Document Information    Clinical Image - Mobile Device      12/01/2024 09:25   Attached To:   Office Visit on 12/1/24 with Babita Hoffman MD   Source Information    Babita Hoffman MD  [facility]   Document History          Physical Exam  Vitals and nursing note reviewed.   Constitutional:       Appearance: She is well-developed.      Comments: + walker   HENT:      Head: Normocephalic and atraumatic.   Cardiovascular:      Rate and Rhythm: Normal rate.   Pulmonary:      Effort: Pulmonary effort is normal. No respiratory distress.    Musculoskeletal:        Feet:    Feet:      Comments: Left 3rd toe: + swelling purplish discoloration, + tenderness, + dry skin with a open fissure distally, no drainage  2nd toe hammertoe  Neurological:      Mental Status: She is alert and oriented to person, place, and time.   Psychiatric:         Mood and Affect: Mood normal.

## 2024-12-01 NOTE — PATIENT INSTRUCTIONS
Patient Education     Cellulitis (skin infection) in adults - Discharge instructions   The Basics   Written by the doctors and editors at [facility]   What are discharge instructions? -- Discharge instructions are information about how to take care of yourself after getting medical care for a health problem.  What is cellulitis? -- Cellulitis is a skin infection (figure 1). It can happen when germs get into the skin. Normally, different types of germs live on a person's skin. Most of the time, these germs do not cause any problems. But if a person gets a cut or a break in the skin, the germs can get into the skin and cause an infection.  You need antibiotics to treat cellulitis. Usually, this involves taking antibiotic pills. Just putting antibiotic ointment on the skin does not work. It is important to take all of your antibiotics even if you start to feel better.  How do I care for myself at home? -- Ask the doctor or nurse what you should do when you go home. Make sure that you understand exactly what you need to do to care for yourself. Ask questions if there is anything you do not understand.  You should also:   Prop your painful body part on pillows, keeping it above the level of your heart. This might help lessen pain and swelling.   Keep the infected area clean and dry. Do not squeeze, scratch, or rub it. You can gently wash the area with soap and water or take a shower. Pat the area dry with a clean towel.   Wash your hands before and after you touch the infected area.  When should I call the doctor? -- Call for advice if:   You have a fever of 101°F (38.4°C) or higher, or chills.   The area becomes more red, swollen, or painful, or the redness or swelling spreads up your leg or arm or to a larger area.   The infected area is not better after 3 days of taking antibiotics.  All topics are updated as new evidence becomes available and our peer review process is complete.  This topic retrieved from [facility] on:  Mar 06, 2024.  Topic 780133 Version 1.0  Release: 32.2.4 - C32.64  © 2024 UpToDate, Inc. and/or its affiliates. All rights reserved.  figure 1: Cellulitis     Cellulitis is an infection of the skin. These infections can cause redness, pain, and/or swelling.  Graphic 440408 Version 1.0  Consumer Information Use and Disclaimer   Disclaimer: This generalized information is a limited summary of diagnosis, treatment, and/or medication information. It is not meant to be comprehensive and should be used as a tool to help the user understand and/or assess potential diagnostic and treatment options. It does NOT include all information about conditions, treatments, medications, side effects, or risks that may apply to a specific patient. It is not intended to be medical advice or a substitute for the medical advice, diagnosis, or treatment of a health care provider based on the health care provider's examination and assessment of a patient's specific and unique circumstances. Patients must speak with a health care provider for complete information about their health, medical questions, and treatment options, including any risks or benefits regarding use of medications. This information does not endorse any treatments or medications as safe, effective, or approved for treating a specific patient. UpToDate, Inc. and its affiliates disclaim any warranty or liability relating to this information or the use thereof.The use of this information is governed by the Terms of Use, available at https://www.woltersBill.comuwer.com/en/know/clinical-effectiveness-terms. 2024© UpToDate, Inc. and its affiliates and/or licensors. All rights reserved.  Copyright   © 2024 UpToDate, Inc. and/or its affiliates. All rights reserved.

## 2024-12-06 ENCOUNTER — OFFICE VISIT (OUTPATIENT)
Dept: ENDOCRINOLOGY | Facility: CLINIC | Age: 84
End: 2024-12-06
Payer: MEDICARE

## 2024-12-06 VITALS
HEIGHT: 64 IN | WEIGHT: 173.4 LBS | BODY MASS INDEX: 29.6 KG/M2 | DIASTOLIC BLOOD PRESSURE: 86 MMHG | SYSTOLIC BLOOD PRESSURE: 144 MMHG | HEART RATE: 70 BPM | OXYGEN SATURATION: 96 %

## 2024-12-06 DIAGNOSIS — E21.3 HYPERPARATHYROIDISM (HCC): Primary | ICD-10-CM

## 2024-12-06 DIAGNOSIS — M81.0 AGE-RELATED OSTEOPOROSIS WITHOUT CURRENT PATHOLOGICAL FRACTURE: ICD-10-CM

## 2024-12-06 PROCEDURE — G2211 COMPLEX E/M VISIT ADD ON: HCPCS

## 2024-12-06 PROCEDURE — 99214 OFFICE O/P EST MOD 30 MIN: CPT

## 2024-12-06 NOTE — PROGRESS NOTES
Established Patient Progress Note      Chief Complaint   Patient presents with    Hyperparathyroidism        Impression & Plan:    Assessment & Plan  Hyperparathyroidism (HCC)  This seems to have resolved post-surgery  Will continue to monitor levels  She has labs scheduled for March as well as follow-up with surgical oncology  Orders:    Comprehensive metabolic panel; Future    PTH, intact; Future    Age-related osteoporosis without current pathological fracture  Continue Fosamax at current dose  Continue with calcium and vitamin d supplementation   Orders:    Vitamin D 25 hydroxy; Future      History of Present Illness:   Sushma Ambriz is a 84 y.o. female with history of hyperparathyroidism with hypercalcemia seen in follow up.     She had consistent hypercalcemia since 2022 and elevated PTH since 2023. She had excision of right superior and left superior parathyroid glands 8/27/24 by Dr. Keller. Calcium and PTH post surgery normalized.     She is also taking Fosamax weekly prescribed by her PCP. DXA from July 2024 demonstrated mild osteoporosis in the lef femoral neck. She denies any fractures or falls recently. She is now taking calcium and vitamin d supplementation.     Patient Active Problem List   Diagnosis    Vitamin D deficiency    Transient ischemic attack    Osteoarthritis    Essential hypertension    Hypercholesterolemia    Hyperglycemia    Complex partial seizure (HCC)    Midline cystocele    Anxiety    Personal history of breast cancer    Personal history of radiation therapy    Hiatal hernia    Allergic rhinitis    Dyspnea on exertion    Abnormal EKG    Trigeminy    Cigarette nicotine dependence in remission    Peripheral edema    Hypercalcemia    Hyperparathyroidism (HCC)    Parathyroid adenoma    Walker as ambulation aid    OAB (overactive bladder)    Age-related osteoporosis without current pathological fracture    Right bundle branch block (RBBB)    S/P parathyroidectomy    Uterine prolapse     "Cervical abrasion    Vaginal bleeding      Past Medical History:   Diagnosis Date    Cancer (HCC)     left breast    Cellulitis 2024    on left foot    Chronic kidney disease     stage 3    Elevated serum alkaline phosphatase level     GERD (gastroesophageal reflux disease)     Glaucoma     Hiatal hernia     Hx of bleeding disorder     vadginal    Hypercalcemia     Hyperglycemia     diet controlled    Hypertension     Lyme disease     Presence of pessary     Rheumatic fever     Seizures (HCC)     Stroke (HCC)     TIA    TIA (transient ischemic attack)     Vitamin D deficiency       Past Surgical History:   Procedure Laterality Date    APPENDECTOMY      BREAST LUMPECTOMY Left     CHG ASSAY OF PARATHORMONE N/A 2024    Procedure: INTRAOPERATIVE PTH MONITORING;  Surgeon: Giovanny Keller MD;  [facility];  Service: Surgical Oncology    COLONOSCOPY      FOOT SURGERY Left     ORIF ANKLE FRACTURE Left     AZ PARATHYROIDECTOMY/EXPLORATION PARATHYROIDS Bilateral 2024    Procedure: PARATHYROIDECTOMY, 4 GLAND EXPLORATION;  Surgeon: Giovanny Keller MD;  Location: [facility];  Service: Surgical Oncology    TONSILLECTOMY      TUBAL LIGATION        Family History   Problem Relation Age of Onset    Breast cancer Mother     Coronary artery disease Father     Bone cancer Brother     Lung cancer Brother      Social History     Tobacco Use    Smoking status: Former     Current packs/day: 0.00     Average packs/day: 1 pack/day for 53.0 years (53.0 ttl pk-yrs)     Types: Cigarettes     Start date:      Quit date:      Years since quittin.9    Smokeless tobacco: Never   Substance Use Topics    Alcohol use: Yes     Comment: social     Allergies   Allergen Reactions    Carbamazepine Myalgia     flu-like symptoms    Epinephrine Other (See Comments)     \"weakness\"    Iodides Itching    Latex Rash    Lisinopril Cough    Morphine And Codeine Other (See Comments)     Caused burning during nerve block    " Sulfamethoxazole-Trimethoprim Other (See Comments)     Vaginal bleeding    Topiramate Rash         Current Outpatient Medications:     alendronate (Fosamax) 70 mg tablet, Take 1 tablet (70 mg total) by mouth every 7 days .  Take on empty stomach do not eat drink or lie down for half hour (Patient taking differently: Take 70 mg by mouth every 7 days .  Take on empty stomach do not eat drink or lie down for half hour), Disp: 12 tablet, Rfl: 3    Alpha-Lipoic Acid 100 MG CAPS, Take by mouth , Disp: , Rfl:     aspirin 81 MG tablet, Take 162 mg by mouth, Disp: , Rfl:     B Complex Vitamins (B COMPLEX 1 PO), Take 2 tablets by mouth daily, Disp: , Rfl:     bimatoprost (LUMIGAN) 0.03 % ophthalmic drops, Administer 1 drop to both eyes daily at bedtime  , Disp: , Rfl:     cephalexin (KEFLEX) 500 mg capsule, Take 1 capsule (500 mg total) by mouth every 6 (six) hours for 7 days, Disp: 28 capsule, Rfl: 0    Cholecalciferol (VITAMIN D) 2000 units CAPS, Take 2 capsules by mouth daily, Disp: , Rfl:     Coenzyme Q10 (COQ10) 100 MG CAPS, Take by mouth, Disp: , Rfl:     furosemide (LASIX) 20 mg tablet, TAKE 1 TABLET BY MOUTH EVERY DAY (Patient taking differently: Take 20 mg by mouth as needed), Disp: 90 tablet, Rfl: 2    GLUCOSAMINE-MSM-HYALURONIC ACD PO, Take 1 capsule by mouth 3 (three) times a day, Disp: , Rfl:     GRAPE SEED EXTRACT PO, Take by mouth, Disp: , Rfl:     hydrOXYzine HCL (ATARAX) 10 mg tablet, TAKE 1 TABLET BY MOUTH DAILY AS NEEDED FOR ANXIETY, Disp: 90 tablet, Rfl: 1    levETIRAcetam (KEPPRA) 500 mg tablet, Take 0.5 tablets (250 mg total) by mouth 2 (two) times a day, Disp: 90 tablet, Rfl: 1    Lumigan 0.01 % ophthalmic drops, INSTILL 1 DROP INTO BOTH EYES AT BEDTIME, Disp: , Rfl:     melatonin 3 mg, Take by mouth daily at bedtime, Disp: , Rfl:     metoprolol succinate (TOPROL-XL) 200 MG 24 hr tablet, TAKE 1 TABLET BY MOUTH EVERY DAY, Disp: 90 tablet, Rfl: 3    milk thistle 175 MG tablet, Take 175 mg by mouth daily,  "Disp: , Rfl:     Multiple Vitamins-Minerals (MULTIVITAMIN ADULT PO), Take 1 capsule by mouth daily  , Disp: , Rfl:     Omega-3 Fatty Acids (FISH OIL PO), Take 1,000 mg by mouth daily  , Disp: , Rfl:     potassium bicarbonate (K-LYTE) 25 MEQ disintegrating tablet, Take 25 mEq by mouth 2 (two) times a day, Disp: , Rfl:     terbinafine (LamISIL) 1 % cream, Apply topically 2 (two) times a day, Disp: 42 g, Rfl: 0    Turmeric 500 MG CAPS, Take 1 capsule by mouth daily  , Disp: , Rfl:     VOLTAREN 1 %, APPLY 2G TOPICALLY TO AFFECTED AREA FOUR TIMES A DAY AS DIRECTED, Disp: 100 g, Rfl: 5    diphenhydrAMINE (BENADRYL) 50 MG tablet, Take one 50 mg tablet 1 hour prior to CT scan. (Patient not taking: Reported on 8/22/2024), Disp: 1 tablet, Rfl: 0    LORazepam (Ativan) 0.5 mg tablet, Take 1 tablet 1 hour prior to MRI (Patient not taking: Reported on 8/12/2024), Disp: 1 tablet, Rfl: 0    Review of Systems   Gastrointestinal:  Negative for constipation.   Endocrine: Negative for polydipsia and polyuria.   Musculoskeletal:  Positive for gait problem. Negative for arthralgias.   Neurological:  Negative for weakness.       Physical Exam:  Body mass index is 29.76 kg/m².  /86   Pulse 70   Ht 5' 4\" (1.626 m)   Wt 78.7 kg (173 lb 6.4 oz)   LMP  (LMP Unknown)   SpO2 96%   BMI 29.76 kg/m²    Wt Readings from Last 3 Encounters:   12/06/24 78.7 kg (173 lb 6.4 oz)   11/07/24 79.1 kg (174 lb 6.4 oz)   10/09/24 77.8 kg (171 lb 9.6 oz)       Physical Exam  Vitals reviewed.   Constitutional:       Appearance: Normal appearance.   Cardiovascular:      Rate and Rhythm: Normal rate.   Pulmonary:      Effort: Pulmonary effort is normal.   Neurological:      Mental Status: She is alert and oriented to person, place, and time.      Gait: Gait abnormal.   Psychiatric:         Mood and Affect: Mood normal.         Labs:     Component      Latest Ref Rng 8/27/2024   PTH INTRAOP      12.0 - 88.0 pg/mL 71.8    Calcium      8.4 - 10.2 mg/dL 9.8 "          Orders Placed This Encounter   Procedures    Comprehensive metabolic panel     This is a patient instruction: Patient fasting for 8 hours or longer recommended.     Standing Status:   Future     Expected Date:   6/6/2025     Expiration Date:   12/6/2025    PTH, intact     Standing Status:   Future     Expected Date:   6/6/2025     Expiration Date:   12/6/2025    Vitamin D 25 hydroxy     Standing Status:   Future     Expected Date:   6/6/2025     Expiration Date:   12/6/2025       There are no Patient Instructions on file for this visit.    Discussed with the patient and all questioned fully answered. She will call me if any problems arise.    MARIE Gonzalez

## 2024-12-06 NOTE — ASSESSMENT & PLAN NOTE
Continue Fosamax at current dose  Continue with calcium and vitamin d supplementation   Orders:    Vitamin D 25 hydroxy; Future

## 2024-12-06 NOTE — ASSESSMENT & PLAN NOTE
This seems to have resolved post-surgery  Will continue to monitor levels  She has labs scheduled for March as well as follow-up with surgical oncology  Orders:    Comprehensive metabolic panel; Future    PTH, intact; Future

## 2024-12-09 ENCOUNTER — OFFICE VISIT (OUTPATIENT)
Dept: PODIATRY | Facility: CLINIC | Age: 84
End: 2024-12-09
Payer: MEDICARE

## 2024-12-09 DIAGNOSIS — L97.524 SKIN ULCER OF THIRD TOE OF LEFT FOOT WITH NECROSIS OF BONE (HCC): Primary | ICD-10-CM

## 2024-12-09 DIAGNOSIS — Z01.818 PREOP EXAMINATION: ICD-10-CM

## 2024-12-09 PROCEDURE — 99204 OFFICE O/P NEW MOD 45 MIN: CPT | Performed by: PODIATRIST

## 2024-12-09 NOTE — PROGRESS NOTES
Name: Sushma Ambriz      : 1940      MRN: [id number]  Encounter Provider: Cruzito Parmar DPM  Encounter Date: 2024   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 Clinically the wound probes to the distal phalanx. Treatment would involve partial vs total amputation of the toe.     Consent was signed for left 3rd partial vs total amputation    Surgical procedure and recovery discussed as can best be predicted.  Alternatives, risks, complications covered with the patient.    Appropriate postop medication discussed, educated patient on narcotic medication and its risks.     Patient will be sent for preoperative testing and clearance    Patient doen not need DVT prophylaxis for this procedure    Orders:    XR foot 3+ vw left; Future    CBC and Platelet; Future    Comprehensive metabolic panel; Future    C-reactive protein; Future    Case request operating room: AMPUTATION TOE, left 3rd toe; Standing    Preop examination    Orders:    CBC and Platelet; Future    Comprehensive metabolic panel; Future    C-reactive protein; Future    Diagnosis and options discussed with patient  Patient agreeable to the plan as stated below  Reviewed PCP visit  and urgent care visit from April  Reviewed April XR and compared to today. There is suspected OM of the 3rd toe    History of Present Illness   HPI  Sushma Ambriz is a 84 y.o. female who presents with a sore on her left middle toe. It has been there for almost a year. She had an XR last April which did not show any infection. The toe is very red and swollen. She has been on antibiotics a few times for the sore but never been to a wound center or podiatrist for it. Her PCP put her on keflex recently (). Patient denies N/F/V/C/D/CP/SOB        Review of Systems  As stated in HPI, otherwise  normal    Medical History Reviewed by provider this encounter:  Tobacco  Allergies  Meds  Problems  Med Hx  Surg Hx  Fam Hx           Objective   LMP  (LMP Unknown)      Physical Exam  Vitals reviewed.   Constitutional:       Appearance: She is obese. She is not ill-appearing.   Cardiovascular:      Pulses: Normal pulses.   Pulmonary:      Effort: No respiratory distress.   Musculoskeletal:         General: Swelling and deformity (severe hammertoe deformity left 2,3,4) present.   Feet:      Left foot:      Skin integrity: Ulcer (tip left 3rd toe with cellulitis of the entire toe. Probes close to the distal phalanx), skin breakdown and erythema present.   Skin:     Capillary Refill: Capillary refill takes less than 2 seconds.      Findings: Erythema present.   Neurological:      Mental Status: She is alert.

## 2024-12-09 NOTE — PATIENT INSTRUCTIONS
[facility] Podiatry  Dr. Cruzito Parmar, DPM, FACFAS  Post-Operative Instructions    1. Take your prescribed medication as directed. You can take ibuprofen in between doses of the narcotic if breakthrough pain occurs  2. Upon arrival at home, lie down and elevate your surgical foot on 2 pillows. Unless you're moving from the couch, bed, or bathroom, make sure to elevate the foot. Swelling is not your friend.   3. Remain quiet, off your feet as much as possible, for the first 24-48 hours. This is when your feet first swell and may become painful. After 48 hours you may begin limited walking following these restrictions:   Weightbear as tolerated to surgical foot  4. Drink large quantities of water. Consume no alcohol. Continue a well-balanced diet.  5. Report any unusual discomfort or fever to this office.  6. A limited amount of discomfort and swelling is to be expected. In some cases the skin may take on a bruised appearance. The surgical solution that was applied to your foot prior to the operation is dark in color and the operation site may appear to be oozing when it actually is not.  7. A slight amount of blood is to be expected, and is no cause for alarm. Do not remove the dressings. If there is active bleeding and if the bleeding persists, add additional gauze to the bandage, apply direct pressure, elevate your feet and call this office.  8. Do not get the dressings wet. As regular bathing may be inconvenient, sponge baths are recommended. If you shower, keep the dressing dry.   9. When anesthesia wears off and if any discomfort should be present, apply an ice pack directly over the operated area for 15 minute intervals for several hours or until the pain leaves. (USE IN EXCESS OF 15 MINUTES COULD CAUSE FROSTBITE). Do not use hot water bags or electric pads. A convenient icepack can be made by placing ice cubes in a plastic bag and covering this with a towel.  10. If necessary, take a mild laxative before  retiring.  11. Wear your special boot anytime you put weight on your foot, even if it is just to walk to the bathroom and back. It will probably be a few weeks before you will be permitted to try regular shoes.  12. Having performed the operation, we are interested in a prompt recovery. Please cooperate by following the above instructions.  13. Please call to confirm your post-op appointment or call with any other questions.

## 2024-12-10 ENCOUNTER — TELEPHONE (OUTPATIENT)
Age: 84
End: 2024-12-10

## 2024-12-10 DIAGNOSIS — L02.619 ABSCESS OF TOE, UNSPECIFIED LATERALITY: Primary | ICD-10-CM

## 2024-12-10 NOTE — TELEPHONE ENCOUNTER
This patient did NOT have surgery yesterday. We saw her in office. I will attach our surgery scheduler and see if she can help with this.

## 2024-12-10 NOTE — TELEPHONE ENCOUNTER
Caller: Sushma    Doctor: Ghulam    Reason for call: Had toe amputation yesterday and was not scheduled a PO F/U- reviewed AVS but unsure when patient needs to return for PO     Call back#: [phone]

## 2024-12-10 NOTE — TELEPHONE ENCOUNTER
Patient was seen by Dr. Parmar yesterday and was recommended to have a partial vs total amputation of left 3rd toe.  Patient is unsure if she would want to have surgery and would like to see a different podiatrist for a second opinion.  Patient asking if Dr. Sanchez could refer her to another podiatrist?    Please follow up with patient to advise.

## 2024-12-23 ENCOUNTER — TELEPHONE (OUTPATIENT)
Dept: PODIATRY | Facility: CLINIC | Age: 84
End: 2024-12-23

## 2024-12-23 NOTE — TELEPHONE ENCOUNTER
Caller: Ashley Ambriz    Doctor: Cruzito Parmar DPM    Reason for call: Sushma's left 3rd toe is very red.   Sushma is scheduled on 12/26/24 for a second opinion.  Then she will schedule her surgery.  Can she soak her foot in epsom salts to help with the redness and her swelling and possibly the pain.    Call back#: [phone]

## 2025-01-05 ENCOUNTER — TELEPHONE (OUTPATIENT)
Dept: OTHER | Facility: OTHER | Age: 85
End: 2025-01-05

## 2025-01-05 NOTE — TELEPHONE ENCOUNTER
Patient is calling regarding cancelling an appointment.    Date/Time: 1/6/25 @ 9:15AM    Patient was rescheduled: YES [] NO [x]    Patient requesting call back to reschedule: YES [] NO [x]    Pt will call back to reschedule.

## 2025-01-05 NOTE — TELEPHONE ENCOUNTER
Pt called to inform that she is not sure the transportation van will cancel for tomorrow's appt due to the weather. She will be calling back in case of cancellation to let the office know.     NFA at the moment

## 2025-01-08 ENCOUNTER — TELEPHONE (OUTPATIENT)
Age: 85
End: 2025-01-08

## 2025-01-08 NOTE — TELEPHONE ENCOUNTER
Patient called to schedule 3mo pessary check from missed appt on 1/6 with Dr. Lopez.   Pt declined 1/10/25 at 12:00, no other appts available. S/w Selena, requested to send encounter to review schedule with Dr. Lopez. Pt aware office will reach out tomorrow to r/s.

## 2025-01-08 NOTE — TELEPHONE ENCOUNTER
Spoke w/patient.  Explained that her Dec. Appt was made in advance as requested/But when she canx her Sept appt and moved it out to November that changed when she was next due.      Pt is not due till Feb./But since she wanted to see Dr Lopez we had to make appt for March 20, 2025 for 3-4 mo Pessary check from her last appt on Nov. 7, 2024.      Pt does have next appt on her calendar at home for March.    Pt was told that we will not make several appts at a time in advance going forward.    Pt appreciated our patience.

## 2025-01-21 DIAGNOSIS — R60.0 PERIPHERAL EDEMA: ICD-10-CM

## 2025-01-21 RX ORDER — FUROSEMIDE 20 MG/1
20 TABLET ORAL DAILY
Qty: 90 TABLET | Refills: 1 | Status: SHIPPED | OUTPATIENT
Start: 2025-01-21

## 2025-01-21 NOTE — TELEPHONE ENCOUNTER
Reason for call:   [x] Refill   [] Prior Auth  [] Other:     Office:   [x] PCP/Provider - Mick Sanchez, DO   [] Specialty/Provider -     Medication: furosemide (LASIX) 20 mg tablet     Dose/Frequency:  TAKE 1 TABLET BY MOUTH EVERY DAY,     Quantity: 90    Pharmacy: [facility]/pharm[facility] #[id number] - [place] - [address] [place] [address]      Does the patient have enough for 3 days?   [] Yes   [x] No - Send as HP to POD

## 2025-02-03 DIAGNOSIS — F41.9 ANXIETY: ICD-10-CM

## 2025-02-04 ENCOUNTER — TELEPHONE (OUTPATIENT)
Age: 85
End: 2025-02-04

## 2025-02-04 RX ORDER — HYDROXYZINE HYDROCHLORIDE 10 MG/1
10 TABLET, FILM COATED ORAL DAILY PRN
Qty: 90 TABLET | Refills: 2 | Status: SHIPPED | OUTPATIENT
Start: 2025-02-04

## 2025-02-04 NOTE — TELEPHONE ENCOUNTER
Pt notified there are pending orders from July. Pt asked for her appointment to be cancelled and will call back to reschedule as soon as she gets her blood work done

## 2025-02-04 NOTE — TELEPHONE ENCOUNTER
Spoke with pt and schedule an appt as needed for pt for 2/11/2025 for a follow up with provider as needed. Does request if new routine blood work be placed before appt as needed.     Once in please call pt to advice.     Thank you,   Lidia

## 2025-02-04 NOTE — TELEPHONE ENCOUNTER
Patient called in regards to having an appointment with provider on 2/11 and she does not have blood work to get done by provider. Patient state that she has other blood work to get done by a different provider but that is not due until march. Patient wanted to inform provider.

## 2025-02-06 ENCOUNTER — TELEPHONE (OUTPATIENT)
Dept: FAMILY MEDICINE CLINIC | Facility: CLINIC | Age: 85
End: 2025-02-06

## 2025-02-10 ENCOUNTER — TELEPHONE (OUTPATIENT)
Dept: FAMILY MEDICINE CLINIC | Facility: CLINIC | Age: 85
End: 2025-02-10

## 2025-02-10 NOTE — TELEPHONE ENCOUNTER
Duplicate encounter created, please see telephone encounter from 02/06/2025 regarding Hydroxyzine PA status. Please review patient's chart to see if there is already an encounter regarding the medication in question and to document anything regarding this medication in regards to anything regarding the authorization process etc before creating another encounter Thank You.

## 2025-02-11 NOTE — TELEPHONE ENCOUNTER
PA for hydrOXYzine HCL (ATARAX) 10 mg [facility]    via    [x]CMM-KEY: [id number]  []Surescripts-Case ID #   []Availity-Auth ID # NDC #   []Faxed to plan   []Other website   []Phone call Case ID #     []PA sent as URGENT    All office notes, labs and other pertaining documents and studies sent. Clinical questions answered. Awaiting determination from insurance company.     Turnaround time for your insurance to make a decision on your Prior Authorization can take 7-21 business days.

## 2025-02-12 NOTE — TELEPHONE ENCOUNTER
PA for hydrOXYzine HCL (ATARAX) 10 mg DENIED    Reason:(Screenshot if applicable)        Message sent to office clinical pool Yes    Denial letter scanned into Media Yes    Appeal started No (Provider will need to decide if appeal is warranted and send clinical documentation to Prior Authorization Team for initiation.)    **Please follow up with your patient regarding denial and next steps**

## 2025-02-13 NOTE — TELEPHONE ENCOUNTER
Per pt she does not need this med all the time, she will be okay, she still has 8 pills from previous script.

## 2025-02-20 ENCOUNTER — TELEPHONE (OUTPATIENT)
Age: 85
End: 2025-02-20

## 2025-02-20 NOTE — TELEPHONE ENCOUNTER
Patient called because she had labs ordered last July and wanted to know if she can still get them done. Confirmed she had labs ordered this past July, she was scheduled to have them in January. The orders are still active and can be done anytime. They do not  until 2025.

## 2025-02-28 ENCOUNTER — TELEPHONE (OUTPATIENT)
Dept: LAB | Facility: HOSPITAL | Age: 85
End: 2025-02-28

## 2025-03-10 ENCOUNTER — TELEPHONE (OUTPATIENT)
Dept: SURGICAL ONCOLOGY | Facility: CLINIC | Age: 85
End: 2025-03-10

## 2025-03-10 NOTE — TELEPHONE ENCOUNTER
Called patient left voice message to have blood work done prior to appointment 3/18/25 with MARIE Laureano.

## 2025-03-13 ENCOUNTER — APPOINTMENT (OUTPATIENT)
Dept: LAB | Facility: HOSPITAL | Age: 85
End: 2025-03-13
Payer: MEDICARE

## 2025-03-13 DIAGNOSIS — G40.209 COMPLEX PARTIAL SEIZURE (HCC): ICD-10-CM

## 2025-03-13 DIAGNOSIS — E83.52 HYPERCALCEMIA: ICD-10-CM

## 2025-03-13 DIAGNOSIS — E21.3 HYPERPARATHYROIDISM (HCC): ICD-10-CM

## 2025-03-13 DIAGNOSIS — Z01.818 PREOP EXAMINATION: ICD-10-CM

## 2025-03-13 DIAGNOSIS — E78.00 HYPERCHOLESTEROLEMIA: ICD-10-CM

## 2025-03-13 DIAGNOSIS — R60.0 PERIPHERAL EDEMA: ICD-10-CM

## 2025-03-13 DIAGNOSIS — R73.9 HYPERGLYCEMIA: ICD-10-CM

## 2025-03-13 DIAGNOSIS — Z78.0 POSTMENOPAUSAL ESTROGEN DEFICIENCY: ICD-10-CM

## 2025-03-13 DIAGNOSIS — N18.31 STAGE 3A CHRONIC KIDNEY DISEASE (HCC): ICD-10-CM

## 2025-03-13 DIAGNOSIS — N81.11 MIDLINE CYSTOCELE: ICD-10-CM

## 2025-03-13 DIAGNOSIS — L97.524 SKIN ULCER OF THIRD TOE OF LEFT FOOT WITH NECROSIS OF BONE (HCC): ICD-10-CM

## 2025-03-13 DIAGNOSIS — E55.9 VITAMIN D DEFICIENCY: ICD-10-CM

## 2025-03-13 DIAGNOSIS — I10 ESSENTIAL HYPERTENSION: ICD-10-CM

## 2025-03-13 DIAGNOSIS — N32.81 OAB (OVERACTIVE BLADDER): ICD-10-CM

## 2025-03-13 LAB
25(OH)D3 SERPL-MCNC: 54.3 NG/ML (ref 30–100)
ALBUMIN SERPL BCG-MCNC: 4.4 G/DL (ref 3.5–5)
ALP SERPL-CCNC: 68 U/L (ref 34–104)
ALT SERPL W P-5'-P-CCNC: 13 U/L (ref 7–52)
ANION GAP SERPL CALCULATED.3IONS-SCNC: 9 MMOL/L (ref 4–13)
AST SERPL W P-5'-P-CCNC: 26 U/L (ref 13–39)
BILIRUB SERPL-MCNC: 0.73 MG/DL (ref 0.2–1)
BUN SERPL-MCNC: 18 MG/DL (ref 5–25)
CALCIUM SERPL-MCNC: 9.7 MG/DL (ref 8.4–10.2)
CHLORIDE SERPL-SCNC: 106 MMOL/L (ref 96–108)
CHOLEST SERPL-MCNC: 192 MG/DL (ref ?–200)
CO2 SERPL-SCNC: 27 MMOL/L (ref 21–32)
CREAT SERPL-MCNC: 0.82 MG/DL (ref 0.6–1.3)
CRP SERPL QL: 21.5 MG/L
ERYTHROCYTE [DISTWIDTH] IN BLOOD BY AUTOMATED COUNT: 13.6 % (ref 11.6–15.1)
EST. AVERAGE GLUCOSE BLD GHB EST-MCNC: 137 MG/DL
GFR SERPL CREATININE-BSD FRML MDRD: 66 ML/MIN/1.73SQ M
GLUCOSE SERPL-MCNC: 90 MG/DL (ref 65–140)
HBA1C MFR BLD: 6.4 %
HCT VFR BLD AUTO: 44.4 % (ref 34.8–46.1)
HDLC SERPL-MCNC: 62 MG/DL
HGB BLD-MCNC: 13.9 G/DL (ref 11.5–15.4)
LDLC SERPL CALC-MCNC: 115 MG/DL (ref 0–100)
MCH RBC QN AUTO: 29.8 PG (ref 26.8–34.3)
MCHC RBC AUTO-ENTMCNC: 31.3 G/DL (ref 31.4–37.4)
MCV RBC AUTO: 95 FL (ref 82–98)
PLATELET # BLD AUTO: 254 THOUSANDS/UL (ref 149–390)
PMV BLD AUTO: 10.6 FL (ref 8.9–12.7)
POTASSIUM SERPL-SCNC: 4.7 MMOL/L (ref 3.5–5.3)
PROT SERPL-MCNC: 7.6 G/DL (ref 6.4–8.4)
PTH-INTACT SERPL-MCNC: 41 PG/ML (ref 12–88)
RBC # BLD AUTO: 4.66 MILLION/UL (ref 3.81–5.12)
SODIUM SERPL-SCNC: 142 MMOL/L (ref 135–147)
TRIGL SERPL-MCNC: 73 MG/DL (ref ?–150)
TSH SERPL DL<=0.05 MIU/L-ACNC: 1.55 UIU/ML (ref 0.45–4.5)
WBC # BLD AUTO: 8.54 THOUSAND/UL (ref 4.31–10.16)

## 2025-03-13 PROCEDURE — 86140 C-REACTIVE PROTEIN: CPT

## 2025-03-13 PROCEDURE — 82652 VIT D 1 25-DIHYDROXY: CPT

## 2025-03-13 PROCEDURE — 83036 HEMOGLOBIN GLYCOSYLATED A1C: CPT

## 2025-03-13 PROCEDURE — 82306 VITAMIN D 25 HYDROXY: CPT

## 2025-03-13 PROCEDURE — 80061 LIPID PANEL: CPT

## 2025-03-13 PROCEDURE — 80053 COMPREHEN METABOLIC PANEL: CPT

## 2025-03-13 PROCEDURE — 36415 COLL VENOUS BLD VENIPUNCTURE: CPT

## 2025-03-13 PROCEDURE — 83970 ASSAY OF PARATHORMONE: CPT

## 2025-03-13 PROCEDURE — 84443 ASSAY THYROID STIM HORMONE: CPT

## 2025-03-13 PROCEDURE — 85027 COMPLETE CBC AUTOMATED: CPT

## 2025-03-14 ENCOUNTER — TELEPHONE (OUTPATIENT)
Dept: SURGICAL ONCOLOGY | Facility: CLINIC | Age: 85
End: 2025-03-14

## 2025-03-14 ENCOUNTER — RESULTS FOLLOW-UP (OUTPATIENT)
Dept: ENDOCRINOLOGY | Facility: CLINIC | Age: 85
End: 2025-03-14

## 2025-03-14 LAB — 1,25(OH)2D SERPL-MCNC: 29.9 PG/ML (ref 5–200)

## 2025-03-14 NOTE — TELEPHONE ENCOUNTER
Patient called back about results. Unable to connect with clinical. Please follow up with patient. Thank you

## 2025-03-14 NOTE — TELEPHONE ENCOUNTER
Patient returning a call. I reviewed above message per provider. Patient verbalized understanding. Voiced no further questions/ concerns.

## 2025-03-14 NOTE — TELEPHONE ENCOUNTER
Called spoke to patient, offered appointment 3/18/25 to be switched to Virtual with MARIE Laureano. Confirmed date and time with patient, she agreed to have a phone call.

## 2025-03-17 ENCOUNTER — RA CDI HCC (OUTPATIENT)
Dept: OTHER | Facility: HOSPITAL | Age: 85
End: 2025-03-17

## 2025-03-18 ENCOUNTER — TELEMEDICINE (OUTPATIENT)
Dept: SURGICAL ONCOLOGY | Facility: CLINIC | Age: 85
End: 2025-03-18
Payer: MEDICARE

## 2025-03-18 DIAGNOSIS — Z98.890 S/P PARATHYROIDECTOMY: ICD-10-CM

## 2025-03-18 DIAGNOSIS — Z90.89 S/P PARATHYROIDECTOMY: ICD-10-CM

## 2025-03-18 DIAGNOSIS — D35.1 PARATHYROID ADENOMA: Primary | ICD-10-CM

## 2025-03-18 PROCEDURE — 99212 OFFICE O/P EST SF 10 MIN: CPT

## 2025-03-18 NOTE — PROGRESS NOTES
Virtual Regular VisitName: Sushma Ambriz      : 1940      MRN: [id number]  Encounter Provider: MARIE Doran  Encounter Date: 3/18/2025   Encounter department: [facility]  :  Assessment & Plan  Parathyroid adenoma  Hyperparathyroidism appears to have resolved with surgery. Her PTH, calcium and vitamin D levels are all in normal range. No further follow-up is needed.       S/P parathyroidectomy           History of Present Illness     This is an 83 y/o female with a history of primary hyperparathyroidism, with 2 targets identified on CT parathyroid study.  She underwent 3-gland excision in 2024.  She has had labs drawn in anticipation of this appointment.      Review of Systems   Constitutional:  Positive for fatigue. Negative for activity change and appetite change.   HENT: Negative.  Negative for trouble swallowing and voice change.    Gastrointestinal: Negative.    Musculoskeletal:  Positive for arthralgias.   Neurological: Negative.    Hematological: Negative.    Psychiatric/Behavioral: Negative.         Objective   LMP  (LMP Unknown)     Physical Exam  Not performed    Labs  Collected 3/13/2025    Component Value Units   Vitamin D 1, 25 Dihydroxy 29.9  pg/mL     Component Value Units   PTH 41.0 pg/mL     Component Value Units   Calcium 9.7 Mg/dL         Administrative Statements   Encounter provider MARIE Doran    The Patient is located at Home and in the following state in which I hold an active license [place].    The patient was identified by name and date of birth. Sushma Ambriz was informed that this is a telemedicine visit and that the visit is being conducted through Telephone.  My office door was closed. No one else was in the room.  She acknowledged consent and understanding of privacy and security of the video platform. The patient has agreed to participate and understands they can discontinue the visit at any  time.    I have spent a total time of 8 minutes in caring for this patient on the day of the visit/encounter including Prognosis, Counseling / Coordination of care, Documenting in the medical record, Reviewing/placing orders in the medical record (including tests, medications, and/or procedures), and Obtaining or reviewing history  , not including the time spent for establishing the audio/video connection.

## 2025-03-18 NOTE — ASSESSMENT & PLAN NOTE
Hyperparathyroidism appears to have resolved with surgery. Her PTH, calcium and vitamin D levels are all in normal range. No further follow-up is needed.

## 2025-03-20 ENCOUNTER — OFFICE VISIT (OUTPATIENT)
Dept: GYNECOLOGY | Facility: CLINIC | Age: 85
End: 2025-03-20
Payer: MEDICARE

## 2025-03-20 VITALS — SYSTOLIC BLOOD PRESSURE: 142 MMHG | DIASTOLIC BLOOD PRESSURE: 90 MMHG | WEIGHT: 171.8 LBS | BODY MASS INDEX: 29.49 KG/M2

## 2025-03-20 DIAGNOSIS — N81.11 MIDLINE CYSTOCELE: ICD-10-CM

## 2025-03-20 DIAGNOSIS — N81.4 UTERINE PROLAPSE: Primary | ICD-10-CM

## 2025-03-20 DIAGNOSIS — N95.0 POST-MENOPAUSAL BLEEDING: ICD-10-CM

## 2025-03-20 DIAGNOSIS — N89.8 VAGINAL DISCHARGE: ICD-10-CM

## 2025-03-20 PROCEDURE — 99213 OFFICE O/P EST LOW 20 MIN: CPT | Performed by: OBSTETRICS & GYNECOLOGY

## 2025-03-20 NOTE — PROGRESS NOTES
Pessary    Date/Time: 3/20/2025 10:00 AM    Performed by: Berenice Lopez DO  Authorized by: Berenice Lopez DO  Universal Protocol:  Consent: Verbal consent obtained.  Risks and benefits: risks, benefits and alternatives were discussed  Consent given by: patient  Patient understanding: patient states understanding of the procedure being performed  Patient identity confirmed: verbally with patient    Indication:     Indication for pessary: uterine prolapse and cystocele    Pre-procedure:     Pessary procedure type:  Cleaning/check  Problems:     Pessary complications: bleeding and vaginal discharge    Procedure:     Pessary type:  Ring w/ support    Patient tolerance of procedure:  Tolerated well, no immediate complications  Comments:     Procedure comments:  She has a very small amount of bleeding and discharge everyday.  She had endometrial polyps noted on ultrasound in 2023.  Emb done in 2021 was insufficient.  She did not want repeat biopsy.  I also recommended a D&C but she has not been interested.    Vagina is without excoriation.    Discussed repeat US and emb.  She is agreeable to ultrasound.  She may consider D&C.  Will get the ultrasound and we will review results.

## 2025-03-24 ENCOUNTER — OFFICE VISIT (OUTPATIENT)
Dept: FAMILY MEDICINE CLINIC | Facility: CLINIC | Age: 85
End: 2025-03-24
Payer: MEDICARE

## 2025-03-24 VITALS
OXYGEN SATURATION: 96 % | DIASTOLIC BLOOD PRESSURE: 82 MMHG | BODY MASS INDEX: 29.02 KG/M2 | SYSTOLIC BLOOD PRESSURE: 130 MMHG | WEIGHT: 170 LBS | HEART RATE: 73 BPM | HEIGHT: 64 IN

## 2025-03-24 DIAGNOSIS — Z90.89 S/P PARATHYROIDECTOMY: ICD-10-CM

## 2025-03-24 DIAGNOSIS — I10 ESSENTIAL HYPERTENSION: ICD-10-CM

## 2025-03-24 DIAGNOSIS — R73.9 HYPERGLYCEMIA: ICD-10-CM

## 2025-03-24 DIAGNOSIS — M81.0 AGE-RELATED OSTEOPOROSIS WITHOUT CURRENT PATHOLOGICAL FRACTURE: ICD-10-CM

## 2025-03-24 DIAGNOSIS — L97.524 SKIN ULCER OF THIRD TOE OF LEFT FOOT WITH NECROSIS OF BONE (HCC): ICD-10-CM

## 2025-03-24 DIAGNOSIS — R60.0 PERIPHERAL EDEMA: ICD-10-CM

## 2025-03-24 DIAGNOSIS — Z98.890 S/P PARATHYROIDECTOMY: ICD-10-CM

## 2025-03-24 DIAGNOSIS — G40.209 COMPLEX PARTIAL SEIZURE (HCC): ICD-10-CM

## 2025-03-24 DIAGNOSIS — E21.3 HYPERPARATHYROIDISM (HCC): Primary | ICD-10-CM

## 2025-03-24 DIAGNOSIS — E55.9 VITAMIN D DEFICIENCY: ICD-10-CM

## 2025-03-24 DIAGNOSIS — Z99.89 USES WALKER: ICD-10-CM

## 2025-03-24 DIAGNOSIS — E83.52 HYPERCALCEMIA: ICD-10-CM

## 2025-03-24 DIAGNOSIS — E78.00 HYPERCHOLESTEROLEMIA: ICD-10-CM

## 2025-03-24 PROBLEM — S37.69XA: Status: RESOLVED | Noted: 2024-11-07 | Resolved: 2025-03-24

## 2025-03-24 PROBLEM — D35.1 PARATHYROID ADENOMA: Status: RESOLVED | Noted: 2024-03-13 | Resolved: 2025-03-24

## 2025-03-24 PROBLEM — R06.09 DYSPNEA ON EXERTION: Status: RESOLVED | Noted: 2021-05-19 | Resolved: 2025-03-24

## 2025-03-24 PROBLEM — F17.211 CIGARETTE NICOTINE DEPENDENCE IN REMISSION: Status: RESOLVED | Noted: 2022-03-01 | Resolved: 2025-03-24

## 2025-03-24 PROCEDURE — G2211 COMPLEX E/M VISIT ADD ON: HCPCS | Performed by: FAMILY MEDICINE

## 2025-03-24 PROCEDURE — 99214 OFFICE O/P EST MOD 30 MIN: CPT | Performed by: FAMILY MEDICINE

## 2025-03-24 NOTE — ASSESSMENT & PLAN NOTE
Stable continue present therapy  Orders:  •  CBC; Future  •  Comprehensive metabolic panel; Future  •  Hemoglobin A1C; Future  •  Lipid Panel with Direct LDL reflex; Future  •  TSH, 3rd generation with Free T4 reflex; Future  •  UA (URINE) with reflex to Scope; Future

## 2025-03-24 NOTE — ASSESSMENT & PLAN NOTE
Diet controlled  Orders:  •  CBC; Future  •  Comprehensive metabolic panel; Future  •  Hemoglobin A1C; Future  •  Lipid Panel with Direct LDL reflex; Future  •  TSH, 3rd generation with Free T4 reflex; Future  •  UA (URINE) with reflex to Scope; Future

## 2025-03-24 NOTE — ASSESSMENT & PLAN NOTE
Able continue present therapy  Orders:  •  CBC; Future  •  Comprehensive metabolic panel; Future  •  Hemoglobin A1C; Future  •  Lipid Panel with Direct LDL reflex; Future  •  TSH, 3rd generation with Free T4 reflex; Future  •  UA (URINE) with reflex to Scope; Future

## 2025-03-24 NOTE — ASSESSMENT & PLAN NOTE
Follows with surgical oncology  Orders:  •  CBC; Future  •  Comprehensive metabolic panel; Future  •  Hemoglobin A1C; Future  •  Lipid Panel with Direct LDL reflex; Future  •  TSH, 3rd generation with Free T4 reflex; Future  •  UA (URINE) with reflex to Scope; Future

## 2025-03-24 NOTE — ASSESSMENT & PLAN NOTE
That is post thyroidectomy, stable follows with surgical oncology as well as endocrinology  Orders:  •  CBC; Future  •  Comprehensive metabolic panel; Future  •  Hemoglobin A1C; Future  •  Lipid Panel with Direct LDL reflex; Future  •  TSH, 3rd generation with Free T4 reflex; Future  •  UA (URINE) with reflex to Scope; Future

## 2025-03-24 NOTE — PROGRESS NOTES
Name: Sushma Ambriz      : 1940      MRN: [id number]  Encounter Provider: Mick Sanchez DO  Encounter Date: 3/24/2025   Encounter department: [facility]  :  Assessment & Plan  Hyperparathyroidism (HCC)  That is post thyroidectomy, stable follows with surgical oncology as well as endocrinology  Orders:  •  CBC; Future  •  Comprehensive metabolic panel; Future  •  Hemoglobin A1C; Future  •  Lipid Panel with Direct LDL reflex; Future  •  TSH, 3rd generation with Free T4 reflex; Future  •  UA (URINE) with reflex to Scope; Future    Essential hypertension  Stable continue present therapy  Orders:  •  CBC; Future  •  Comprehensive metabolic panel; Future  •  Hemoglobin A1C; Future  •  Lipid Panel with Direct LDL reflex; Future  •  TSH, 3rd generation with Free T4 reflex; Future  •  UA (URINE) with reflex to Scope; Future    Age-related osteoporosis without current pathological fracture  Able continue Fosamax therapy up-to-date with DEXA scan  Orders:  •  CBC; Future  •  Comprehensive metabolic panel; Future  •  Hemoglobin A1C; Future  •  Lipid Panel with Direct LDL reflex; Future  •  TSH, 3rd generation with Free T4 reflex; Future  •  UA (URINE) with reflex to Scope; Future    S/P parathyroidectomy  Follows with surgical oncology  Orders:  •  CBC; Future  •  Comprehensive metabolic panel; Future  •  Hemoglobin A1C; Future  •  Lipid Panel with Direct LDL reflex; Future  •  TSH, 3rd generation with Free T4 reflex; Future  •  UA (URINE) with reflex to Scope; Future    Complex partial seizure (HCC)  Stable follows with neurology  Orders:  •  CBC; Future  •  Comprehensive metabolic panel; Future  •  Hemoglobin A1C; Future  •  Lipid Panel with Direct LDL reflex; Future  •  TSH, 3rd generation with Free T4 reflex; Future  •  UA (URINE) with reflex to Scope; Future    Hypercalcemia  Solved after parathyroidectomy  Orders:  •  CBC; Future  •  Comprehensive metabolic panel; Future  •   Hemoglobin A1C; Future  •  Lipid Panel with Direct LDL reflex; Future  •  TSH, 3rd generation with Free T4 reflex; Future  •  UA (URINE) with reflex to Scope; Future    Hypercholesterolemia  Diet controlled  Orders:  •  CBC; Future  •  Comprehensive metabolic panel; Future  •  Hemoglobin A1C; Future  •  Lipid Panel with Direct LDL reflex; Future  •  TSH, 3rd generation with Free T4 reflex; Future  •  UA (URINE) with reflex to Scope; Future    Hyperglycemia  Diet controlled  Orders:  •  CBC; Future  •  Comprehensive metabolic panel; Future  •  Hemoglobin A1C; Future  •  Lipid Panel with Direct LDL reflex; Future  •  TSH, 3rd generation with Free T4 reflex; Future  •  UA (URINE) with reflex to Scope; Future    Peripheral edema  Able continue present therapy  Orders:  •  CBC; Future  •  Comprehensive metabolic panel; Future  •  Hemoglobin A1C; Future  •  Lipid Panel with Direct LDL reflex; Future  •  TSH, 3rd generation with Free T4 reflex; Future  •  UA (URINE) with reflex to Scope; Future    Vitamin D deficiency  Stable continue present therapy  Orders:  •  CBC; Future  •  Comprehensive metabolic panel; Future  •  Hemoglobin A1C; Future  •  Lipid Panel with Direct LDL reflex; Future  •  TSH, 3rd generation with Free T4 reflex; Future  •  UA (URINE) with reflex to Scope; Future    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 Parmar       Uses walker                History of Present Illness   Patient here for routine follow-up without any new medical complaints or concerns blood work was reviewed.      Review of Systems   Constitutional: Negative.    HENT: Negative.     Eyes: Negative.    Respiratory: Negative.     Cardiovascular: Negative.    Gastrointestinal: Negative.    Endocrine: Negative.    Genitourinary: Negative.    Musculoskeletal: Negative.    Skin: Negative.    Allergic/Immunologic: Negative.    Neurological: Negative.    Hematological: Negative.   "  Psychiatric/Behavioral: Negative.         Objective   /82   Pulse 73   Ht 5' 4\" (1.626 m)   Wt 77.1 kg (170 lb)   LMP  (LMP Unknown)   SpO2 96%   BMI 29.18 kg/m²      Physical Exam  Vitals and nursing note reviewed.   Constitutional:       Appearance: Normal appearance.   HENT:      Head: Normocephalic and atraumatic.      Mouth/Throat:      Mouth: Mucous membranes are moist.   Eyes:      General: No scleral icterus.  Neck:      Vascular: No carotid bruit.   Cardiovascular:      Rate and Rhythm: Normal rate and regular rhythm.      Heart sounds: Normal heart sounds.   Pulmonary:      Effort: Pulmonary effort is normal.      Breath sounds: Normal breath sounds.   Abdominal:      General: Bowel sounds are normal.      Palpations: Abdomen is soft.      Tenderness: There is no abdominal tenderness.   Musculoskeletal:      Cervical back: Neck supple.      Right lower leg: No edema.      Left lower leg: No edema.   Skin:     General: Skin is warm and dry.      Comments: Left foot dressing clean and dry   Neurological:      General: No focal deficit present.      Mental Status: She is alert.      Gait: Gait abnormal.      Comments: Ambulates with walker   Psychiatric:         Mood and Affect: Mood normal.         "

## 2025-03-24 NOTE — PATIENT INSTRUCTIONS
Continue present therapy  Follow with all specialist per their instructions  Return in 6 months for office visit, blood work, and AWV sooner if needed

## 2025-03-24 NOTE — ASSESSMENT & PLAN NOTE
Stable follows with neurology  Orders:  •  CBC; Future  •  Comprehensive metabolic panel; Future  •  Hemoglobin A1C; Future  •  Lipid Panel with Direct LDL reflex; Future  •  TSH, 3rd generation with Free T4 reflex; Future  •  UA (URINE) with reflex to Scope; Future

## 2025-03-24 NOTE — ASSESSMENT & PLAN NOTE
Solved after parathyroidectomy  Orders:  •  CBC; Future  •  Comprehensive metabolic panel; Future  •  Hemoglobin A1C; Future  •  Lipid Panel with Direct LDL reflex; Future  •  TSH, 3rd generation with Free T4 reflex; Future  •  UA (URINE) with reflex to Scope; Future

## 2025-03-24 NOTE — ASSESSMENT & PLAN NOTE
Able continue Fosamax therapy up-to-date with DEXA scan  Orders:  •  CBC; Future  •  Comprehensive metabolic panel; Future  •  Hemoglobin A1C; Future  •  Lipid Panel with Direct LDL reflex; Future  •  TSH, 3rd generation with Free T4 reflex; Future  •  UA (URINE) with reflex to Scope; Future

## 2025-03-26 ENCOUNTER — TELEPHONE (OUTPATIENT)
Dept: GYNECOLOGY | Facility: CLINIC | Age: 85
End: 2025-03-26

## 2025-03-26 DIAGNOSIS — N89.8 VAGINAL DISCHARGE: ICD-10-CM

## 2025-03-26 NOTE — TELEPHONE ENCOUNTER
Pt asking for Oxyquinoline-Sod Lauryl Sulf 0.025-0.01 % GEL to be called in to   [id number] - [place] - [address]   She says she was given a paper script but now wants it to be sent electronically

## 2025-04-01 DIAGNOSIS — B35.3 TINEA PEDIS OF BOTH FEET: ICD-10-CM

## 2025-04-04 DIAGNOSIS — B35.3 TINEA PEDIS OF BOTH FEET: ICD-10-CM

## 2025-04-06 DIAGNOSIS — M81.0 AGE-RELATED OSTEOPOROSIS WITHOUT CURRENT PATHOLOGICAL FRACTURE: ICD-10-CM

## 2025-04-06 RX ORDER — PRENATAL VIT 91/IRON/FOLIC/DHA 28-975-200
1 COMBINATION PACKAGE (EA) ORAL 2 TIMES DAILY
Qty: 42 G | Refills: 0 | OUTPATIENT
Start: 2025-04-06

## 2025-04-07 RX ORDER — ALENDRONATE SODIUM 70 MG/1
70 TABLET ORAL
Qty: 12 TABLET | Refills: 3 | Status: SHIPPED | OUTPATIENT
Start: 2025-04-07

## 2025-04-18 ENCOUNTER — TELEPHONE (OUTPATIENT)
Age: 85
End: 2025-04-18

## 2025-04-18 DIAGNOSIS — M81.0 AGE-RELATED OSTEOPOROSIS WITHOUT CURRENT PATHOLOGICAL FRACTURE: ICD-10-CM

## 2025-04-18 DIAGNOSIS — E21.3 HYPERPARATHYROIDISM (HCC): Primary | ICD-10-CM

## 2025-04-18 NOTE — TELEPHONE ENCOUNTER
"Patient has follow up appointment scheduled for August States \"Can you mail me my lab orders and put on when I should complete them because I have so much going on and I  need everything wrote down\"    Please mail patient lab orders and explain when they should be completed.  "

## 2025-04-18 NOTE — TELEPHONE ENCOUNTER
Ordered PTH, vitamin d and CMP. Recommend she complete these labs about 1 week prior to her appointment on 8/26.

## 2025-04-22 ENCOUNTER — TELEPHONE (OUTPATIENT)
Dept: OTHER | Facility: OTHER | Age: 85
End: 2025-04-22

## 2025-04-22 NOTE — TELEPHONE ENCOUNTER
Patient is calling regarding cancelling an appointment.    Date/Time: 4/23/25  11:40 am    Patient was rescheduled: YES [] NO [x]    Patient requesting call back to reschedule: YES [x] NO []

## 2025-05-14 ENCOUNTER — TELEPHONE (OUTPATIENT)
Age: 85
End: 2025-05-14

## 2025-05-14 NOTE — TELEPHONE ENCOUNTER
Patient called in and stated that she would like to go to a different [facility] doctor. Patient stated that she went to saw   CARDIO OFFICE VISIT SHORT [facility] [[id number]] Copay: $0.00   Provider: Philly Beckett MD    And she was 15 mins late due to the van running behind. She stated that no one was around in the office and she is very upset.  Patient would like to go to a closer [facility] doctor and she doesn't want to go back to see Philly Beckett. She stated that they charged for the office visit and she was turned away.    Please call patient and advise who else she can go to. [phone].    Thank you

## 2025-05-21 NOTE — PROGRESS NOTES
AMB US Pelvic Non OB    Date/Time: 5/22/2025 1:00 PM    Performed by: Anna Ugalde  Authorized by: Berenice Lopez DO    Universal Protocol:  Consent: Verbal consent obtained  Consent given by: patient  Timeout called at: 5/22/2025 12:58 PM.  Patient understanding: patient states understanding of the procedure being performed  Patient identity confirmed: verbally with patient    Procedure details:     SIS Procedure: No    Technique:  Transvaginal US, Non-OB    Position: lithotomy exam    Uterine findings:     Length (cm): 7.66    Height (cm):  3.86    Width (cm):  4.7    Endometrial stripe: identified      Endometrium thickness (mm):  1.7  Left ovary findings:     Left ovary:  Visualized    Length (cm): 5.69    Height (cm): 4.33    Width (cm): 4.56  Right ovary findings:     Right ovary:  Visualized    Length (cm): 2.59    Height (cm): 1.46    Width (cm): 1.73  Other findings:     Free pelvic fluid: not identified      Free peritoneal fluid: not identified    Post-Procedure Details:     Impression:  Anteverted uterus is inhomogeneous throughout without fibroids noted. The endometrium contains fluid and also demonstrates two echogenic masses which most likely represent endometrial polyps. The left ovary demonstrates a 4.0cm with a thick septation. The right ovary demonstrates a 1.4cm simple cyst, stable. No free fluid.     Tolerance:  Tolerated well, no immediate complications    Complications: no complications    Additional Procedure Comments:      Uncovet F8 E8C-RS transvaginal transducer Serial # [id number] was used to perform the examination today and subsequently followed with high level disinfection utilizing Trophon EPR procedure.     Ultrasound performed at:     [facility]  [address] [place] [id number]  Phone:  [phone]  Fax:  [phone]

## 2025-05-22 ENCOUNTER — OFFICE VISIT (OUTPATIENT)
Dept: GYNECOLOGY | Facility: CLINIC | Age: 85
End: 2025-05-22

## 2025-05-22 ENCOUNTER — ULTRASOUND (OUTPATIENT)
Dept: GYNECOLOGY | Facility: CLINIC | Age: 85
End: 2025-05-22

## 2025-05-22 DIAGNOSIS — N83.202 LEFT OVARIAN CYST: ICD-10-CM

## 2025-05-22 DIAGNOSIS — N81.11 MIDLINE CYSTOCELE: ICD-10-CM

## 2025-05-22 DIAGNOSIS — N95.0 POST-MENOPAUSAL BLEEDING: ICD-10-CM

## 2025-05-22 DIAGNOSIS — N84.0 ENDOMETRIAL POLYP: ICD-10-CM

## 2025-05-22 DIAGNOSIS — N83.201 RIGHT OVARIAN CYST: ICD-10-CM

## 2025-05-22 DIAGNOSIS — N83.201 RIGHT OVARIAN CYST: Primary | ICD-10-CM

## 2025-05-22 DIAGNOSIS — N81.4 UTERINE PROLAPSE: Primary | ICD-10-CM

## 2025-05-22 DIAGNOSIS — D39.12 NEOPLASM OF UNCERTAIN BEHAVIOR OF LEFT OVARY: ICD-10-CM

## 2025-05-22 DIAGNOSIS — N94.89 ADNEXAL MASS: ICD-10-CM

## 2025-05-22 NOTE — PROGRESS NOTES
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  I believe that she really needs a D&C to remove the polyps however she has been reluctant to do this.  She was considering endometrial biopsy but then declined.    Left adnexal mass-I explained that this needs further workup.  I ordered a  and we will repeat the at the end of July.  If this needs removal, the D&C or possibly a hysterectomy could be done at the same time.  She understands this and is agreeable.    We have discussed vaginal moisturizer.  I ordered Trimo-Pal however the pharmacy could not obtain it for her.  Gave her a list of over-the-counter vaginal moisturizers that would also be helpful.  History of Present Illness   HPI  Sushma Ambriz is a 84 y.o. female who presents for ultrasound, possible EMB and pessary check.  She has had a ring with support pessary for uterine prolapse and cystocele.  She has also had bleeding for the past several years.  She had a biopsy in  that was insufficient and on ultrasound in , 2 endometrial polyps were noted because there was fluid in her endometrium.  She has not wanted a repeat biopsy or a D&C hysteroscopy as recommended.    She does have spotting or light bleeding just about every day    Ultrasound today shows endometrial thickness measuring 1.7 mm.  There is fluid in the endometrium with 2 echogenic masses, consistent with polyps.  Left ovary shows a 4 cm cyst with a thick  septation and left ovary shows a 1.4 cm simple cyst..      Review of Systems   Constitutional: Negative.    Gastrointestinal: Negative.    Genitourinary:  Positive for vaginal bleeding.          Objective   LMP  (LMP Unknown)      Physical Exam  Genitourinary:     General: Normal vulva.      Comments: Ring with support pessary removed without difficulty, moderate amount of dark blood noted.  This was cleaned and reinserted without difficulty.

## 2025-06-07 DIAGNOSIS — R60.0 PERIPHERAL EDEMA: ICD-10-CM

## 2025-06-07 DIAGNOSIS — G40.909 SEIZURE DISORDER (HCC): ICD-10-CM

## 2025-06-08 RX ORDER — FUROSEMIDE 20 MG/1
20 TABLET ORAL DAILY
Qty: 90 TABLET | Refills: 1 | Status: SHIPPED | OUTPATIENT
Start: 2025-06-08

## 2025-06-09 RX ORDER — LEVETIRACETAM 500 MG/1
500 TABLET ORAL 2 TIMES DAILY
Qty: 180 TABLET | Refills: 0 | Status: SHIPPED | OUTPATIENT
Start: 2025-06-09

## 2025-07-31 ENCOUNTER — TELEPHONE (OUTPATIENT)
Dept: GYNECOLOGY | Facility: CLINIC | Age: 85
End: 2025-07-31

## (undated) DEVICE — GLOVE INDICATOR PI UNDERGLOVE SZ 7 BLUE

## (undated) DEVICE — SUT MONOCRYL 5-0 P-3 18 IN Y493G

## (undated) DEVICE — MINOR PROCEDURE DRAPE: Brand: CONVERTORS

## (undated) DEVICE — SURGICEL 4 X 8IN

## (undated) DEVICE — ELECTRODE BLADE MOD E-Z CLEAN 2.5IN 6.4CM -0012M

## (undated) DEVICE — SUT VICRYL 4-0 PS-2 27 IN J426H

## (undated) DEVICE — DRAPE SURGIKIT SADDLE BAG

## (undated) DEVICE — SUT VICRYL 3-0 SH 27 IN J416H

## (undated) DEVICE — PACK UNIVERSAL NECK

## (undated) DEVICE — 3M™ STERI-STRIP™ REINFORCED ADHESIVE SKIN CLOSURES, R1547, 1/2 IN X 4 IN (12 MM X 100 MM), 6 STRIPS/ENVELOPE: Brand: 3M™ STERI-STRIP™

## (undated) DEVICE — LIGACLIP MCA MULTIPLE CLIP APPLIERS, 20 SMALL CLIPS: Brand: LIGACLIP

## (undated) DEVICE — 3M™ STERI-STRIP™ COMPOUND BENZOIN TINCTURE 40 BAGS/CARTON 4 CARTONS/CASE C1544: Brand: 3M™ STERI-STRIP™

## (undated) DEVICE — GLOVE SRG BIOGEL ECLIPSE 7